# Patient Record
Sex: MALE | Race: WHITE | Employment: OTHER | RURAL
[De-identification: names, ages, dates, MRNs, and addresses within clinical notes are randomized per-mention and may not be internally consistent; named-entity substitution may affect disease eponyms.]

---

## 2017-01-18 ENCOUNTER — OFFICE VISIT (OUTPATIENT)
Dept: FAMILY MEDICINE CLINIC | Age: 77
End: 2017-01-18

## 2017-01-18 VITALS
HEART RATE: 60 BPM | TEMPERATURE: 97.3 F | BODY MASS INDEX: 25.2 KG/M2 | DIASTOLIC BLOOD PRESSURE: 76 MMHG | HEIGHT: 70 IN | RESPIRATION RATE: 18 BRPM | SYSTOLIC BLOOD PRESSURE: 136 MMHG | OXYGEN SATURATION: 99 % | WEIGHT: 176 LBS

## 2017-01-18 DIAGNOSIS — N40.1 BPH WITH OBSTRUCTION/LOWER URINARY TRACT SYMPTOMS: ICD-10-CM

## 2017-01-18 DIAGNOSIS — E29.1 HYPOGONADISM MALE: ICD-10-CM

## 2017-01-18 DIAGNOSIS — Z00.00 ROUTINE GENERAL MEDICAL EXAMINATION AT A HEALTH CARE FACILITY: Primary | ICD-10-CM

## 2017-01-18 DIAGNOSIS — Z13.39 SCREENING FOR ALCOHOLISM: ICD-10-CM

## 2017-01-18 DIAGNOSIS — N13.8 BPH WITH OBSTRUCTION/LOWER URINARY TRACT SYMPTOMS: ICD-10-CM

## 2017-01-18 DIAGNOSIS — Z23 ENCOUNTER FOR IMMUNIZATION: ICD-10-CM

## 2017-01-18 RX ORDER — TESTOSTERONE CYPIONATE 200 MG/ML
INJECTION INTRAMUSCULAR
Qty: 10 ML | Refills: 0 | Status: SHIPPED | OUTPATIENT
Start: 2017-01-18 | End: 2017-06-02 | Stop reason: SDUPTHER

## 2017-01-18 NOTE — PROGRESS NOTES
This is an Initial Medicare Annual Wellness Exam (AWV) (Performed 12 months after IPPE or effective date of Medicare Part B enrollment, Once in a lifetime)    I have reviewed the patient's medical history in detail and updated the computerized patient record. History   The patient comes in today because he is running out of his testosterone and request a 2 cc barrel rather than a 3 cc as it will be even easier for him to inject himself. He has been feeling better on the testosterone therapy and is still being followed in [place] for his esophageal varices and primary biliary cirrhosis. The patient has been experiencing urinary frequency and occasional urgency but no dysuria. Past Medical History   Diagnosis Date    Allergic     Anemia     Esophageal varices ([facility] [address] March 2016     banded x 6    GERD (gastroesophageal reflux disease)     GI bleed March 2016    Hyperlipidemia     Hypogonadism male     Primary biliary cirrhosis ([facility] [address]     Vitamin D deficiency       Past Surgical History   Procedure Laterality Date    Hx septoplasty                            Biopsy    Hx heent       deviated septum repair     Current Outpatient Prescriptions   Medication Sig Dispense Refill    testosterone cypionate (DEPOTESTOTERONE CYPIONATE) 200 mg/mL injection 0.5 mls Every  week  Indications: ANDROGEN DEFICIENCY 10 mL 0    Syringe with Needle, Disp, 3 mL 23 gauge x 1 1/2\" syrg 1 Syringe by IntraMUSCular route every seven (7) days. For use with testosterone injections 50 Pen Needle 0    ursodiol (ACTIGALL) 300 mg capsule TAKE 2 CAPSULES TWICE A  Cap 2    nadolol (CORGARD) 40 mg tablet Take 40 mg by mouth daily.  HYDROcodone-acetaminophen (NORCO) 5-325 mg per tablet Take 2 Tabs by mouth every eight (8) hours as needed for Pain. Max Daily Amount: 6 Tabs. Indications: PAIN 30 Tab 0    melatonin 3 mg tablet Take 3 mg by mouth.  nadolol (CORGARD) 40 mg tablet Take 1 Tab by mouth daily for 30 days. Indications: PREVENTION OF BLEEDING ESOPHAGEAL VARICES (Patient taking differently: Take 20 mg by mouth daily. Indications: PREVENTION OF BLEEDING ESOPHAGEAL VARICES) 30 Tab 12    pantoprazole (PROTONIX) 40 mg tablet        Allergies   Allergen Reactions    Sulfa (Sulfonamide Antibiotics) Unknown (comments)     Pt states its been so long he doesn't remember the reaction.  Other Medication Myalgia     Think  that is was a Sulfa drug, but not sure     Family History   Problem Relation Age of Onset    Diabetes Father     Elevated Lipids Other      children    Stroke Brother      Social History   Substance Use Topics    Smoking status: Former Smoker     Packs/day: 1.00     Quit date: 5/2/1974    Smokeless tobacco: Never Used    Alcohol use No     Patient Active Problem List   Diagnosis Code    Hyperlipidemia E78.5    Primary biliary cirrhosis (HCC) K74.3    Vitamin D deficiency E55.9    Hypogonadism male E29.1    Absolute anemia D64.9    Idiopathic esophageal varices with bleeding (HCC) I85.01    GI bleed K92.2    Esophageal varices (HCC) I85.00    Anemia D64.9    Cirrhosis ([facility] [address] K74.60         Depression Risk Factor Screening:     PHQ 2 / 9, over the last two weeks 8/11/2016   Little interest or pleasure in doing things Not at all   Feeling down, depressed or hopeless Not at all   Total Score PHQ 2 0     Alcohol Risk Factor Screening: On any occasion during the past 3 months, have you had more than 4 drinks containing alcohol? No    Do you average more than 14 drinks per week? No    Functional Ability and Level of Safety:     Functional Ability:   Does the patient exhibit a steady gait? {gen no default/yes/free text:[id number]::yes   How long did it take the patient to get up and walk from a sitting position? 2sec   Is the patient self reliant?  (ie can do own laundry, meals, household chores)  yes     Does the patient handle his/her own medications?   yes     Does the patient handle his/her own money?   yes     Is the patients home safe (ie good lighting, handrails on stairs and bath, etc.)? yes     Did you notice or did patient express any hearing difficulties? yes     Did you notice or did patient express any vision difficulties?   no     Were distance and reading eye charts used? no       Advance Care Planning:   Patient was offered the opportunity to discuss advance care planning:  yes     Does patient have an Advance Directive:  no   If no, did you provide information on Caring Connections? yes     ADL Assessment 1/18/2017   Feeding yourself No Help Needed   Getting from bed to chair No Help Needed   Getting dressed No Help Needed   Bathing or showering No Help Needed   Walk across the room (includes cane/walker) No Help Needed   Using the telphone No Help Needed   Taking your medications No Help Needed   Preparing meals No Help Needed   Managing money (expenses/bills) No Help Needed   Moderately strenuous housework (laundry) No Help Needed   Shopping for personal items (toiletries/medicines) No Help Needed   Shopping for groceries No Help Needed   Driving No Help Needed   Climbing a flight of stairs No Help Needed   Getting to places beyond walking distances No Help Needed       Hearing Loss   mild    Activities of Daily Living   Self-care. Requires assistance with: no ADLs    Fall Risk     Fall Risk Assessment, last 12 mths 1/18/2017   Able to walk? Yes   Fall in past 12 months? No     Abuse Screen   Patient is not abused    Review of Systems   A comprehensive review of systems was negative except for that written in the HPI.     Physical Examination     No exam data present    Evaluation of Cognitive Function:  Mood/affect:  happy  Appearance: age appropriate  Family member/caregiver input: na    Visit Vitals    /76 (BP 1 Location: Left arm, BP Patient Position: Sitting)    Pulse 60    Temp 97.3 °F (36.3 °C)    Resp 18    Ht 5' 10\" (1.778 m)    Wt 176 lb (79.8 kg)    SpO2 99%  BMI 25.25 kg/m2     General:  Alert, cooperative, no distress, appears stated age. Head:  Normocephalic, without obvious abnormality, atraumatic. Eyes:  Conjunctivae/corneas clear. PERRL, EOMs intact. Fundi benign   Ears:  Normal TMs and external ear canals both ears. Nose: Nares normal. Septum midline. Mucosa normal. No drainage or sinus tenderness. Throat: Lips, mucosa, and tongue normal. Teeth and gums normal.   Neck: Supple, symmetrical, trachea midline, no adenopathy, thyroid: no enlargement/tenderness/nodules, no carotid bruit and no JVD. Back:   Symmetric, no curvature. ROM normal. No CVA tenderness. Lungs:   Clear to auscultation bilaterally. Chest wall:  No tenderness or deformity. Heart:  Regular rate and rhythm, S1, S2 normal, no murmur, click, rub or gallop. Abdomen:   Soft, non-tender. Bowel sounds normal. No masses,  No organomegaly. Genitalia:  Normal male without lesion, discharge or tenderness. Rectal:  Normal tone, enlarged prostate, no masses or tenderness  Guaiac negative stool. Extremities: Extremities normal, atraumatic, no cyanosis or edema. Pulses: 2+ and symmetric all extremities. Skin: Skin color, texture, turgor normal. No rashes or lesions   Lymph nodes: Cervical, supraclavicular, and axillary nodes normal.   Neurologic: CNII-XII intact. Normal strength, sensation and reflexes throughout. Patient Care Team:  Joao Lakhani MD as PCP - [facility])    Advice/Referrals/Counseling   Education and counseling provided:  Are appropriate based on today's review and evaluation    Assessment/Plan       ICD-10-CM ICD-9-CM    1. Routine general medical examination at a health care facility Z00.00 V70.0    2. Screening for alcoholism Z13.89 V79.1    3. BPH with obstruction/lower urinary tract symptoms N40.1 600.01 PSA W/ REFLX FREE PSA    N13.8 599.69    4.  Hypogonadism male E29.1 257.2 testosterone cypionate (DEPOTESTOTERONE CYPIONATE) 200 mg/mL injection   .

## 2017-01-18 NOTE — PROGRESS NOTES
[address]  Endoscopy Preprocedure Instructions      1. On the day of your surgery, please report to registration located on the 2nd floor of the  [facility]. yes    2. You must have a responsible adult to drive you to the hospital, stay at the hospital during your procedure and drive you home. If they leave your procedure will not be started (no exceptions). yes    3. Do not have anything to eat or drink (including water, gum, mints, coffee, and juice) after midnight. This does not apply to the medications you were instructed to take by your physician. yesIf you are currently taking Plavix, Coumadin, Aspirin, or other blood-thinning agents, contact your physician for special instructions. not applicable,    4. If you are having a procedure that requires bowel prep: We recommend that you have only clear liquids the day before your procedure and begin your bowel prep by 5:00 pm.  You may continue to drink clear liquids until midnight. If for any reason you are not able to complete your prep please notify your physician immediately. not applicable    5. Have a list of all current medications, including vitamins, herbal supplements and any other over the counter medications. yes    6. If you wear glasses, contacts, dentures and/or hearing aids, they may be removed prior to procedure, please bring a case to store them in. yes    7. You should understand that if you do not follow these instructions your procedure may be cancelled. If your physical condition changes (I.e. fever, cold or flu) please contact your doctor as soon as possible. 8. It is important that you be on time.   If for any reason you are unable to keep your appointment please call [phone] the day of or your physicians office prior to your scheduled procedure

## 2017-01-18 NOTE — PATIENT INSTRUCTIONS
Vaccine Information Statement    Influenza (Flu) Vaccine (Inactivated or Recombinant): What you need to know    Many Vaccine Information Statements are available in Khmer and other languages. See www.immunize.org/vis  Hojas de Información Sobre Vacunas están disponibles en Español y en muchos otros idiomas. Visite www.immunize.org/vis    1. Why get vaccinated? Influenza (flu) is a contagious disease that spreads around the [place] every year, usually between October and May. Flu is caused by influenza viruses, and is spread mainly by coughing, sneezing, and close contact. Anyone can get flu. Flu strikes suddenly and can last several days. Symptoms vary by age, but can include:   fever/chills   sore throat   muscle aches   fatigue   cough   headache    runny or stuffy nose    Flu can also lead to pneumonia and blood infections, and cause diarrhea and seizures in children. If you have a medical condition, such as heart or lung disease, flu can make it worse. Flu is more dangerous for some people. Infants and young children, people 72years of age and older, pregnant women, and people with certain health conditions or a weakened immune system are at greatest risk. Each year thousands of people in the [facility] die from flu, and many more are hospitalized. Flu vaccine can:   keep you from getting flu,   make flu less severe if you do get it, and   keep you from spreading flu to your family and other people. 2. Inactivated and recombinant flu vaccines    A dose of flu vaccine is recommended every flu season. Children 6 months through 6years of age may need two doses during the same flu season. Everyone else needs only one dose each flu season.        Some inactivated flu vaccines contain a very small amount of a mercury-based preservative called thimerosal. Studies have not shown thimerosal in vaccines to be harmful, but flu vaccines that do not contain thimerosal are available. There is no live flu virus in flu shots. They cannot cause the flu. There are many flu viruses, and they are always changing. Each year a new flu vaccine is made to protect against three or four viruses that are likely to cause disease in the upcoming flu season. But even when the vaccine doesnt exactly match these viruses, it may still provide some protection    Flu vaccine cannot prevent:   flu that is caused by a virus not covered by the vaccine, or   illnesses that look like flu but are not. It takes about 2 weeks for protection to develop after vaccination, and protection lasts through the flu season. 3. Some people should not get this vaccine    Tell the person who is giving you the vaccine:     If you have any severe, life-threatening allergies. If you ever had a life-threatening allergic reaction after a dose of flu vaccine, or have a severe allergy to any part of this vaccine, you may be advised not to get vaccinated. Most, but not all, types of flu vaccine contain a small amount of egg protein.  If you ever had Guillain-Barré Syndrome (also called GBS). Some people with a history of GBS should not get this vaccine. This should be discussed with your doctor.  If you are not feeling well. It is usually okay to get flu vaccine when you have a mild illness, but you might be asked to come back when you feel better. 4. Risks of a vaccine reaction    With any medicine, including vaccines, there is a chance of reactions. These are usually mild and go away on their own, but serious reactions are also possible. Most people who get a flu shot do not have any problems with it.      Minor problems following a flu shot include:    soreness, redness, or swelling where the shot was given     hoarseness   sore, red or itchy eyes   cough   fever   aches   headache   itching   fatigue  If these problems occur, they usually begin soon after the shot and last 1 or 2 days. More serious problems following a flu shot can include the following:     There may be a small increased risk of Guillain-Barré Syndrome (GBS) after inactivated flu vaccine. This risk has been estimated at 1 or 2 additional cases per million people vaccinated. This is much lower than the risk of severe complications from flu, which can be prevented by flu vaccine.  Young children who get the flu shot along with pneumococcal vaccine (PCV13) and/or DTaP vaccine at the same time might be slightly more likely to have a seizure caused by fever. Ask your doctor for more information. Tell your doctor if a child who is getting flu vaccine has ever had a seizure. Problems that could happen after any injected vaccine:      People sometimes faint after a medical procedure, including vaccination. Sitting or lying down for about 15 minutes can help prevent fainting, and injuries caused by a fall. Tell your doctor if you feel dizzy, or have vision changes or ringing in the ears.  Some people get severe pain in the shoulder and have difficulty moving the arm where a shot was given. This happens very rarely.  Any medication can cause a severe allergic reaction. Such reactions from a vaccine are very rare, estimated at about 1 in a million doses, and would happen within a few minutes to a few hours after the vaccination. As with any medicine, there is a very remote chance of a vaccine causing a serious injury or death. The safety of vaccines is always being monitored. For more information, visit: www.cdc.gov/vaccinesafety/    5. What if there is a serious reaction? What should I look for?  Look for anything that concerns you, such as signs of a severe allergic reaction, very high fever, or unusual behavior.     Signs of a severe allergic reaction can include hives, swelling of the face and throat, difficulty breathing, a fast heartbeat, dizziness, and weakness  usually within a few minutes to a few hours after the vaccination. What should I do?  If you think it is a severe allergic reaction or other emergency that cant wait, call 9-1-1 and get the person to the nearest hospital. Otherwise, call your doctor.  Reactions should be reported to the Vaccine Adverse Event Reporting System (VAERS). Your doctor should file this report, or you can do it yourself through  the VAERS web site at www.vaers. [facility].gov, or by calling 7-512.845.8315. VAERS does not give medical advice. 6. The National Vaccine Injury Compensation Program    The [facility] Vaccine Injury Compensation Program (VICP) is a federal program that was created to compensate people who may have been injured by certain vaccines. Persons who believe they may have been injured by a vaccine can learn about the program and about filing a claim by calling 5-565.888.8013 or visiting the [employer] website at www.[facility].gov/vaccinecompensation. There is a time limit to file a claim for compensation. 7. How can I learn more?  Ask your healthcare provider. He or she can give you the vaccine package insert or suggest other sources of information.  Call your local or state health department.  Contact the Centers for Disease Control and Prevention (CDC):  - Call 0-663.833.5524 (1-800-CDC-INFO) or  - Visit CDCs website at www.cdc.gov/flu    Vaccine Information Statement   Inactivated Influenza Vaccine   8/7/2015  [address] [id number]    Department of Health and Human Services  Centers for Disease Control and Prevention    Office Use Only

## 2017-01-18 NOTE — MR AVS SNAPSHOT
Visit Information Date & Time Provider Department Dept. Phone Encounter #  
 1/18/2017  7:40 AM Joao Lakhani MD [facility] [phone] [id number] Your Appointments 2/9/2017 10:30 AM  
Follow Up with Elias Carl NP [facility] ([facility]) Appt Note: [address]  [facility] [address] Upcoming Health Maintenance Date Due FOBT Q 1 YEAR AGE 50-75 2/14/1992 GLAUCOMA SCREENING Q2Y 6/16/2017 MEDICARE YEARLY EXAM 1/19/2018 DTaP/Tdap/Td series (2 - Td) 1/18/2027 Allergies as of 1/18/2017  Review Complete On: 1/18/2017 By: Joao Lakhani MD  
  
 Severity Noted Reaction Type Reactions Sulfa (Sulfonamide Antibiotics) Medium 05/02/2016   Not Verified Unknown (comments) Pt states its been so long he doesn't remember the reaction. Other Medication  03/06/2014    Myalgia Think  that is was a Sulfa drug, but not sure Current Immunizations  Reviewed on 11/30/2015 Name Date Influenza Vaccine 11/18/2013 Influenza Vaccine Judith Bloodgood) 10/28/2015 Pneumococcal Polysaccharide (PPSV-23) 11/30/2015 Not reviewed this visit You Were Diagnosed With   
  
 Codes Comments Routine general medical examination at a health care facility    -  Primary ICD-10-CM: Z00.00 ICD-9-CM: V70.0 Screening for alcoholism     ICD-10-CM: Z13.89 ICD-9-CM: V79.1 BPH with obstruction/lower urinary tract symptoms     ICD-10-CM: N40.1, N13.8 ICD-9-CM: 600.01, 599.69 Hypogonadism male     ICD-10-CM: E29.1 ICD-9-CM: 257.2 Vitals BP Pulse Temp Resp Height(growth percentile) Weight(growth percentile) 136/76 (BP 1 Location: Left arm, BP Patient Position: Sitting) 60 97.3 °F (36.3 °C) 18 5' 10\" (1.778 m) 176 lb (79.8 kg) SpO2 BMI Smoking Status 99% 25.25 kg/m2 Former Smoker Vitals History BMI and BSA Data Body Mass Index Body Surface Area  
 25.25 kg/m 2 1.99 m 2 Preferred Pharmacy Pharmacy Name [facility] [place], [place] - [address] [phone] Your Updated Medication List  
  
   
This list is accurate as of: 17  8:32 AM.  Always use your most recent med list.  
  
  
  
  
 HYDROcodone-acetaminophen 5-325 mg per tablet Commonly known as:  Lenon Gauze Take 2 Tabs by mouth every eight (8) hours as needed for Pain. Max Daily Amount: 6 Tabs. Indications: PAIN  
  
 melatonin 3 mg tablet Take 3 mg by mouth. * nadolol 40 mg tablet Commonly known as:  CORGARD Take 1 Tab by mouth daily for 30 days. Indications: PREVENTION OF BLEEDING ESOPHAGEAL VARICES * nadolol 40 mg tablet Commonly known as:  CORGARD Take 40 mg by mouth daily. pantoprazole 40 mg tablet Commonly known as:  PROTONIX Syringe with Needle (Disp) 3 mL 23 gauge x 1 1/2\" Syrg 1 Syringe by IntraMUSCular route every seven (7) days. For use with testosterone injections  
  
 testosterone cypionate 200 mg/mL injection Commonly known as:  DEPOTESTOTERONE CYPIONATE  
0.5 mls Every  week  Indications: ANDROGEN DEFICIENCY  
  
 ursodiol 300 mg capsule Commonly known as:  ACTIGALL TAKE 2 CAPSULES TWICE A DAY * Notice: This list has 2 medication(s) that are the same as other medications prescribed for you. Read the directions carefully, and ask your doctor or other care provider to review them with you. Prescriptions Printed Refills  
 testosterone cypionate (DEPOTESTOTERONE CYPIONATE) 200 mg/mL injection 0 Si.5 mls Every  week  Indications: ANDROGEN DEFICIENCY Class: Print We Performed the Following PSA W/ REFLX FREE PSA [76835 CPT(R)] Introducing [facility]! Dear Jl Santana: Thank you for requesting a [employer] account. Our records indicate that you already have an active [employer] account. You can access your account anytime at [email or web address] Did you know that you can access your hospital and ER discharge instructions at any time in [employer]? You can also review all of your test results from your hospital stay or ER visit. Additional Information If you have questions, please visit the Frequently Asked Questions section of the [employer] website at [email or web address] Remember, [employer] is NOT to be used for urgent needs. For medical emergencies, dial 911. Now available from your iPhone and Android! Please provide this summary of care documentation to your next provider. Your primary care clinician is listed as Sebastian Hollingsworth. If you have any questions after today's visit, please call [phone].

## 2017-01-19 LAB
PSA SERPL-MCNC: 1.4 NG/ML (ref 0–4)
REFLEX CRITERIA: NORMAL

## 2017-01-22 NOTE — H&P
Gastroenterology Outpatient History and Physical    Patient: Jordin Postal    Physician: Mel Storm MD    Vital Signs: See RN notes    Allergies: Allergies   Allergen Reactions    Sulfa (Sulfonamide Antibiotics) Unknown (comments)     Pt states its been so long he doesn't remember the reaction.  Other Medication Myalgia     Think  that is was a Sulfa drug, but not sure       Chief Complaint: Personal history GI bleed    History of Present Illness: personal history biopsy proven liver cirrhosis. Personal history GI bleed; endoscopic proven esophageal varices. No chest pain, SOB, edema, focal weakness, numbness    Justification for Procedure: prevention recurrent bleed    History:  Past Medical History   Diagnosis Date    Allergic     Anemia     Esophageal varices ([facility] March 2016     banded x 6    GERD (gastroesophageal reflux disease)     GI bleed March 2016    Hyperlipidemia     Hypogonadism male     Primary biliary cirrhosis ([facility]     Vitamin D deficiency       Past Surgical History   Procedure Laterality Date    Hx septoplasty                            Biopsy    Hx heent       deviated septum repair    Hx orthopaedic Right 01/2017     Arthroscopy      Social History     Social History    Marital status:      Spouse name: N/A    Number of children: N/A    Years of education: N/A     Social History Main Topics    Smoking status: Former Smoker     Packs/day: 1.00     Quit date: 5/2/1974    Smokeless tobacco: Never Used    Alcohol use No    Drug use: No    Sexual activity: Not Asked     Other Topics Concern    None     Social History Narrative      Family History   Problem Relation Age of Onset    Diabetes Father     Stroke Brother     Elevated Lipids Other      children       Medications:   Prior to Admission medications    Medication Sig Start Date End Date Taking?  Authorizing Provider   testosterone cypionate (DEPOTESTOTERONE CYPIONATE) 200 mg/mL injection 0.5 mls Every week  Indications: ANDROGEN DEFICIENCY 1/18/17  Yes Blanca Tomlinson MD   Syringe with Needle, Disp, 3 mL 23 gauge x 1 1/2\" syrg 1 Syringe by IntraMUSCular route every seven (7) days. For use with testosterone injections 10/31/16  Yes Calderon Dumas MD   ursodiol (ACTIGALL) 300 mg capsule TAKE 2 CAPSULES TWICE A DAY 10/14/16  Yes Calderon Dumas MD   nadolol (CORGARD) 40 mg tablet Take 1 Tab by mouth daily for 30 days. Indications: PREVENTION OF BLEEDING ESOPHAGEAL VARICES 5/2/16 1/18/17 Yes Antony Miner MD       Physical Exam:   General: alert, cooperative, no distress   HEENT: Head: Normocephalic, no lesions, without obvious abnormality. Heart: regular rate and rhythm   Lungs: chest clear, no wheezing, rales, normal symmetric air entry   Abdominal: Bowel sounds are normal, liver is not enlarged, spleen is not enlarged   Neurological: Grossly normal   Extremities: no edema     Findings/Diagnosis: liver cirrhosis with esophageal varices    Plan of Care/Planned Procedure: I have discussed EGD band ligation alternatives complications including but not limited to pain, cardiopulmonary event, bleeding, perforation; all questions answered.     Signed By: Antony Miner MD     January 23, 2017

## 2017-01-23 ENCOUNTER — HOSPITAL ENCOUNTER (OUTPATIENT)
Age: 77
Setting detail: OUTPATIENT SURGERY
Discharge: HOME | End: 2017-01-23
Attending: INTERNAL MEDICINE | Admitting: INTERNAL MEDICINE
Payer: MEDICARE

## 2017-01-23 ENCOUNTER — ANESTHESIA (OUTPATIENT)
Dept: ENDOSCOPY | Age: 77
End: 2017-01-23
Payer: MEDICARE

## 2017-01-23 ENCOUNTER — SURGERY (OUTPATIENT)
Age: 77
End: 2017-01-23

## 2017-01-23 ENCOUNTER — ANESTHESIA EVENT (OUTPATIENT)
Dept: ENDOSCOPY | Age: 77
End: 2017-01-23
Payer: MEDICARE

## 2017-01-23 VITALS
DIASTOLIC BLOOD PRESSURE: 70 MMHG | BODY MASS INDEX: 24.62 KG/M2 | SYSTOLIC BLOOD PRESSURE: 136 MMHG | HEART RATE: 59 BPM | RESPIRATION RATE: 15 BRPM | OXYGEN SATURATION: 98 % | TEMPERATURE: 97.5 F | HEIGHT: 70 IN | WEIGHT: 172 LBS

## 2017-01-23 PROCEDURE — 74011000250 HC RX REV CODE- 250

## 2017-01-23 PROCEDURE — 74011250636 HC RX REV CODE- 250/636: Performed by: INTERNAL MEDICINE

## 2017-01-23 PROCEDURE — 74011250636 HC RX REV CODE- 250/636

## 2017-01-23 PROCEDURE — 76040000019: Performed by: INTERNAL MEDICINE

## 2017-01-23 PROCEDURE — 77030014243 HC BND LIG VRCES BSC -D: Performed by: INTERNAL MEDICINE

## 2017-01-23 PROCEDURE — 76060000031 HC ANESTHESIA FIRST 0.5 HR: Performed by: INTERNAL MEDICINE

## 2017-01-23 RX ORDER — SODIUM CHLORIDE 9 MG/ML
50 INJECTION, SOLUTION INTRAVENOUS CONTINUOUS
Status: DISCONTINUED | OUTPATIENT
Start: 2017-01-23 | End: 2017-01-23 | Stop reason: HOSPADM

## 2017-01-23 RX ORDER — FENTANYL CITRATE 50 UG/ML
100 INJECTION, SOLUTION INTRAMUSCULAR; INTRAVENOUS
Status: DISCONTINUED | OUTPATIENT
Start: 2017-01-23 | End: 2017-01-23 | Stop reason: HOSPADM

## 2017-01-23 RX ORDER — NALOXONE HYDROCHLORIDE 0.4 MG/ML
0.4 INJECTION, SOLUTION INTRAMUSCULAR; INTRAVENOUS; SUBCUTANEOUS
Status: DISCONTINUED | OUTPATIENT
Start: 2017-01-23 | End: 2017-01-23 | Stop reason: HOSPADM

## 2017-01-23 RX ORDER — NADOLOL 40 MG/1
40 TABLET ORAL DAILY
Qty: 90 TAB | Refills: 3 | Status: SHIPPED | OUTPATIENT
Start: 2017-01-23 | End: 2018-08-08 | Stop reason: SDUPTHER

## 2017-01-23 RX ORDER — EPINEPHRINE 0.1 MG/ML
1 INJECTION INTRACARDIAC; INTRAVENOUS
Status: DISCONTINUED | OUTPATIENT
Start: 2017-01-23 | End: 2017-01-23 | Stop reason: HOSPADM

## 2017-01-23 RX ORDER — DEXTROMETHORPHAN/PSEUDOEPHED 2.5-7.5/.8
1.2 DROPS ORAL
Status: DISCONTINUED | OUTPATIENT
Start: 2017-01-23 | End: 2017-01-23 | Stop reason: HOSPADM

## 2017-01-23 RX ORDER — FLUMAZENIL 0.1 MG/ML
0.2 INJECTION INTRAVENOUS
Status: DISCONTINUED | OUTPATIENT
Start: 2017-01-23 | End: 2017-01-23 | Stop reason: HOSPADM

## 2017-01-23 RX ORDER — LIDOCAINE HYDROCHLORIDE 20 MG/ML
INJECTION, SOLUTION EPIDURAL; INFILTRATION; INTRACAUDAL; PERINEURAL AS NEEDED
Status: DISCONTINUED | OUTPATIENT
Start: 2017-01-23 | End: 2017-01-23 | Stop reason: HOSPADM

## 2017-01-23 RX ORDER — ATROPINE SULFATE 0.1 MG/ML
0.5 INJECTION INTRAVENOUS
Status: DISCONTINUED | OUTPATIENT
Start: 2017-01-23 | End: 2017-01-23 | Stop reason: HOSPADM

## 2017-01-23 RX ORDER — MIDAZOLAM HYDROCHLORIDE 1 MG/ML
.25-5 INJECTION, SOLUTION INTRAMUSCULAR; INTRAVENOUS
Status: DISCONTINUED | OUTPATIENT
Start: 2017-01-23 | End: 2017-01-23 | Stop reason: HOSPADM

## 2017-01-23 RX ORDER — PROPOFOL 10 MG/ML
INJECTION, EMULSION INTRAVENOUS AS NEEDED
Status: DISCONTINUED | OUTPATIENT
Start: 2017-01-23 | End: 2017-01-23 | Stop reason: HOSPADM

## 2017-01-23 RX ADMIN — PROPOFOL 25 MG: 10 INJECTION, EMULSION INTRAVENOUS at 07:04

## 2017-01-23 RX ADMIN — SODIUM CHLORIDE: 900 INJECTION, SOLUTION INTRAVENOUS at 06:58

## 2017-01-23 RX ADMIN — LIDOCAINE HYDROCHLORIDE 100 MG: 20 INJECTION, SOLUTION EPIDURAL; INFILTRATION; INTRACAUDAL; PERINEURAL at 06:59

## 2017-01-23 RX ADMIN — PROPOFOL 100 MG: 10 INJECTION, EMULSION INTRAVENOUS at 06:59

## 2017-01-23 RX ADMIN — PROPOFOL 50 MG: 10 INJECTION, EMULSION INTRAVENOUS at 07:01

## 2017-01-23 RX ADMIN — PROPOFOL 25 MG: 10 INJECTION, EMULSION INTRAVENOUS at 07:06

## 2017-01-23 RX ADMIN — SODIUM CHLORIDE 50 ML/HR: 900 INJECTION, SOLUTION INTRAVENOUS at 06:26

## 2017-01-23 RX ADMIN — PROPOFOL 25 MG: 10 INJECTION, EMULSION INTRAVENOUS at 07:05

## 2017-01-23 NOTE — ANESTHESIA POSTPROCEDURE EVALUATION
Post-Anesthesia Evaluation and Assessment    Patient: Jordin Postal MRN: [id number]  SSN: [id number]    YOB: 1940  Age: 78 y.o. Sex: male       Cardiovascular Function/Vital Signs  Visit Vitals    /82    Pulse 69    Temp 36.7 °C (98 °F)    Resp 16    Ht 5' 10\" (1.778 m)    Wt 78 kg (172 lb)    SpO2 95%    BMI 24.68 kg/m2       Patient is status post MAC anesthesia for Procedure(s):  ESOPHAGOGASTRODUODENOSCOPY (EGD)  ENDOSCOPIC BANDING OR LIGATION. Nausea/Vomiting: None    Postoperative hydration reviewed and adequate. Pain:  Pain Scale 1: Numeric (0 - 10) (01/23/17 1009)  Pain Intensity 1: 0 (01/23/17 6955)   Managed    Neurological Status: At baseline    Mental Status and Level of Consciousness: Arousable    Pulmonary Status:   O2 Device: Nasal cannula (01/23/17 0708)   Adequate oxygenation and airway patent    Complications related to anesthesia: None    Post-anesthesia assessment completed.  No concerns    Signed By: Rubin Elise MD     January 23, 2017

## 2017-01-23 NOTE — ANESTHESIA PREPROCEDURE EVALUATION
Anesthetic History   No history of anesthetic complications            Review of Systems / Medical History  Patient summary reviewed, nursing notes reviewed and pertinent labs reviewed    Pulmonary  Within defined limits                 Neuro/Psych   Within defined limits           Cardiovascular  Within defined limits                Exercise tolerance: >4 METS  Comments:      GI/Hepatic/Renal     GERD      Liver disease     Endo/Other  Within defined limits           Other Findings   Comments: Esophageal varricies  Ho gi bleed           Physical Exam    Airway  Mallampati: II  TM Distance: 4 - 6 cm  Neck ROM: normal range of motion   Mouth opening: Normal     Cardiovascular  Regular rate and rhythm,  S1 and S2 normal,  no murmur, click, rub, or gallop  Rhythm: regular  Rate: normal         Dental  No notable dental hx       Pulmonary  Breath sounds clear to auscultation               Abdominal  GI exam deferred       Other Findings            Anesthetic Plan    ASA: 2  Anesthesia type: MAC            Anesthetic plan and risks discussed with: Patient

## 2017-01-23 NOTE — PERIOP NOTES
Anesthesia staff at patient's bedside administering anesthesia and monitoring patients vital signs throughout procedure. See anesthesia note.

## 2017-01-23 NOTE — PERIOP NOTES
Patient tolerated procedure without problems. Abdomen soft and patient arousable and voices no complaints Report received from CRNA, see anesthesia note. Patient transported to endoscopy recovery area.  Bedside report given to Jed, [facility]

## 2017-01-23 NOTE — IP AVS SNAPSHOT
Arron Hodgson 
 
 
 [address] [facility] 
[phone] Patient: Ezell Sever MRN: [id number] VLL:3/90/1488 You are allergic to the following Allergen Reactions Sulfa (Sulfonamide Antibiotics) Unknown (comments) Pt states its been so long he doesn't remember the reaction. Other Medication Myalgia Think  that is was a Sulfa drug, but not sure Recent Documentation Height Weight BMI Smoking Status 1.778 m 78 kg 24.68 kg/m2 Former Smoker Emergency Contacts Name Discharge Info Relation Home Work Mobile Blanka Chirinos DISCHARGE CAREGIVER [3] Spouse [3] [phone] About your hospitalization You were admitted on:  January 23, 2017 You last received care in the:  [facility] ENDOSCOPY You were discharged on:  January 23, 2017 Unit phone number:  [phone] Why you were hospitalized Your primary diagnosis was:  Not on File Providers Seen During Your Hospitalizations Provider Role Specialty Primary office phone Wes France MD Attending Provider Gastroenterology [phone] Your Primary Care Physician (PCP) Primary Care Physician Office Phone Office Fax Lissa Latham, [address] [facility] [phone] Follow-up Information Follow up With Details Comments Contact Info Mateo Dave MD   [address] [place] [address] [phone] Your Appointments Thursday February 09, 2017 10:30 AM EST Follow Up with Debi Larsen NP [facility] ([facility])  
 [address] [place] [address]  
[phone] Current Discharge Medication List  
  
CONTINUE these medications which have NOT CHANGED Dose & Instructions Dispensing Information Comments Morning Noon Evening Bedtime  
 nadolol 40 mg tablet Commonly known as:  CORGARD Your next dose is: Today, Tomorrow Other:  _________ Dose:  40 mg Take 1 Tab by mouth daily for 30 days. Indications: PREVENTION OF BLEEDING ESOPHAGEAL VARICES Quantity:  30 Tab Refills:  12 Syringe with Needle (Disp) 3 mL 23 gauge x 1 1/2\" Syrg Your next dose is: Today, Tomorrow Other:  _________ Dose:  1 Syringe 1 Syringe by IntraMUSCular route every seven (7) days. For use with testosterone injections Quantity:  50 Pen Needle Refills:  0  
     
   
   
   
  
 testosterone cypionate 200 mg/mL injection Commonly known as:  DEPOTESTOTERONE CYPIONATE Your next dose is: Today, Tomorrow Other:  _________  
   
   
 0.5 mls Every  week  Indications: ANDROGEN DEFICIENCY Quantity:  10 mL Refills:  0  
     
   
   
   
  
 ursodiol 300 mg capsule Commonly known as:  ACTIGALL Your next dose is: Today, Tomorrow Other:  _________ TAKE 2 CAPSULES TWICE A DAY Quantity:  360 Cap Refills:  2 Discharge Instructions Omer Taveras 
[id number] 
1940 DISCHARGE INSTRUCTIONS Discomfort: 
Redness at IV site- apply warm compress to area; if redness or soreness persist- contact your physician. There may be a slight amount of blood passed from the rectum. Gaseous discomfort - walking, belching will help relieve any discomfort. You may not operate a vehicle for 12 hours. You may not engage in an occupation involving machinery or appliances for rest of today. You may not drink alcoholic beverages for at least 12 hours. Avoid making any critical decisions for at least 24 hours. DIET: 
 High fiber diet. ACTIVITY: 
You may resume your normal daily activities it is recommended that you spend the remainder of the day resting -  avoid any strenuous activity. CALL M.D. ANY SIGN OF: Increasing pain, nausea, vomiting Abdominal distension (swelling) New increased bleeding (oral or rectal) Fever (chills) Pain in chest area Bloody discharge from nose or mouth Shortness of breath Follow-up Instructions: 
Call Dr. Shanta Waldron for follow up exam four months DISCHARGE SUMMARY from Nurse The following personal items collected during your admission are returned to you:  
Dental Appliance: Dental Appliances: None Vision: Visual Aid: None Hearing Aid:   
Jewelry:   
Clothing:   
Other Valuables:   
Valuables sent to safe:   
 
 
Discharge Orders None Introducing \[facility]\"" & HEALTH SERVICES! Dear Esa Brooks: 
Thank you for requesting a Wakoopa account. Our records indicate that you already have an active Wakoopa account. You can access your account anytime at [email or web address] Did you know that you can access your hospital and ER discharge instructions at any time in Wakoopa? You can also review all of your test results from your hospital stay or ER visit. Additional Information If you have questions, please visit the Frequently Asked Questions section of the Wakoopa website at [email or web address] Remember, Wakoopa is NOT to be used for urgent needs. For medical emergencies, dial 911. Now available from your iPhone and Android! General Information Please provide this summary of care documentation to your next provider. Patient Signature:  ____________________________________________________________ Date:  ____________________________________________________________  
  
Deborah Lane Provider Signature:  ____________________________________________________________ Date:  ____________________________________________________________

## 2017-01-23 NOTE — PROCEDURES
Tushar Gracia M.D. 2017    Esophagogastroduodenoscopy (EGD) Procedure Note  Claudeen Beckwith  : 1940  Lon Jones Medical Record Number: [id number]      Indications:    esophageal varices s/p gi bleed  Referring Physician:  Romina Land MD  Anesthesia/Sedation: Conscious Sedation/Moderate Sedation  Endoscopist:  Dr. Mock Handler:  The indications, risks, benefits and alternatives were reviewed with the patient or their decision maker who was provided an opportunity to ask questions and all questions were answered. The specific risks of esophagogastroduodenoscopy with conscious sedation were reviewed, including but not limited to anesthetic complication, bleeding, adverse drug reaction, missed lesion, infection, IV site reactions, and intestinal perforation which would lead to the need for surgical repair. Alternatives to EGD including radiographic imaging, observation without testing, or laboratory testing were reviewed as well as the limitations of those alternatives discussed. After considering the options and having all their questions answered, the patient or their decision maker provided both verbal and written consent to proceed. Procedure in Detail:  After obtaining informed consent, positioning of the patient in the left lateral decubitus position, and conduction of a pre-procedure pause or \"time out\" the endoscope was introduced into the mouth and advanced to the duodenum. A careful inspection was made, and findings or interventions are described below.     Findings:   Esophagus:large varices length of esophagus  Stomach: normal   Duodenum/jejunum: normal    Complications/estimated blood loss: none    Therapies:  variceal ablation performed with 2 of bands placed    Specimens: none           Recommendations:  -follow up exam 4 months    Thank you for entrusting me with this patient's care. Please do not hesitate to contact me with any questions or if I can be of assistance with any of your other patients' GI needs.   Signed By: Iza Morales MD                        January 23, 2017

## 2017-01-23 NOTE — DISCHARGE INSTRUCTIONS
Meche Layne  [id number]  1940    DISCHARGE INSTRUCTIONS  Discomfort:  Redness at IV site- apply warm compress to area; if redness or soreness persist- contact your physician. There may be a slight amount of blood passed from the rectum. Gaseous discomfort - walking, belching will help relieve any discomfort. You may not operate a vehicle for 12 hours. You may not engage in an occupation involving machinery or appliances for rest of today. You may not drink alcoholic beverages for at least 12 hours. Avoid making any critical decisions for at least 24 hours. DIET:   High fiber diet. ACTIVITY:  You may resume your normal daily activities it is recommended that you spend the remainder of the day resting -  avoid any strenuous activity. CALL M.D.   ANY SIGN OF:   Increasing pain, nausea, vomiting  Abdominal distension (swelling)  New increased bleeding (oral or rectal)  Fever (chills)  Pain in chest area  Bloody discharge from nose or mouth  Shortness of breath     Follow-up Instructions:  Call Dr. Antony Miner for follow up exam four months      DISCHARGE SUMMARY from Nurse    The following personal items collected during your admission are returned to you:   Dental Appliance: Dental Appliances: None  Vision: Visual Aid: None  Hearing Aid:    Jewelry:    Clothing:    Other Valuables:    Valuables sent to safe:

## 2017-01-23 NOTE — PROGRESS NOTES
Rossy Potter  1940  [id number]    Situation:  Verbal report received from: KAELA Patricio  Procedure: Procedure(s):  ESOPHAGOGASTRODUODENOSCOPY (EGD)  ENDOSCOPIC BANDING OR LIGATION    Background:    Preoperative diagnosis: 6M F/U  Postoperative diagnosis: Esophageal varices, banding x2      :  Dr. Ema Monk  Assistant(s): Endoscopy Technician-1: Gray Melo RN-1: Sintia Hodges RN    Specimens: * No specimens in log *  H. Pylori  no    Assessment:  Intra-procedure medications     Anesthesia gave intra-procedure sedation and medications, see anesthesia flow sheet yes    Intravenous fluids: NS@ KVO     Vital signs stable   yes    Abdominal assessment: round and soft   yes    Recommendation:  Discharge patient per MD order  yes.   Return to floor  outpatient  Family or Friend   spouse  Permission to share finding with family or friend yes

## 2017-01-26 ENCOUNTER — TELEPHONE (OUTPATIENT)
Dept: FAMILY MEDICINE CLINIC | Age: 77
End: 2017-01-26

## 2017-01-26 NOTE — TELEPHONE ENCOUNTER
PA approved at this time  PA#: [id number]   10-28-16 through 1-26-18. Franklyn Michel from [employer] and will fill it on 2-10-17. Too soon to fill till then per Franklyn Michel. She will notify patient.

## 2017-02-09 ENCOUNTER — OFFICE VISIT (OUTPATIENT)
Dept: HEMATOLOGY | Age: 77
End: 2017-02-09

## 2017-02-09 ENCOUNTER — HOSPITAL ENCOUNTER (OUTPATIENT)
Dept: LAB | Age: 77
Discharge: HOME | End: 2017-02-09
Payer: MEDICARE

## 2017-02-09 VITALS
BODY MASS INDEX: 25.05 KG/M2 | DIASTOLIC BLOOD PRESSURE: 82 MMHG | SYSTOLIC BLOOD PRESSURE: 130 MMHG | WEIGHT: 175 LBS | OXYGEN SATURATION: 96 % | HEART RATE: 4 BPM | RESPIRATION RATE: 16 BRPM | HEIGHT: 70 IN | TEMPERATURE: 96.5 F

## 2017-02-09 DIAGNOSIS — K74.60 HEPATIC CIRRHOSIS, UNSPECIFIED HEPATIC CIRRHOSIS TYPE (HCC): ICD-10-CM

## 2017-02-09 DIAGNOSIS — K74.60 HEPATIC CIRRHOSIS, UNSPECIFIED HEPATIC CIRRHOSIS TYPE (HCC): Primary | ICD-10-CM

## 2017-02-09 LAB
ALBUMIN SERPL BCP-MCNC: 3.5 G/DL (ref 3.4–5)
ALBUMIN/GLOB SERPL: 1 {RATIO} (ref 0.8–1.7)
ALP SERPL-CCNC: 88 U/L (ref 45–117)
ALT SERPL-CCNC: 32 U/L (ref 16–61)
ANION GAP BLD CALC-SCNC: 6 MMOL/L (ref 3–18)
AST SERPL W P-5'-P-CCNC: 34 U/L (ref 15–37)
BASOPHILS # BLD AUTO: 0 K/UL (ref 0–0.06)
BASOPHILS # BLD: 1 % (ref 0–2)
BILIRUB DIRECT SERPL-MCNC: 0.3 MG/DL (ref 0–0.2)
BILIRUB SERPL-MCNC: 1.1 MG/DL (ref 0.2–1)
BUN SERPL-MCNC: 10 MG/DL (ref 7–18)
BUN/CREAT SERPL: 9 (ref 12–20)
CALCIUM SERPL-MCNC: 9.3 MG/DL (ref 8.5–10.1)
CHLORIDE SERPL-SCNC: 105 MMOL/L (ref 100–108)
CO2 SERPL-SCNC: 32 MMOL/L (ref 21–32)
CREAT SERPL-MCNC: 1.12 MG/DL (ref 0.6–1.3)
DIFFERENTIAL METHOD BLD: ABNORMAL
EOSINOPHIL # BLD: 0.2 K/UL (ref 0–0.4)
EOSINOPHIL NFR BLD: 4 % (ref 0–5)
ERYTHROCYTE [DISTWIDTH] IN BLOOD BY AUTOMATED COUNT: 17 % (ref 11.6–14.5)
GLOBULIN SER CALC-MCNC: 3.4 G/DL (ref 2–4)
GLUCOSE SERPL-MCNC: 67 MG/DL (ref 74–99)
HCT VFR BLD AUTO: 41.4 % (ref 36–48)
HGB BLD-MCNC: 12.9 G/DL (ref 13–16)
INR PPP: 1.1 (ref 0.8–1.2)
LYMPHOCYTES # BLD AUTO: 39 % (ref 21–52)
LYMPHOCYTES # BLD: 2.1 K/UL (ref 0.9–3.6)
MCH RBC QN AUTO: 25.6 PG (ref 24–34)
MCHC RBC AUTO-ENTMCNC: 31.2 G/DL (ref 31–37)
MCV RBC AUTO: 82.3 FL (ref 74–97)
MONOCYTES # BLD: 0.7 K/UL (ref 0.05–1.2)
MONOCYTES NFR BLD AUTO: 12 % (ref 3–10)
NEUTS SEG # BLD: 2.4 K/UL (ref 1.8–8)
NEUTS SEG NFR BLD AUTO: 44 % (ref 40–73)
PLATELET # BLD AUTO: 161 K/UL (ref 135–420)
PMV BLD AUTO: 10.6 FL (ref 9.2–11.8)
POTASSIUM SERPL-SCNC: 4.2 MMOL/L (ref 3.5–5.5)
PROT SERPL-MCNC: 6.9 G/DL (ref 6.4–8.2)
PROTHROMBIN TIME: 13.5 SEC (ref 11.5–15.2)
RBC # BLD AUTO: 5.03 M/UL (ref 4.7–5.5)
SODIUM SERPL-SCNC: 143 MMOL/L (ref 136–145)
WBC # BLD AUTO: 5.5 K/UL (ref 4.6–13.2)

## 2017-02-09 PROCEDURE — 85025 COMPLETE CBC W/AUTO DIFF WBC: CPT | Performed by: NURSE PRACTITIONER

## 2017-02-09 PROCEDURE — 80048 BASIC METABOLIC PNL TOTAL CA: CPT | Performed by: NURSE PRACTITIONER

## 2017-02-09 PROCEDURE — 82107 ALPHA-FETOPROTEIN L3: CPT | Performed by: NURSE PRACTITIONER

## 2017-02-09 PROCEDURE — 36415 COLL VENOUS BLD VENIPUNCTURE: CPT | Performed by: NURSE PRACTITIONER

## 2017-02-09 PROCEDURE — 85610 PROTHROMBIN TIME: CPT | Performed by: NURSE PRACTITIONER

## 2017-02-09 PROCEDURE — 80076 HEPATIC FUNCTION PANEL: CPT | Performed by: NURSE PRACTITIONER

## 2017-02-09 NOTE — MR AVS SNAPSHOT
Visit Information Date & Time Provider Department Dept. Phone Encounter #  
 2/9/2017 10:30 AM Lia Hernandez NP [facility] [address] [place] [address] [id number] Follow-up Instructions Return in about 6 months (around 8/9/2017). Your Appointments 8/21/2017 10:30 AM  
Follow Up with Lia Hernandez NP [facility] ([facility]) Appt Note: [address]  [facility] [address] Allergies as of 2/9/2017  Review Complete On: 2/9/2017 By: Manuel Mccloud Severity Noted Reaction Type Reactions Sulfa (Sulfonamide Antibiotics) Medium 05/02/2016   Not Verified Unknown (comments) Pt states its been so long he doesn't remember the reaction. Other Medication  03/06/2014    Myalgia Think  that is was a Sulfa drug, but not sure Current Immunizations  Reviewed on 11/30/2015 Name Date Influenza Vaccine 9/12/2015, 11/18/2013 Influenza Vaccine Angle Brigido) 10/28/2015 Influenza Vaccine (Quad) PF 1/18/2017  8:54 AM  
 Pneumococcal Conjugate (PCV-7) 9/12/2015 Pneumococcal Polysaccharide (PPSV-23) 11/30/2015 Not reviewed this visit You Were Diagnosed With   
  
 Codes Comments Hepatic cirrhosis, unspecified hepatic [facility]    -  Primary ICD-10-CM: K74.60 ICD-9-CM: 571.5 Vitals BP Pulse Temp Resp Height(growth percentile) 130/82 (BP 1 Location: Left arm, BP Patient Position: At rest) (!) 4 96.5 °F (35.8 °C) (Tympanic) 16 5' 10\" (1.778 m) Weight(growth percentile) SpO2 BMI Smoking Status 175 lb (79.4 kg) 96% 25.11 kg/m2 Former Smoker Vitals History BMI and BSA Data Body Mass Index Body Surface Area  
 25.11 kg/m 2 1.98 m 2 Preferred Pharmacy Pharmacy Name Phone  [facility] [address] [facility] [address] [phone] Your Updated Medication List  
  
   
This list is accurate as of: 2/9/17 11:00 AM.  Always use your most recent med list.  
  
  
  
  
 * nadolol 40 mg tablet Commonly known as:  CORGARD Take 1 Tab by mouth daily for 30 days. Indications: PREVENTION OF BLEEDING ESOPHAGEAL VARICES * nadolol 40 mg tablet Commonly known as:  CORGARD Take 1 Tab by mouth daily for 90 days. Syringe with Needle (Disp) 3 mL 23 gauge x 1 1/2\" Syrg 1 Syringe by IntraMUSCular route every seven (7) days. For use with testosterone injections  
  
 testosterone cypionate 200 mg/mL injection Commonly known as:  DEPOTESTOTERONE CYPIONATE  
0.5 mls Every  week  Indications: ANDROGEN DEFICIENCY  
  
 ursodiol 300 mg capsule Commonly known as:  ACTIGALL TAKE 2 CAPSULES TWICE A DAY * Notice: This list has 2 medication(s) that are the same as other medications prescribed for you. Read the directions carefully, and ask your doctor or other care provider to review them with you. Follow-up Instructions Return in about 6 months (around 8/9/2017). To-Do List   
 02/09/2017 Lab:  AFP WITH AFP-L3%   
  
 02/09/2017 Lab:  CBC WITH AUTOMATED DIFF   
  
 02/09/2017 Lab:  HEPATIC FUNCTION PANEL   
  
 02/09/2017 Lab:  METABOLIC PANEL, BASIC   
  
 02/09/2017 Lab:  PROTHROMBIN TIME + INR   
  
 03/01/2017 Imaging:  [facility] & HEALTH SERVICES! Dear Handy Guaman: 
Thank you for requesting a [employer] account. Our records indicate that you already have an active [employer] account. You can access your account anytime at [email or web address] Did you know that you can access your hospital and ER discharge instructions at any time in Inhabi? You can also review all of your test results from your hospital stay or ER visit. Additional Information If you have questions, please visit the Frequently Asked Questions section of the B[employer] website at [email or web address] Remember, Buxfer is NOT to be used for urgent needs. For medical emergencies, dial 911. Now available from your iPhone and Android! Please provide this summary of care documentation to your next provider. Your primary care clinician is listed as Mauri Gallo. If you have any questions after today's visit, please call [phone].

## 2017-02-09 NOTE — PROGRESS NOTES
[address], MD, FACP, [facility]     April Cristi Holden, EMILIE Salamanca MD, [address], Tyra Stone MD     [address], MOISES Lees, [place]ES        [facility]     [address] [facility], [address] [address]     [phone]     FAX: [address] [place] [address] Drive, [address] [facility], [address]     [phone]     FAX: [phone]           Patient Care Team:  Mateo Turcios MD as PCP - General (Family Practice)      Problem List  Date Reviewed: 2/9/2017          Codes Class Noted    BPH with obstruction/lower urinary tract symptoms ICD-10-CM: N40.1, N13.8  ICD-9-CM: 600.01, 599.69  1/18/2017        Cirrhosis ([facility] ICD-10-CM: K74.60  ICD-9-CM: 571.5  5/11/2016        Idiopathic esophageal varices with bleeding [facility]) ICD-10-CM: I85.01  ICD-9-CM: 456.0  3/21/2016        Anemia ICD-10-CM: D64.9  ICD-9-CM: 630. 9  Unknown        GI bleed ICD-10-CM: K92.2  ICD-9-CM: 578.9  3/1/2016        Esophageal varices (HCC) ICD-10-CM: I85.00  ICD-9-CM: 456.1  3/1/2016    Overview Signed 3/21/2016  6:54 PM by Jocelyn Brenner MD     banded x 6             Absolute anemia ICD-10-CM: D64.9  ICD-9-CM: 285.9  11/30/2015        Hypogonadism male ICD-10-CM: E29.1  ICD-9-CM: 257.2  Unknown        Hyperlipidemia ICD-10-CM: E78.5  ICD-9-CM: 272.4  Unknown        Primary biliary cirrhosis ([facility] ICD-10-CM: K74.3  ICD-9-CM: 571.6  Unknown        Vitamin D deficiency ICD-10-CM: E55.9  ICD-9-CM: 268.9  Unknown                 Mckay Brown is here for 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 The patient is a 78 y.o.  male who was first noted to have [place] about 10 years ago based on serologies. He has been maintained on 1200 mg of ursodiol without issue. Imaging of the liver performed 09/2016. Heterogeneous echotexture.   No focal liver lesions. A liver biopsy was performed recently but I do not have the report. The patient has no extrahepatic autoimmune disorders. The most recent laboratory studies indicate that the liver transaminases are normal, alkaline phosphatase is normal, tests of hepatic synthetic and metabolic function are normal, and the platelet count is normal.      The patient has developed varices with recent hemorrhage. He has undergone serial EGD's by Dr. Ashely Guzman and he is continuing to band the varices. The most recent EGD was in 01/2017 and 2 bands were placed. The patient completes all daily activities without any functional limitations. He has not developed ascites or encephalopathy. No edema. He complains of leg swelling. This is not thought to be related to his liver disease. He takes vitamin D and calcium for osteopenia. The patient has not experienced fatigue, fevers, chills, shortness of breath, chest pain, pain in the right side over the liver, diffuse abdominal pain, nausea, vomiting, constipation, diarrhea, dryeyes, dry mouth, arthralgias, myalgias, yellowing of the eyes or skin, itching, dark urine, problems concentrating, swelling of the abdomen, no recent hematemesis or hematochezia other than event mentioned. ALLERGIES  Allergies   Allergen Reactions    Sulfa (Sulfonamide Antibiotics) Unknown (comments)     Pt states its been so long he doesn't remember the reaction.  Other Medication Myalgia     Think  that is was a Sulfa drug, but not sure       MEDICATIONS  Current Outpatient Prescriptions   Medication Sig    nadolol (CORGARD) 40 mg tablet Take 1 Tab by mouth daily for 90 days.  testosterone cypionate (DEPOTESTOTERONE CYPIONATE) 200 mg/mL injection 0.5 mls Every  week  Indications: ANDROGEN DEFICIENCY    Syringe with Needle, Disp, 3 mL 23 gauge x 1 1/2\" syrg 1 Syringe by IntraMUSCular route every seven (7) days.  For use with testosterone injections    ursodiol (ACTIGALL) 300 mg capsule TAKE 2 CAPSULES TWICE A DAY    nadolol (CORGARD) 40 mg tablet Take 1 Tab by mouth daily for 30 days. Indications: PREVENTION OF BLEEDING ESOPHAGEAL VARICES     No current facility-administered medications for this visit. SYSTEM REVIEW NOT RELATED TO LIVER DISEASE OR REVIEWED ABOVE:  Constitution systems: Negative for fever, chills, weight gain, weight loss. Eyes: Negative for visual changes. ENT: Negative for sore throat, painful swallowing. Respiratory: Negative for cough, hemoptysis, SOB. Cardiology: Negative for chest pain, palpitations. GI:  Negative for constipation or diarrhea. : + for urinary frequency, dysuria, hematuria, + nocturia. +   Skin: Negative for rash. Hematology: Negative for easy bruising, blood clots. Musculo-skelatal: Negative for back pain, muscle pain, weakness. Neurologic: Negative for headaches, dizziness, vertigo, memory problems not related to HE. Psychology: Negative for anxiety, depression. FAMILY HISTORY:  The mother passed CHF age 80  The father passed CAD age 76   There is no family history of liver disease. There is no family history of immune disorders. SOCIAL HISTORY:  The patient is . The patient has 4 children,   The patient has never used tobacco products. The patient has never consumed significant amounts of alcohol. The patient retired in . PHYSICAL EXAMINATION:  Visit Vitals    /82 (BP 1 Location: Left arm, BP Patient Position: At rest)    Pulse (!) 4    Temp 96.5 °F (35.8 °C) (Tympanic)    Resp 16    Ht 5' 10\" (1.778 m)    Wt 175 lb (79.4 kg)    SpO2 96%    BMI 25.11 kg/m2     General: No acute distress. Eyes: Sclera anicteric. ENT: No oral lesions. Thyroid normal.  Nodes: No adenopathy. Skin: No spider angiomata. No jaundice. No palmar erythema. Respiratory: Lungs clear to auscultation. Cardiovascular: Regular heart rate. No murmurs. No JVD.   Abdomen: Soft non-tender. Liver size normal to percussion/palpation. Spleen not palpable. No obvious ascites. Extremities: No edema. No muscle wasting. No gross arthritic changes. Neurologic: Alert and oriented. Cranial nerves grossly intact. No asterixis. LABORATORY STUDIES:  [facility] [place] of [address] [place] [address] & Units 2/9/2017 10/6/2016 8/8/2016   WBC 4.6 - 13.2 K/uL 5.5 5.2 5.1   ANC 1.8 - 8.0 K/UL 2.4 2.5 2.5   HGB 13.0 - 16.0 g/dL 12.9 (L) 12.5 (L) 13.6   HGB 14 - 18 g/dL      HGB (iSTAT) 14 - 18 g/dL       - 420 K/uL 161 180 160   INR 0.8 - 1.2   1.1 1.1 1.0   AST 15 - 37 U/L 34 32 55 (H)   ALT 16 - 61 U/L 32 29 44   Alk Phos 45 - 117 U/L 88 126 (H) 128 (H)   Bili, Total 0.2 - 1.0 MG/DL 1.1 (H) 0.8 1.2 (H)   Bili, Direct 0.0 - 0.2 MG/DL 0.3 (H) 0.2 0.2   Albumin 3.4 - 5.0 g/dL 3.5 3.7 3.8   BUN 7.0 - 18 MG/DL 10 12 16   Creat 0.6 - 1.3 MG/DL 1.12 1.12 0.84   Na 136 - 145 mmol/L 143 143 140   K 3.5 - 5.5 mmol/L 4.2 4.2 4.7   Cl 100 - 108 mmol/L 105 105 103   CO2 21 - 32 mmol/L 32 32 28   Glucose 74 - 99 mg/dL 67 (L) 86 86   Ammonia 11 - 32 UMOL/L   21     Cancer Screening Latest Ref Rng & Units 10/6/2016 8/8/2016 5/11/2016   AFP Tumor Marker 0.0 - 8.3 ng/mL      AFP, Serum 0.0 - 8.0 ng/mL 2.1 3.0 2.2   AFP-L3% 0.0 - 9.9 % Comment Comment Comment     SEROLOGIES:  Serologies Latest Ref Rng 3/21/2016   ISSAC Ab, Direct Negative Negative   M2 Ab 0.0 - 20.0 Units 178.8 (H)       Hep B sAB (-)  Hep A total Ab (-)  HCV ab (-)    LIVER HISTOLOGY:  04/2016: Not available to me. ENDOSCOPIC PROCEDURES:  04/2016 EGD Dr. Cathleen Panchal Esophageal varices banded  07/2016 EGD with obliterated Varices per patient. 01/2017. EGD by Dr. Cathleen Panchal. Large esophageal varices. 2 bands placed. Follow up in 4 months. RADIOLOGY:  03/2016  MRI scan abdomen. Changes consistent with cirrhosis. No liver mass lesions. No dilated bile ducts. No bile duct strictures. Pericholecystic fluid. No ascites  09/2016.   Ultrasound of the liver.  The liver is mildly hetogeneous. No hepatic masses. Mel size. OTHER TESTING:  Not available or performed      ASSESSMENT AND PLAN:  Primary Biliary Cholangitis with cirrhosis. The most recent laboratory studies indicate that the liver transaminases are normal, alkaline phosphatase is normal,  tests of hepatic synthetic and metabolic function are normal, and the platelet count is normal.  Will perform laboratory testing to monitor liver function and degree of liver injury. This will include hepatic panel, a CBC w/ diff, a BMP, a PT/INR, and an AFP-L3%. Complications of cirrhosis were discussed in detail. We discussed thrombocytopenia, portal hypertension, varices, GI bleeding, peripheral edema, ascites, hepatic encephalopathy, and hepatocellular carcinoma. We discussed the need for follow ups on a regular basis, at 3 month intervals to monitor for complications. We discussed the need for every 6 month liver imaging studies. Discussed extrahepatic manifestations of PBC such as osteoporosis and elevated cholesterol. The risk of osteoporosis is increased in patients with PBC. DEXA bone density to assess for osteoporosis should be ordered by the patients primary care physician. The patient was advised to take calcium supplementation and Vitamin D. He was advised to take supplemental vitamin A, D, E, and K. Patients with PBC are at increased risk for hypercholesterolemia. However, this is not associated with an increased risk of cardiac disease and MI because this is typically an elevation in HDL cholesterol. There is no contraindication for treatment with a statin if this is felt to be indicated based upon cardiac risk assessment. The patient is receiving treatment with urosdeoxycholic Acid. Continue this at current dose. The patient  and is tolerating the treatment without significant side effects. His alkaline phosphatase is normal as of today's labs.      Vaccination for viral hepatitis A and B is recommended since the patient has no serologic evidence of previous exposure or vaccination with immunity. [address] screening has recently been performed and does not suggest [facility] The next liver imaging study will be performed in 03/2017. This was ordered today. Anemia may be secondary to portal hypertension and varices. He will remain on iron. Bone density up to date with osteopenia. All of the above issues were discussed with the patient. All questions were answered. The patient expressed a clear understanding of the above. [facility] in 6 months.       Claude Fountain NP   [facility] [place] of [facility], [address]   [phone]

## 2017-02-10 LAB
AFP L3 MFR SERPL: NORMAL % (ref 0–9.9)
AFP SERPL-MCNC: 2 NG/ML (ref 0–8)

## 2017-02-14 ENCOUNTER — TELEPHONE (OUTPATIENT)
Dept: HEMATOLOGY | Age: 77
End: 2017-02-14

## 2017-02-27 ENCOUNTER — HOSPITAL ENCOUNTER (OUTPATIENT)
Dept: ULTRASOUND IMAGING | Age: 77
Discharge: HOME | End: 2017-02-27
Payer: MEDICARE

## 2017-02-27 DIAGNOSIS — K74.60 HEPATIC CIRRHOSIS, UNSPECIFIED HEPATIC CIRRHOSIS TYPE (HCC): ICD-10-CM

## 2017-02-27 PROCEDURE — 76705 ECHO EXAM OF ABDOMEN: CPT

## 2017-03-13 DIAGNOSIS — K74.60 CIRRHOSIS OF LIVER WITHOUT ASCITES, UNSPECIFIED HEPATIC CIRRHOSIS TYPE (HCC): Primary | ICD-10-CM

## 2017-03-16 ENCOUNTER — HOSPITAL ENCOUNTER (OUTPATIENT)
Dept: MRI IMAGING | Age: 77
Discharge: HOME | End: 2017-03-16
Payer: MEDICARE

## 2017-03-16 DIAGNOSIS — K74.60 CIRRHOSIS OF LIVER WITHOUT ASCITES, UNSPECIFIED HEPATIC CIRRHOSIS TYPE (HCC): ICD-10-CM

## 2017-03-16 LAB — CREAT UR-MCNC: 1.2 MG/DL (ref 0.6–1.3)

## 2017-03-16 PROCEDURE — 74183 MRI ABD W/O CNTR FLWD CNTR: CPT

## 2017-03-16 PROCEDURE — 82565 ASSAY OF CREATININE: CPT

## 2017-03-16 PROCEDURE — 74011636320 HC RX REV CODE- 636/320: Performed by: NURSE PRACTITIONER

## 2017-03-16 PROCEDURE — A9579 GAD-BASE MR CONTRAST NOS,1ML: HCPCS | Performed by: NURSE PRACTITIONER

## 2017-03-16 RX ADMIN — GADOPENTETATE DIMEGLUMINE 20 ML: 469.01 INJECTION INTRAVENOUS at 14:21

## 2017-03-17 ENCOUNTER — TELEPHONE (OUTPATIENT)
Dept: HEMATOLOGY | Age: 77
End: 2017-03-17

## 2017-05-20 NOTE — H&P
Gastroenterology Outpatient History and Physical    Patient: Megan Haldat    Physician: Teddy Ferrara MD    Vital Signs: See RN notes    Allergies: Allergies   Allergen Reactions    Sulfa (Sulfonamide Antibiotics) Unknown (comments)     Pt states its been so long he doesn't remember the reaction.  Other Medication Myalgia     Think  that is was a Sulfa drug, but not sure       Chief Complaint: follow up esophageal varices    History of Present Illness: personal history cirrhosis, personal history GI bleed, esophageal varices    Justification for Procedure: bleed prevention    History:  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     Primary biliary cirrhosis ([facility]     Vitamin D deficiency       Past Surgical History:   Procedure Laterality Date    HX HEENT      deviated septum repair    HX ORTHOPAEDIC Right 01/2017    Arthroscopy    HX SEPTOPLASTY                           Biopsy      Social History     Social History    Marital status:      Spouse name: N/A    Number of children: N/A    Years of education: N/A     Social History Main Topics    Smoking status: Former Smoker     Packs/day: 1.00     Quit date: 5/2/1974    Smokeless tobacco: Never Used    Alcohol use No    Drug use: No    Sexual activity: Not Asked     Other Topics Concern    None     Social History Narrative      Family History   Problem Relation Age of Onset    Diabetes Father     Stroke Brother     Elevated Lipids Other      children       Medications:   Prior to Admission medications    Medication Sig Start Date End Date Taking? Authorizing Provider   nadolol (CORGARD) 40 mg tablet Take 1 Tab by mouth daily for 90 days.  1/23/17 4/23/17  Teddy Ferrara MD   testosterone cypionate (DEPOTESTOTERONE CYPIONATE) 200 mg/mL injection 0.5 mls Every  week  Indications: ANDROGEN DEFICIENCY 1/18/17   Deb Núñez MD   Syringe with Needle, Disp, 3 mL 23 gauge x 1 1/2\" syrg 1 Syringe by IntraMUSCular route every seven (7) days. For use with testosterone injections 10/31/16   Prince Lamin MD   ursodiol (ACTIGALL) 300 mg capsule TAKE 2 CAPSULES TWICE A DAY 10/14/16   Prince Lamin MD   nadolol (CORGARD) 40 mg tablet Take 1 Tab by mouth daily for 30 days. Indications: PREVENTION OF BLEEDING ESOPHAGEAL VARICES 5/2/16 1/18/17  Alex Dos Santos MD       Physical Exam:   General: alert, cooperative, no distress   HEENT: Head: Normocephalic, no lesions, without obvious abnormality. Heart: regular rate and rhythm   Lungs: chest clear, no wheezing, rales, normal symmetric air entry   Abdominal: Bowel sounds are normal, liver is not enlarged, spleen is not enlarged           Findings/Diagnosis: liver cirrhosis with portal hypertension    Plan of Care/Planned Procedure: I have discussed EGD band ligation alternatives complications including but not limited to pain, cardiopulmonary event, bleeding, perforation; all questions answered.     Signed By: Alex Dos Santos MD     May 22, 2017

## 2017-05-22 ENCOUNTER — ANESTHESIA (OUTPATIENT)
Dept: ENDOSCOPY | Age: 77
End: 2017-05-22
Payer: MEDICARE

## 2017-05-22 ENCOUNTER — HOSPITAL ENCOUNTER (OUTPATIENT)
Age: 77
Setting detail: OUTPATIENT SURGERY
Discharge: HOME | End: 2017-05-22
Attending: INTERNAL MEDICINE | Admitting: INTERNAL MEDICINE
Payer: MEDICARE

## 2017-05-22 ENCOUNTER — ANESTHESIA EVENT (OUTPATIENT)
Dept: ENDOSCOPY | Age: 77
End: 2017-05-22
Payer: MEDICARE

## 2017-05-22 VITALS
BODY MASS INDEX: 25.18 KG/M2 | SYSTOLIC BLOOD PRESSURE: 156 MMHG | HEART RATE: 58 BPM | TEMPERATURE: 97.8 F | DIASTOLIC BLOOD PRESSURE: 89 MMHG | RESPIRATION RATE: 16 BRPM | OXYGEN SATURATION: 99 % | HEIGHT: 69 IN | WEIGHT: 170 LBS

## 2017-05-22 PROCEDURE — 74011250636 HC RX REV CODE- 250/636: Performed by: INTERNAL MEDICINE

## 2017-05-22 PROCEDURE — 76040000019: Performed by: INTERNAL MEDICINE

## 2017-05-22 PROCEDURE — 74011250636 HC RX REV CODE- 250/636

## 2017-05-22 PROCEDURE — 77030014243 HC BND LIG VRCES BSC -D: Performed by: INTERNAL MEDICINE

## 2017-05-22 PROCEDURE — 76060000031 HC ANESTHESIA FIRST 0.5 HR: Performed by: INTERNAL MEDICINE

## 2017-05-22 RX ORDER — PROPOFOL 10 MG/ML
INJECTION, EMULSION INTRAVENOUS AS NEEDED
Status: DISCONTINUED | OUTPATIENT
Start: 2017-05-22 | End: 2017-05-22 | Stop reason: HOSPADM

## 2017-05-22 RX ORDER — NALOXONE HYDROCHLORIDE 0.4 MG/ML
0.4 INJECTION, SOLUTION INTRAMUSCULAR; INTRAVENOUS; SUBCUTANEOUS
Status: DISCONTINUED | OUTPATIENT
Start: 2017-05-22 | End: 2017-05-22 | Stop reason: HOSPADM

## 2017-05-22 RX ORDER — MIDAZOLAM HYDROCHLORIDE 1 MG/ML
.25-5 INJECTION, SOLUTION INTRAMUSCULAR; INTRAVENOUS
Status: DISCONTINUED | OUTPATIENT
Start: 2017-05-22 | End: 2017-05-22 | Stop reason: HOSPADM

## 2017-05-22 RX ORDER — EPINEPHRINE 0.1 MG/ML
1 INJECTION INTRACARDIAC; INTRAVENOUS
Status: DISCONTINUED | OUTPATIENT
Start: 2017-05-22 | End: 2017-05-22 | Stop reason: HOSPADM

## 2017-05-22 RX ORDER — SODIUM CHLORIDE 9 MG/ML
50 INJECTION, SOLUTION INTRAVENOUS CONTINUOUS
Status: DISCONTINUED | OUTPATIENT
Start: 2017-05-22 | End: 2017-05-22 | Stop reason: HOSPADM

## 2017-05-22 RX ORDER — FLUMAZENIL 0.1 MG/ML
0.2 INJECTION INTRAVENOUS
Status: DISCONTINUED | OUTPATIENT
Start: 2017-05-22 | End: 2017-05-22 | Stop reason: HOSPADM

## 2017-05-22 RX ORDER — DEXTROMETHORPHAN/PSEUDOEPHED 2.5-7.5/.8
1.2 DROPS ORAL
Status: DISCONTINUED | OUTPATIENT
Start: 2017-05-22 | End: 2017-05-22 | Stop reason: HOSPADM

## 2017-05-22 RX ORDER — FENTANYL CITRATE 50 UG/ML
100 INJECTION, SOLUTION INTRAMUSCULAR; INTRAVENOUS
Status: DISCONTINUED | OUTPATIENT
Start: 2017-05-22 | End: 2017-05-22 | Stop reason: HOSPADM

## 2017-05-22 RX ORDER — SODIUM CHLORIDE 9 MG/ML
INJECTION, SOLUTION INTRAVENOUS
Status: DISCONTINUED | OUTPATIENT
Start: 2017-05-22 | End: 2017-05-22 | Stop reason: HOSPADM

## 2017-05-22 RX ORDER — ATROPINE SULFATE 0.1 MG/ML
0.5 INJECTION INTRAVENOUS
Status: DISCONTINUED | OUTPATIENT
Start: 2017-05-22 | End: 2017-05-22 | Stop reason: HOSPADM

## 2017-05-22 RX ADMIN — PROPOFOL 100 MG: 10 INJECTION, EMULSION INTRAVENOUS at 10:54

## 2017-05-22 RX ADMIN — SODIUM CHLORIDE: 9 INJECTION, SOLUTION INTRAVENOUS at 09:38

## 2017-05-22 RX ADMIN — PROPOFOL 40 MG: 10 INJECTION, EMULSION INTRAVENOUS at 10:56

## 2017-05-22 RX ADMIN — SODIUM CHLORIDE 50 ML/HR: 900 INJECTION, SOLUTION INTRAVENOUS at 09:50

## 2017-05-22 NOTE — PROGRESS NOTES
Sylvia Master  1940  [id number]    Situation:  Verbal report received from: Zana Hodges rn  Procedure: Procedure(s):  ESOPHAGOGASTRODUODENOSCOPY (EGD)  ENDOSCOPIC BANDING OR LIGATION    Background:    Preoperative diagnosis: VARICES  Postoperative diagnosis: Banding of Varices    :  Dr. Mckay Wadsworth  Assistant(s): Endoscopy Technician-1: Anju Zuñiga  Endoscopy RN-1: April RAYSHAWN Hodges RN    Specimens: * No specimens in log *  H. Pylori  no    Assessment:  Intra-procedure medications   Anesthesia gave intra-procedure sedation and medications, see anesthesia flow sheet yes    Intravenous fluids: NS@ KVO     Vital signs stable     Abdominal assessment: round and soft     Recommendation:  Discharge patient per MD order. Family   Permission to share finding with family or friend yes  Endoscopy discharge instructions have been reviewed and given to patient. The patient verbalized understanding and acceptance of instructions.

## 2017-05-22 NOTE — ANESTHESIA PREPROCEDURE EVALUATION
Anesthetic History   No history of anesthetic complications            Review of Systems / Medical History  Patient summary reviewed, nursing notes reviewed and pertinent labs reviewed    Pulmonary  Within defined limits                 Neuro/Psych   Within defined limits           Cardiovascular  Within defined limits                Exercise tolerance: >4 METS     GI/Hepatic/Renal     GERD      Liver disease (cirrhosis, varices)     Endo/Other  Within defined limits           Other Findings            Physical Exam    Airway  Mallampati: I  TM Distance: 4 - 6 cm  Neck ROM: normal range of motion   Mouth opening: Normal     Cardiovascular    Rhythm: regular  Rate: normal         Dental    Dentition: Lower dentition intact and Upper dentition intact     Pulmonary  Breath sounds clear to auscultation               Abdominal         Other Findings            Anesthetic Plan    ASA: 2  Anesthesia type: MAC          Induction: Intravenous  Anesthetic plan and risks discussed with: Patient

## 2017-05-22 NOTE — ANESTHESIA POSTPROCEDURE EVALUATION
Post-Anesthesia Evaluation and Assessment    Patient: Komal Avery MRN: [id number]  SSN: [id number]    YOB: 1940  Age: 78 y.o. Sex: male       Cardiovascular Function/Vital Signs  Visit Vitals    /89    Pulse (!) 58    Temp 36.6 °C (97.8 °F)    Resp 16    Ht 5' 9\" (1.753 m)    Wt 77.1 kg (170 lb)    SpO2 99%    BMI 25.1 kg/m2       Patient is status post MAC anesthesia for Procedure(s):  ESOPHAGOGASTRODUODENOSCOPY (EGD)  ENDOSCOPIC BANDING OR LIGATION. Nausea/Vomiting: None    Postoperative hydration reviewed and adequate. Pain:  Pain Scale 1: Numeric (0 - 10) (05/22/17 1141)  Pain Intensity 1: 3 (05/22/17 1141)   Managed    Neurological Status: At baseline    Mental Status and Level of Consciousness: Arousable    Pulmonary Status:   O2 Device: Room air (05/22/17 1141)   Adequate oxygenation and airway patent    Complications related to anesthesia: None    Post-anesthesia assessment completed.  No concerns    Signed By: Viki Glover MD     May 22, 2017

## 2017-05-22 NOTE — PROCEDURES
Yifan Gonzalez M.D. May 22, 2017    Esophagogastroduodenoscopy (EGD) Procedure Note  Naomi Burroughs  : 1940  [facility] Medical Record Number: [id number]      Indications:    follow up bleeding esophageal varices  Referring Physician:  Evert Nolan MD  Anesthesia/Sedation: Conscious Sedation/Moderate Sedation  Endoscopist:  Dr. Rick Amaya:  The indications, risks, benefits and alternatives were reviewed with the patient or their decision maker who was provided an opportunity to ask questions and all questions were answered. The specific risks of esophagogastroduodenoscopy with conscious sedation were reviewed, including but not limited to anesthetic complication, bleeding, adverse drug reaction, missed lesion, infection, IV site reactions, and intestinal perforation which would lead to the need for surgical repair. Alternatives to EGD including radiographic imaging, observation without testing, or laboratory testing were reviewed as well as the limitations of those alternatives discussed. After considering the options and having all their questions answered, the patient or their decision maker provided both verbal and written consent to proceed. Procedure in Detail:  After obtaining informed consent, positioning of the patient in the left lateral decubitus position, and conduction of a pre-procedure pause or \"time out\" the endoscope was introduced into the mouth and advanced to the duodenum. A careful inspection was made, and findings or interventions are described below.     Findings:   Esophagus:varices extending length of esophagus; not currently bleeding  Stomach: normal   Duodenum/jejunum: normal    Complications/estimated blood loss: none    Therapies:  variceal ablation performed with three of bands placed    Specimens: none           Recommendations:  -six month follow up exam    Thank you for entrusting me with this patient's care. Please do not hesitate to contact me with any questions or if I can be of assistance with any of your other patients' GI needs.   Signed By: Timothy Garcia MD                        May 22, 2017

## 2017-05-22 NOTE — DISCHARGE INSTRUCTIONS
Philip Salvador  [id number]  1940    DISCHARGE INSTRUCTIONS  Discomfort:  Redness at IV site- apply warm compress to area; if redness or soreness persist- contact your physician. There may be a slight amount of blood passed from the rectum. Gaseous discomfort - walking, belching will help relieve any discomfort. You may not operate a vehicle for 12 hours. You may not engage in an occupation involving machinery or appliances for rest of today. You may not drink alcoholic beverages for at least 12 hours. Avoid making any critical decisions for at least 24 hours. DIET:   High fiber diet. ACTIVITY:  You may resume your normal daily activities it is recommended that you spend the remainder of the day resting -  avoid any strenuous activity. CALL M.D.   ANY SIGN OF:   Increasing pain, nausea, vomiting  Abdominal distension (swelling)  New increased bleeding (oral or rectal)  Fever (chills)  Pain in chest area  Bloody discharge from nose or mouth  Shortness of breath     Follow-up Instructions:  Call Dr. Bryce Escobar for follow up treatment session six months      DISCHARGE SUMMARY from Nurse    The following personal items collected during your admission are returned to you:   Dental Appliance: Dental Appliances: None  Vision: Visual Aid: Glasses, At bedside  Hearing Aid:    Jewelry:    Clothing:    Other Valuables:    Valuables sent to safe:

## 2017-05-22 NOTE — IP AVS SNAPSHOT
[address] [id number] Patient: Kim Nguyen MRN: [id number] QRU:[id number] You are allergic to the following Allergen Reactions Sulfa (Sulfonamide Antibiotics) Unknown (comments) Pt states its been so long he doesn't remember the reaction. Other Medication Myalgia Think  that is was a Sulfa drug, but not sure Recent Documentation Height Weight BMI Smoking Status 1.753 m 77.1 kg 25.1 kg/m2 Former Smoker Emergency Contacts Name Discharge Info Relation Home Work Mobile ChirinosBlanka DISCHARGE CAREGIVER [3] Spouse [3] [phone] About your hospitalization You were admitted on:  May 22, 2017 You last received care in the:  [facility] ENDOSCOPY You were discharged on:  May 22, 2017 Unit phone number:  [phone] Why you were hospitalized Your primary diagnosis was:  Not on File Providers Seen During Your Hospitalizations Provider Role Specialty Primary office phone Brittany Abrams MD Attending Provider Gastroenterology [phone] Your Primary Care Physician (PCP) Primary Care Physician Office Phone Office Fax Ginette Ramirez, [address] [facility] [phone] Follow-up Information Follow up With Details Comments Contact Info Darrell Mcclellan MD   [address] [place] [address] [phone] Current Discharge Medication List  
  
CONTINUE these medications which have NOT CHANGED Dose & Instructions Dispensing Information Comments Morning Noon Evening Bedtime * nadolol 40 mg tablet Commonly known as:  CORGARD Your last dose was: Your next dose is:    
   
   
 Dose:  40 mg Take 1 Tab by mouth daily for 30 days. Indications: PREVENTION OF BLEEDING ESOPHAGEAL VARICES Quantity:  30 Tab Refills:  12 * nadolol 40 mg tablet Commonly known as:  CORGARD Your last dose was: Your next dose is:    
   
   
 Dose:  40 mg Take 1 Tab by mouth daily for 90 days. Quantity:  90 Tab Refills:  3 Syringe with Needle (Disp) 3 mL 23 gauge x 1 1/2\" Syrg Your last dose was: Your next dose is:    
   
   
 Dose:  1 Syringe 1 Syringe by IntraMUSCular route every seven (7) days. For use with testosterone injections Quantity:  50 Pen Needle Refills:  0  
     
   
   
   
  
 testosterone cypionate 200 mg/mL injection Commonly known as:  DEPOTESTOTERONE CYPIONATE Your last dose was: Your next dose is: 0.5 mls Every  week  Indications: ANDROGEN DEFICIENCY Quantity:  10 mL Refills:  0  
     
   
   
   
  
 ursodiol 300 mg capsule Commonly known as:  ACTIGALL Your last dose was: Your next dose is: TAKE 2 CAPSULES TWICE A DAY Quantity:  360 Cap Refills:  2  
     
   
   
   
  
 * Notice: This list has 2 medication(s) that are the same as other medications prescribed for you. Read the directions carefully, and ask your doctor or other care provider to review them with you. Discharge Instructions Sylvia Thao 
[id number] 
1942 DISCHARGE INSTRUCTIONS Discomfort: 
Redness at IV site- apply warm compress to area; if redness or soreness persist- contact your physician. There may be a slight amount of blood passed from the rectum. Gaseous discomfort - walking, belching will help relieve any discomfort. You may not operate a vehicle for 12 hours. You may not engage in an occupation involving machinery or appliances for rest of today. You may not drink alcoholic beverages for at least 12 hours. Avoid making any critical decisions for at least 24 hours. DIET: 
 High fiber diet.  
  
 
  
ACTIVITY: 
You may resume your normal daily activities it is recommended that you spend the remainder of the day resting -  avoid any strenuous activity. CALL M.D. ANY SIGN OF: Increasing pain, nausea, vomiting Abdominal distension (swelling) New increased bleeding (oral or rectal) Fever (chills) Pain in chest area Bloody discharge from nose or mouth Shortness of breath Follow-up Instructions: 
Call Dr. Bard Noble for follow up treatment session six months DISCHARGE SUMMARY from Nurse The following personal items collected during your admission are returned to you:  
Dental Appliance: Dental Appliances: None Vision: Visual Aid: Glasses, At bedside Hearing Aid:   
Jewelry:   
Clothing:   
Other Valuables:   
Valuables sent to safe:   
 
 
Discharge Orders None ACO Transitions of Care Introducing [facility] [address] offers a voluntary care coordination program to provide high quality service and care to [facility] fee-for-service beneficiaries. Can Mcdonnell was designed to help you enhance your health and well-being through the following services: ? Transitions of Care  support for individuals who are transitioning from one care setting to another (example: Hospital to home). ? Chronic and Complex Care Coordination  support for individuals and caregivers of those with serious or chronic illnesses or with more than one chronic (ongoing) condition and those who take a number of different medications. If you meet specific medical criteria, a [facility] [address] may call you directly to coordinate your care with your primary care physician and your other care providers. For questions about the [facility] programs, please, contact your physicians office. For general questions or additional information about Accountable Care Organizations: 
Please visit www.medicare.gov/acos. html or call 1-800-MEDICARE (3-506.572.2699) TTY users should call 6-549.590.1064. Introducing [facility] & HEALTH SERVICES! Dear Caro Riggs: 
Thank you for requesting a [employer]wl[employer] ??? account. Our records indicate that you already have an active [employer]wlTing ??? account. You can access your account anytime at [email or web address] Did you know that you can access your hospital and ER discharge instructions at any time in OwlTing ???? You can also review all of your test results from your hospital stay or ER visit. Additional Information If you have questions, please visit the Frequently Asked Questions section of the OwlTing ??? website at [email or web address] Remember, OwlTing ??? is NOT to be used for urgent needs. For medical emergencies, dial 911. Now available from your iPhone and Android! General Information Please provide this summary of care documentation to your next provider. Patient Signature:  ____________________________________________________________ Date:  ____________________________________________________________  
  
Brenna Smith Provider Signature:  ____________________________________________________________ Date:  ____________________________________________________________

## 2017-05-22 NOTE — PERIOP NOTES
Patient tolerated procedure without problems. Abdomen soft and patient arousable and voices no complaints Report received from CRNA, see anesthesia note. Patient transported to endoscopy recovery area.  Bedside report given to [facility]

## 2017-06-02 DIAGNOSIS — E29.1 HYPOGONADISM MALE: ICD-10-CM

## 2017-06-02 RX ORDER — TESTOSTERONE CYPIONATE 200 MG/ML
INJECTION INTRAMUSCULAR
Qty: 10 ML | Refills: 0 | Status: SHIPPED | OUTPATIENT
Start: 2017-06-02 | End: 2017-06-05 | Stop reason: SDUPTHER

## 2017-06-05 DIAGNOSIS — E29.1 HYPOGONADISM MALE: ICD-10-CM

## 2017-06-07 RX ORDER — TESTOSTERONE CYPIONATE 200 MG/ML
INJECTION INTRAMUSCULAR
Qty: 10 ML | Refills: 0 | Status: SHIPPED | OUTPATIENT
Start: 2017-06-07 | End: 2017-07-17 | Stop reason: SDUPTHER

## 2017-06-20 ENCOUNTER — OFFICE VISIT (OUTPATIENT)
Dept: FAMILY MEDICINE CLINIC | Age: 77
End: 2017-06-20

## 2017-06-20 VITALS
WEIGHT: 167.25 LBS | SYSTOLIC BLOOD PRESSURE: 124 MMHG | HEIGHT: 69 IN | DIASTOLIC BLOOD PRESSURE: 74 MMHG | HEART RATE: 60 BPM | RESPIRATION RATE: 18 BRPM | BODY MASS INDEX: 24.77 KG/M2 | OXYGEN SATURATION: 97 % | TEMPERATURE: 97.8 F

## 2017-06-20 DIAGNOSIS — E55.9 VITAMIN D DEFICIENCY: ICD-10-CM

## 2017-06-20 DIAGNOSIS — J40 BRONCHITIS: Primary | ICD-10-CM

## 2017-06-20 DIAGNOSIS — K74.3 PRIMARY BILIARY CIRRHOSIS (HCC): ICD-10-CM

## 2017-06-20 RX ORDER — URSODIOL 300 MG/1
CAPSULE ORAL
Qty: 360 CAP | Refills: 2 | Status: SHIPPED | OUTPATIENT
Start: 2017-06-20 | End: 2018-04-05 | Stop reason: SDUPTHER

## 2017-06-20 RX ORDER — AZITHROMYCIN 250 MG/1
TABLET, FILM COATED ORAL
Qty: 6 TAB | Refills: 0 | Status: SHIPPED | OUTPATIENT
Start: 2017-06-20 | End: 2017-06-25

## 2017-06-20 RX ORDER — PROMETHAZINE HYDROCHLORIDE AND CODEINE PHOSPHATE 6.25; 1 MG/5ML; MG/5ML
1 SOLUTION ORAL
Qty: 240 ML | Refills: 0 | Status: SHIPPED | OUTPATIENT
Start: 2017-06-20 | End: 2017-08-21

## 2017-06-20 NOTE — MR AVS SNAPSHOT
Visit Information Date & Time Provider Department Dept. Phone Encounter #  
 6/20/2017  1:00 PM Rafael Claire MD [place] [facility] Primary Care [phone] [id number] Your Appointments 8/21/2017 10:30 AM  
Follow Up with Vidya Yee NP [facility] [place] [facility] ([facility]) Appt Note: [address] Upcoming Health Maintenance Date Due  
 GLAUCOMA SCREENING Q2Y 6/16/2017 INFLUENZA AGE 9 TO ADULT 8/1/2017 MEDICARE YEARLY EXAM 1/19/2018 DTaP/Tdap/Td series (2 - Td) 1/18/2027 Allergies as of 6/20/2017  Review Complete On: 6/20/2017 By: Rafael Claire MD  
  
 Severity Noted Reaction Type Reactions Sulfa (Sulfonamide Antibiotics) Medium 05/02/2016   Not Verified Unknown (comments) Pt states its been so long he doesn't remember the reaction. Other Medication  03/06/2014    Myalgia Think  that is was a Sulfa drug, but not sure Current Immunizations  Reviewed on 11/30/2015 Name Date Influenza Vaccine 9/12/2015, 11/18/2013 Influenza Vaccine Veronica Pavan) 10/28/2015 Influenza Vaccine (Quad) PF 1/18/2017  8:54 AM  
 Pneumococcal Conjugate (PCV-7) 9/12/2015 Pneumococcal Polysaccharide (PPSV-23) 11/30/2015 Not reviewed this visit You Were Diagnosed With   
  
 Codes Comments Bronchitis    -  Primary ICD-10-CM: D53 ICD-9-CM: 691 Vitamin D deficiency     ICD-10-CM: E55.9 ICD-9-CM: 268.9 Primary biliary cirrhosis (HCC)     ICD-10-CM: K74.3 ICD-9-CM: 571.6 Vitals BP Pulse Temp Resp Height(growth percentile) Weight(growth percentile) 124/74 60 97.8 °F (36.6 °C) (Oral) 18 5' 9\" (1.753 m) 167 lb 4 oz (75.9 kg) SpO2 BMI Smoking Status 97% 24.7 kg/m2 Former Smoker Vitals History BMI and BSA Data Body Mass Index Body Surface Area 24.7 kg/m 2 1.92 m 2 Preferred Pharmacy Pharmacy Name Phone [id number], [address] [facility] [address] [phone] Your Updated Medication List  
  
   
This list is accurate as of: 6/20/17  1:22 PM.  Always use your most recent med list.  
  
  
  
  
 azithromycin 250 mg tablet Commonly known as:  Orelia Lesley Take 2 tablets today, then take 1 tablet daily * nadolol 40 mg tablet Commonly known as:  CORGARD Take 1 Tab by mouth daily for 30 days. Indications: PREVENTION OF BLEEDING ESOPHAGEAL VARICES * nadolol 40 mg tablet Commonly known as:  CORGARD Take 1 Tab by mouth daily for 90 days. promethazine-codeine 6.25-10 mg/5 mL syrup Commonly known as:  PHENERGAN with CODEINE Take 5 mL by mouth every six (6) hours as needed for Cough. Max Daily Amount: 20 mL. Syringe with Needle (Disp) 3 mL 23 gauge x 1 1/2\" Syrg 1 Syringe by IntraMUSCular route every seven (7) days. For use with testosterone injections  
  
 testosterone cypionate 200 mg/mL injection Commonly known as:  DEPOTESTOTERONE CYPIONATE INJECT ONE-HALF ML (CC) INTRAMUSCULARLY ONCE A WEEK  
  
 ursodiol 300 mg capsule Commonly known as:  ACTIGALL TAKE 2 CAPSULES TWICE A DAY * Notice: This list has 2 medication(s) that are the same as other medications prescribed for you. Read the directions carefully, and ask your doctor or other care provider to review them with you. Prescriptions Printed Refills  
 promethazine-codeine (PHENERGAN WITH CODEINE) 6.25-10 mg/5 mL syrup 0 Sig: Take 5 mL by mouth every six (6) hours as needed for Cough. Max Daily Amount: 20 mL. Class: Print Route: Oral  
  
Prescriptions Sent to Pharmacy Refills  
 azithromycin (ZITHROMAX) 250 mg tablet 0 Sig: Take 2 tablets today, then take 1 tablet daily  Class: Normal  
 Pharmacy: [address], [place] - [address] Ph #: [phone]  
 ursodiol (ACTIGALL) 300 mg capsule 2 Sig: TAKE 2 CAPSULES TWICE A DAY Class: Normal  
 Pharmacy: [facility]/pharmacy #[id number] Kermit Chan, [address] [place] Ph #: [phone] Introducing [facility] [place] [facility] SERVICES! Dear Boaz Moraes: 
Thank you for requesting a "[employer]" account. Our records indicate that you already have an active "[employer]" account. You can access your account anytime at [email or web address] Did you know that you can access your hospital and ER discharge instructions at any time in "[employer]"? You can also review all of your test results from your hospital stay or ER visit. Additional Information If you have questions, please visit the Frequently Asked Questions section of the "[employer]" website at [email or web address] Remember, "[employer]" is NOT to be used for urgent needs. For medical emergencies, dial 911. Now available from your iPhone and Android! Please provide this summary of care documentation to your next provider. Your primary care clinician is listed as Thee Bueno. If you have any questions after today's visit, please call [phone].

## 2017-06-20 NOTE — PROGRESS NOTES
HISTORY OF PRESENT ILLNESS  Mckay Mosqueda is a 78 y.o. male. HPI  He is here with c/o nasal congestion and producitve cough for the past week. ST and headache. Worsens with the cough. Left ear pain. Sinus tenderness. Fever initially. Needs refill of his Actigal 2 po BID. Seeing Dr Donna Nails regularly. Review of Systems   Constitutional: Positive for fever. Negative for chills and malaise/fatigue. HENT: Positive for congestion and sore throat. Respiratory: Positive for cough and sputum production. Cardiovascular: Negative for chest pain and palpitations. Gastrointestinal: Positive for heartburn. Negative for abdominal pain. Neurological: Positive for headaches. Past Medical History:   Diagnosis Date    Allergic     Anemia     Esophageal varices ([facility] [address] March 2016    banded x 6    GERD (gastroesophageal reflux disease)     GI bleed March 2016    Hyperlipidemia     Hypogonadism male     Primary biliary cirrhosis ([facility] [address] [facility]     Vitamin D deficiency      Past Surgical History:   Procedure Laterality Date    HX HEENT      deviated septum repair    HX ORTHOPAEDIC Right 01/2017    Arthroscopy    HX SEPTOPLASTY                           Biopsy     Allergies   Allergen Reactions    Sulfa (Sulfonamide Antibiotics) Unknown (comments)     Pt states its been so long he doesn't remember the reaction.  Other Medication Myalgia     Think  that is was a Sulfa drug, but not sure     Blood pressure 124/74, pulse 60, temperature 97.8 °F (36.6 °C), temperature source Oral, resp. rate 18, height 5' 9\" (1.753 m), weight 167 lb 4 oz (75.9 kg), SpO2 97 %. Physical Exam   Constitutional: He appears well-developed and well-nourished. No distress. HENT:   Head: Normocephalic. Right Ear: External ear normal.   Left Ear: External ear normal.   Nose with yellow mucous  Throat red without exudate   Neck: Neck supple. Cardiovascular: Normal rate, regular rhythm and normal heart sounds.     Pulmonary/Chest: Effort normal. No respiratory distress. Scattered rhonchi   Lymphadenopathy:     He has cervical adenopathy. Neurological: He is alert. Skin: Skin is warm. Psychiatric: He has a normal mood and affect.        ASSESSMENT and PLAN  Bronchitis   Zpack 5 day course as directed   Promethezine with Codeine 1 tsp q6h as needed 240 ml   ADRs discussed  PBC   Refill Actigal 2 po BID #360/2R

## 2017-07-17 DIAGNOSIS — E29.1 HYPOGONADISM MALE: ICD-10-CM

## 2017-07-18 RX ORDER — TESTOSTERONE CYPIONATE 200 MG/ML
INJECTION INTRAMUSCULAR
Qty: 10 ML | Refills: 0 | Status: SHIPPED | OUTPATIENT
Start: 2017-07-18 | End: 2017-08-22 | Stop reason: SDUPTHER

## 2017-07-18 RX ORDER — SYRINGE W-NEEDLE,DISPOSAB,3 ML 25GX5/8"
1 SYRINGE, EMPTY DISPOSABLE MISCELLANEOUS
Qty: 50 PEN NEEDLE | Refills: 0 | Status: SHIPPED | OUTPATIENT
Start: 2017-07-18 | End: 2017-08-22 | Stop reason: SDUPTHER

## 2017-07-28 ENCOUNTER — OFFICE VISIT (OUTPATIENT)
Dept: FAMILY MEDICINE CLINIC | Age: 77
End: 2017-07-28

## 2017-07-28 VITALS
RESPIRATION RATE: 18 BRPM | BODY MASS INDEX: 25.07 KG/M2 | TEMPERATURE: 98 F | SYSTOLIC BLOOD PRESSURE: 143 MMHG | DIASTOLIC BLOOD PRESSURE: 79 MMHG | HEIGHT: 69 IN | OXYGEN SATURATION: 97 % | WEIGHT: 169.25 LBS | HEART RATE: 53 BPM

## 2017-07-28 DIAGNOSIS — J00 ACUTE NASOPHARYNGITIS: ICD-10-CM

## 2017-07-28 DIAGNOSIS — H83.02 LABYRINTHITIS, LEFT: Primary | ICD-10-CM

## 2017-07-28 RX ORDER — IPRATROPIUM BROMIDE 42 UG/1
1 SPRAY, METERED NASAL 4 TIMES DAILY
Qty: 15 ML | Refills: 0 | Status: SHIPPED | OUTPATIENT
Start: 2017-07-28 | End: 2018-01-10

## 2017-07-28 RX ORDER — PREDNISONE 20 MG/1
TABLET ORAL
Qty: 30 TAB | Refills: 0 | Status: SHIPPED | OUTPATIENT
Start: 2017-07-28 | End: 2017-08-21

## 2017-07-28 RX ORDER — MECLIZINE HYDROCHLORIDE 25 MG/1
25 TABLET ORAL
Qty: 30 TAB | Refills: 3 | Status: SHIPPED | OUTPATIENT
Start: 2017-07-28 | End: 2017-08-07

## 2017-07-28 NOTE — PROGRESS NOTES
Toy Murcia is a 78 y.o. male who presents with the following:  Chief Complaint   Patient presents with    Ear Pain     left ear    Dizziness     vertigo? Ear Pain   The history is provided by the patient (Patient with 1 week of symptoms of nasopharyngitis with postnasal drip comes in for some relief). Pertinent negatives include no chest pain, no abdominal pain, no headaches and no shortness of breath. Dizziness    Associated symptoms include dizziness. Pertinent negatives include no chest pain, no abdominal pain and no headaches. Allergies   Allergen Reactions    Sulfa (Sulfonamide Antibiotics) Unknown (comments)     Pt states its been so long he doesn't remember the reaction.  Other Medication Myalgia     Think  that is was a Sulfa drug, but not sure       Current Outpatient Prescriptions   Medication Sig    meclizine (ANTIVERT) 25 mg tablet Take 1 Tab by mouth three (3) times daily as needed for up to 10 days. Indications: VERTIGO    predniSONE (DELTASONE) 20 mg tablet 5 po every day x 4 days then 4 on day 5, 3 on day 6, 2 on day 7, and 1 on day 8    ipratropium (ATROVENT) 0.06 % nasal spray 1 [place] by Both Nostrils route four (4) times daily. Indications: RHINORRHEA    testosterone cypionate (DEPOTESTOTERONE CYPIONATE) 200 mg/mL injection INJECT ONE-HALF ML (CC) INTRAMUSCULARLY ONCE A WEEK    Syringe with Needle, Disp, 3 mL 23 gauge x 1 1/2\" syrg 1 Syringe by IntraMUSCular route every seven (7) days. For use with testosterone injections    promethazine-codeine (PHENERGAN WITH CODEINE) 6.25-10 mg/5 mL syrup Take 5 mL by mouth every six (6) hours as needed for Cough. Max Daily Amount: 20 mL.  ursodiol (ACTIGALL) 300 mg capsule TAKE 2 CAPSULES TWICE A DAY    nadolol (CORGARD) 40 mg tablet Take 1 Tab by mouth daily for 90 days.  nadolol (CORGARD) 40 mg tablet Take 1 Tab by mouth daily for 30 days.  Indications: PREVENTION OF BLEEDING ESOPHAGEAL VARICES     No current facility-administered medications for this visit. 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     Primary biliary cirrhosis (HCC)     Vitamin D deficiency        Past Surgical History:   Procedure Laterality Date    HX HEENT      deviated septum repair    HX ORTHOPAEDIC Right 01/2017    Arthroscopy    HX SEPTOPLASTY                           Biopsy       Family History   Problem Relation Age of Onset    Diabetes Father     Stroke Brother     Elevated Lipids Other      children       Social History     Social History    Marital status:      Spouse name: N/A    Number of children: N/A    Years of education: N/A     Social History Main Topics    Smoking status: Former Smoker     Packs/day: 1.00     Quit date: 5/2/1974    Smokeless tobacco: Never Used    Alcohol use No    Drug use: No    Sexual activity: Not Asked     Other Topics Concern    None     Social History Narrative       Review of Systems   Respiratory: Negative for shortness of breath. Cardiovascular: Negative for chest pain. Gastrointestinal: Negative for abdominal pain. Neurological: Positive for dizziness. Negative for headaches. Visit Vitals    /79 (BP 1 Location: Left arm, BP Patient Position: Sitting)    Pulse (!) 53    Temp 98 °F (36.7 °C) (Oral)    Resp 18    Ht 5' 9\" (1.753 m)    Wt 169 lb 4 oz (76.8 kg)    SpO2 97%    BMI 24.99 kg/m2     Physical Exam   Constitutional: He is oriented to person, place, and time. No distress. Has coryza that is clear - mild distress   HENT:   Head: Normocephalic and atraumatic. Right Ear: External ear normal.   Left Ear: External ear normal.   Mouth/Throat: No oropharyngeal exudate.    Red mm's in the nose and tht   Eyes: Conjunctivae and EOM are normal. Pupils are equal, round, and reactive to light. Right eye exhibits no discharge. Left eye exhibits no discharge. Neck: Normal range of motion. Neck supple. No tracheal deviation present. No thyromegaly present. Cardiovascular: Normal rate, regular rhythm, normal heart sounds and intact distal pulses. Exam reveals no gallop and no friction rub. No murmur heard. Pulmonary/Chest: Effort normal and breath sounds normal. No stridor. No respiratory distress. He has no wheezes. He has no rales. He exhibits no tenderness. Abdominal: He exhibits no distension and no mass. There is no tenderness. There is no guarding. Musculoskeletal: He exhibits no edema or tenderness. Lymphadenopathy:     He has no cervical adenopathy. Neurological: He is alert and oriented to person, place, and time. He has normal reflexes. Gait normal.   Skin: Skin is warm and dry. No rash noted. He is not diaphoretic. No erythema. Psychiatric: Mood, memory, affect and judgment normal.         ICD-10-CM ICD-9-CM    1. Labyrinthitis, left H83.02 386.30 meclizine (ANTIVERT) 25 mg tablet      predniSONE (DELTASONE) 20 mg tablet      ipratropium (ATROVENT) 0.06 % nasal spray   2. Acute nasopharyngitis J00 460 meclizine (ANTIVERT) 25 mg tablet      predniSONE (DELTASONE) 20 mg tablet      ipratropium (ATROVENT) 0.06 % nasal spray       Orders Placed This Encounter    meclizine (ANTIVERT) 25 mg tablet     Sig: Take 1 Tab by mouth three (3) times daily as needed for up to 10 days. Indications: VERTIGO     Dispense:  30 Tab     Refill:  3    predniSONE (DELTASONE) 20 mg tablet     Si po every day x 4 days then 4 on day 5, 3 on day 6, 2 on day 7, and 1 on day 8     Dispense:  30 Tab     Refill:  0    ipratropium (ATROVENT) 0.06 % nasal spray     [place] by Both Nostrils route four (4) times daily.  Indications: RHINORRHEA     Dispense:  15 mL     Refill:  0       Follow-up Disposition: Not on Westley Swann MD

## 2017-07-28 NOTE — MR AVS SNAPSHOT
Visit Information Date & Time Provider Department Dept. Phone Encounter #  
 7/28/2017  3:00 PM Los Gray MD [place] [facility] Primary Care [phone] [id number] Your Appointments 8/21/2017 10:30 AM  
Follow Up with Harrison Gamboa NP [facility] [place] [facility] ([facility]) Appt Note: [address] Upcoming Health Maintenance Date Due  
 GLAUCOMA SCREENING Q2Y 6/16/2017 INFLUENZA AGE 9 TO ADULT 8/1/2017 MEDICARE YEARLY EXAM 1/19/2018 DTaP/Tdap/Td series (2 - Td) 1/18/2027 Allergies as of 7/28/2017  Review Complete On: 7/28/2017 By: Abdulaziz Ochoa RN Severity Noted Reaction Type Reactions Sulfa (Sulfonamide Antibiotics) Medium 05/02/2016   Not Verified Unknown (comments) Pt states its been so long he doesn't remember the reaction. Other Medication  03/06/2014    Myalgia Think  that is was a Sulfa drug, but not sure Current Immunizations  Reviewed on 11/30/2015 Name Date Influenza Vaccine 9/12/2015, 11/18/2013 Influenza Vaccine Laura ) 10/28/2015 Influenza Vaccine (Quad) PF 1/18/2017  8:54 AM  
 Pneumococcal Conjugate (PCV-7) 9/12/2015 Pneumococcal Polysaccharide (PPSV-23) 11/30/2015 Not reviewed this visit Vitals BP Pulse Temp Resp Height(growth percentile) Weight(growth percentile) 143/79 (BP 1 Location: Left arm, BP Patient Position: Sitting) (!) 53 98 °F (36.7 °C) (Oral) 18 5' 9\" (1.753 m) 169 lb 4 oz (76.8 kg) SpO2 BMI Smoking Status 97% 24.99 kg/m2 Former Smoker Vitals History BMI and BSA Data Body Mass Index Body Surface Area 24.99 kg/m 2 1.93 m 2 Preferred Pharmacy Pharmacy Name Phone [facility] [address] [address], [place] - [address] [phone] Your Updated Medication List  
  
   
This list is accurate as of: 7/28/17  3:28 PM.  Always use your most recent med list.  
  
  
  
  
 * nadolol 40 mg tablet Commonly known as:  CORGARD Take 1 Tab by mouth daily for 30 days. Indications: PREVENTION OF BLEEDING ESOPHAGEAL VARICES * nadolol 40 mg tablet Commonly known as:  CORGARD Take 1 Tab by mouth daily for 90 days. promethazine-codeine 6.25-10 mg/5 mL syrup Commonly known as:  PHENERGAN with CODEINE Take 5 mL by mouth every six (6) hours as needed for Cough. Max Daily Amount: 20 mL. Syringe with Needle (Disp) 3 mL 23 gauge x 1 1/2\" Syrg 1 Syringe by IntraMUSCular route every seven (7) days. For use with testosterone injections  
  
 testosterone cypionate 200 mg/mL injection Commonly known as:  DEPOTESTOTERONE CYPIONATE INJECT ONE-HALF ML (CC) INTRAMUSCULARLY ONCE A WEEK  
  
 ursodiol 300 mg capsule Commonly known as:  ACTIGALL TAKE 2 CAPSULES TWICE A DAY * Notice: This list has 2 medication(s) that are the same as other medications prescribed for you. Read the directions carefully, and ask your doctor or other care provider to review them with you. Introducing [facility] & HEALTH SERVICES! Dear Rey Dumont: 
Thank you for requesting a [employer] account. Our records indicate that you already have an active [employer] account. You can access your account anytime at [email or web address] Did you know that you can access your hospital and ER discharge instructions at any time in [employer]? You can also review all of your test results from your hospital stay or ER visit. Additional Information If you have questions, please visit the Frequently Asked Questions section of the [employer] website at [email or web address] Remember, [employer] is NOT to be used for urgent needs. For medical emergencies, dial 911. Now available from your iPhone and Android! Please provide this summary of care documentation to your next provider. Your primary care clinician is listed as Iliana Arauz. If you have any questions after today's visit, please call [phone].

## 2017-08-21 ENCOUNTER — OFFICE VISIT (OUTPATIENT)
Dept: HEMATOLOGY | Age: 77
End: 2017-08-21

## 2017-08-21 ENCOUNTER — HOSPITAL ENCOUNTER (OUTPATIENT)
Dept: LAB | Age: 77
Discharge: HOME | End: 2017-08-21
Payer: MEDICARE

## 2017-08-21 VITALS
HEART RATE: 46 BPM | SYSTOLIC BLOOD PRESSURE: 131 MMHG | DIASTOLIC BLOOD PRESSURE: 73 MMHG | HEIGHT: 69 IN | RESPIRATION RATE: 16 BRPM | WEIGHT: 162 LBS | BODY MASS INDEX: 23.99 KG/M2 | OXYGEN SATURATION: 97 % | TEMPERATURE: 97.6 F

## 2017-08-21 DIAGNOSIS — K74.3 PRIMARY BILIARY CIRRHOSIS (HCC): ICD-10-CM

## 2017-08-21 DIAGNOSIS — K74.3 PRIMARY BILIARY CIRRHOSIS (HCC): Primary | ICD-10-CM

## 2017-08-21 LAB
ALBUMIN SERPL-MCNC: 3.7 G/DL (ref 3.4–5)
ALBUMIN/GLOB SERPL: 1 {RATIO} (ref 0.8–1.7)
ALP SERPL-CCNC: 92 U/L (ref 45–117)
ALT SERPL-CCNC: 39 U/L (ref 16–61)
ANION GAP SERPL CALC-SCNC: 6 MMOL/L (ref 3–18)
AST SERPL-CCNC: 44 U/L (ref 15–37)
BASOPHILS # BLD: 0 K/UL (ref 0–0.06)
BASOPHILS NFR BLD: 1 % (ref 0–2)
BILIRUB DIRECT SERPL-MCNC: 0.2 MG/DL (ref 0–0.2)
BILIRUB SERPL-MCNC: 0.9 MG/DL (ref 0.2–1)
BUN SERPL-MCNC: 17 MG/DL (ref 7–18)
BUN/CREAT SERPL: 14 (ref 12–20)
CALCIUM SERPL-MCNC: 9.2 MG/DL (ref 8.5–10.1)
CHLORIDE SERPL-SCNC: 103 MMOL/L (ref 100–108)
CO2 SERPL-SCNC: 30 MMOL/L (ref 21–32)
CREAT SERPL-MCNC: 1.21 MG/DL (ref 0.6–1.3)
DIFFERENTIAL METHOD BLD: ABNORMAL
EOSINOPHIL # BLD: 0.2 K/UL (ref 0–0.4)
EOSINOPHIL NFR BLD: 5 % (ref 0–5)
ERYTHROCYTE [DISTWIDTH] IN BLOOD BY AUTOMATED COUNT: 17.4 % (ref 11.6–14.5)
GLOBULIN SER CALC-MCNC: 3.7 G/DL (ref 2–4)
GLUCOSE SERPL-MCNC: 69 MG/DL (ref 74–99)
HCT VFR BLD AUTO: 40.3 % (ref 36–48)
HGB BLD-MCNC: 13 G/DL (ref 13–16)
INR PPP: 1.1 (ref 0.8–1.2)
LYMPHOCYTES # BLD: 1.6 K/UL (ref 0.9–3.6)
LYMPHOCYTES NFR BLD: 40 % (ref 21–52)
MCH RBC QN AUTO: 26.2 PG (ref 24–34)
MCHC RBC AUTO-ENTMCNC: 32.3 G/DL (ref 31–37)
MCV RBC AUTO: 81.3 FL (ref 74–97)
MONOCYTES # BLD: 0.4 K/UL (ref 0.05–1.2)
MONOCYTES NFR BLD: 10 % (ref 3–10)
NEUTS SEG # BLD: 1.8 K/UL (ref 1.8–8)
NEUTS SEG NFR BLD: 44 % (ref 40–73)
PLATELET # BLD AUTO: 120 K/UL (ref 135–420)
PMV BLD AUTO: 11.2 FL (ref 9.2–11.8)
POTASSIUM SERPL-SCNC: 4.5 MMOL/L (ref 3.5–5.5)
PROT SERPL-MCNC: 7.4 G/DL (ref 6.4–8.2)
PROTHROMBIN TIME: 13.5 SEC (ref 11.5–15.2)
RBC # BLD AUTO: 4.96 M/UL (ref 4.7–5.5)
SODIUM SERPL-SCNC: 139 MMOL/L (ref 136–145)
WBC # BLD AUTO: 4.1 K/UL (ref 4.6–13.2)

## 2017-08-21 PROCEDURE — 80048 BASIC METABOLIC PNL TOTAL CA: CPT | Performed by: NURSE PRACTITIONER

## 2017-08-21 PROCEDURE — 36415 COLL VENOUS BLD VENIPUNCTURE: CPT | Performed by: NURSE PRACTITIONER

## 2017-08-21 PROCEDURE — 85610 PROTHROMBIN TIME: CPT | Performed by: NURSE PRACTITIONER

## 2017-08-21 PROCEDURE — 80076 HEPATIC FUNCTION PANEL: CPT | Performed by: NURSE PRACTITIONER

## 2017-08-21 PROCEDURE — 85025 COMPLETE CBC W/AUTO DIFF WBC: CPT | Performed by: NURSE PRACTITIONER

## 2017-08-21 PROCEDURE — 82107 ALPHA-FETOPROTEIN L3: CPT | Performed by: NURSE PRACTITIONER

## 2017-08-21 RX ORDER — NADOLOL 40 MG/1
TABLET ORAL DAILY
COMMUNITY
End: 2018-01-10

## 2017-08-21 NOTE — MR AVS SNAPSHOT
Visit Information Date & Time Provider Department Dept. Phone Encounter #  
 8/21/2017 10:30 AM MOISES [address] of  [address] [id number] Follow-up Instructions Return in about 6 months (around 2/21/2018). Upcoming Health Maintenance Date Due  
 GLAUCOMA SCREENING Q2Y 6/16/2017 INFLUENZA AGE 9 TO ADULT 8/1/2017 MEDICARE YEARLY EXAM 1/19/2018 DTaP/Tdap/Td series (2 - Td) 1/18/2027 Allergies as of 8/21/2017  Review Complete On: 8/21/2017 By: Nayely Chandler Severity Noted Reaction Type Reactions Sulfa (Sulfonamide Antibiotics) Medium 05/02/2016   Not Verified Unknown (comments) Pt states its been so long he doesn't remember the reaction. Other Medication  03/06/2014    Myalgia Think  that is was a Sulfa drug, but not sure Current Immunizations  Reviewed on 11/30/2015 Name Date Influenza Vaccine 9/12/2015, 11/18/2013 Influenza Vaccine Mert Cagey) 10/28/2015 Influenza Vaccine (Quad) PF 1/18/2017  8:54 AM  
 Pneumococcal Conjugate (PCV-7) 9/12/2015 Pneumococcal Polysaccharide (PPSV-23) 11/30/2015 Not reviewed this visit You Were Diagnosed With   
  
 Codes Comments Primary biliary cirrhosis (HCC)    -  Primary ICD-10-CM: Y86.5 ICD-9-CM: 571.6 Vitals BP Pulse Temp Resp Height(growth percentile) 131/73 (BP 1 Location: Left arm, BP Patient Position: Sitting) (!) 46 97.6 °F (36.4 °C) (Tympanic) 16 5' 9\" (1.753 m) Weight(growth percentile) SpO2 BMI Smoking Status 162 lb (73.5 kg) 97% 23.92 kg/m2 Former Smoker BMI and BSA Data Body Mass Index Body Surface Area  
 23.92 kg/m 2 1.89 m 2 Preferred Pharmacy Pharmacy Name Phone [facility] [address], [place] - [address] [phone] Your Updated Medication List  
  
   
This list is accurate as of: 8/21/17 11:16 AM.  Always use your most recent med list.  
  
 ipratropium 0.06 % nasal spray Commonly known as:  ATROVENT  
1 [place] by Both Nostrils route four (4) times daily. Indications: RHINORRHEA * nadolol 40 mg tablet Commonly known as:  CORGARD Take  by mouth daily. * nadolol 40 mg tablet Commonly known as:  CORGARD Take 1 Tab by mouth daily for 30 days. Indications: PREVENTION OF BLEEDING ESOPHAGEAL VARICES * nadolol 40 mg tablet Commonly known as:  CORGARD Take 1 Tab by mouth daily for 90 days. Syringe with Needle (Disp) 3 mL 23 gauge x 1 1/2\" Syrg 1 Syringe by IntraMUSCular route every seven (7) days. For use with testosterone injections  
  
 testosterone cypionate 200 mg/mL injection Commonly known as:  DEPOTESTOTERONE CYPIONATE INJECT ONE-HALF ML (CC) INTRAMUSCULARLY ONCE A WEEK  
  
 ursodiol 300 mg capsule Commonly known as:  ACTIGALL TAKE 2 CAPSULES TWICE A DAY * Notice: This list has 3 medication(s) that are the same as other medications prescribed for you. Read the directions carefully, and ask your doctor or other care provider to review them with you. Follow-up Instructions Return in about 6 months (around 2/21/2018). To-Do List   
 08/21/2017 Lab:  AFP WITH AFP-L3% 08/21/2017 Lab:  CBC WITH AUTOMATED DIFF   
  
 08/21/2017 Lab:  HEPATIC FUNCTION PANEL   
  
 08/21/2017 Lab:  METABOLIC PANEL, BASIC   
  
 08/21/2017 Lab:  PROTHROMBIN TIME + INR   
  
 08/21/2017 Imaging:  [facility]! Dear Rey Dumont: 
Thank you for requesting a [employer] account. Our records indicate that you already have an active [employer] account. You can access your account anytime at [email or web address] Did you know that you can access your hospital and ER discharge instructions at any time in [employer]? You can also review all of your test results from your hospital stay or ER visit. Additional Information If you have questions, please visit the Frequently Asked Questions section of the [employer] website at [email or web address] Remember, [employer] is NOT to be used for urgent needs. For medical emergencies, dial 911. Now available from your iPhone and Android! Please provide this summary of care documentation to your next provider. Your primary care clinician is listed as Maritza Antony. If you have any questions after today's visit, please call [phone].

## 2017-08-21 NOTE — PROGRESS NOTES
[address], [address], [place], [place]       MOISES Emanuel PA-C Jaynie Copper, MOISES Goel NP        at [address], [address] [address]     [address] [facility], [address]     [phone]     FAX: [phone]    at [facility]     [address], [address]     [facility], [address]     [phone]     FAX: [phone]           Patient Care Team:  Yu Mann MD as PCP - General (Family Practice)      Problem List  Date Reviewed: 8/21/2017          Codes Class Noted    BPH with obstruction/lower urinary tract symptoms ICD-10-CM: N40.1, N13.8  ICD-9-CM: 600.01, 599.69  1/18/2017        Cirrhosis ([facility] ICD-10-CM: K74.60  ICD-9-CM: 571.5  5/11/2016        Idiopathic esophageal varices with bleeding ([facility] ICD-10-CM: I85.01  ICD-9-CM: 456.0  3/21/2016        Anemia ICD-10-CM: D64.9  ICD-9-CM: 046. 9  Unknown        GI bleed ICD-10-CM: K92.2  ICD-9-CM: 578.9  3/1/2016        Esophageal varices (HCC) ICD-10-CM: I85.00  ICD-9-CM: 456.1  3/1/2016    Overview Signed 3/21/2016  6:54 PM by Kayy Moraes MD     banded x 6             Absolute anemia ICD-10-CM: D64.9  ICD-9-CM: 285.9  11/30/2015        Hypogonadism male ICD-10-CM: E29.1  ICD-9-CM: 257.2  Unknown        Hyperlipidemia ICD-10-CM: E78.5  ICD-9-CM: 272.4  Unknown        Primary biliary cirrhosis ([facility] ICD-10-CM: K74.3  ICD-9-CM: 571.6  Unknown        Vitamin D deficiency ICD-10-CM: E55.9  ICD-9-CM: 268.9  Unknown                Mckay Salazar is here for 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 The active problem list, all pertinent past medical history, medications, liver histology, endoscopic studies, radiologic findings and laboratory findings related to the liver disorder were reviewed with the patient. The patient is a 78 y.o.   male who was first noted to have [facility] 10 years ago based on serologies. He has been maintained on 1200 mg of ursodiol without issue. Imaging of the liver performed 02/2017 with ultrasound followed with MRI in 03/2017. Heterogeneous echotexture. No focal liver lesions. A liver biopsy was performed recently but I do not have the report. The patient has no extrahepatic autoimmune disorders. The most recent laboratory studies indicate that the liver transaminases are normal, alkaline phosphatase is normal, tests of hepatic synthetic and metabolic function are normal, and the platelet count is normal.      The patient has developed varices with recent hemorrhage. He has undergone serial EGD's by Dr. Tommy Rodarte and he is continuing to band the varices. The most recent EGD was in 01/2017 and 2 bands were placed. The patient completes all daily activities without any functional limitations. He has not developed ascites or encephalopathy. No edema. He complains of leg swelling. This is not thought to be related to his liver disease. He takes vitamin D and calcium for osteopenia. The patient has not experienced fatigue, fevers, chills, shortness of breath, chest pain, pain in the right side over the liver, diffuse abdominal pain, nausea, vomiting, constipation, diarrhea, dryeyes, dry mouth, arthralgias, myalgias, yellowing of the eyes or skin, itching, dark urine, problems concentrating, swelling of the abdomen, no recent hematemesis or hematochezia other than event mentioned. ALLERGIES  Allergies   Allergen Reactions    Sulfa (Sulfonamide Antibiotics) Unknown (comments)     Pt states its been so long he doesn't remember the reaction.  Other Medication Myalgia     Think  that is was a Sulfa drug, but not sure       MEDICATIONS  Current Outpatient Prescriptions   Medication Sig    nadolol (CORGARD) 40 mg tablet Take  by mouth daily.     testosterone cypionate (DEPOTESTOTERONE CYPIONATE) 200 mg/mL injection INJECT ONE-HALF ML (CC) INTRAMUSCULARLY ONCE A WEEK    Syringe with Needle, Disp, 3 mL 23 gauge x 1 1/2\" syrg 1 Syringe by IntraMUSCular route every seven (7) days. For use with testosterone injections    ursodiol (ACTIGALL) 300 mg capsule TAKE 2 CAPSULES TWICE A DAY    ipratropium (ATROVENT) 0.06 % nasal spray 1 [place] by Both Nostrils route four (4) times daily. Indications: RHINORRHEA    nadolol (CORGARD) 40 mg tablet Take 1 Tab by mouth daily for 90 days.  nadolol (CORGARD) 40 mg tablet Take 1 Tab by mouth daily for 30 days. Indications: PREVENTION OF BLEEDING ESOPHAGEAL VARICES     No current facility-administered medications for this visit. SYSTEM REVIEW NOT RELATED TO LIVER DISEASE OR REVIEWED ABOVE:  Constitution systems: Negative for fever, chills, weight gain, weight loss. Eyes: Negative for visual changes. ENT: Negative for sore throat, painful swallowing. Respiratory: Negative for cough, hemoptysis, SOB. Cardiology: Negative for chest pain, palpitations. GI:  Negative for constipation or diarrhea. : + for urinary frequency, dysuria, hematuria, + nocturia. +   Skin: Negative for rash. Hematology: Negative for easy bruising, blood clots. Musculo-skelatal: Negative for back pain, muscle pain, weakness. Neurologic: Negative for headaches, dizziness, vertigo, memory problems not related to HE. Psychology: Negative for anxiety, depression. FAMILY HISTORY:  The mother passed CHF age 80  The father passed CAD age 76   There is no family history of liver disease. There is no family history of immune disorders. SOCIAL HISTORY:  The patient is . The patient has 4 children,   The patient has never used tobacco products. The patient has never consumed significant amounts of alcohol. The patient retired in .     PHYSICAL EXAMINATION:  Visit Vitals    /73 (BP 1 Location: Left arm, BP Patient Position: Sitting)    Pulse (!) 46    Temp 97.6 °F (36.4 °C) (Tympanic)    Resp 16    Ht 5' 9\" (1.753 m)    Wt 162 lb (73.5 kg)    SpO2 97%    BMI 23.92 kg/m2     General: No acute distress. Eyes: Sclera anicteric. ENT: No oral lesions. Thyroid normal.  Nodes: No adenopathy. Skin: No spider angiomata. No jaundice. No palmar erythema. Respiratory: Lungs clear to auscultation. Cardiovascular: Regular heart rate. No murmurs. No JVD. Abdomen: Soft non-tender. Liver size normal to percussion/palpation. Spleen not palpable. No obvious ascites. Extremities: No edema. No muscle wasting. No gross arthritic changes. Neurologic: Alert and oriented. Cranial nerves grossly intact. No asterixis. LABORATORY STUDIES:  [facility] [address] 2/9/2017 10/6/2016 8/8/2016   WBC 4.6 - 13.2 K/uL 5.5 5.2 5.1   ANC 1.8 - 8.0 K/UL 2.4 2.5 2.5   HGB 13.0 - 16.0 g/dL 12.9 (L) 12.5 (L) 13.6   HGB 14 - 18 g/dL      HGB (iSTAT) 14 - 18 g/dL       - 420 K/uL 161 180 160   INR 0.8 - 1.2   1.1 1.1 1.0   AST 15 - 37 U/L 34 32 55 (H)   ALT 16 - 61 U/L 32 29 44   Alk Phos 45 - 117 U/L 88 126 (H) 128 (H)   Bili, Total 0.2 - 1.0 MG/DL 1.1 (H) 0.8 1.2 (H)   Bili, Direct 0.0 - 0.2 MG/DL 0.3 (H) 0.2 0.2   Albumin 3.4 - 5.0 g/dL 3.5 3.7 3.8   BUN 7.0 - 18 MG/DL 10 12 16   Creat 0.6 - 1.3 MG/DL 1.12 1.12 0.84   Na 136 - 145 mmol/L 143 143 140   K 3.5 - 5.5 mmol/L 4.2 4.2 4.7   Cl 100 - 108 mmol/L 105 105 103   CO2 21 - 32 mmol/L 32 32 28   Glucose 74 - 99 mg/dL 67 (L) 86 86   Ammonia 11 - 32 UMOL/L   21     Cancer Screening Latest Ref Rng & Units 10/6/2016 8/8/2016 5/11/2016   AFP Tumor Marker 0.0 - 8.3 ng/mL      AFP, Serum 0.0 - 8.0 ng/mL 2.1 3.0 2.2   AFP-L3% 0.0 - 9.9 % Comment Comment Comment     SEROLOGIES:  Serologies Latest Ref Rng 3/21/2016   ISSAC Ab, Direct Negative Negative   M2 Ab 0.0 - 20.0 Units 178.8 (H)       Hep B sAB (-)  Hep A total Ab (-)  HCV ab (-)    LIVER HISTOLOGY:  04/2016: Not available to me.     ENDOSCOPIC PROCEDURES:  04/2016 EGD Dr. Radha Mars Esophageal varices banded  07/2016 EGD with obliterated Varices per patient. 01/2017. EGD by Dr. Radha Mars. Large esophageal varices. 2 bands placed. RADIOLOGY:  03/2016  MRI scan abdomen. Changes consistent with cirrhosis. No liver mass lesions. No dilated bile ducts. No bile duct strictures. Pericholecystic fluid. No ascites  09/2016. Ultrasound of the liver. The liver is mildly hetogeneous. No hepatic masses. Mel size. OTHER TESTING:  Not available or performed      ASSESSMENT AND PLAN:  Primary Biliary Cholangitis with cirrhosis. The most recent laboratory studies indicate that the liver transaminases are normal, alkaline phosphatase is normal,  tests of hepatic synthetic and metabolic function are normal, and the platelet count is normal.  Will perform laboratory testing to monitor liver function and degree of liver injury. This will include hepatic panel, a CBC w/ diff, a BMP, a PT/INR, and an AFP-L3%. Complications of cirrhosis were discussed in detail. We discussed thrombocytopenia, portal hypertension, varices, GI bleeding, peripheral edema, ascites, hepatic encephalopathy, and hepatocellular carcinoma. We discussed the need for follow ups on a regular basis, at 3 month intervals to monitor for complications. We discussed the need for every 6 month liver imaging studies. 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 Radha Mars. Follow up with him as directed. Discussed extrahepatic manifestations of PBC such as osteoporosis and elevated cholesterol. The risk of osteoporosis is increased in patients with PBC. DEXA bone density to assess for osteoporosis should be ordered by the patients primary care physician. The patient was advised to take calcium supplementation and Vitamin D. He was advised to take supplemental vitamin A, D, E, and K. Patients with PBC are at increased risk for hypercholesterolemia.   However, this is not associated with an increased risk of cardiac disease and MI because this is typically an elevation in HDL cholesterol. There is no contraindication for treatment with a statin if this is felt to be indicated based upon cardiac risk assessment. The patient is receiving treatment with urosdeoxycholic Acid. Continue this at current dose. The patient  and is tolerating the treatment without significant side effects. His alkaline phosphatase is normal.     Vaccination for viral hepatitis A and B is recommended since the patient has no serologic evidence of previous exposure or vaccination with immunity. [address] screening has recently been performed and does not suggest [address] The next liver imaging study will be performed in 09/2017 with ultrasound and shear wave elastography. Elastography  can assess liver fibrosis and determine if a patient has advanced fibrosis or cirrhosis without the need for liver biopsy. This was ordered today. Anemia may be secondary to portal hypertension and varices. He will remain on iron. Bone density up to date with osteopenia. All of the above issues were discussed with the patient. All questions were answered. The patient expressed a clear understanding of the above. [address] [facility] in 6 months.       France Mcclain NP   [facility] [place] of [address] [place] [address], [address] [address] [address], [address] [facility]   [phone]

## 2017-08-22 DIAGNOSIS — E29.1 HYPOGONADISM MALE: ICD-10-CM

## 2017-08-22 LAB
AFP L3 MFR SERPL: NORMAL % (ref 0–9.9)
AFP SERPL-MCNC: 2 NG/ML (ref 0–8)

## 2017-08-22 RX ORDER — TESTOSTERONE CYPIONATE 200 MG/ML
INJECTION INTRAMUSCULAR
Qty: 10 ML | Refills: 0 | Status: SHIPPED | OUTPATIENT
Start: 2017-08-22 | End: 2017-12-11 | Stop reason: SDUPTHER

## 2017-08-22 RX ORDER — SYRINGE W-NEEDLE,DISPOSAB,3 ML 25GX5/8"
1 SYRINGE, EMPTY DISPOSABLE MISCELLANEOUS
Qty: 50 PEN NEEDLE | Refills: 0 | Status: SHIPPED | OUTPATIENT
Start: 2017-08-22 | End: 2017-12-11 | Stop reason: SDUPTHER

## 2017-09-25 DIAGNOSIS — K74.3 PRIMARY BILIARY CIRRHOSIS (HCC): ICD-10-CM

## 2017-10-03 ENCOUNTER — TELEPHONE (OUTPATIENT)
Dept: HEMATOLOGY | Age: 77
End: 2017-10-03

## 2017-10-11 ENCOUNTER — TELEPHONE (OUTPATIENT)
Dept: HEMATOLOGY | Age: 77
End: 2017-10-11

## 2017-10-12 ENCOUNTER — TELEPHONE (OUTPATIENT)
Dept: HEMATOLOGY | Age: 77
End: 2017-10-12

## 2017-10-12 NOTE — TELEPHONE ENCOUNTER
Pt. Called in stating he has not received lab results or results from his [address] , they were both done in September and it is now November! Pt was very annoyed, I let pt know that the results from the [address] seemed fine but there may be some things that Narcisa Morelos NP needs to go over with him that I don't see . Marii Pritchett Also let pt know his labs were okay but there were some things that need attention and that MOISES Villafuerte would have to go over with him . Marii Pritchett Please call pt. With information , US was done at [facility] and is scanned under media and labs were done in our office . ...

## 2017-12-11 DIAGNOSIS — E29.1 HYPOGONADISM MALE: ICD-10-CM

## 2017-12-12 RX ORDER — SYRINGE W-NEEDLE,DISPOSAB,3 ML 25GX5/8"
1 SYRINGE, EMPTY DISPOSABLE MISCELLANEOUS
Qty: 50 PEN NEEDLE | Refills: 0 | Status: SHIPPED | OUTPATIENT
Start: 2017-12-12 | End: 2018-10-18 | Stop reason: SDUPTHER

## 2017-12-12 RX ORDER — TESTOSTERONE CYPIONATE 200 MG/ML
INJECTION INTRAMUSCULAR
Qty: 10 ML | Refills: 0 | Status: SHIPPED | OUTPATIENT
Start: 2017-12-12 | End: 2018-05-16 | Stop reason: SDUPTHER

## 2017-12-13 ENCOUNTER — TELEPHONE (OUTPATIENT)
Dept: FAMILY MEDICINE CLINIC | Age: 77
End: 2017-12-13

## 2017-12-13 NOTE — TELEPHONE ENCOUNTER
Patient called and stated he picked his rx up yesterday from her and took to [facility]. He went back to  and they told him there was a problem with the rx and they called us to clarify, but there is not a documentation of this in the system. Can you please call [facility] to clarify what is needed.

## 2018-01-10 NOTE — PERIOP NOTES
[address]  Endoscopy Preprocedure Instructions      1. On the day of your surgery, please report to registration located on the 2nd floor of the  [facility]. yes    2. You must have a responsible adult to drive you to the hospital, stay at the hospital during your procedure and drive you home. If they leave your procedure will not be started (no exceptions). yes    3. Do not have anything to eat or drink (including water, gum, mints, coffee, and juice) after midnight. This does not apply to the medications you were instructed to take by your physician. yesIf you are currently taking Plavix, Coumadin, Aspirin, or other blood-thinning agents, contact your physician for special instructions. no,    4. If you are having a procedure that requires bowel prep: We recommend that you have only clear liquids the day before your procedure and begin your bowel prep by 5:00 pm.  You may continue to drink clear liquids until midnight. If for any reason you are not able to complete your prep please notify your physician immediately. yes    5. Have a list of all current medications, including vitamins, herbal supplements and any other over the counter medications. yes    6. If you wear glasses, contacts, dentures and/or hearing aids, they may be removed prior to procedure, please bring a case to store them in. yes    7. You should understand that if you do not follow these instructions your procedure may be cancelled. If your physical condition changes (I.e. fever, cold or flu) please contact your doctor as soon as possible. 8. It is important that you be on time.   If for any reason you are unable to keep your appointment please call )- the day of or your physicians office prior to your scheduled procedure

## 2018-01-12 NOTE — H&P
Gastroenterology Outpatient History and Physical    Patient: Ronal Sosa    Physician: Antione Cullen MD    Vital Signs: see RN notes    Allergies: Allergies   Allergen Reactions    Sulfa (Sulfonamide Antibiotics) Unknown (comments)     Pt states its been so long he doesn't remember the reaction.  Other Medication Myalgia     Think  that is was a Sulfa drug, but not sure       Chief Complaint: Follow up liver cirrhosis    History of Present Illness: personal history liver cirrhosis; personal history of bleed. Here for follow up esophageal varices    Justification for Procedure: bleed risk management    History:  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     Primary biliary cirrhosis     Vitamin D deficiency       Past Surgical History:   Procedure Laterality Date    HX HEENT      deviated septum repair    HX ORTHOPAEDIC Right 01/2017    Arthroscopy    HX SEPTOPLASTY                           Biopsy      Social History     Social History    Marital status:      Spouse name: N/A    Number of children: N/A    Years of education: N/A     Social History Main Topics    Smoking status: Former Smoker     Packs/day: 1.00     Quit date: 5/2/1974    Smokeless tobacco: Never Used    Alcohol use No    Drug use: No    Sexual activity: Not Asked     Other Topics Concern    None     Social History Narrative      Family History   Problem Relation Age of Onset    Diabetes Father     Stroke Brother     Elevated Lipids Other      children       Medications:   Prior to Admission medications    Medication Sig Start Date End Date Taking?  Authorizing Provider   testosterone cypionate (DEPOTESTOTERONE CYPIONATE) 200 mg/mL injection INJECT ONE-HALF ML (CC) INTRAMUSCULARLY ONCE A WEEK 12/12/17  Yes Sujatha Acevedo MD ursodiol (ACTIGALL) 300 mg capsule TAKE 2 CAPSULES TWICE A DAY 6/20/17  Yes Bin Sanchez MD   nadolol (CORGARD) 40 mg tablet Take 1 Tab by mouth daily for 30 days. Indications: PREVENTION OF BLEEDING ESOPHAGEAL VARICES 5/2/16 1/10/18 Yes Inés Rodgers MD   Syringe with Needle, Disp, 3 mL 23 gauge x 1 1/2\" syrg 1 Syringe by IntraMUSCular route every seven (7) days. For use with testosterone injections 12/12/17   Stephanie Roldan MD   nadolol (CORGARD) 40 mg tablet Take 1 Tab by mouth daily for 90 days. 1/23/17 4/23/17  Inés Rodgers MD       Physical Exam:   General: alert, cooperative, no distress   HEENT: Head: Normal, normocephalic, atraumatic. Heart: regular rate and rhythm   Lungs: chest clear, no wheezing, rales, normal symmetric air entry   Abdominal: Bowel sounds are normal, liver is not enlarged, spleen is not enlarged           Findings/Diagnosis: esophageal varices secondary liver cirrhosis    Plan of Care/Planned Procedure: I have discussed EGD band ligation, alternatives complications including but not limited to pain, cardiopulmonary event, bleeding, perforation; all questions answered.     Signed By: Inés Rodgers MD     January 15, 2018

## 2018-01-15 ENCOUNTER — HOSPITAL ENCOUNTER (OUTPATIENT)
Age: 78
Setting detail: OUTPATIENT SURGERY
Discharge: HOME | End: 2018-01-15
Attending: INTERNAL MEDICINE | Admitting: INTERNAL MEDICINE
Payer: MEDICARE

## 2018-01-15 ENCOUNTER — ANESTHESIA EVENT (OUTPATIENT)
Dept: ENDOSCOPY | Age: 78
End: 2018-01-15
Payer: MEDICARE

## 2018-01-15 ENCOUNTER — ANESTHESIA (OUTPATIENT)
Dept: ENDOSCOPY | Age: 78
End: 2018-01-15
Payer: MEDICARE

## 2018-01-15 VITALS
SYSTOLIC BLOOD PRESSURE: 124 MMHG | WEIGHT: 162 LBS | DIASTOLIC BLOOD PRESSURE: 91 MMHG | TEMPERATURE: 98.5 F | RESPIRATION RATE: 16 BRPM | HEART RATE: 75 BPM | BODY MASS INDEX: 23.99 KG/M2 | HEIGHT: 69 IN | OXYGEN SATURATION: 99 %

## 2018-01-15 PROCEDURE — 76040000019: Performed by: INTERNAL MEDICINE

## 2018-01-15 PROCEDURE — 74011250636 HC RX REV CODE- 250/636: Performed by: INTERNAL MEDICINE

## 2018-01-15 PROCEDURE — 74011000250 HC RX REV CODE- 250

## 2018-01-15 PROCEDURE — 76060000031 HC ANESTHESIA FIRST 0.5 HR: Performed by: INTERNAL MEDICINE

## 2018-01-15 PROCEDURE — 74011250636 HC RX REV CODE- 250/636

## 2018-01-15 RX ORDER — EPINEPHRINE 0.1 MG/ML
1 INJECTION INTRACARDIAC; INTRAVENOUS
Status: DISCONTINUED | OUTPATIENT
Start: 2018-01-15 | End: 2018-01-15 | Stop reason: HOSPADM

## 2018-01-15 RX ORDER — ATROPINE SULFATE 0.1 MG/ML
0.5 INJECTION INTRAVENOUS
Status: DISCONTINUED | OUTPATIENT
Start: 2018-01-15 | End: 2018-01-15 | Stop reason: HOSPADM

## 2018-01-15 RX ORDER — FENTANYL CITRATE 50 UG/ML
100 INJECTION, SOLUTION INTRAMUSCULAR; INTRAVENOUS
Status: DISCONTINUED | OUTPATIENT
Start: 2018-01-15 | End: 2018-01-15 | Stop reason: HOSPADM

## 2018-01-15 RX ORDER — GLYCOPYRROLATE 0.2 MG/ML
INJECTION INTRAMUSCULAR; INTRAVENOUS AS NEEDED
Status: DISCONTINUED | OUTPATIENT
Start: 2018-01-15 | End: 2018-01-15 | Stop reason: HOSPADM

## 2018-01-15 RX ORDER — SODIUM CHLORIDE 9 MG/ML
50 INJECTION, SOLUTION INTRAVENOUS CONTINUOUS
Status: DISCONTINUED | OUTPATIENT
Start: 2018-01-15 | End: 2018-01-15 | Stop reason: HOSPADM

## 2018-01-15 RX ORDER — DEXTROMETHORPHAN/PSEUDOEPHED 2.5-7.5/.8
1.2 DROPS ORAL
Status: DISCONTINUED | OUTPATIENT
Start: 2018-01-15 | End: 2018-01-15 | Stop reason: HOSPADM

## 2018-01-15 RX ORDER — PROPOFOL 10 MG/ML
INJECTION, EMULSION INTRAVENOUS AS NEEDED
Status: DISCONTINUED | OUTPATIENT
Start: 2018-01-15 | End: 2018-01-15 | Stop reason: HOSPADM

## 2018-01-15 RX ORDER — NADOLOL 20 MG/1
40 TABLET ORAL DAILY
Qty: 90 TAB | Refills: 3 | Status: SHIPPED | OUTPATIENT
Start: 2018-01-15 | End: 2018-08-08 | Stop reason: SDUPTHER

## 2018-01-15 RX ORDER — MIDAZOLAM HYDROCHLORIDE 1 MG/ML
.25-5 INJECTION, SOLUTION INTRAMUSCULAR; INTRAVENOUS
Status: DISCONTINUED | OUTPATIENT
Start: 2018-01-15 | End: 2018-01-15 | Stop reason: HOSPADM

## 2018-01-15 RX ORDER — NALOXONE HYDROCHLORIDE 0.4 MG/ML
0.4 INJECTION, SOLUTION INTRAMUSCULAR; INTRAVENOUS; SUBCUTANEOUS
Status: DISCONTINUED | OUTPATIENT
Start: 2018-01-15 | End: 2018-01-15 | Stop reason: HOSPADM

## 2018-01-15 RX ORDER — FLUMAZENIL 0.1 MG/ML
0.2 INJECTION INTRAVENOUS
Status: DISCONTINUED | OUTPATIENT
Start: 2018-01-15 | End: 2018-01-15 | Stop reason: HOSPADM

## 2018-01-15 RX ORDER — LIDOCAINE HYDROCHLORIDE 20 MG/ML
INJECTION, SOLUTION EPIDURAL; INFILTRATION; INTRACAUDAL; PERINEURAL AS NEEDED
Status: DISCONTINUED | OUTPATIENT
Start: 2018-01-15 | End: 2018-01-15 | Stop reason: HOSPADM

## 2018-01-15 RX ORDER — PROPOFOL 10 MG/ML
INJECTION, EMULSION INTRAVENOUS
Status: DISCONTINUED | OUTPATIENT
Start: 2018-01-15 | End: 2018-01-15 | Stop reason: HOSPADM

## 2018-01-15 RX ADMIN — LIDOCAINE HYDROCHLORIDE 100 MG: 20 INJECTION, SOLUTION EPIDURAL; INFILTRATION; INTRACAUDAL; PERINEURAL at 10:13

## 2018-01-15 RX ADMIN — GLYCOPYRROLATE 0.1 MG: 0.2 INJECTION INTRAMUSCULAR; INTRAVENOUS at 10:13

## 2018-01-15 RX ADMIN — SODIUM CHLORIDE 50 ML/HR: 900 INJECTION, SOLUTION INTRAVENOUS at 08:26

## 2018-01-15 RX ADMIN — PROPOFOL 75 MG: 10 INJECTION, EMULSION INTRAVENOUS at 10:13

## 2018-01-15 RX ADMIN — PROPOFOL 140 MCG/KG/MIN: 10 INJECTION, EMULSION INTRAVENOUS at 10:13

## 2018-01-15 NOTE — PROGRESS NOTES
Patient resting comfortably on stretcher, HOB elevated, VS stable, call bell within reach, pt request wife wait in waiting room, waiting for procedure start, will continue to monitor pt.

## 2018-01-15 NOTE — ANESTHESIA POSTPROCEDURE EVALUATION
Post-Anesthesia Evaluation and Assessment    Patient: Lizbeth White MRN: [id number]  SSN: [id number]    YOB: 1940  Age: 78 y.o. Sex: male       Cardiovascular Function/Vital Signs  Visit Vitals    BP (!) 124/91    Pulse 75    Temp 36.9 °C (98.5 °F)    Resp 16    Ht 5' 9\" (1.753 m)    Wt 73.5 kg (162 lb)    SpO2 99%    BMI 23.92 kg/m2       Patient is status post MAC anesthesia for Procedure(s):  ESOPHAGOGASTRODUODENOSCOPY (EGD). Nausea/Vomiting: None    Postoperative hydration reviewed and adequate. Pain:  Pain Scale 1: Numeric (0 - 10) (01/15/18 1036)  Pain Intensity 1: 0 (01/15/18 1036)   Managed    Neurological Status: At baseline    Mental Status and Level of Consciousness: Alert and oriented     Pulmonary Status:   O2 Device: Room air (01/15/18 0818)   Adequate oxygenation and airway patent    Complications related to anesthesia: None    Post-anesthesia assessment completed.  No concerns    Signed By: Malia Mcqueen DO     January 15, 2018

## 2018-01-15 NOTE — DISCHARGE INSTRUCTIONS
Kristen Wilbur  [id number]  1940    DISCHARGE INSTRUCTIONS  Discomfort:  Redness at IV site- apply warm compress to area; if redness or soreness persist- contact your physician. There may be a slight amount of blood passed from the rectum. Gaseous discomfort - walking, belching will help relieve any discomfort. You may not operate a vehicle for 12 hours. You may not engage in an occupation involving machinery or appliances for rest of today. You may not drink alcoholic beverages for at least 12 hours. Avoid making any critical decisions for at least 24 hours. DIET:   High fiber diet. - however -  remember your colon is empty and a heavy meal will produce gas. Avoid these foods:  vegetables, fried / greasy foods, carbonated drinks for today. ACTIVITY:  You may resume your normal daily activities it is recommended that you spend the remainder of the day resting -  avoid any strenuous activity. CALL M.D.   ANY SIGN OF:   Increasing pain, nausea, vomiting  Abdominal distension (swelling)  New increased bleeding (oral or rectal)  Fever (chills)  Pain in chest area  Bloody discharge from nose or mouth  Shortness of breath     Follow-up Instructions:  Call Dr. Joey Simms for follow up exam six months      DISCHARGE SUMMARY from Nurse    The following personal items collected during your admission are returned to you:   Dental Appliance: Dental Appliances: None  Vision: Visual Aid: None  Hearing Aid:    Jewelry:    Clothing:    Other Valuables:    Valuables sent to safe:

## 2018-01-15 NOTE — PROGRESS NOTES
Assumed care of patient from anesthesia. Endoscope was pre-cleaned at bedside immediately following procedure by Shahana Shea.

## 2018-01-15 NOTE — PROCEDURES
Dee Dee Sanchez M.D. January 15, 2018    Esophagogastroduodenoscopy (EGD) Procedure Note  L[facility] Barrett  [facility] 1940  [facility] [id number]      Indications:    esophageal varices; PBC  Referring Physician:  Heron Sutton MD  Anesthesia/Sedation: Conscious Sedation/Moderate Sedation  Endoscopist:  Dr. Vicente De La Cruz:  The indications, risks, benefits and alternatives were reviewed with the patient or their decision maker who was provided an opportunity to ask questions and all questions were answered. The specific risks of esophagogastroduodenoscopy with conscious sedation were reviewed, including but not limited to anesthetic complication, bleeding, adverse drug reaction, missed lesion, infection, IV site reactions, and intestinal perforation which would lead to the need for surgical repair. Alternatives to EGD including radiographic imaging, observation without testing, or laboratory testing were reviewed as well as the limitations of those alternatives discussed. After considering the options and having all their questions answered, the patient or their decision maker provided both verbal and written consent to proceed. Procedure in Detail:  After obtaining informed consent, positioning of the patient in the left lateral decubitus position, and conduction of a pre-procedure pause or \"time out\" the endoscope was introduced into the mouth and advanced to the duodenum. A careful inspection was made, and findings or interventions are described below. Findings:   Esophagus:varices 32-40 cm that are small size at EG junction  Stomach: normal   Duodenum/jejunum: normal    Complications/estimated blood loss: none    Therapies:  none    Specimens: none           Recommendations:  -follow up exam six months    Thank you for entrusting me with this patient's care.   Please do not hesitate to contact me with any questions or if I can be of assistance with any of your other patients' GI needs.   Signed By: Marco Harper MD                        January 15, 2018

## 2018-01-15 NOTE — IP AVS SNAPSHOT
[address] 
[phone] Patient: Kristen Bowles MRN: [id number] BID:6/43/4494 A check marge indicates which time of day the medication should be taken. My Medications CHANGE how you take these medications Instructions Each Dose to Equal  
 Morning Noon Evening Bedtime * nadolol 40 mg tablet Commonly known as:  CORGARD What changed:  Another medication with the same name was added. Make sure you understand how and when to take each. Your last dose was: Your next dose is: Take 1 Tab by mouth daily for 30 days. Indications: PREVENTION OF BLEEDING ESOPHAGEAL VARICES 40 mg  
    
   
   
   
  
 * nadolol 40 mg tablet Commonly known as:  CORGARD What changed:  Another medication with the same name was added. Make sure you understand how and when to take each. Your last dose was: Your next dose is: Take 1 Tab by mouth daily for 90 days. 40 mg  
    
   
   
   
  
 * nadolol 20 mg tablet Commonly known as:  CORGARD What changed: You were already taking a medication with the same name, and this prescription was added. Make sure you understand how and when to take each. Your last dose was: Your next dose is: Take 2 Tabs by mouth daily for 90 days. Indications: Portal Hypertension 40 mg  
    
   
   
   
  
 * Notice: This list has 3 medication(s) that are the same as other medications prescribed for you. Read the directions carefully, and ask your doctor or other care provider to review them with you. CONTINUE taking these medications Instructions Each Dose to Equal  
 Morning Noon Evening Bedtime Syringe with Needle (Disp) 3 mL 23 gauge x 1 1/2\" Syrg Your last dose was: Your next dose is:    
   
   
 1 Syringe by IntraMUSCular route every seven (7) days.  For use with testosterone injections 1 Syringe  
    
   
   
   
  
 testosterone cypionate 200 mg/mL injection Commonly known as:  DEPOTESTOTERONE CYPIONATE Your last dose was: Your next dose is: INJECT ONE-HALF ML (CC) INTRAMUSCULARLY ONCE A WEEK  
     
   
   
   
  
 ursodiol 300 mg capsule Commonly known as:  ACTIGALL Your last dose was: Your next dose is: TAKE 2 CAPSULES TWICE A DAY Where to Get Your Medications These medications were sent to [facility] Jodee Segovia, [address] [address] [address] [place] [address] Phone:  [phone]  
  nadolol 20 mg tablet

## 2018-01-15 NOTE — PROGRESS NOTES
See anesthesia flow-sheet for procedural sedation and vital signs. No respiratory distress. Abdomen without distention. Stable for transfer to recovery per anesthesia. No specimens obtained.

## 2018-01-15 NOTE — PERIOP NOTES
[facility]  1940  [id number]    Situation:  Verbal report received from: Ulices Woodard   Procedure: Procedure(s):  ESOPHAGOGASTRODUODENOSCOPY (EGD)    Background:    Preoperative diagnosis: ESOPHAGEAL VARICES   Postoperative diagnosis: Esophageal varices    :  Dr. Lynn Both  Assistant(s): Endoscopy Technician-1: Mir Gerard  Endoscopy RN-1: Yifan Saldaña RN    Specimens: * No specimens in log *  H. Pylori  no    Assessment:  Intra-procedure medications   Anesthesia gave intra-procedure sedation and medications, see anesthesia flow sheet yes    Intravenous fluids: NS@ KVO     Vital signs stable Yes    Abdominal assessment: round and soft yes    Recommendation:  Discharge patient per MD order yes.   Permission to share finding with family or friend yes wife

## 2018-01-15 NOTE — PROGRESS NOTES
Patient resting comfortably on stretcher, HOB elevated, VS stable, call bell within reach, assisted patient to the bathroom, waiting for procedure start, will continue to monitor pt, updated spouse regarding delay in procedure start.

## 2018-01-15 NOTE — IP AVS SNAPSHOT
303 [facility] 
 
 
 [address] 
[phone] Patient: Narcisa Ruby MRN: [id number] DNU:3/04/[birth year]78 About your hospitalization You were admitted on:  January 15, 2018 You last received care in the:  [facility] ENDOSCOPY You were discharged on:  January 15, 2018 Why you were hospitalized Your primary diagnosis was:  Not on File Follow-up Information Follow up With Details Comments Contact Info Rene Thomas MD   [address] [phone] [phone] Your Scheduled Appointments Wednesday January 24, 2018 10:00 AM EST Roberto Wagner with Rene Thomas MD  
[address] [facility] ([address]) [address] [facility]  
[phone] Discharge Orders None A check marge indicates which time of day the medication should be taken. My Medications CHANGE how you take these medications Instructions Each Dose to Equal  
 Morning Noon Evening Bedtime * nadolol 40 mg tablet Commonly known as:  CORGARD What changed:  Another medication with the same name was added. Make sure you understand how and when to take each. Your last dose was: Your next dose is: Take 1 Tab by mouth daily for 30 days. Indications: PREVENTION OF BLEEDING ESOPHAGEAL VARICES 40 mg  
    
   
   
   
  
 * nadolol 40 mg tablet Commonly known as:  CORGARD What changed:  Another medication with the same name was added. Make sure you understand how and when to take each. Your last dose was: Your next dose is: Take 1 Tab by mouth daily for 90 days. 40 mg  
    
   
   
   
  
 * nadolol 20 mg tablet Commonly known as:  CORGARD What changed: You were already taking a medication with the same name, and this prescription was added.  Make sure you understand how and when to take each. Your last dose was: Your next dose is: Take 2 Tabs by mouth daily for 90 days. Indications: Portal Hypertension 40 mg  
    
   
   
   
  
 * Notice: This list has 3 medication(s) that are the same as other medications prescribed for you. Read the directions carefully, and ask your doctor or other care provider to review them with you. CONTINUE taking these medications Instructions Each Dose to Equal  
 Morning Noon Evening Bedtime Syringe with Needle (Disp) 3 mL 23 gauge x 1 1/2\" Syrg Your last dose was: Your next dose is:    
   
   
 1 Syringe by IntraMUSCular route every seven (7) days. For use with testosterone injections 1 Syringe  
    
   
   
   
  
 testosterone cypionate 200 mg/mL injection Commonly known as:  DEPOTESTOTERONE CYPIONATE Your last dose was: Your next dose is: INJECT ONE-HALF ML (CC) INTRAMUSCULARLY ONCE A WEEK  
     
   
   
   
  
 ursodiol 300 mg capsule Commonly known as:  ACTIGALL Your last dose was: Your next dose is: TAKE 2 CAPSULES TWICE A DAY Where to Get Your Medications These medications were sent to [facility] Jairon Segovia, [address] [facility]  [address] Mak[address]la Kim [facility] [address] Phone:  [phone]  
  nadolol 20 mg tablet Discharge Instructions Lea Comp 
[id number] 
1940 DISCHARGE INSTRUCTIONS Discomfort: 
Redness at IV site- apply warm compress to area; if redness or soreness persist- contact your physician. There may be a slight amount of blood passed from the rectum. Gaseous discomfort - walking, belching will help relieve any discomfort. You may not operate a vehicle for 12 hours. You may not engage in an occupation involving machinery or appliances for rest of today. You may not drink alcoholic beverages for at least 12 hours. Avoid making any critical decisions for at least 24 hours. DIET: 
 High fiber diet.  however -  remember your colon is empty and a heavy meal will produce gas. Avoid these foods:  vegetables, fried / greasy foods, carbonated drinks for today. ACTIVITY: 
You may resume your normal daily activities it is recommended that you spend the remainder of the day resting -  avoid any strenuous activity. CALL M.D. ANY SIGN OF: Increasing pain, nausea, vomiting Abdominal distension (swelling) New increased bleeding (oral or rectal) Fever (chills) Pain in chest area Bloody discharge from nose or mouth Shortness of breath Follow-up Instructions: 
Call Dr. Darnell Hunt for follow up exam six months DISCHARGE SUMMARY from Nurse The following personal items collected during your admission are returned to you:  
Dental Appliance: Dental Appliances: None Vision: Visual Aid: None Hearing Aid:   
Jewelry:   
Clothing:   
Other Valuables:   
Valuables sent to safe:   
 
 
[employer] Transitions of Care [address] offers a voluntary care coordination program to provide high quality service and care to [facility] fee-for-service beneficiaries. Nikky Dolan was designed to help you enhance your health and well-being through the following services: ? Transitions of Care  support for individuals who are transitioning from one care setting to another (example: Hospital to home). ? Chronic and Complex Care Coordination  support for individuals and caregivers of those with serious or chronic illnesses or with more than one chronic (ongoing) condition and those who take a number of different medications. If you meet specific medical criteria, a [address] [place] [address] Rd may call you directly to coordinate your care with your primary care physician and your other care providers. For questions about the [facility]3] MEDICAL CENTER programs, please, contact your physicians office. For general questions or additional information about Accountable Care Organizations: 
Please visit www.medicare.gov/acos. html or call 1-800-MEDICARE (6-218.491.1113) TTY users should call 2-203.399.8899. Introducing hospitals & HEALTH SERVICES! Dear Madison Mccarty: 
Thank you for requesting a Authentic Response account. Our records indicate that you already have an active Authentic Response account. You can access your account anytime at [email or web address] Did you know that you can access your hospital and ER discharge instructions at any time in Authentic Response? You can also review all of your test results from your hospital stay or ER visit. Additional Information If you have questions, please visit the Frequently Asked Questions section of the Authentic Response website at [email or web address] Remember, Authentic Response is NOT to be used for urgent needs. For medical emergencies, dial 911. Now available from your iPhone and Android! Providers Seen During Your Hospitalization Provider Specialty Primary office phone Aidan Bhatti MD Gastroenterology [phone] Your Primary Care Physician (PCP) Primary Care Physician Office Phone Office Fax Mehran Freitas, [address] [phone] You are allergic to the following Allergen Reactions Sulfa (Sulfonamide Antibiotics) Unknown (comments) Pt states its been so long he doesn't remember the reaction. Other Medication Myalgia Think  that is was a Sulfa drug, but not sure Recent Documentation Height Weight BMI Smoking Status 1.753 m 73.5 kg 23.92 kg/m2 Former Smoker Emergency Contacts Name Discharge Info Relation Home Work Mobile Blanka Chriinos DISCHARGE CAREGIVER [3] Spouse [3] [phone] Patient Belongings The following personal items are in your possession at time of discharge: 
  Dental Appliances: None  Visual Aid: None Please provide this summary of care documentation to your next provider. Signatures-by signing, you are acknowledging that this After Visit Summary has been reviewed with you and you have received a copy. Patient Signature:  ____________________________________________________________ Date:  ____________________________________________________________  
  
Libia Sleeper Provider Signature:  ____________________________________________________________ Date:  ____________________________________________________________

## 2018-01-15 NOTE — ANESTHESIA PREPROCEDURE EVALUATION
Anesthetic History   No history of anesthetic complications            Review of Systems / Medical History  Patient summary reviewed, nursing notes reviewed and pertinent labs reviewed    Pulmonary  Within defined limits                 Neuro/Psych   Within defined limits           Cardiovascular  Within defined limits                Exercise tolerance: >4 METS     GI/Hepatic/Renal           Liver disease (cirrhosis, varices)    Comments: Previous UGI bleed 3/2016  Esophageal varices Endo/Other  Within defined limits           Other Findings              Physical Exam    Airway  Mallampati: I    Neck ROM: normal range of motion   Mouth opening: Normal     Cardiovascular    Rhythm: regular  Rate: normal         Dental    Dentition: Lower dentition intact, Upper dentition intact and Caps/crowns     Pulmonary  Breath sounds clear to auscultation               Abdominal         Other Findings            Anesthetic Plan    ASA: 2  Anesthesia type: MAC          Induction: Intravenous  Anesthetic plan and risks discussed with: Patient      Informed consent obtained.

## 2018-01-24 ENCOUNTER — OFFICE VISIT (OUTPATIENT)
Dept: FAMILY MEDICINE CLINIC | Age: 78
End: 2018-01-24

## 2018-01-24 VITALS
HEART RATE: 58 BPM | BODY MASS INDEX: 25.18 KG/M2 | DIASTOLIC BLOOD PRESSURE: 77 MMHG | HEIGHT: 69 IN | TEMPERATURE: 97 F | WEIGHT: 170 LBS | RESPIRATION RATE: 18 BRPM | SYSTOLIC BLOOD PRESSURE: 123 MMHG | OXYGEN SATURATION: 98 %

## 2018-01-24 DIAGNOSIS — K92.2 GASTROINTESTINAL HEMORRHAGE, UNSPECIFIED GASTROINTESTINAL HEMORRHAGE TYPE: ICD-10-CM

## 2018-01-24 DIAGNOSIS — N13.8 BPH WITH OBSTRUCTION/LOWER URINARY TRACT SYMPTOMS: ICD-10-CM

## 2018-01-24 DIAGNOSIS — E78.00 PURE HYPERCHOLESTEROLEMIA: ICD-10-CM

## 2018-01-24 DIAGNOSIS — N40.1 BPH WITH OBSTRUCTION/LOWER URINARY TRACT SYMPTOMS: ICD-10-CM

## 2018-01-24 DIAGNOSIS — I85.01 IDIOPATHIC ESOPHAGEAL VARICES WITH BLEEDING (HCC): ICD-10-CM

## 2018-01-24 DIAGNOSIS — K74.3 HEPATIC CIRRHOSIS DUE TO PRIMARY BILIARY CHOLANGITIS (HCC): ICD-10-CM

## 2018-01-24 DIAGNOSIS — E29.1 HYPOGONADISM MALE: ICD-10-CM

## 2018-01-24 DIAGNOSIS — D50.0 IRON DEFICIENCY ANEMIA DUE TO CHRONIC BLOOD LOSS: ICD-10-CM

## 2018-01-24 DIAGNOSIS — Z23 ENCOUNTER FOR IMMUNIZATION: ICD-10-CM

## 2018-01-24 DIAGNOSIS — Z00.00 MEDICARE ANNUAL WELLNESS VISIT, SUBSEQUENT: Primary | ICD-10-CM

## 2018-01-24 NOTE — MR AVS SNAPSHOT
[address] [phone] Patient: Nishi Muhammad MRN: [id number] MJR:6/80/0425 Visit Information Date & Time Provider Department Dept. Phone Encounter #  
 1/24/2018 10:00 AM Arin Velarde  [address] [facility] [phone] [id number] Upcoming Health Maintenance Date Due  
 MEDICARE YEARLY EXAM 1/25/2019 GLAUCOMA SCREENING Q2Y 1/24/2020 DTaP/Tdap/Td series (2 - Td) 1/18/2027 Allergies as of 1/24/2018  Review Complete On: 1/24/2018 By: Arin Velarde MD  
  
 Severity Noted Reaction Type Reactions Sulfa (Sulfonamide Antibiotics) Medium 05/02/2016   Not Verified Unknown (comments) Pt states its been so long he doesn't remember the reaction. Other Medication  03/06/2014    Myalgia Think  that is was a Sulfa drug, but not sure Current Immunizations  Reviewed on 11/30/2015 Name Date Influenza Vaccine 9/12/2015, 11/18/2013 Influenza Vaccine Veto Gemma) 10/28/2015 Influenza Vaccine (Quad) PF 1/18/2017  8:54 AM  
 Pneumococcal Conjugate (PCV-7) 9/12/2015 Pneumococcal Polysaccharide (PPSV-23) 11/30/2015 Not reviewed this visit You Were Diagnosed With   
  
 Codes Comments Medicare annual wellness visit, subsequent    -  Primary ICD-10-CM: Z00.00 ICD-9-CM: V70.0 Gastrointestinal hemorrhage, unspecified gastrointestinal hemorrhage type     ICD-10-CM: K92.2 ICD-9-CM: 578.9 Iron deficiency anemia due to chronic blood loss     ICD-10-CM: D50.0 ICD-9-CM: 280.0 BPH with obstruction/lower urinary tract symptoms     ICD-10-CM: N40.1, N13.8 ICD-9-CM: 600.01, 599.69 Idiopathic esophageal varices with bleeding (HCC)     ICD-10-CM: I85.01 
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  
 25.1 kg/m 2 1.94 m 2 Preferred Pharmacy Pharmacy Name Phone [address] Moore [phone] Your Updated Medication List  
  
   
This list is accurate as of: 1/24/18 11:08 AM.  Always use your most recent med list.  
  
  
  
  
 * nadolol 40 mg tablet Commonly known as:  CORGARD Take 1 Tab by mouth daily for 30 days. Indications: PREVENTION OF BLEEDING ESOPHAGEAL VARICES * nadolol 40 mg tablet Commonly known as:  CORGARD Take 1 Tab by mouth daily for 90 days. * nadolol 20 mg tablet Commonly known as:  CORGARD Take 2 Tabs by mouth daily for 90 days. Indications: Portal Hypertension Syringe with Needle (Disp) 3 mL 23 gauge x 1 1/2\" Syrg 1 Syringe by IntraMUSCular route every seven (7) days. For use with testosterone injections  
  
 testosterone cypionate 200 mg/mL injection Commonly known as:  DEPOTESTOTERONE CYPIONATE INJECT ONE-HALF ML (CC) INTRAMUSCULARLY ONCE A WEEK  
  
 ursodiol 300 mg capsule Commonly known as:  ACTIGALL TAKE 2 CAPSULES TWICE A DAY * Notice: This list has 3 medication(s) that are the same as other medications prescribed for you. Read the directions carefully, and ask your doctor or other care provider to review them with you. We Performed the Following CBC WITH AUTOMATED DIFF [62339 CPT(R)] COLLECTION VENOUS BLOOD,VENIPUNCTURE Z3331000 CPT(R)] LIPID PANEL [39965 CPT(R)] METABOLIC PANEL, COMPREHENSIVE [37520 CPT(R)] PSA W/ REFLX FREE PSA [19873 CPT(R)] Patient Instructions Medicare Wellness Visit, Male The best way to live healthy is to have a healthy lifestyle by eating a well-balanced diet, exercising regularly, limiting alcohol and stopping smoking. Regular physical exams and screening tests are another way to keep healthy. Preventive exams provided by your health care provider can find health problems before they become diseases or illnesses. Preventive services including immunizations, screening tests, monitoring and exams can help you take care of your own health. All people over age 72 should have a pneumovax  and and a prevnar shot to prevent pneumonia. These are once in a lifetime unless you and your provider decide differently. All people over 65 should have a yearly flu shot and a tetanus vaccine every 10 years. Screening for diabetes mellitus with a blood sugar test should be done every year. Glaucoma is a disease of the eye due to increased ocular pressure that can lead to blindness and it should be done every year by an eye professional. 
 
Cardiovascular screening tests that check for elevated lipids (fatty part of blood) which can lead to heart disease and strokes should be done every 5 years. Colorectal screening that evaluates for blood or polyps in your colon should be done yearly as a stool test or every five years as a flexible sigmoidoscope or every 10 years as a colonoscopy up to age 76. Men up to age 76 may need a screening blood test for prostate cancer at certain intervals, depending on their personal and family history. This decision is between the patient and his provider. If you have been a smoker or had family history of abdominal aortic aneurysms, you and your provider may decide to schedule an ultrasound test of your aorta. Hepatitis C screening is also recommended for anyone born between [birth year] through [address]. A shingles vaccine is also recommended once in a lifetime after age 61. Your Medicare Wellness Exam is recommended annually. Here is a list of your current Health Maintenance items with a due date: There are no preventive care reminders to display for this patient. Introducing [facility] SERVICES! Dear Meghann Escobar: 
Thank you for requesting a wavecatch account. Our records indicate that you already have an active wavecatch account. You can access your account anytime at [email or web address] Did you know that you can access your hospital and ER discharge instructions at any time in wavecatch? You can also review all of your test results from your hospital stay or ER visit. Additional Information If you have questions, please visit the Frequently Asked Questions section of the wavecatch website at [email or web address] Remember, wavecatch is NOT to be used for urgent needs. For medical emergencies, dial 911. Now available from your iPhone and Android! Please provide this summary of care documentation to your next provider. Your primary care clinician is listed as Harrison Snowball. If you have any questions after today's visit, please call [phone].

## 2018-01-24 NOTE — PROGRESS NOTES
This is a Subsequent Medicare Annual Wellness Exam (AWV) (Performed 12 months after IPPE or effective date of Medicare Part B enrollment)    I have reviewed the patient's medical history in detail and updated the computerized patient record. History     Alie Escudero is a 78 y.o. male who presents with the following:  Chief Complaint   Patient presents with    Annual Wellness Visit       Urinary Frequency    The history is provided by the patient (Patient continues to have urinary frequency secondary to his BPH with lower urinary tract symptoms. ). Associated symptoms include frequency. Pertinent negatives include no chills, no urgency and no abdominal pain. Cholesterol Problem   The history is provided by the patient (Patient continues to have an elevated lipid but is not on any statins at this time partially because of his cirrhosis. Patient is having no chest pain or shortness of breath.). Pertinent negatives include no chest pain, no abdominal pain, no headaches and no shortness of breath. Rectal Bleeding   The history is provided by the patient (Patient with esophageal varices with a history of bleeding from hepatic cirrhosis which is caused portal hypertension. ). Pertinent negatives include no chest pain, no abdominal pain, no headaches and no shortness of breath. Sexual Problem   The history is provided by the patient (Patient is doing well on his 100 mg of testosterone IM weekly without any weakness or other noted problem except for frequency of urination. ). Pertinent negatives include no chest pain, no abdominal pain, no headaches and no shortness of breath. Allergies   Allergen Reactions    Sulfa (Sulfonamide Antibiotics) Unknown (comments)     Pt states its been so long he doesn't remember the reaction.      Other Medication Myalgia     Think  that is was a Sulfa drug, but not sure       Current Outpatient Prescriptions   Medication Sig    nadolol (CORGARD) 20 mg tablet Take 2 Tabs by mouth daily for 90 days. Indications: Portal Hypertension    testosterone cypionate (DEPOTESTOTERONE CYPIONATE) 200 mg/mL injection INJECT ONE-HALF ML (CC) INTRAMUSCULARLY ONCE A WEEK    Syringe with Needle, Disp, 3 mL 23 gauge x 1 1/2\" syrg 1 Syringe by IntraMUSCular route every seven (7) days. For use with testosterone injections    ursodiol (ACTIGALL) 300 mg capsule TAKE 2 CAPSULES TWICE A DAY    nadolol (CORGARD) 40 mg tablet Take 1 Tab by mouth daily for 90 days.  nadolol (CORGARD) 40 mg tablet Take 1 Tab by mouth daily for 30 days. Indications: PREVENTION OF BLEEDING ESOPHAGEAL VARICES     No current facility-administered medications for this visit. 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     Primary biliary cirrhosis     Vitamin D deficiency        Past Surgical History:   Procedure Laterality Date    HX HEENT      deviated septum repair    HX ORTHOPAEDIC Right 01/2017    Arthroscopy    HX SEPTOPLASTY                           Biopsy       Family History   Problem Relation Age of Onset    Diabetes Father     Stroke Brother     Elevated Lipids Other      children       Social History     Social History    Marital status:      Spouse name: N/A    Number of children: N/A    Years of education: N/A     Social History Main Topics    Smoking status: Former Smoker     Packs/day: 1.00     Quit date: 5/2/1974    Smokeless tobacco: Never Used    Alcohol use No    Drug use: No    Sexual activity: Not Asked     Other Topics Concern    None     Social History Narrative       Review of Systems   Constitutional: Negative for chills, fever, malaise/fatigue and weight loss. HENT: Negative for congestion, hearing loss, sore throat and tinnitus.     Eyes: Negative for blurred vision, pain and discharge. Respiratory: Negative for cough, shortness of breath and wheezing. Cardiovascular: Negative for chest pain, palpitations, orthopnea, claudication and leg swelling. Gastrointestinal: Positive for anal bleeding. Negative for abdominal pain, constipation and heartburn. Genitourinary: Positive for frequency. Negative for dysuria and urgency. Musculoskeletal: Negative for falls, joint pain and myalgias. Skin: Negative for itching and rash. Neurological: Negative for dizziness, tingling, tremors and headaches. Endo/Heme/Allergies: Negative for environmental allergies and polydipsia. Psychiatric/Behavioral: Negative for depression and substance abuse. The patient is not nervous/anxious. Visit Vitals    /77 (BP 1 Location: Left arm)    Pulse (!) 58    Temp 97 °F (36.1 °C)    Resp 18    Ht 5' 9\" (1.753 m)    Wt 170 lb (77.1 kg)    SpO2 98%    BMI 25.1 kg/m2     Physical Exam   Constitutional: He is oriented to person, place, and time and well-developed, well-nourished, and in no distress. HENT:   Head: Normocephalic and atraumatic. Nose: Nose normal.   Mouth/Throat: Oropharynx is clear and moist.   Eyes: Conjunctivae and EOM are normal. Pupils are equal, round, and reactive to light. Neck: Normal range of motion. Neck supple. No JVD present. No tracheal deviation present. No thyromegaly present. Cardiovascular: Normal rate, regular rhythm, normal heart sounds and intact distal pulses. Exam reveals no gallop and no friction rub. No murmur heard. The veins in the patient's arms are beginning to distend significantly. Pulmonary/Chest: Effort normal and breath sounds normal. No respiratory distress. He has no wheezes. He has no rales. He exhibits no tenderness. Abdominal: Soft. Bowel sounds are normal. He exhibits no distension and no mass. There is no tenderness. There is no rebound and no guarding. Musculoskeletal: Normal range of motion.  He exhibits no edema or tenderness. Lymphadenopathy:     He has no cervical adenopathy. Neurological: He is alert and oriented to person, place, and time. He has normal reflexes. No cranial nerve deficit. He exhibits normal muscle tone. Gait normal. Coordination normal.   Skin: Skin is warm and dry. No rash noted. No erythema. Psychiatric: Mood, memory, affect and judgment normal.   Vitals reviewed. ICD-10-CM ICD-9-CM    1. Medicare annual wellness visit, subsequent Z00.00 V70.0    2. Gastrointestinal hemorrhage, unspecified gastrointestinal hemorrhage type K92.2 578.9 CBC WITH AUTOMATED DIFF      COLLECTION VENOUS BLOOD,VENIPUNCTURE   3. Iron deficiency anemia due to chronic blood loss D50.0 280.0 CBC WITH AUTOMATED DIFF      COLLECTION VENOUS BLOOD,VENIPUNCTURE   4. BPH with obstruction/lower urinary tract symptoms N40.1 600.01 PSA W/ REFLX FREE PSA    N13.8 599.69 COLLECTION VENOUS BLOOD,VENIPUNCTURE   5. Idiopathic esophageal varices with bleeding (HCC) I85.01 456.0    6. 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 Pure hypercholesterolemia E88.37 167.6 METABOLIC PANEL, COMPREHENSIVE      LIPID PANEL      COLLECTION VENOUS BLOOD,VENIPUNCTURE   8.  Hypogonadism male M72.4 541.0 METABOLIC PANEL, COMPREHENSIVE      PSA W/ REFLX FREE PSA      COLLECTION VENOUS BLOOD,VENIPUNCTURE       Orders Placed This Encounter    COLLECTION VENOUS BLOOD,VENIPUNCTURE    METABOLIC PANEL, COMPREHENSIVE    LIPID PANEL    CBC WITH AUTOMATED DIFF    PSA W/ REFLX FREE PSA       Follow-up Disposition: Not on Ulysses Messina MD          Past Medical History:   Diagnosis Date    Allergic     Anemia     Esophageal var[facility] ([facility] March 2016    banded x 6    GERD (gastroesophageal reflux disease)     GI bleed March 2016    Hyperlipidemia     Hypogonadism male     Primary biliary cirrhosis     Vitamin D deficiency Past Surgical History:   Procedure Laterality Date    HX HEENT      deviated septum repair    HX ORTHOPAEDIC Right 01/2017    Arthroscopy    HX SEPTOPLASTY                           Biopsy     Current Outpatient Prescriptions   Medication Sig Dispense Refill    nadolol (CORGARD) 20 mg tablet Take 2 Tabs by mouth daily for 90 days. Indications: Portal Hypertension 90 Tab 3    testosterone cypionate (DEPOTESTOTERONE CYPIONATE) 200 mg/mL injection INJECT ONE-HALF ML (CC) INTRAMUSCULARLY ONCE A WEEK 10 mL 0    Syringe with Needle, Disp, 3 mL 23 gauge x 1 1/2\" syrg 1 Syringe by IntraMUSCular route every seven (7) days. For use with testosterone injections 50 Pen Needle 0    ursodiol (ACTIGALL) 300 mg capsule TAKE 2 CAPSULES TWICE A  Cap 2    nadolol (CORGARD) 40 mg tablet Take 1 Tab by mouth daily for 90 days. 90 Tab 3    nadolol (CORGARD) 40 mg tablet Take 1 Tab by mouth daily for 30 days. Indications: PREVENTION OF BLEEDING ESOPHAGEAL VARICES 30 Tab 12     Allergies   Allergen Reactions    Sulfa (Sulfonamide Antibiotics) Unknown (comments)     Pt states its been so long he doesn't remember the reaction.      Other Medication Myalgia     Think  that is was a Sulfa drug, but not sure     Family History   Problem Relation Age of Onset    Diabetes Father     Stroke Brother     Elevated Lipids Other      children     Social History   Substance Use Topics    Smoking status: Former Smoker     Packs/day: 1.00     Quit date: 5/2/1974    Smokeless tobacco: Never Used    Alcohol use No     Patient Active Problem List   Diagnosis Code    Vitamin D deficiency E55.9    Hypogonadism male E29.1    Idiopathic esophageal varices with bleeding (HCC) I85.01    GI bleed K92.2    Anemia D64.9    BPH with obstruction/lower urinary tract symptoms N40.1, N13.8    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 Functional Ability and Level of Safety:   Hearing Loss  Hearing is good. Activities of Daily Living  The home contains: no safety equipment. Patient does total self care    Fall Risk  Fall Risk Assessment, last 12 mths 1/24/2018   Able to walk? Yes   Fall in past 12 months? No     Functional Ability:   Does the patient exhibit a steady gait? {gen no default/yes/free text:[id number]::yes   How long did it take the patient to get up and walk from a sitting position? 2sec   Is the patient self reliant?  (ie can do own laundry, meals, household chores)  yes     Does the patient handle his/her own medications? yes     Does the patient handle his/her own money? yes     Is the patients home safe (ie good lighting, handrails on stairs and bath, etc.)? yes     Did you notice or did patient express any hearing difficulties? no     Did you notice or did patient express any vision difficulties?   no     Were distance and reading eye charts used? no       Advance Care Planning:   Patient was offered the opportunity to discuss advance care planning:  yes     Does patient have an Advance Directive:  yes   If no, did you provide information on Caring Connections?   yes     ADL Assessment 1/24/2018   Feeding yourself No Help Needed   Getting from bed to chair No Help Needed   Getting dressed No Help Needed   Bathing or showering No Help Needed   Walk across the room (includes cane/walker) No Help Needed   Using the telphone No Help Needed   Taking your medications No Help Needed   Preparing meals No Help Needed   Managing money (expenses/bills) No Help Needed   Moderately strenuous housework (laundry) No Help Needed   Shopping for personal items (toiletries/medicines) No Help Needed   Shopping for groceries No Help Needed   Driving No Help Needed   Climbing a flight of stairs No Help Needed   Getting to places beyond walking distances No Help Needed       Abuse Screen  Patient is not abused    Cognitive Screening   Evaluation of Cognitive Function:  Has your family/caregiver stated any concerns about your memory: no  Normal    Patient Care Team   Patient Care Team:  Ceci Tovar MD as PCP - General (Family Practice)  Felicitas Mejía MD (Gastroenterology)    Assessment/Plan   Education and counseling provided:  Are appropriate based on today's review and evaluation      There are no preventive care reminders to display for this patient.

## 2018-01-25 LAB
ALBUMIN SERPL-MCNC: 4 G/DL (ref 3.5–4.8)
ALBUMIN/GLOB SERPL: 1.5 {RATIO} (ref 1.2–2.2)
ALP SERPL-CCNC: 78 IU/L (ref 39–117)
ALT SERPL-CCNC: 24 IU/L (ref 0–44)
AST SERPL-CCNC: 36 IU/L (ref 0–40)
BASOPHILS # BLD AUTO: 0.1 X10E3/UL (ref 0–0.2)
BASOPHILS NFR BLD AUTO: 1 %
BILIRUB SERPL-MCNC: 0.7 MG/DL (ref 0–1.2)
BUN SERPL-MCNC: 11 MG/DL (ref 8–27)
BUN/CREAT SERPL: 11 (ref 10–24)
CALCIUM SERPL-MCNC: 9.2 MG/DL (ref 8.6–10.2)
CHLORIDE SERPL-SCNC: 101 MMOL/L (ref 96–106)
CHOLEST SERPL-MCNC: 176 MG/DL (ref 100–199)
CO2 SERPL-SCNC: 28 MMOL/L (ref 18–29)
CREAT SERPL-MCNC: 1.03 MG/DL (ref 0.76–1.27)
EOSINOPHIL # BLD AUTO: 0.2 X10E3/UL (ref 0–0.4)
EOSINOPHIL NFR BLD AUTO: 4 %
ERYTHROCYTE [DISTWIDTH] IN BLOOD BY AUTOMATED COUNT: 17.2 % (ref 12.3–15.4)
GFR SERPLBLD CREATININE-BSD FMLA CKD-EPI: 71 ML/MIN/1.73
GFR SERPLBLD CREATININE-BSD FMLA CKD-EPI: 82 ML/MIN/1.73
GLOBULIN SER CALC-MCNC: 2.6 G/DL (ref 1.5–4.5)
GLUCOSE SERPL-MCNC: 103 MG/DL (ref 65–99)
HCT VFR BLD AUTO: 39.9 % (ref 37.5–51)
HDLC SERPL-MCNC: 55 MG/DL
HGB BLD-MCNC: 12.8 G/DL (ref 13–17.7)
IMM GRANULOCYTES # BLD: 0 X10E3/UL (ref 0–0.1)
IMM GRANULOCYTES NFR BLD: 0 %
INTERPRETATION, 910389: NORMAL
LDLC SERPL CALC-MCNC: 108 MG/DL (ref 0–99)
LYMPHOCYTES # BLD AUTO: 1.4 X10E3/UL (ref 0.7–3.1)
LYMPHOCYTES NFR BLD AUTO: 35 %
MCH RBC QN AUTO: 26.8 PG (ref 26.6–33)
MCHC RBC AUTO-ENTMCNC: 32.1 G/DL (ref 31.5–35.7)
MCV RBC AUTO: 84 FL (ref 79–97)
MONOCYTES # BLD AUTO: 0.5 X10E3/UL (ref 0.1–0.9)
MONOCYTES NFR BLD AUTO: 12 %
NEUTROPHILS # BLD AUTO: 2 X10E3/UL (ref 1.4–7)
NEUTROPHILS NFR BLD AUTO: 48 %
PLATELET # BLD AUTO: 145 X10E3/UL (ref 150–379)
POTASSIUM SERPL-SCNC: 4.5 MMOL/L (ref 3.5–5.2)
PROT SERPL-MCNC: 6.6 G/DL (ref 6–8.5)
PSA SERPL-MCNC: 1 NG/ML (ref 0–4)
RBC # BLD AUTO: 4.77 X10E6/UL (ref 4.14–5.8)
REFLEX CRITERIA: NORMAL
SODIUM SERPL-SCNC: 143 MMOL/L (ref 134–144)
TRIGL SERPL-MCNC: 65 MG/DL (ref 0–149)
VLDLC SERPL CALC-MCNC: 13 MG/DL (ref 5–40)
WBC # BLD AUTO: 4.1 X10E3/UL (ref 3.4–10.8)

## 2018-04-05 DIAGNOSIS — E55.9 VITAMIN D DEFICIENCY: ICD-10-CM

## 2018-04-05 RX ORDER — URSODIOL 300 MG/1
CAPSULE ORAL
Qty: 360 CAP | Refills: 1 | Status: SHIPPED | OUTPATIENT
Start: 2018-04-05 | End: 2018-10-01 | Stop reason: SDUPTHER

## 2018-04-24 ENCOUNTER — OFFICE VISIT (OUTPATIENT)
Dept: HEMATOLOGY | Age: 78
End: 2018-04-24

## 2018-04-24 ENCOUNTER — HOSPITAL ENCOUNTER (OUTPATIENT)
Dept: LAB | Age: 78
Discharge: HOME | End: 2018-04-24
Payer: MEDICARE

## 2018-04-24 VITALS
DIASTOLIC BLOOD PRESSURE: 73 MMHG | RESPIRATION RATE: 16 BRPM | HEART RATE: 53 BPM | OXYGEN SATURATION: 98 % | WEIGHT: 166 LBS | TEMPERATURE: 96.6 F | SYSTOLIC BLOOD PRESSURE: 114 MMHG | HEIGHT: 69 IN | BODY MASS INDEX: 24.59 KG/M2

## 2018-04-24 DIAGNOSIS — M89.9 DISORDER OF BONE: ICD-10-CM

## 2018-04-24 DIAGNOSIS — K74.60 CIRRHOSIS OF LIVER WITHOUT ASCITES, UNSPECIFIED HEPATIC CIRRHOSIS TYPE (HCC): ICD-10-CM

## 2018-04-24 DIAGNOSIS — K74.3 PRIMARY BILIARY CHOLANGITIS (HCC): ICD-10-CM

## 2018-04-24 DIAGNOSIS — R68.89 OTHER GENERAL SYMPTOMS AND SIGNS: ICD-10-CM

## 2018-04-24 DIAGNOSIS — K74.60 CIRRHOSIS OF LIVER WITHOUT ASCITES, UNSPECIFIED HEPATIC CIRRHOSIS TYPE (HCC): Primary | ICD-10-CM

## 2018-04-24 LAB
ALBUMIN SERPL-MCNC: 3.8 G/DL (ref 3.4–5)
ALBUMIN/GLOB SERPL: 1.1 {RATIO} (ref 0.8–1.7)
ALP SERPL-CCNC: 95 U/L (ref 45–117)
ALT SERPL-CCNC: 36 U/L (ref 16–61)
ANION GAP SERPL CALC-SCNC: 7 MMOL/L (ref 3–18)
AST SERPL-CCNC: 38 U/L (ref 15–37)
BASOPHILS # BLD: 0 K/UL (ref 0–0.06)
BASOPHILS NFR BLD: 1 % (ref 0–2)
BILIRUB DIRECT SERPL-MCNC: 0.2 MG/DL (ref 0–0.2)
BILIRUB SERPL-MCNC: 0.9 MG/DL (ref 0.2–1)
BUN SERPL-MCNC: 16 MG/DL (ref 7–18)
BUN/CREAT SERPL: 14 (ref 12–20)
CALCIUM SERPL-MCNC: 9.2 MG/DL (ref 8.5–10.1)
CHLORIDE SERPL-SCNC: 104 MMOL/L (ref 100–108)
CO2 SERPL-SCNC: 29 MMOL/L (ref 21–32)
CREAT SERPL-MCNC: 1.18 MG/DL (ref 0.6–1.3)
DIFFERENTIAL METHOD BLD: ABNORMAL
EOSINOPHIL # BLD: 0.2 K/UL (ref 0–0.4)
EOSINOPHIL NFR BLD: 4 % (ref 0–5)
ERYTHROCYTE [DISTWIDTH] IN BLOOD BY AUTOMATED COUNT: 16.9 % (ref 11.6–14.5)
FERRITIN SERPL-MCNC: 9 NG/ML (ref 8–388)
FOLATE SERPL-MCNC: >20 NG/ML (ref 3.1–17.5)
GLOBULIN SER CALC-MCNC: 3.4 G/DL (ref 2–4)
GLUCOSE SERPL-MCNC: 94 MG/DL (ref 74–99)
HCT VFR BLD AUTO: 41.9 % (ref 36–48)
HGB BLD-MCNC: 13.1 G/DL (ref 13–16)
INR PPP: 1.1 (ref 0.8–1.2)
LYMPHOCYTES # BLD: 1.7 K/UL (ref 0.9–3.6)
LYMPHOCYTES NFR BLD: 40 % (ref 21–52)
MCH RBC QN AUTO: 25.9 PG (ref 24–34)
MCHC RBC AUTO-ENTMCNC: 31.3 G/DL (ref 31–37)
MCV RBC AUTO: 82.8 FL (ref 74–97)
MONOCYTES # BLD: 0.5 K/UL (ref 0.05–1.2)
MONOCYTES NFR BLD: 12 % (ref 3–10)
NEUTS SEG # BLD: 1.8 K/UL (ref 1.8–8)
NEUTS SEG NFR BLD: 43 % (ref 40–73)
PLATELET # BLD AUTO: 150 K/UL (ref 135–420)
PMV BLD AUTO: 11.3 FL (ref 9.2–11.8)
POTASSIUM SERPL-SCNC: 4.4 MMOL/L (ref 3.5–5.5)
PROT SERPL-MCNC: 7.2 G/DL (ref 6.4–8.2)
PROTHROMBIN TIME: 13.3 SEC (ref 11.5–15.2)
RBC # BLD AUTO: 5.06 M/UL (ref 4.7–5.5)
SODIUM SERPL-SCNC: 140 MMOL/L (ref 136–145)
VIT B12 SERPL-MCNC: 188 PG/ML (ref 211–911)
WBC # BLD AUTO: 4.2 K/UL (ref 4.6–13.2)

## 2018-04-24 PROCEDURE — 82107 ALPHA-FETOPROTEIN L3: CPT | Performed by: NURSE PRACTITIONER

## 2018-04-24 PROCEDURE — 85025 COMPLETE CBC W/AUTO DIFF WBC: CPT | Performed by: NURSE PRACTITIONER

## 2018-04-24 PROCEDURE — 82728 ASSAY OF FERRITIN: CPT | Performed by: NURSE PRACTITIONER

## 2018-04-24 PROCEDURE — 85610 PROTHROMBIN TIME: CPT | Performed by: NURSE PRACTITIONER

## 2018-04-24 PROCEDURE — 36415 COLL VENOUS BLD VENIPUNCTURE: CPT | Performed by: NURSE PRACTITIONER

## 2018-04-24 PROCEDURE — 82607 VITAMIN B-12: CPT | Performed by: NURSE PRACTITIONER

## 2018-04-24 PROCEDURE — 80048 BASIC METABOLIC PNL TOTAL CA: CPT | Performed by: NURSE PRACTITIONER

## 2018-04-24 PROCEDURE — 82652 VIT D 1 25-DIHYDROXY: CPT | Performed by: NURSE PRACTITIONER

## 2018-04-24 PROCEDURE — 83516 IMMUNOASSAY NONANTIBODY: CPT | Performed by: NURSE PRACTITIONER

## 2018-04-24 PROCEDURE — 83540 ASSAY OF IRON: CPT | Performed by: NURSE PRACTITIONER

## 2018-04-24 PROCEDURE — 80076 HEPATIC FUNCTION PANEL: CPT | Performed by: NURSE PRACTITIONER

## 2018-04-24 NOTE — PROGRESS NOTES
70 Ulysses Vaughn MD, [address], [place] [facility], [place]       [place], EMILIE Smith Cera, [place] [facility]   MOISES Joyner NP [address] [facility] [address]    at [address], [address] [address] Crowe  22.    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 9  Unknown        GI bleed ICD-10-CM: K92.2  ICD-9-CM: 578.9  3/1/2016        Hypogonadism male ICD-10-CM: E29.1  ICD-9-CM: 257.2  Unknown        Vitamin D deficiency ICD-10-CM: E55.9  ICD-9-CM: 268.9  Unknown                Mckay Thornton Slider is here for 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 The active problem list, all pertinent past medical history, medications, liver histology, endoscopic studies, radiologic findings and laboratory findings related to the liver disorder were reviewed with the patient. The patient is a 70 y.o.  male who was first noted to have [place] about 12 years ago based on serologies.     He has been maintained on 1200 mg of ursodiol without issue. Imaging of the liver performed 02/2017 with ultrasound followed with MRI in 03/2017. Heterogeneous echotexture. No focal liver lesions. Most recent imaging was performed in 09/2017 with ultrasound and was unremarkable. A liver biopsy was performed recently but I do not have the report. The patient has no extrahepatic autoimmune disorders. The most recent laboratory studies indicate that the liver transaminases are normal, alkaline phosphatase is normal, tests of hepatic synthetic and metabolic function are normal, and the platelet count is normal.      The patient has developed varices with recent hemorrhage. He has undergone serial EGD's by Dr. Edgar Boone and he is continuing to band the varices. The most recent EGD was in 01/2018. The patient completes all daily activities without any functional limitations. He has not developed ascites or encephalopathy. No edema. The patient has no complaints which can be attributed to liver disease. The patient has not experienced fatigue, fevers, chills, shortness of breath, chest pain, pain in the right side over the liver, diffuse abdominal pain, nausea, vomiting, constipation, diarrhea, dryeyes, dry mouth, arthralgias, myalgias, yellowing of the eyes or skin, itching, dark urine, problems concentrating, swelling of the abdomen, no recent hematemesis or hematochezia other than event mentioned. ALLERGIES  Allergies   Allergen Reactions    Sulfa (Sulfonamide Antibiotics) Unknown (comments)     Pt states its been so long he doesn't remember the reaction.  Other Medication Myalgia     Think  that is was a Sulfa drug, but not sure       MEDICATIONS  Current Outpatient Prescriptions   Medication Sig    ursodiol (ACTIGALL) 300 mg capsule TAKE 2 CAPSULES TWICE A DAY    nadolol (CORGARD) 20 mg tablet Take 2 Tabs by mouth daily for 90 days.  Indications: Portal Hypertension    testosterone cypionate (DEPOTESTOTERONE CYPIONATE) 200 mg/mL injection INJECT ONE-HALF ML (CC) INTRAMUSCULARLY ONCE A WEEK    Syringe with Needle, Disp, 3 mL 23 gauge x 1 1/2\" syrg 1 Syringe by IntraMUSCular route every seven (7) days. For use with testosterone injections    nadolol (CORGARD) 40 mg tablet Take 1 Tab by mouth daily for 90 days.  nadolol (CORGARD) 40 mg tablet Take 1 Tab by mouth daily for 30 days. Indications: PREVENTION OF BLEEDING ESOPHAGEAL VARICES     No current facility-administered medications for this visit. SYSTEM REVIEW NOT RELATED TO LIVER DISEASE OR REVIEWED ABOVE:  Constitution systems: Negative for fever, chills, weight gain, weight loss. Eyes: Negative for visual changes. ENT: Negative for sore throat, painful swallowing. Respiratory: Negative for cough, hemoptysis, SOB. Cardiology: Negative for chest pain, palpitations. GI:  Negative for constipation or diarrhea. : + for urinary frequency, dysuria, hematuria, + nocturia. +   Skin: Negative for rash. Hematology: Negative for easy bruising, blood clots. Musculo-skelatal: Negative for back pain, muscle pain, weakness. Neurologic: Negative for headaches, dizziness, vertigo, memory problems not related to HE. Psychology: Negative for anxiety, depression. FAMILY HISTORY:  The mother passed CHF age 80  The father passed CAD age 76   There is no family history of liver disease. There is no family history of immune disorders. SOCIAL HISTORY:  The patient is . The patient has 4 children,   The patient has never used tobacco products. The patient has never consumed significant amounts of alcohol. The patient retired in .     PHYSICAL EXAMINATION:  Visit Vitals    /73 (BP 1 Location: Right arm, BP Patient Position: Sitting)    Pulse (!) 53    Temp 96.6 °F (35.9 °C) (Tympanic)    Resp 16    Ht 5' 9\" (1.753 m)    Wt 166 lb (75.3 kg)    SpO2 98%    BMI 24.51 kg/m2     General: No acute distress. Eyes: Sclera anicteric. ENT: No oral lesions. Thyroid normal.  Nodes: No adenopathy. Skin: No spider angiomata. No jaundice. No palmar erythema. Respiratory: Lungs clear to auscultation. Cardiovascular: Regular heart rate. No murmurs. No JVD. Abdomen: Soft non-tender. Liver size normal to percussion/palpation. Spleen not palpable. No obvious ascites. Extremities: No edema. No muscle wasting. No gross arthritic changes. Neurologic: Alert and oriented. Cranial nerves grossly intact. No asterixis. LABORATORY STUDIES:  Liver [place] of [id number] Units 1/24/2018 8/21/2017   WBC 3.4 - 10.8 x10E3/uL 4.1 4.1 (L)   ANC 1.4 - 7.0 x10E3/uL 2.0 1.8   HGB 13.0 - 17.7 g/dL 12.8 (L) 13.0    - 379 x10E3/uL 145 (L) 120 (L)   INR 0.8 - 1.2    1.1   AST 0 - 40 IU/L 36 44 (H)   ALT 0 - 44 IU/L 24 39   Alk Phos 39 - 117 IU/L 78 92   Bili, Total 0.0 - 1.2 mg/dL 0.7 0.9   Bili, Direct 0.0 - 0.2 MG/DL  0.2   Albumin 3.5 - 4.8 g/dL 4.0 3.7   BUN 8 - 27 mg/dL 11 17   Creat 0.76 - 1.27 mg/dL 1.03 1.21   Creat (iSTAT) 0.6 - 1.3 MG/DL     Na 134 - 144 mmol/L 143 139   K 3.5 - 5.2 mmol/L 4.5 4.5   Cl 96 - 106 mmol/L 101 103   CO2 18 - 29 mmol/L 28 30   Glucose 65 - 99 mg/dL 103 (H) 69 (L)   Ammonia 11 - 32 UMOL/L       Cancer Screening Latest Ref Rng & Units 8/21/2017 2/9/2017   AFP Tumor Marker 0.0 - 8.3 ng/mL      AFP, Serum 0.0 - 8.0 ng/mL 2.0  2.0   AFP-L3% 0.0 - 9.9 % Comment  Comment     SEROLOGIES:  Serologies Latest Ref Rng 3/21/2016   ISSAC Ab, Direct Negative Negative   M2 Ab 0.0 - 20.0 Units 178.8 (H)       Hep B sAB (-)  Hep A total Ab (-)  HCV ab (-)    LIVER HISTOLOGY:  04/2016: Not available. ENDOSCOPIC PROCEDURES:  04/2016 EGD Dr. Praveen Boone Esophageal varices banded  07/2016 EGD with obliterated Varices per patient. 01/2017. EGD by Dr. Praveen Boone. Large esophageal varices. 2 bands placed. 01/2018. EGD by Dr. Praveen Boone. Patient reports there were no varices. RADIOLOGY:  03/2016  MRI scan abdomen. Changes consistent with cirrhosis. No liver mass lesions. No dilated bile ducts. No bile duct strictures. Pericholecystic fluid. No ascites  09/2016. Ultrasound of the liver. The liver is mildly hetogeneous. No hepatic masses. Mel size. 02/2017. Ultrasound of the liver. The liver is slightly heterogeneous in echotexture. There is a subtle isoechoic, partially exophytic questionable lesion in the right lobe measuring approximately 3.7 cm x 3.5 cm x 4 cm.   03/2017. Dynamic MRI of the liver. No significant abnormality. No hepatic lesion identified. 09/2017. Ultrasound of the liver. The liver and pancreas are normal in size, contour, and echogenicity. OTHER TESTING:  Not available or performed    ASSESSMENT AND PLAN:  Primary Biliary Cholangitis with cirrhosis. The most recent laboratory studies indicate that the liver transaminases are normal, alkaline phosphatase is normal,  tests of hepatic synthetic and metabolic function are normal, and the platelet count is normal.  Will perform laboratory testing to monitor liver function and degree of liver injury. This will include hepatic panel, a CBC w/ diff, a BMP, a PT/INR, an AFP-L3%, a ferritin and iron panel, and Vit D and B levels. Complications of cirrhosis were discussed in detail. We discussed thrombocytopenia, portal hypertension, varices, GI bleeding, peripheral edema, ascites, hepatic encephalopathy, and hepatocellular carcinoma. We discussed the need for follow ups on a regular basis to monitor for complications. We discussed the need for every 6 month liver imaging studies. 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 Stephanie Blandon. Follow up with him as directed. Discussed extrahepatic manifestations of PBC such as osteoporosis and elevated cholesterol. The risk of osteoporosis is increased in patients with PBC.  DEXA bone density to assess for osteoporosis should be ordered by the patients primary care physician. The patient was advised to take calcium supplementation and Vitamin D. He was advised to take supplemental vitamin A, D, E, and K. Patients with PBC are at increased risk for hypercholesterolemia. However, this is not associated with an increased risk of cardiac disease and MI because this is typically an elevation in HDL cholesterol. There is no contraindication for treatment with a statin if this is felt to be indicated based upon cardiac risk assessment. The patient is receiving treatment with urosdeoxycholic Acid. Continue this at current dose. The patient  and is tolerating the treatment without significant side effects. His alkaline phosphatase is normal.     Vaccination for viral hepatitis A and B is recommended since the patient has no serologic evidence of previous exposure or vaccination with immunity. [address] screening has recently been performed and does not suggest [address] The next liver imaging study is due now and will be performed with  ultrasound and shear wave elastography. Elastography  can assess liver fibrosis and determine if a patient has advanced fibrosis or cirrhosis without the need for liver biopsy. This was ordered today. Anemia may be secondary to portal hypertension and varices. Iron stores will be assessed with today's labs. Bone density up to date with osteopenia. All of the above issues were discussed with the patient. All questions were answered. The patient expressed a clear understanding of the above. 30 minutes total time spent with this patient with more than 50% of this time spent counseling and coordinating care as described above. [facility] 87 in 6 months.       Conrad Pinedo NP   Liver [place] of [address] [place] [address], [address] [place] [address]   [phone]

## 2018-04-24 NOTE — PROGRESS NOTES
Karie Lujan is a 70 y.o. male    No chief complaint on file. 1. Have you been to the ER, urgent care clinic or hospitalized since your last visit? NO.     2. Have you seen or consulted any other health care providers outside of the [address] [place] [address] since your last visit (Include any pap smears or colon screening)?  NO            Learning Assessment 4/24/2018   PRIMARY LEARNER Patient   BARRIERS PRIMARY LEARNER NONE   CO-LEARNER CAREGIVER No   PRIMARY LANGUAGE ENGLISH   LEARNER PREFERENCE PRIMARY LISTENING   ANSWERED BY PATIENT   RELATIONSHIP SELF

## 2018-04-24 NOTE — MR AVS SNAPSHOT
[address] [place] [address] 
[phone] Patient: Janett Barragan MRN: [id number] IWS:[id number]/06/[id number] Visit Information Date & Time Provider Department Dept. Phone Encounter #  
 4/24/2018  1:30 PM Louie Eye, [facility] [address] [id number] Follow-up Instructions Return in about 6 months (around 10/24/2018). Upcoming Health Maintenance Date Due  
 MEDICARE YEARLY EXAM 1/25/2019 GLAUCOMA SCREENING Q2Y 1/24/2020 DTaP/Tdap/Td series (2 - Td) 1/18/2027 Allergies as of 4/24/2018  Review Complete On: 4/24/2018 By: Tai Clay Severity Noted Reaction Type Reactions Sulfa (Sulfonamide Antibiotics) Medium 05/02/2016   Not Verified Unknown (comments) Pt states its been so long he doesn't remember the reaction. Other Medication  03/06/2014    Myalgia Think  that is was a Sulfa drug, but not sure Current Immunizations  Reviewed on 11/30/2015 Name Date Influenza High Dose Vaccine PF 2/1/2018 Influenza Vaccine 9/12/2015, 11/18/2013 Influenza Vaccine Orlean Meres) 10/28/2015 Influenza Vaccine (Quad) PF 1/18/2017  8:54 AM  
 Pneumococcal Conjugate (PCV-7) 9/12/2015 Pneumococcal Polysaccharide (PPSV-23) 11/30/2015 Not reviewed this visit You Were Diagnosed With   
  
 Codes Comments Cirrhosis of liver without ascites, unspecified hepatic cirrhosis type ([facility]    -  Primary ICD-10-CM: K74.60 ICD-9-CM: 571.5 Primary biliary cholangitis (HCC)     ICD-10-CM: K74.3 ICD-9-CM: 571.6 Other general symptoms and signs     ICD-10-CM: R68.89 ICD-9-CM: 780.99 Disorder of bone     ICD-10-CM: M89.9 ICD-9-CM: 733.90 Vitals BP Pulse Temp Resp Height(growth percentile) 114/73 (BP 1 Location: Right arm, BP Patient Position: Sitting) (!) 53 96.6 °F (35.9 °C) (Tympanic) 16 5' 9\" (1.753 m) Weight(growth percentile) SpO2 BMI Smoking Status 166 lb (75.3 kg) 98% 24.51 kg/m2 Former Smoker BMI and BSA Data Body Mass Index Body Surface Area 24.51 kg/m 2 1.91 m 2 Preferred Pharmacy Pharmacy Name Phone [id number] [address] [place] [phone] Your Updated Medication List  
  
   
This list is accurate as of 4/24/18  2:12 PM.  Always use your most recent med list.  
  
  
  
  
 * nadolol 40 mg tablet Commonly known as:  CORGARD Take 1 Tab by mouth daily for 30 days. Indications: PREVENTION OF BLEEDING ESOPHAGEAL VARICES * nadolol 40 mg tablet Commonly known as:  CORGARD Take 1 Tab by mouth daily for 90 days. * nadolol 20 mg tablet Commonly known as:  CORGARD Take 2 Tabs by mouth daily for 90 days. Indications: Portal Hypertension Syringe with Needle (Disp) 3 mL 23 gauge x 1 1/2\" Syrg 1 Syringe by IntraMUSCular route every seven (7) days. For use with testosterone injections  
  
 testosterone cypionate 200 mg/mL injection Commonly known as:  DEPOTESTOTERONE CYPIONATE INJECT ONE-HALF ML (CC) INTRAMUSCULARLY ONCE A WEEK  
  
 ursodiol 300 mg capsule Commonly known as:  ACTIGALL TAKE 2 CAPSULES TWICE A DAY * Notice: This list has 3 medication(s) that are the same as other medications prescribed for you. Read the directions carefully, and ask your doctor or other care provider to review them with you. Follow-up Instructions Return in about 6 months (around 10/24/2018). To-Do List   
 04/24/2018 Lab:  ACTIN (SMOOTH MUSCLE) ANTIBODY   
  
 04/24/2018 Lab:  AFP WITH AFP-L3% 04/24/2018 Lab:  CBC WITH AUTOMATED DIFF   
  
 04/24/2018 Lab:  FERRITIN   
  
 04/24/2018 Lab:  HEPATIC FUNCTION PANEL   
  
 04/24/2018 Lab:  IRON PROFILE   
  
 04/24/2018 Lab:  METABOLIC PANEL, BASIC   
  
 04/24/2018   Lab:  MITOCHONDRIAL M2 AB   
  
 04/24/2018 Lab:  PROTHROMBIN TIME + INR   
  
 04/24/2018 Lab:  VITAMIN B12 & FOLATE   
  
 04/24/2018 Lab:  VITAMIN D, 1, 25 DIHYDROXY   
  
 07/01/2018 Imaging:  US ABD LTD W ELASTOGRAPHY Introducing [facility] & HEALTH SERVICES! Dear Parviz Paredes: 
Thank you for requesting a [facility] account. Our records indicate that you already have an active [facility] account. You can access your account anytime at [email or web address] Did you know that you can access your hospital and ER discharge instructions at any time in [facility]? You can also review all of your test results from your hospital stay or ER visit. Additional Information If you have questions, please visit the Frequently Asked Questions section of the [facility] website at [email or web address] Remember, [facility] is NOT to be used for urgent needs. For medical emergencies, dial 911. Now available from your iPhone and Android! Please provide this summary of care documentation to your next provider. Your primary care clinician is listed as Aissatou Townsend. If you have any questions after today's visit, please call [phone].

## 2018-04-25 LAB
1,25(OH)2D3 SERPL-MCNC: 71.6 PG/ML (ref 19.9–79.3)
ACTIN IGG SERPL-ACNC: 6 UNITS (ref 0–19)
AFP L3 MFR SERPL: NORMAL % (ref 0–9.9)
AFP SERPL-MCNC: 2.5 NG/ML (ref 0–8)
MITOCHONDRIA M2 IGG SER-ACNC: 122.9 UNITS (ref 0–20)

## 2018-04-26 RX ORDER — LANOLIN ALCOHOL/MO/W.PET/CERES
325 CREAM (GRAM) TOPICAL
Qty: 180 TAB | Refills: 3 | Status: SHIPPED | OUTPATIENT
Start: 2018-04-26 | End: 2018-12-11

## 2018-05-07 LAB
IRON SATN MFR SERPL: 5 %
IRON SERPL-MCNC: 20 UG/DL (ref 50–175)
TIBC SERPL-MCNC: 443 UG/DL (ref 250–450)

## 2018-05-15 ENCOUNTER — HOSPITAL ENCOUNTER (OUTPATIENT)
Dept: ULTRASOUND IMAGING | Age: 78
Discharge: HOME | End: 2018-05-15
Payer: MEDICARE

## 2018-05-15 DIAGNOSIS — K74.3 PRIMARY BILIARY CHOLANGITIS (HCC): ICD-10-CM

## 2018-05-15 PROCEDURE — 0346T US ABD LTD W ELASTOGRAPHY: CPT

## 2018-05-16 DIAGNOSIS — E29.1 HYPOGONADISM MALE: ICD-10-CM

## 2018-05-16 NOTE — PROGRESS NOTES
Please let him know that there is nothing suspicious on his ultrasound. No hepatic masses. Fibrosis score improved to F2. Thank you.

## 2018-05-22 ENCOUNTER — TELEPHONE (OUTPATIENT)
Dept: FAMILY MEDICINE CLINIC | Age: 78
End: 2018-05-22

## 2018-05-22 RX ORDER — TESTOSTERONE CYPIONATE 200 MG/ML
INJECTION INTRAMUSCULAR
Qty: 10 ML | Refills: 0 | Status: SHIPPED | OUTPATIENT
Start: 2018-05-22 | End: 2018-08-08 | Stop reason: SDUPTHER

## 2018-08-07 NOTE — TELEPHONE ENCOUNTER
Needs refill last office visit 1-24-18 s/w pt wife and she said Dr Petr Trevizo originally prescribed medication they have just been unable to reach his office to schedule an apt or get a refill but if unable to reach him they will schedule apt with Dr Jeffery Chaudhry

## 2018-08-08 ENCOUNTER — OFFICE VISIT (OUTPATIENT)
Dept: FAMILY MEDICINE CLINIC | Age: 78
End: 2018-08-08

## 2018-08-08 VITALS
TEMPERATURE: 98.2 F | RESPIRATION RATE: 18 BRPM | OXYGEN SATURATION: 97 % | BODY MASS INDEX: 24.88 KG/M2 | HEIGHT: 69 IN | SYSTOLIC BLOOD PRESSURE: 139 MMHG | DIASTOLIC BLOOD PRESSURE: 88 MMHG | HEART RATE: 77 BPM | WEIGHT: 168 LBS

## 2018-08-08 DIAGNOSIS — I85.01 IDIOPATHIC ESOPHAGEAL VARICES WITH BLEEDING (HCC): ICD-10-CM

## 2018-08-08 DIAGNOSIS — K74.3 HEPATIC CIRRHOSIS DUE TO PRIMARY BILIARY CHOLANGITIS (HCC): Primary | ICD-10-CM

## 2018-08-08 DIAGNOSIS — E53.8 B12 DEFICIENCY: ICD-10-CM

## 2018-08-08 DIAGNOSIS — E29.1 HYPOGONADISM MALE: ICD-10-CM

## 2018-08-08 RX ORDER — TESTOSTERONE CYPIONATE 200 MG/ML
INJECTION INTRAMUSCULAR
Qty: 10 ML | Refills: 0 | Status: SHIPPED | OUTPATIENT
Start: 2018-08-08 | End: 2019-03-07 | Stop reason: SDUPTHER

## 2018-08-08 RX ORDER — NADOLOL 40 MG/1
40 TABLET ORAL DAILY
Qty: 90 TAB | Refills: 1 | Status: SHIPPED | OUTPATIENT
Start: 2018-08-08 | End: 2019-01-30 | Stop reason: SDUPTHER

## 2018-08-08 NOTE — PROGRESS NOTES
Sonali Stein is a 70 y.o. male who presents with the following:  Chief Complaint   Patient presents with    Anemia    GERD     Patient has esophageal varices    Fatigue     Low testosterone when out of medicine       Anemia   The history is provided by the patient (Patient has had chronic bleeding from esophageal varices which has in the past resulted in an iron deficiency and he has been noted to have a B12 deficiency also). Associated symptoms include chest pain. Pertinent negatives include no abdominal pain, no headaches and no shortness of breath. Associated symptoms comments: Patient's been feeling more tired lately without his usual energy. Callie Whiteface GERD   The history is provided by the patient (Had some heartburn and has been confused over the dosing of his nadolol. This seems to have come from Dr. Bernardino Devine in [place] and .). Associated symptoms include chest pain. Pertinent negatives include no abdominal pain, no headaches and no shortness of breath. Associated symptoms comments: I cannot find any reference to Nadolol all in any of the records that were sent to me. Fatigue   The history is provided by the patient (Generally since being on his testosterone he has felt better but recently even though he discontinued his testosterone he has felt more easily tired. ). Associated symptoms include chest pain. Pertinent negatives include no abdominal pain, no headaches and no shortness of breath. Allergies   Allergen Reactions    Sulfa (Sulfonamide Antibiotics) Unknown (comments)     Pt states its been so long he doesn't remember the reaction.  Other Medication Myalgia     Think  that is was a Sulfa drug, but not sure       Current Outpatient Prescriptions   Medication Sig    testosterone cypionate (DEPOTESTOTERONE CYPIONATE) 200 mg/mL injection INJECT ONE-HALF ML (CC) INTRAMUSCULARLY ONCE A WEEK    nadolol (CORGARD) 40 mg tablet Take 1 Tab by mouth daily.     ferrous sulfate 325 mg (65 mg iron) tablet Take 1 Tab by mouth Daily (before breakfast).  ursodiol (ACTIGALL) 300 mg capsule TAKE 2 CAPSULES TWICE A DAY    Syringe with Needle, Disp, 3 mL 23 gauge x 1 1/2\" syrg 1 Syringe by IntraMUSCular route every seven (7) days. For use with testosterone injections     No current facility-administered medications for this visit. 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     Primary biliary cirrhosis (HCC)     Vitamin D deficiency        Past Surgical History:   Procedure Laterality Date    HX HEENT      deviated septum repair    HX ORTHOPAEDIC Right 01/2017    Arthroscopy    HX SEPTOPLASTY                           Biopsy       Family History   Problem Relation Age of Onset    Diabetes Father     Stroke Brother     Elevated Lipids Other      children       Social History     Social History    Marital status:      Spouse name: N/A    Number of children: N/A    Years of education: N/A     Social History Main Topics    Smoking status: Former Smoker     Packs/day: 1.00     Quit date: 5/2/1974    Smokeless tobacco: Never Used    Alcohol use No    Drug use: No    Sexual activity: Not Asked     Other Topics Concern    None     Social History Narrative       Review of Systems   Constitutional: Positive for fatigue and malaise/fatigue. Negative for chills, fever and weight loss. HENT: Negative for congestion, hearing loss, sore throat and tinnitus. Eyes: Negative for blurred vision, pain and discharge. Respiratory: Negative for cough, shortness of breath and wheezing. Cardiovascular: Positive for chest pain. Negative for palpitations, orthopnea, claudication and leg swelling. Gastrointestinal: Negative for abdominal pain, constipation and heartburn.    Genitourinary: Negative for dysuria, frequency and urgency. Musculoskeletal: Negative for falls, joint pain and myalgias. Skin: Negative for itching and rash. Neurological: Negative for dizziness, tingling, tremors and headaches. Endo/Heme/Allergies: Negative for environmental allergies and polydipsia. Psychiatric/Behavioral: Negative for depression and substance abuse. The patient is not nervous/anxious. Visit Vitals    BP (!) 167/96    Pulse 77    Temp 98.2 °F (36.8 °C)    Resp 18    Ht 5' 9\" (1.753 m)    Wt 168 lb (76.2 kg)    SpO2 97%    BMI 24.81 kg/m2     Physical Exam   Constitutional: He is oriented to person, place, and time and well-developed, well-nourished, and in no distress. HENT:   Head: Normocephalic and atraumatic. Nose: Nose normal.   Mouth/Throat: Oropharynx is clear and moist.   Eyes: Conjunctivae and EOM are normal. Pupils are equal, round, and reactive to light. Neck: Normal range of motion. Neck supple. No JVD present. No tracheal deviation present. No thyromegaly present. Cardiovascular: Normal rate, regular rhythm, normal heart sounds and intact distal pulses. Exam reveals no gallop and no friction rub. No murmur heard. Pulmonary/Chest: Effort normal and breath sounds normal. No respiratory distress. He has no wheezes. He has no rales. He exhibits no tenderness. Abdominal: Soft. Bowel sounds are normal. He exhibits no distension and no mass. There is no tenderness. There is no rebound and no guarding. Musculoskeletal: Normal range of motion. He exhibits no edema or tenderness. Lymphadenopathy:     He has no cervical adenopathy. Neurological: He is alert and oriented to person, place, and time. He has normal reflexes. No cranial nerve deficit. He exhibits normal muscle tone. Gait normal. Coordination normal.   Skin: Skin is warm and dry. No rash noted. No erythema. Psychiatric: Mood, memory, affect and judgment normal.   Vitals reviewed. ICD-10-CM ICD-9-CM    1.  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 Hypogonadism male E29.1 257.2 testosterone cypionate (DEPOTESTOTERONE CYPIONATE) 200 mg/mL injection   3. Idiopathic esophageal varices with bleeding (HCC) I85.01 456.0 IRON PROFILE      CBC WITH AUTOMATED DIFF   4. B12 deficiency E53.8 266.2 VITAMIN B12      CBC WITH AUTOMATED DIFF       Orders Placed This Encounter    IRON PROFILE    VITAMIN B12    CBC WITH AUTOMATED DIFF    testosterone cypionate (DEPOTESTOTERONE CYPIONATE) 200 mg/mL injection     Sig: INJECT ONE-HALF ML (CC) INTRAMUSCULARLY ONCE A WEEK     Dispense:  10 mL     Refill:  0    nadolol (CORGARD) 40 mg tablet     Sig: Take 1 Tab by mouth daily.      Dispense:  90 Tab     Refill:  1       Follow-up Disposition: Not on Justen Mathews MD

## 2018-08-08 NOTE — PROGRESS NOTES
1. Have you been to the ER, urgent care clinic since your last visit? Hospitalized since your last visit?no    2. Have you seen or consulted any other health care providers outside of the [address] [place] [address] since your last visit? Include any pap smears or colon screening.  no

## 2018-08-08 NOTE — MR AVS SNAPSHOT
[address] [phone] Patient: Jarad Posey MRN: [id number] ZTF:[id number] Visit Information Date & Time Provider Department Dept. Phone Encounter #  
 8/8/2018 10:40 AM Lalito Terry  [address] [facility] [phone] [id number] Your Appointments 10/22/2018 10:00 AM  
[place] [id number] [facility] ([facility]) Appt Note: 6mnth f/u  
 [address], [place] [address]  
  
   
 [address], [address] [place] [address] Upcoming Health Maintenance Date Due Influenza Age 5 to Adult 8/1/2018 MEDICARE YEARLY EXAM 1/25/2019 GLAUCOMA SCREENING Q2Y 1/24/2020 DTaP/Tdap/Td series (2 - Td) 1/18/2027 Allergies as of 8/8/2018  Review Complete On: 8/8/2018 By: Lalito Terry MD  
  
 Severity Noted Reaction Type Reactions Sulfa (Sulfonamide Antibiotics) Medium 05/02/2016   Not Verified Unknown (comments) Pt states its been so long he doesn't remember the reaction. Other Medication  03/06/2014    Myalgia Think  that is was a Sulfa drug, but not sure Current Immunizations  Reviewed on 11/30/2015 Name Date Influenza High Dose Vaccine PF 2/1/2018 Influenza Vaccine 9/12/2015, 11/18/2013 Influenza Vaccine [place]) 10/28/2015 Influenza Vaccine (Quad) PF 1/18/2017  8:54 AM  
 Pneumococcal Conjugate (PCV-7) 9/12/2015 Pneumococcal Polysaccharide (PPSV-23) 11/30/2015 Not reviewed this visit Vitals BP Pulse Temp Resp Height(growth percentile) Weight(growth percentile) (!) 167/96 77 98.2 °F (36.8 °C) 18 5' 9\" (1.753 m) 168 lb (76.2 kg) SpO2 BMI Smoking Status 97% 24.81 kg/m2 Former Smoker Vitals History BMI and BSA Data  Body Mass Index Body Surface Area  
 24.81 kg/m 2 1.93 m 2  
  
 Preferred Pharmacy Pharmacy Name Phone [id number] [phone] Your Updated Medication List  
  
   
This list is accurate as of 8/8/18 12:40 PM.  Always use your most recent med list.  
  
  
  
  
 ferrous sulfate 325 mg (65 mg iron) tablet Take 1 Tab by mouth Daily (before breakfast). * nadolol 40 mg tablet Commonly known as:  CORGARD Take 1 Tab by mouth daily for 30 days. Indications: PREVENTION OF BLEEDING ESOPHAGEAL VARICES * nadolol 40 mg tablet Commonly known as:  CORGARD Take 1 Tab by mouth daily for 90 days. * nadolol 20 mg tablet Commonly known as:  CORGARD Take 2 Tabs by mouth daily for 90 days. Indications: Portal Hypertension Syringe with Needle (Disp) 3 mL 23 gauge x 1 1/2\" Syrg 1 Syringe by IntraMUSCular route every seven (7) days. For use with testosterone injections  
  
 testosterone cypionate 200 mg/mL injection Commonly known as:  DEPOTESTOTERONE CYPIONATE INJECT ONE-HALF ML (CC) INTRAMUSCULARLY ONCE A WEEK  
  
 ursodiol 300 mg capsule Commonly known as:  ACTIGALL TAKE 2 CAPSULES TWICE A DAY * Notice: This list has 3 medication(s) that are the same as other medications prescribed for you. Read the directions carefully, and ask your doctor or other care provider to review them with you. Introducing [facility] & HEALTH SERVICES! Dear Cathleen Delarosa: 
Thank you for requesting a [employer] account. Our records indicate that you already have an active [employer] account. You can access your account anytime at [email or web address] Did you know that you can access your hospital and ER discharge instructions at any time in [employer]? You can also review all of your test results from your hospital stay or ER visit. Additional Information If you have questions, please visit the Frequently Asked Questions section of the [facility] website at [email or web address] "[email or web address] Remember, [facility] is NOT to be used for urgent needs. For medical emergencies, dial 911. Now available from your iPhone and Android! Please provide this summary of care documentation to your next provider. Your primary care clinician is listed as Ferd Fix. If you have any questions after today's visit, please call [phone].

## 2018-08-09 ENCOUNTER — LAB ONLY (OUTPATIENT)
Dept: FAMILY MEDICINE CLINIC | Age: 78
End: 2018-08-09

## 2018-08-09 DIAGNOSIS — K92.2 GASTROINTESTINAL HEMORRHAGE, UNSPECIFIED GASTROINTESTINAL HEMORRHAGE TYPE: ICD-10-CM

## 2018-08-09 DIAGNOSIS — E78.00 PURE HYPERCHOLESTEROLEMIA: ICD-10-CM

## 2018-08-09 DIAGNOSIS — D50.0 IRON DEFICIENCY ANEMIA DUE TO CHRONIC BLOOD LOSS: ICD-10-CM

## 2018-08-09 DIAGNOSIS — I85.01 IDIOPATHIC ESOPHAGEAL VARICES WITH BLEEDING (HCC): ICD-10-CM

## 2018-08-09 DIAGNOSIS — K74.3 HEPATIC CIRRHOSIS DUE TO PRIMARY BILIARY CHOLANGITIS (HCC): Primary | ICD-10-CM

## 2018-08-10 ENCOUNTER — CLINICAL SUPPORT (OUTPATIENT)
Dept: FAMILY MEDICINE CLINIC | Age: 78
End: 2018-08-10

## 2018-08-10 VITALS — BODY MASS INDEX: 24.88 KG/M2 | HEIGHT: 69 IN | WEIGHT: 168 LBS

## 2018-08-10 DIAGNOSIS — D69.6 THROMBOCYTOPENIA (HCC): Primary | ICD-10-CM

## 2018-08-10 DIAGNOSIS — E53.8 B12 DEFICIENCY: ICD-10-CM

## 2018-08-10 DIAGNOSIS — E53.8 B12 DEFICIENCY: Primary | ICD-10-CM

## 2018-08-10 LAB
BASOPHILS # BLD AUTO: 0 X10E3/UL (ref 0–0.2)
BASOPHILS NFR BLD AUTO: 1 %
EOSINOPHIL # BLD AUTO: 0.2 X10E3/UL (ref 0–0.4)
EOSINOPHIL NFR BLD AUTO: 5 %
ERYTHROCYTE [DISTWIDTH] IN BLOOD BY AUTOMATED COUNT: 16.7 % (ref 12.3–15.4)
HCT VFR BLD AUTO: 45.2 % (ref 37.5–51)
HGB BLD-MCNC: 15.4 G/DL (ref 13–17.7)
IMM GRANULOCYTES # BLD: 0 X10E3/UL (ref 0–0.1)
IMM GRANULOCYTES NFR BLD: 1 %
IRON SATN MFR SERPL: 68 % (ref 15–55)
IRON SERPL-MCNC: 225 UG/DL (ref 38–169)
LYMPHOCYTES # BLD AUTO: 1.3 X10E3/UL (ref 0.7–3.1)
LYMPHOCYTES NFR BLD AUTO: 39 %
MCH RBC QN AUTO: 30.8 PG (ref 26.6–33)
MCHC RBC AUTO-ENTMCNC: 34.1 G/DL (ref 31.5–35.7)
MCV RBC AUTO: 90 FL (ref 79–97)
MONOCYTES # BLD AUTO: 0.4 X10E3/UL (ref 0.1–0.9)
MONOCYTES NFR BLD AUTO: 11 %
MORPHOLOGY BLD-IMP: ABNORMAL
NEUTROPHILS # BLD AUTO: 1.5 X10E3/UL (ref 1.4–7)
NEUTROPHILS NFR BLD AUTO: 43 %
PLATELET # BLD AUTO: 22 X10E3/UL (ref 150–379)
RBC # BLD AUTO: 5 X10E6/UL (ref 4.14–5.8)
TIBC SERPL-MCNC: 332 UG/DL (ref 250–450)
UIBC SERPL-MCNC: 107 UG/DL (ref 111–343)
VIT B12 SERPL-MCNC: 191 PG/ML (ref 232–1245)
WBC # BLD AUTO: 3.5 X10E3/UL (ref 3.4–10.8)

## 2018-08-10 RX ORDER — CYANOCOBALAMIN 1000 UG/ML
1000 INJECTION, SOLUTION INTRAMUSCULAR; SUBCUTANEOUS ONCE
Qty: 1 ML | Refills: 0
Start: 2018-08-10 | End: 2018-08-10

## 2018-08-10 NOTE — PROGRESS NOTES
Patient aware and is in the jarek news area is [facility] and I have sent them your note and lab results

## 2018-08-13 ENCOUNTER — TELEPHONE (OUTPATIENT)
Dept: FAMILY MEDICINE CLINIC | Age: 78
End: 2018-08-13

## 2018-08-13 NOTE — TELEPHONE ENCOUNTER
Spoke to [facility] lab department to inquire on Platelet count results. States the results are back at 106 and they will fax over to be scanned into chart. Spoke to patient after verifying two identifiers, informed per provider that his platelet count was 913. Patient acknowledged understanding.

## 2018-09-30 NOTE — H&P
Gastroenterology Outpatient History and Physical 
 
Patient: Carole Malone Physician: Farrukh Conway MD 
 
Vital Signs: see nursing notes Allergies: Allergies Allergen Reactions  Sulfa (Sulfonamide Antibiotics) Unknown (comments) Pt states its been so long he doesn't remember the reaction.  Other Medication Myalgia Think  that is was a Sulfa drug, but not sure Chief Complaint: follow-up esophageal varices History of Present Illness: personal history of GI bleed, portal hypertension, esophageal varices. No symptoms; no chest pain, SOB, edema, focal weakness, numbness Justification for Procedure: prevention additional bleeding History: 
Past Medical History:  
Diagnosis Date  Allergic  Anemia  Esophageal varices ([address] March 2016  
 banded x 6  
 GERD (gastroesophageal reflux disease)  GI bleed March 2016  Hyperlipidemia  Hypogonadism male  Primary biliary cirrhosis (HCC)  Vitamin D deficiency Past Surgical History:  
Procedure Laterality Date  HX HEENT    
 deviated septum repair  HX ORTHOPAEDIC Right 01/2017 Arthroscopy  HX SEPTOPLASTY Biopsy Social History Social History  Marital status:  Spouse name: N/A  
 Number of children: N/A  
 Years of education: N/A Social History Main Topics  Smoking status: Former Smoker Packs/day: 1.00 Quit date: 5/2/1974  Smokeless tobacco: Never Used  Alcohol use No  
 Drug use: No  
 Sexual activity: Not on file Other Topics Concern  Not on file Social History Narrative Family History Problem Relation Age of Onset  Diabetes Father  Stroke Brother  Elevated Lipids Other   
  children Medications:  
Prior to Admission medications Medication Sig Start Date End Date Taking?  Authorizing Provider  
testosterone cypionate (DEPOTESTOTERONE CYPIONATE) 200 mg/mL injection INJECT ONE-HALF ML (CC) INTRAMUSCULARLY ONCE A WEEK 8/8/18   Efren Dennison MD  
nadolol (CORGARD) 40 mg tablet Take 1 Tab by mouth daily. 8/8/18   Efren Dennison MD  
ferrous sulfate 325 mg (65 mg iron) tablet Take 1 Tab by mouth Daily (before breakfast). 4/26/18   Sirena Taylor NP  
ursodiol (ACTIGALL) 300 mg capsule TAKE 2 CAPSULES TWICE A DAY 4/5/18   Efren Dennison MD  
Syringe with Needle, Disp, 3 mL 23 gauge x 1 1/2\" syrg 1 Syringe by IntraMUSCular route every seven (7) days. For use with testosterone injections 12/12/17   Efren Dennison MD  
 
 
Physical Exam:  
General: alert, cooperative, no distress HEENT: Head: Normal, normocephalic, atraumatic. Heart: regular rate and rhythm Lungs: chest clear, no wheezing, rales, normal symmetric air entry Abdominal: Bowel sounds are normal, liver is not enlarged, spleen is not enlarged Neurological: no asterixis Extremities: no edema Findings/Diagnosis: esophageal varices, portal hypertension Plan of Care/Planned Procedure: I have discussed EGD band ligation alternatives complications including but not limited to pain, cardiopulmonary event, bleeding, perforation; all questions answered. I have discussed EGD biopsy alternatives complications including but not limited to pain, cardiopulmonary event, bleeding, perforation; all questions answered. Signed By: Evens Foster MD   
 October 29, 2018

## 2018-10-18 RX ORDER — SYRINGE W-NEEDLE,DISPOSAB,3 ML 25GX5/8"
1 SYRINGE, EMPTY DISPOSABLE MISCELLANEOUS
Qty: 50 PEN NEEDLE | Refills: 0 | Status: SHIPPED | OUTPATIENT
Start: 2018-10-18 | End: 2019-03-07 | Stop reason: SDUPTHER

## 2018-10-29 ENCOUNTER — ANESTHESIA (OUTPATIENT)
Dept: ENDOSCOPY | Age: 78
End: 2018-10-29
Payer: MEDICARE

## 2018-10-29 ENCOUNTER — ANESTHESIA EVENT (OUTPATIENT)
Dept: ENDOSCOPY | Age: 78
End: 2018-10-29
Payer: MEDICARE

## 2018-10-29 ENCOUNTER — HOSPITAL ENCOUNTER (OUTPATIENT)
Age: 78
Setting detail: OUTPATIENT SURGERY
Discharge: HOME | End: 2018-10-29
Attending: INTERNAL MEDICINE | Admitting: INTERNAL MEDICINE
Payer: MEDICARE

## 2018-10-29 VITALS
TEMPERATURE: 97.7 F | WEIGHT: 162 LBS | DIASTOLIC BLOOD PRESSURE: 89 MMHG | BODY MASS INDEX: 23.19 KG/M2 | RESPIRATION RATE: 18 BRPM | SYSTOLIC BLOOD PRESSURE: 105 MMHG | OXYGEN SATURATION: 99 % | HEIGHT: 70 IN | HEART RATE: 68 BPM

## 2018-10-29 PROCEDURE — 74011250636 HC RX REV CODE- 250/636: Performed by: INTERNAL MEDICINE

## 2018-10-29 PROCEDURE — 74011250636 HC RX REV CODE- 250/636

## 2018-10-29 PROCEDURE — 76040000019: Performed by: INTERNAL MEDICINE

## 2018-10-29 PROCEDURE — 76060000031 HC ANESTHESIA FIRST 0.5 HR: Performed by: INTERNAL MEDICINE

## 2018-10-29 RX ORDER — NALOXONE HYDROCHLORIDE 0.4 MG/ML
0.4 INJECTION, SOLUTION INTRAMUSCULAR; INTRAVENOUS; SUBCUTANEOUS
Status: DISCONTINUED | OUTPATIENT
Start: 2018-10-29 | End: 2018-10-29 | Stop reason: HOSPADM

## 2018-10-29 RX ORDER — SODIUM CHLORIDE 9 MG/ML
50 INJECTION, SOLUTION INTRAVENOUS CONTINUOUS
Status: DISCONTINUED | OUTPATIENT
Start: 2018-10-29 | End: 2018-10-29 | Stop reason: HOSPADM

## 2018-10-29 RX ORDER — FENTANYL CITRATE 50 UG/ML
100 INJECTION, SOLUTION INTRAMUSCULAR; INTRAVENOUS
Status: DISCONTINUED | OUTPATIENT
Start: 2018-10-29 | End: 2018-10-29 | Stop reason: HOSPADM

## 2018-10-29 RX ORDER — PROPOFOL 10 MG/ML
INJECTION, EMULSION INTRAVENOUS AS NEEDED
Status: DISCONTINUED | OUTPATIENT
Start: 2018-10-29 | End: 2018-10-29 | Stop reason: HOSPADM

## 2018-10-29 RX ORDER — EPINEPHRINE 0.1 MG/ML
1 INJECTION INTRACARDIAC; INTRAVENOUS
Status: DISCONTINUED | OUTPATIENT
Start: 2018-10-29 | End: 2018-10-29 | Stop reason: HOSPADM

## 2018-10-29 RX ORDER — ATROPINE SULFATE 0.1 MG/ML
0.5 INJECTION INTRAVENOUS
Status: DISCONTINUED | OUTPATIENT
Start: 2018-10-29 | End: 2018-10-29 | Stop reason: HOSPADM

## 2018-10-29 RX ORDER — DEXTROMETHORPHAN/PSEUDOEPHED 2.5-7.5/.8
1.2 DROPS ORAL
Status: DISCONTINUED | OUTPATIENT
Start: 2018-10-29 | End: 2018-10-29 | Stop reason: HOSPADM

## 2018-10-29 RX ORDER — LIDOCAINE HYDROCHLORIDE 20 MG/ML
INJECTION, SOLUTION EPIDURAL; INFILTRATION; INTRACAUDAL; PERINEURAL AS NEEDED
Status: DISCONTINUED | OUTPATIENT
Start: 2018-10-29 | End: 2018-10-29 | Stop reason: HOSPADM

## 2018-10-29 RX ORDER — FLUMAZENIL 0.1 MG/ML
0.2 INJECTION INTRAVENOUS
Status: DISCONTINUED | OUTPATIENT
Start: 2018-10-29 | End: 2018-10-29 | Stop reason: HOSPADM

## 2018-10-29 RX ORDER — MIDAZOLAM HYDROCHLORIDE 1 MG/ML
.25-5 INJECTION, SOLUTION INTRAMUSCULAR; INTRAVENOUS
Status: DISCONTINUED | OUTPATIENT
Start: 2018-10-29 | End: 2018-10-29 | Stop reason: HOSPADM

## 2018-10-29 RX ADMIN — PROPOFOL 50 MG: 10 INJECTION, EMULSION INTRAVENOUS at 10:17

## 2018-10-29 RX ADMIN — LIDOCAINE HYDROCHLORIDE 30 MG: 20 INJECTION, SOLUTION EPIDURAL; INFILTRATION; INTRACAUDAL; PERINEURAL at 10:15

## 2018-10-29 RX ADMIN — PROPOFOL 50 MG: 10 INJECTION, EMULSION INTRAVENOUS at 10:16

## 2018-10-29 RX ADMIN — SODIUM CHLORIDE 50 ML/HR: 900 INJECTION, SOLUTION INTRAVENOUS at 09:48

## 2018-10-29 RX ADMIN — PROPOFOL 100 MG: 10 INJECTION, EMULSION INTRAVENOUS at 10:15

## 2018-10-29 NOTE — PROGRESS NOTES
Report received from [facility] ([facility]). See anesthesia note. ABD remains soft and non-tender post procedure. Pt has no complaints at this time and tolerated the procedure well. Endoscope was pre-cleaned at bedside immediately following procedure by Sravani Forbes.

## 2018-10-29 NOTE — PROCEDURES
Alee Egan M.D. 2018    Esophagogastroduodenoscopy (EGD) Procedure Note  Tarik Hogue  : 1940  Lon Jones Medical Record Number: [id number]      Indications:   esophageal varices  Referring Physician:  Issac Ludwig MD  Anesthesia/Sedation: Conscious Sedation/Moderate Sedation  Endoscopist:  Dr. Linda Love; no surgical assistants  Permit:  The indications, risks, benefits and alternatives were reviewed with the patient or their decision maker who was provided an opportunity to ask questions and all questions were answered. The specific risks of esophagogastroduodenoscopy with conscious sedation were reviewed, including but not limited to anesthetic complication, bleeding, adverse drug reaction, missed lesion, infection, IV site reactions, and intestinal perforation which would lead to the need for surgical repair. Alternatives to EGD including radiographic imaging, observation without testing, or laboratory testing were reviewed as well as the limitations of those alternatives discussed. After considering the options and having all their questions answered, the patient or their decision maker provided both verbal and written consent to proceed. Procedure in Detail:  After obtaining informed consent, positioning of the patient in the left lateral decubitus position, and conduction of a pre-procedure pause or \"time out\" the endoscope was introduced into the mouth and advanced to the duodenum. A careful inspection was made, and findings or interventions are described below. Findings:   Esophagus:varices 32-40 that are small in size at EG junction  Stomach: normal   Duodenum/jejunum: normal    Complications/estimated blood loss: none    Therapies:  none    Specimens: none           Recommendations:  -Maintain current medications.   One year follow up exam    Thank you for entrusting me with this patient's care. Please do not hesitate to contact me with any questions or if I can be of assistance with any of your other patients' GI needs.   Signed By: Farrukh Conway MD                        October 29, 2018

## 2018-10-29 NOTE — ROUTINE PROCESS
Tarik Hogue 1940 
[id number] Situation: 
Verbal report received from: Kay Salinas RN Procedure: Procedure(s): ESOPHAGOGASTRODUODENOSCOPY (EGD) Background: 
 
Preoperative diagnosis: ESOPHAGEAL VARICES Postoperative diagnosis: esophageal varices :  Dr. Dalton Graf Assistant(s): Endoscopy Technician-1: Minor Knock Endoscopy RN-1: Radha Waite RN Specimens: * No specimens in log * H. Pylori  no Assessment: 
Intra-procedure medications Anesthesia gave intra-procedure sedation and medications, see anesthesia flow sheet yes Intravenous fluids: NS@ Cheryln Mote Vital signs stable Abdominal assessment: round and soft Recommendation: 
Discharge patient per MD order. Family or Friend Permission to share finding with family or friend yes

## 2018-10-29 NOTE — DISCHARGE INSTRUCTIONS
Hellen Harris  [id number]  1940    DISCHARGE INSTRUCTIONS  Discomfort:  Redness at IV site- apply warm compress to area; if redness or soreness persist- contact your physician. There may be a slight amount of blood passed from the rectum. Gaseous discomfort - walking, belching will help relieve any discomfort. You may not operate a vehicle for 12 hours. You may not engage in an occupation involving machinery or appliances for rest of today. You may not drink alcoholic beverages for at least 12 hours. Avoid making any critical decisions for at least 24 hours. DIET:   High fiber diet. ACTIVITY:  You may resume your normal daily activities it is recommended that you spend the remainder of the day resting -  avoid any strenuous activity. CALL M.D.   ANY SIGN OF:   Increasing pain, nausea, vomiting  Abdominal distension (swelling)  New increased bleeding (oral or rectal)  Fever (chills)  Pain in chest area  Bloody discharge from nose or mouth  Shortness of breath     Follow-up Instructions:  Call Dr. Rosalie Millan in one year for follow up exam      DISCHARGE SUMMARY from Nurse    The following personal items collected during your admission are returned to you:   Dental Appliance: Dental Appliances: None  Vision:    Hearing Aid:    Jewelry:    Clothing:    Other Valuables:    Valuables sent to safe:

## 2018-10-29 NOTE — ANESTHESIA POSTPROCEDURE EVALUATION
Procedure(s): ESOPHAGOGASTRODUODENOSCOPY (EGD). Anesthesia Post Evaluation Patient location during evaluation: bedside Level of consciousness: awake Pain management: satisfactory to patient Airway patency: patent Anesthetic complications: no 
Cardiovascular status: acceptable Respiratory status: acceptable Hydration status: acceptable Visit Vitals /72 Pulse 69 Temp 36.6 °C (97.8 °F) Resp 15 Ht 5' 10\" (1.778 m) Wt 73.5 kg (162 lb) SpO2 98% BMI 23.24 kg/m²

## 2018-11-27 PROBLEM — M54.50 LUMBAR PAIN WITH RADIATION DOWN RIGHT LEG: Status: ACTIVE | Noted: 2018-11-27

## 2018-11-27 PROBLEM — M79.604 LUMBAR PAIN WITH RADIATION DOWN RIGHT LEG: Status: ACTIVE | Noted: 2018-11-27

## 2018-11-27 PROBLEM — M25.551 RIGHT HIP PAIN: Status: ACTIVE | Noted: 2018-11-27

## 2018-11-27 PROBLEM — M25.561 CHRONIC PAIN OF RIGHT KNEE: Status: ACTIVE | Noted: 2018-11-27

## 2018-11-27 PROBLEM — G89.29 CHRONIC PAIN OF RIGHT KNEE: Status: ACTIVE | Noted: 2018-11-27

## 2018-11-27 PROBLEM — M46.1 SACROILIITIS (HCC): Status: ACTIVE | Noted: 2018-11-27

## 2018-12-11 ENCOUNTER — OFFICE VISIT (OUTPATIENT)
Dept: HEMATOLOGY | Age: 78
End: 2018-12-11

## 2018-12-11 ENCOUNTER — HOSPITAL ENCOUNTER (OUTPATIENT)
Dept: LAB | Age: 78
Discharge: HOME | End: 2018-12-11
Payer: MEDICARE

## 2018-12-11 VITALS
HEART RATE: 60 BPM | BODY MASS INDEX: 24.05 KG/M2 | DIASTOLIC BLOOD PRESSURE: 79 MMHG | WEIGHT: 168 LBS | SYSTOLIC BLOOD PRESSURE: 128 MMHG | TEMPERATURE: 97.4 F | HEIGHT: 70 IN | OXYGEN SATURATION: 96 % | RESPIRATION RATE: 16 BRPM

## 2018-12-11 DIAGNOSIS — E53.8 DEFICIENCY OF OTHER SPECIFIED B GROUP VITAMINS: ICD-10-CM

## 2018-12-11 DIAGNOSIS — K74.3 PRIMARY BILIARY CHOLANGITIS (HCC): Primary | ICD-10-CM

## 2018-12-11 DIAGNOSIS — K74.3 PRIMARY BILIARY CHOLANGITIS (HCC): ICD-10-CM

## 2018-12-11 LAB
ALBUMIN SERPL-MCNC: 3.4 G/DL (ref 3.4–5)
ALBUMIN/GLOB SERPL: 1.1 {RATIO} (ref 0.8–1.7)
ALP SERPL-CCNC: 83 U/L (ref 45–117)
ALT SERPL-CCNC: 50 U/L (ref 16–61)
ANION GAP SERPL CALC-SCNC: 6 MMOL/L (ref 3–18)
AST SERPL-CCNC: 39 U/L (ref 15–37)
BASOPHILS # BLD: 0 K/UL (ref 0–0.1)
BASOPHILS NFR BLD: 1 % (ref 0–2)
BILIRUB DIRECT SERPL-MCNC: 0.4 MG/DL (ref 0–0.2)
BILIRUB SERPL-MCNC: 1.2 MG/DL (ref 0.2–1)
BUN SERPL-MCNC: 16 MG/DL (ref 7–18)
BUN/CREAT SERPL: 13
CALCIUM SERPL-MCNC: 8.3 MG/DL (ref 8.5–10.1)
CHLORIDE SERPL-SCNC: 101 MMOL/L (ref 100–108)
CO2 SERPL-SCNC: 29 MMOL/L (ref 21–32)
CREAT SERPL-MCNC: 1.19 MG/DL (ref 0.6–1.3)
DIFFERENTIAL METHOD BLD: ABNORMAL
EOSINOPHIL # BLD: 0.2 K/UL (ref 0–0.4)
EOSINOPHIL NFR BLD: 4 % (ref 0–5)
ERYTHROCYTE [DISTWIDTH] IN BLOOD BY AUTOMATED COUNT: 13.8 % (ref 11.6–14.5)
FOLATE SERPL-MCNC: >20 NG/ML (ref 3.1–17.5)
GLOBULIN SER CALC-MCNC: 3.2 G/DL (ref 2–4)
GLUCOSE SERPL-MCNC: 170 MG/DL (ref 74–99)
HCT VFR BLD AUTO: 47.2 % (ref 36–48)
HGB BLD-MCNC: 15.6 G/DL (ref 13–16)
LYMPHOCYTES # BLD: 1.5 K/UL (ref 0.9–3.6)
LYMPHOCYTES NFR BLD: 26 % (ref 21–52)
MCH RBC QN AUTO: 31.4 PG (ref 24–34)
MCHC RBC AUTO-ENTMCNC: 33.1 G/DL (ref 31–37)
MCV RBC AUTO: 95 FL (ref 74–97)
MONOCYTES # BLD: 0.6 K/UL (ref 0.05–1.2)
MONOCYTES NFR BLD: 11 % (ref 3–10)
NEUTS SEG # BLD: 3.4 K/UL (ref 1.8–8)
NEUTS SEG NFR BLD: 58 % (ref 40–73)
PLATELET # BLD AUTO: 121 K/UL (ref 135–420)
PMV BLD AUTO: 11.4 FL (ref 9.2–11.8)
POTASSIUM SERPL-SCNC: 3.9 MMOL/L (ref 3.5–5.5)
PROT SERPL-MCNC: 6.6 G/DL (ref 6.4–8.2)
RBC # BLD AUTO: 4.97 M/UL (ref 4.7–5.5)
SODIUM SERPL-SCNC: 136 MMOL/L (ref 136–145)
VIT B12 SERPL-MCNC: 435 PG/ML (ref 211–911)
WBC # BLD AUTO: 5.7 K/UL (ref 4.6–13.2)

## 2018-12-11 PROCEDURE — 85025 COMPLETE CBC W/AUTO DIFF WBC: CPT

## 2018-12-11 PROCEDURE — 36415 COLL VENOUS BLD VENIPUNCTURE: CPT

## 2018-12-11 PROCEDURE — 80048 BASIC METABOLIC PNL TOTAL CA: CPT

## 2018-12-11 PROCEDURE — 82607 VITAMIN B-12: CPT

## 2018-12-11 PROCEDURE — 80076 HEPATIC FUNCTION PANEL: CPT

## 2018-12-11 RX ORDER — CYANOCOBALAMIN 1000 UG/ML
1000 INJECTION, SOLUTION INTRAMUSCULAR; SUBCUTANEOUS
COMMUNITY
End: 2020-08-25 | Stop reason: SDUPTHER

## 2018-12-11 RX ORDER — MECLIZINE HYDROCHLORIDE 25 MG/1
25 TABLET ORAL AS NEEDED
COMMUNITY
End: 2021-09-27 | Stop reason: SDUPTHER

## 2018-12-11 NOTE — PROGRESS NOTES
70 Ulysses Vaughn MD, [address], [place], [place]       MOISES Conklin, EMILIE Ryees, [facility]   MOISES Aguilar, MOISES Matta [facility] 136    at [address]    [address] [place] [address], [facility] Connie Crowe  22.    [phone]    FAX: [address] [place] [address], [address], [address]    [phone]    FAX: [phone]       Patient Care Team:  Yessica Lund MD as PCP - General (Family Practice)  Micheline Garces MD (Gastroenterology)      Problem List  Date Reviewed: 12/11/2018          Codes Class Noted    Chronic pain of right knee ICD-10-CM: M25.561, G89.29  ICD-9-CM: 719.46, 338.29  11/27/2018        Lumbar pain with radiation down right leg ICD-10-CM: M54.5  ICD-9-CM: 724.2  11/27/2018        Sacroiliitis ([facility] ICD-10-CM: M46.1  ICD-9-CM: 720.2  11/27/2018        Right hip pain ICD-10-CM: M25.551  ICD-9-CM: 719.45  11/27/2018        Thrombocytopenia ([facility] ICD-10-CM: D69.6  ICD-9-CM: 287.5  8/10/2018        B12 deficiency ICD-10-CM: E53.8  ICD-9-CM: 266.2  8/10/2018        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 ([facility] ICD-10-CM: K74.3  ICD-9-CM: 571.6  1/24/2018        Pure hypercholesterolemia ICD-10-CM: E78.00  ICD-9-CM: 272.0  1/24/2018        BPH with obstruction/lower urinary tract symptoms ICD-10-CM: N40.1, N13.8  ICD-9-CM: 600.01, 599.69  1/18/2017        Idiopathic esophageal varices with bleeding [facility]) ICD-10-CM: I85.01  ICD-9-CM: 456.0  3/21/2016        Hypogonadism male ICD-10-CM: E29.1  ICD-9-CM: 257.2  Unknown        Vitamin D deficiency ICD-10-CM: E55.9  ICD-9-CM: 268.9  Unknown                Mckay HARI Gallagher is here for 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 The active problem list, all pertinent past medical history, medications, liver histology, endoscopic studies, radiologic findings and laboratory findings related to the liver disorder were reviewed with the patient. The patient is a 70 y.o.  male who was first noted to have [facility] about 12 years ago based on serologies. He has been maintained on 1200 mg of ursodiol without issue. Imaging of the liver performed 02/2017 with ultrasound followed with MRI in 03/2017. Heterogeneous echotexture. No focal liver lesions. Most recent imaging was performed in 09/2017 with ultrasound and was unremarkable. A liver biopsy was performed recently but I do not have the report. The patient has no extrahepatic autoimmune disorders. The most recent laboratory studies indicate that the liver transaminases are normal, alkaline phosphatase is normal, tests of hepatic synthetic and metabolic function are normal, and the platelet count is normal.      The patient has developed varices with recent hemorrhage. He has undergone serial EGD's by Dr. Margurette Canavan and he is continuing to band the varices. The most recent EGD was in 01/2018. The patient completes all daily activities without any functional limitations. He has not developed ascites or encephalopathy. No edema. The patient has no complaints which can be attributed to liver disease. The patient has not experienced fatigue, fevers, chills, shortness of breath, chest pain, pain in the right side over the liver, diffuse abdominal pain, nausea, vomiting, constipation, diarrhea, dryeyes, dry mouth, arthralgias, myalgias, yellowing of the eyes or skin, itching, dark urine, problems concentrating, swelling of the abdomen, no recent hematemesis or hematochezia other than event mentioned. ALLERGIES  Allergies   Allergen Reactions    Sulfa (Sulfonamide Antibiotics) Unknown (comments)     Pt states its been so long he doesn't remember the reaction.  Other Medication Myalgia     Think  that is was a Sulfa drug, but not sure       MEDICATIONS  Current Outpatient Medications   Medication Sig    therapeutic multivitamin-minerals (VITAMINS AND MINERALS) tablet Take 1 Tab by mouth.  cyanocobalamin (VITAMIN B-12) 1,000 mcg/mL injection 1,000 mcg by IntraMUSCular route once.  Syringe with Needle, Disp, 3 mL 23 gauge x 1 1/2\" syrg 1 Syringe by IntraMUSCular route every seven (7) days. For use with testosterone injections    ursodiol (ACTIGALL) 300 mg capsule TAKE 2 CAPSULES TWICE A DAY    testosterone cypionate (DEPOTESTOTERONE CYPIONATE) 200 mg/mL injection INJECT ONE-HALF ML (CC) INTRAMUSCULARLY ONCE A WEEK    nadolol (CORGARD) 40 mg tablet Take 1 Tab by mouth daily.  meclizine (ANTIVERT) 25 mg tablet Take 25 mg by mouth. No current facility-administered medications for this visit. SYSTEM REVIEW NOT RELATED TO LIVER DISEASE OR REVIEWED ABOVE:  Constitution systems: Negative for fever, chills, weight gain, weight loss. Eyes: Negative for visual changes. ENT: Negative for sore throat, painful swallowing. Respiratory: Negative for cough, hemoptysis, SOB. Cardiology: Negative for chest pain, palpitations. GI:  Negative for constipation or diarrhea. : + for urinary frequency, dysuria, hematuria, + nocturia. +   Skin: Negative for rash. Hematology: Negative for easy bruising, blood clots. Musculo-skelatal: Negative for back pain, muscle pain, weakness. Neurologic: Negative for headaches, dizziness, vertigo, memory problems not related to HE. Psychology: Negative for anxiety, depression. FAMILY HISTORY:  The mother passed CHF age 80  The father passed CAD age 76   There is no family history of liver disease. There is no family history of immune disorders. SOCIAL HISTORY:  The patient is . The patient has 4 children,   The patient has never used tobacco products.     The patient has never consumed significant amounts of alcohol. The patient retired in . PHYSICAL EXAMINATION:  Visit Vitals  /79 (BP 1 Location: Left arm, BP Patient Position: Sitting)   Pulse 60   Temp 97.4 °F (36.3 °C) (Tympanic)   Resp 16   Ht 5' 10\" (1.778 m)   Wt 168 lb (76.2 kg)   SpO2 96%   BMI 24.11 kg/m²     General: No acute distress. Eyes: Sclera anicteric. ENT: No oral lesions. Thyroid normal.  Nodes: No adenopathy. Skin: No spider angiomata. No jaundice. No palmar erythema. Respiratory: Lungs clear to auscultation. Cardiovascular: Regular heart rate. No murmurs. No JVD. Abdomen: Soft non-tender. Liver size normal to percussion/palpation. Spleen not palpable. No obvious ascites. Extremities: No edema. No muscle wasting. No gross arthritic changes. Neurologic: Alert and oriented. Cranial nerves grossly intact. No asterixis.      LABORATORY STUDIES:  [facility] [address] Units 12/11/2018   WBC 4.6 - 13.2 K/uL 5.7   ANC 1.8 - 8.0 K/UL 3.4   HGB 13.0 - 16.0 g/dL 15.6    - 420 K/uL 121 (L)   INR 0.8 - 1.2      AST 15 - 37 U/L 39 (H)   ALT 16 - 61 U/L 50   Alk Phos 45 - 117 U/L 83   Bili, Total 0.2 - 1.0 MG/DL 1.2 (H)   Bili, Direct 0.0 - 0.2 MG/DL 0.4 (H)   Albumin 3.4 - 5.0 g/dL 3.4   BUN 7.0 - 18 MG/DL 16   Creat 0.6 - 1.3 MG/DL 1.19   Creat (iSTAT) 0.6 - 1.3 MG/DL    Na 136 - 145 mmol/L 136   K 3.5 - 5.5 mmol/L 3.9   Cl 100 - 108 mmol/L 101   CO2 21 - 32 mmol/L 29   Glucose 74 - 99 mg/dL 170 (H)   Ammonia 11 - 32 UMOL/L      Cancer Screening Latest Ref Rng & Units 4/24/2018   AFP Tumor Marker 0.0 - 8.3 ng/mL    AFP, Serum 0.0 - 8.0 ng/mL 2.5   AFP-L3% 0.0 - 9.9 % Comment     Cancer Screening Latest Ref Rng & Units 8/21/2017 2/9/2017   AFP Tumor Marker 0.0 - 8.3 ng/mL      AFP, Serum 0.0 - 8.0 ng/mL 2.0  2.0   AFP-L3% 0.0 - 9.9 % Comment  Comment     SEROLOGIES:  Serologies Latest Ref Rng 3/21/2016   ISSAC Ab, Direct Negative Negative   M2 Ab 0.0 - 20.0 Units 178.8 (H) Hep B sAB (-)  Hep A total Ab (-)  HCV ab (-)    LIVER HISTOLOGY:  04/2016: Not available. 05/2018. TRANSIENT HEPATIC ELASTOGRAPHY:   E Range: 7.4-10.2 kPa  E Mean: 9.1 kPa  E Median: 9.3 kPa  E Std: 1.0 kPa    ENDOSCOPIC PROCEDURES:  04/2016 EGD Dr. Yahaira Marie Esophageal varices banded  07/2016 EGD with obliterated Varices per patient. 01/2017. EGD by Dr. Yahaira Marie. Large esophageal varices. 2 bands placed. 01/2018. EGD by Dr. Yahaira Marie. Patient reports there were no varices. RADIOLOGY:  03/2016  MRI scan abdomen. Changes consistent with cirrhosis. No liver mass lesions. No dilated bile ducts. No bile duct strictures. Pericholecystic fluid. No ascites  09/2016. Ultrasound of the liver. The liver is mildly hetogeneous. No hepatic masses. Mel size. 02/2017. Ultrasound of the liver. The liver is slightly heterogeneous in echotexture. There is a subtle isoechoic, partially exophytic questionable lesion in the right lobe measuring approximately 3.7 cm x 3.5 cm x 4 cm.   03/2017. Dynamic MRI of the liver. No significant abnormality. No hepatic lesion identified. 09/2017. Ultrasound of the liver. The liver and pancreas are normal in size, contour, and echogenicity. 05/2018. Ultrasound of the liver. Stable sonographic appearance of the liver. Mild heterogeneous diffuse increased hepatic parenchymal echotexture, ultrasound finding associated with fibrosis from chronic hepatocellular disease. No focal hepatic mass. OTHER TESTING:  Not available or performed    ASSESSMENT AND PLAN:  Primary Biliary Cholangitis with cirrhosis. The most recent laboratory studies indicate that the liver transaminases are normal, alkaline phosphatase is normal,  tests of hepatic synthetic and metabolic function are normal, and the platelet count is normal.  Will perform laboratory testing to monitor liver function and degree of liver injury.  This will include hepatic panel, a CBC w/ diff, a BMP, a PT/INR, an AFP-L3%, and Vit B12 and folate. Complications of cirrhosis were discussed in detail. We discussed thrombocytopenia, portal hypertension, varices, GI bleeding, peripheral edema, ascites, hepatic encephalopathy, and hepatocellular carcinoma. We discussed the need for follow ups on a regular basis to monitor for complications. We discussed the need for every 6 month liver imaging studies. 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 Henrietta Reynoso. Follow up with him as directed. Discussed extrahepatic manifestations of PBC such as osteoporosis and elevated cholesterol. The risk of osteoporosis is increased in patients with PBC. DEXA bone density to assess for osteoporosis should be ordered by the patients primary care physician. The patient was advised to take calcium supplementation and Vitamin D. He was advised to take supplemental vitamin A, D, E, and K. Patients with PBC are at increased risk for hypercholesterolemia. However, this is not associated with an increased risk of cardiac disease and MI because this is typically an elevation in HDL cholesterol. There is no contraindication for treatment with a statin if this is felt to be indicated based upon cardiac risk assessment. The patient is receiving treatment with urosdeoxycholic Acid. Continue this at current dose. The patient  and is tolerating the treatment without significant side effects. His alkaline phosphatase is normal.     Vaccination for viral hepatitis A and B is recommended since the patient has no serologic evidence of previous exposure or vaccination with immunity. [address] screening has recently been performed and does not suggest [address] The next liver imaging study is due now and will be performed with  ultrasound and shear wave elastography. Elastography  can assess liver fibrosis and determine if a patient has advanced fibrosis or cirrhosis without the need for liver biopsy. This was ordered today.       Anemia may be secondary to portal hypertension and varices. Iron stores will be assessed with today's labs. Bone density up to date with osteopenia. All of the above issues were discussed with the patient. All questions were answered. The patient expressed a clear understanding of the above. 30 minutes total time spent with this patient with more than 50% of this time spent counseling and coordinating care as described above. [address] [address] in 6 months.       Yvette Davila NP   [facility] [place] [facility] [address] [place] [address], [address] [place] [address] Jodee Liriano, [address] [place] [address]   [phone]

## 2018-12-11 NOTE — PROGRESS NOTES
Katherine Chávez is a 70 y.o. male      1. Have you been to the ER, urgent care clinic or hospitalized since your last visit? NO.     2. Have you seen or consulted any other health care providers outside of the [facility] since your last visit (Include any pap smears or colon screening)?  NO          Learning Assessment 11/27/2018   PRIMARY LEARNER Patient   BARRIERS PRIMARY LEARNER -   CO-LEARNER CAREGIVER -   PRIMARY LANGUAGE ENGLISH   LEARNER PREFERENCE PRIMARY READING   ANSWERED BY patient   RELATIONSHIP SELF

## 2018-12-20 ENCOUNTER — TELEPHONE (OUTPATIENT)
Dept: HEMATOLOGY | Age: 78
End: 2018-12-20

## 2019-01-30 DIAGNOSIS — K74.3 HEPATIC CIRRHOSIS DUE TO PRIMARY BILIARY CHOLANGITIS (HCC): ICD-10-CM

## 2019-01-30 RX ORDER — NADOLOL 40 MG/1
TABLET ORAL
Qty: 90 TAB | Refills: 0 | Status: SHIPPED | OUTPATIENT
Start: 2019-01-30 | End: 2019-04-27 | Stop reason: SDUPTHER

## 2019-04-27 DIAGNOSIS — K74.3 HEPATIC CIRRHOSIS DUE TO PRIMARY BILIARY CHOLANGITIS (HCC): ICD-10-CM

## 2019-04-30 RX ORDER — NADOLOL 40 MG/1
TABLET ORAL
Qty: 90 TAB | Refills: 0 | Status: SHIPPED | OUTPATIENT
Start: 2019-04-30 | End: 2020-01-10

## 2019-04-30 NOTE — TELEPHONE ENCOUNTER
Patient identified by two patient identifiers, name and date of birth. Spoke with patient. Patient scheduling an appt.

## 2019-04-30 NOTE — TELEPHONE ENCOUNTER
Patient needs to be reminded he is due an appointment in May and I will be on vacation the last week in may.

## 2019-05-01 DIAGNOSIS — K74.3 HEPATIC CIRRHOSIS DUE TO PRIMARY BILIARY CHOLANGITIS (HCC): ICD-10-CM

## 2019-05-01 RX ORDER — NADOLOL 40 MG/1
TABLET ORAL
Qty: 90 TAB | Refills: 0 | Status: SHIPPED | OUTPATIENT
Start: 2019-05-01 | End: 2019-06-13

## 2019-05-09 PROBLEM — N41.1 CHRONIC PROSTATITIS: Status: ACTIVE | Noted: 2019-05-09

## 2019-05-09 PROBLEM — M46.1 SACROILIITIS (HCC): Status: RESOLVED | Noted: 2018-11-27 | Resolved: 2019-05-09

## 2019-06-13 ENCOUNTER — OFFICE VISIT (OUTPATIENT)
Dept: HEMATOLOGY | Age: 79
End: 2019-06-13

## 2019-06-13 VITALS
HEART RATE: 53 BPM | DIASTOLIC BLOOD PRESSURE: 71 MMHG | BODY MASS INDEX: 23.77 KG/M2 | OXYGEN SATURATION: 98 % | HEIGHT: 70 IN | TEMPERATURE: 96.6 F | SYSTOLIC BLOOD PRESSURE: 131 MMHG | WEIGHT: 166 LBS

## 2019-06-13 DIAGNOSIS — K74.3 PRIMARY BILIARY CHOLANGITIS (HCC): Primary | ICD-10-CM

## 2019-06-13 NOTE — PROGRESS NOTES
1. Have you been to the ER, urgent care clinic since your last visit? Hospitalized since your last visit? No    2. Have you seen or consulted any other health care providers outside of the [address] [place] [address] since your last visit? Include any pap smears or colon screening.  No

## 2019-10-02 DIAGNOSIS — K74.3 HEPATIC CIRRHOSIS DUE TO PRIMARY BILIARY CHOLANGITIS (HCC): ICD-10-CM

## 2019-10-09 RX ORDER — NADOLOL 40 MG/1
TABLET ORAL
Qty: 90 TAB | Refills: 0 | Status: ON HOLD | OUTPATIENT
Start: 2019-10-09 | End: 2019-11-18

## 2019-11-14 NOTE — PERIOP NOTES
[address] [place] [address] Dr Melo Preprocedure Instructions      1. On the day of your surgery, please report to registration located on the 2nd floor of the  [facility]. yes    2. You must have a responsible adult to drive you to the hospital, stay at the hospital during your procedure and drive you home. If they leave your procedure will not be started (no exceptions). yes    3. Do not have anything to eat or drink (including water, gum, mints, coffee, and juice) after midnight. This does not apply to the medications you were instructed to take by your physician. yesIf you are currently taking Plavix, Coumadin, Aspirin, or other blood-thinning agents, contact your physician for special instructions. not applicable    4. If you are having a procedure that requires bowel prep: We recommend that you have only clear liquids the day before your procedure and begin your bowel prep by 5:00 pm.  You may continue to drink clear liquids until midnight. If for any reason you are not able to complete your prep please notify your physician immediately. yes    5. Have a list of all current medications, including vitamins, herbal supplements and any other over the counter medications. yes    6. If you wear glasses, contacts, dentures and/or hearing aids, they may be removed prior to procedure, please bring a case to store them in. yes    7. You should understand that if you do not follow these instructions your procedure may be cancelled. If your physical condition changes (I.e. fever, cold or flu) please contact your doctor as soon as possible. yes    8. It is important that you be on time.   If for any reason you are unable to keep your appointment please call [phone] the day of or your physicians office prior to your scheduled procedure

## 2019-11-18 ENCOUNTER — ANESTHESIA (OUTPATIENT)
Dept: ENDOSCOPY | Age: 79
End: 2019-11-18
Payer: MEDICARE

## 2019-11-18 ENCOUNTER — HOSPITAL ENCOUNTER (OUTPATIENT)
Age: 79
Setting detail: OUTPATIENT SURGERY
Discharge: HOME | End: 2019-11-18
Attending: INTERNAL MEDICINE | Admitting: INTERNAL MEDICINE
Payer: MEDICARE

## 2019-11-18 ENCOUNTER — ANESTHESIA EVENT (OUTPATIENT)
Dept: ENDOSCOPY | Age: 79
End: 2019-11-18
Payer: MEDICARE

## 2019-11-18 VITALS
WEIGHT: 169.75 LBS | RESPIRATION RATE: 17 BRPM | TEMPERATURE: 97.6 F | HEART RATE: 62 BPM | HEIGHT: 70 IN | DIASTOLIC BLOOD PRESSURE: 86 MMHG | BODY MASS INDEX: 24.3 KG/M2 | SYSTOLIC BLOOD PRESSURE: 139 MMHG | OXYGEN SATURATION: 99 %

## 2019-11-18 PROCEDURE — 74011250636 HC RX REV CODE- 250/636: Performed by: NURSE ANESTHETIST, CERTIFIED REGISTERED

## 2019-11-18 PROCEDURE — 74011000250 HC RX REV CODE- 250: Performed by: NURSE ANESTHETIST, CERTIFIED REGISTERED

## 2019-11-18 PROCEDURE — 76040000019: Performed by: INTERNAL MEDICINE

## 2019-11-18 PROCEDURE — 77030013805 HC LIGTR VRCES BSC -B: Performed by: INTERNAL MEDICINE

## 2019-11-18 PROCEDURE — 76060000031 HC ANESTHESIA FIRST 0.5 HR: Performed by: INTERNAL MEDICINE

## 2019-11-18 PROCEDURE — 74011250636 HC RX REV CODE- 250/636: Performed by: INTERNAL MEDICINE

## 2019-11-18 RX ORDER — SODIUM CHLORIDE 9 MG/ML
50 INJECTION, SOLUTION INTRAVENOUS CONTINUOUS
Status: DISCONTINUED | OUTPATIENT
Start: 2019-11-18 | End: 2019-11-18 | Stop reason: HOSPADM

## 2019-11-18 RX ORDER — FLUMAZENIL 0.1 MG/ML
0.2 INJECTION INTRAVENOUS
Status: DISCONTINUED | OUTPATIENT
Start: 2019-11-18 | End: 2019-11-18 | Stop reason: HOSPADM

## 2019-11-18 RX ORDER — EPINEPHRINE 0.1 MG/ML
1 INJECTION INTRACARDIAC; INTRAVENOUS
Status: DISCONTINUED | OUTPATIENT
Start: 2019-11-18 | End: 2019-11-18 | Stop reason: HOSPADM

## 2019-11-18 RX ORDER — NALOXONE HYDROCHLORIDE 0.4 MG/ML
0.4 INJECTION, SOLUTION INTRAMUSCULAR; INTRAVENOUS; SUBCUTANEOUS
Status: DISCONTINUED | OUTPATIENT
Start: 2019-11-18 | End: 2019-11-18 | Stop reason: HOSPADM

## 2019-11-18 RX ORDER — DEXTROMETHORPHAN/PSEUDOEPHED 2.5-7.5/.8
1.2 DROPS ORAL
Status: DISCONTINUED | OUTPATIENT
Start: 2019-11-18 | End: 2019-11-18 | Stop reason: HOSPADM

## 2019-11-18 RX ORDER — LIDOCAINE HYDROCHLORIDE 20 MG/ML
INJECTION, SOLUTION EPIDURAL; INFILTRATION; INTRACAUDAL; PERINEURAL AS NEEDED
Status: DISCONTINUED | OUTPATIENT
Start: 2019-11-18 | End: 2019-11-18 | Stop reason: HOSPADM

## 2019-11-18 RX ORDER — PROPOFOL 10 MG/ML
INJECTION, EMULSION INTRAVENOUS AS NEEDED
Status: DISCONTINUED | OUTPATIENT
Start: 2019-11-18 | End: 2019-11-18 | Stop reason: HOSPADM

## 2019-11-18 RX ORDER — MIDAZOLAM HYDROCHLORIDE 1 MG/ML
.25-5 INJECTION, SOLUTION INTRAMUSCULAR; INTRAVENOUS
Status: DISCONTINUED | OUTPATIENT
Start: 2019-11-18 | End: 2019-11-18 | Stop reason: HOSPADM

## 2019-11-18 RX ORDER — ATROPINE SULFATE 0.1 MG/ML
0.5 INJECTION INTRAVENOUS
Status: DISCONTINUED | OUTPATIENT
Start: 2019-11-18 | End: 2019-11-18 | Stop reason: HOSPADM

## 2019-11-18 RX ORDER — FENTANYL CITRATE 50 UG/ML
100 INJECTION, SOLUTION INTRAMUSCULAR; INTRAVENOUS
Status: DISCONTINUED | OUTPATIENT
Start: 2019-11-18 | End: 2019-11-18 | Stop reason: HOSPADM

## 2019-11-18 RX ADMIN — PROPOFOL 100 MG: 10 INJECTION, EMULSION INTRAVENOUS at 11:21

## 2019-11-18 RX ADMIN — PROPOFOL 30 MG: 10 INJECTION, EMULSION INTRAVENOUS at 11:28

## 2019-11-18 RX ADMIN — PROPOFOL 20 MG: 10 INJECTION, EMULSION INTRAVENOUS at 11:31

## 2019-11-18 RX ADMIN — PROPOFOL 50 MG: 10 INJECTION, EMULSION INTRAVENOUS at 11:25

## 2019-11-18 RX ADMIN — LIDOCAINE HYDROCHLORIDE 40 MG: 20 INJECTION, SOLUTION INTRAVENOUS at 11:25

## 2019-11-18 RX ADMIN — PROPOFOL 50 MG: 10 INJECTION, EMULSION INTRAVENOUS at 11:23

## 2019-11-18 RX ADMIN — SODIUM CHLORIDE 50 ML/HR: 900 INJECTION, SOLUTION INTRAVENOUS at 10:34

## 2019-11-18 NOTE — ROUTINE PROCESS
Regi Vallejo 1940 
[id number] Situation: 
Verbal report received from: Pari Moore RN Procedure: Procedure(s): ESOPHAGOGASTRODUODENOSCOPY (EGD) ENDOSCOPIC BANDING Background: 
 
Preoperative diagnosis: ESOPHAGEAL VARICES, CIRRHOSIS OF LIVER Postoperative diagnosis: Esophageal varicies :  Dr. Mabel Kaplan Assistant(s): Endoscopy Technician-1: Carissa Lazo 
Endoscopy RN-1: Mukund Allred RN Specimens: * No specimens in log * H. Pylori  no Assessment: 
Intra-procedure medications Anesthesia gave intra-procedure sedation and medications, see anesthesia flow sheet yes Intravenous fluids: NS@ [facility] Vital signs stable Abdominal assessment: round and soft Recommendation: 
Discharge patient per MD order. Family or Friend Permission to share finding with family or friend yes

## 2019-11-18 NOTE — DISCHARGE INSTRUCTIONS
Elina Richards  [id number]  1940    DISCHARGE INSTRUCTIONS  Discomfort:  Redness at IV site- apply warm compress to area; if redness or soreness persist- contact your physician. There may be a slight amount of blood passed from the rectum. Gaseous discomfort - walking, belching will help relieve any discomfort. You may not operate a vehicle for 12 hours. You may not engage in an occupation involving machinery or appliances for rest of today. You may not drink alcoholic beverages for at least 12 hours. Avoid making any critical decisions for at least 24 hours. DIET:   High fiber diet. ACTIVITY:  You may resume your normal daily activities it is recommended that you spend the remainder of the day resting -  avoid any strenuous activity. CALL M.D.   ANY SIGN OF:   Increasing pain, nausea, vomiting  Abdominal distension (swelling)  New increased bleeding (oral or rectal)  Fever (chills)  Pain in chest area  Bloody discharge from nose or mouth  Shortness of breath     Follow-up Instructions:  Call Dr. Zeinab Damon in one year for follow up exam      DISCHARGE SUMMARY from Nurse    The following personal items collected during your admission are returned to you:   Dental Appliance: Dental Appliances: None  Vision: Visual Aid: Glasses, At home  Hearing Aid:    Jewelry:    Clothing:    Other Valuables:    Valuables sent to safe:

## 2019-11-18 NOTE — ANESTHESIA PREPROCEDURE EVALUATION
Anesthetic History   No history of anesthetic complications            Review of Systems / Medical History  Patient summary reviewed, nursing notes reviewed and pertinent labs reviewed    Pulmonary  Within defined limits                 Neuro/Psych   Within defined limits           Cardiovascular  Within defined limits                Exercise tolerance: >4 METS     GI/Hepatic/Renal           Liver disease (cirrhosis, varices (PBC))    Comments: Previous UGI bleed 3/2016  Esophageal varices Endo/Other        Arthritis     Other Findings              Physical Exam    Airway  Mallampati: I    Neck ROM: normal range of motion   Mouth opening: Normal     Cardiovascular    Rhythm: regular  Rate: normal         Dental    Dentition: Lower dentition intact, Upper dentition intact and Caps/crowns     Pulmonary  Breath sounds clear to auscultation               Abdominal         Other Findings            Anesthetic Plan    ASA: 2  Anesthesia type: MAC          Induction: Intravenous  Anesthetic plan and risks discussed with: Patient

## 2019-11-18 NOTE — H&P
Gastroenterology Outpatient History and Physical    Patient: Tamia Nose    Physician: Iona Holley MD    Vital Signs: see nursing notes    Allergies: Allergies   Allergen Reactions    Sulfa (Sulfonamide Antibiotics) Unknown (comments)     Pt states its been so long he doesn't remember the reaction.  Other Medication Myalgia     Think  that is was a Sulfa drug, but not sure       Chief Complaint: follow-up esophageal varices    History of Present Illness: personal history of not alcohol related liver cirrhosis, intestinal bleeding from esophageal varices. No symptoms; no chest pain, SOB, edema, focal weakness, numbness    Justification for Procedure: esophageal varices maintenance    History:  Past Medical History:   Diagnosis Date    Allergic     Anemia     Esophageal varices ([facility] 2016    banded x 6    GERD (gastroesophageal reflux disease)     GI bleed 2016    Hyperlipidemia     Hypogonadism male     Primary biliary cirrhosis ([facility]     Vitamin D deficiency       Past Surgical History:   Procedure Laterality Date    HX HEENT      deviated septum repair    HX HEENT      cataract removal    HX ORTHOPAEDIC Right 2017    Arthroscopy    HX REFRACTIVE SURGERY Left     HX SEPTOPLASTY                           Biopsy      Social History     Socioeconomic History    Marital status:      Spouse name: Not on file    Number of children: Not on file    Years of education: Not on file    Highest education level: Not on file   Tobacco Use    Smoking status: Former Smoker     Packs/day: 1.00     Last attempt to quit: 1974     Years since quittin.5    Smokeless tobacco: Never Used   Substance and Sexual Activity    Alcohol use: No     Alcohol/week: 0.0 standard drinks    Drug use: No      Family History   Problem Relation Age of Onset    Diabetes Father     Stroke Brother     Elevated Lipids Other         children       Medications:   Prior to Admission medications Medication Sig Start Date End Date Taking? Authorizing Provider   testosterone cypionate (DEPOTESTOTERONE CYPIONATE) 200 mg/mL injection INJECT 1/2 ML INTRAMUSCULARLY ONCE A WEEK 10/17/19   Elaine Yates., MD   nadolol (CORGARD) 40 mg tablet TAKE 1 TABLET BY MOUTH EVERY DAY 10/9/19   Som Arreaga MD   ursodiol (ACTIGALL) 300 mg capsule TAKE 2 CAPSULES BY MOUTH TWICE A DAY 9/26/19   Elaine Yates., MD   Dutasteride-Tamsulosin 0.5-0.4 mg EN09 Take 1 Cap by mouth daily. 5/9/19   Elaine Yates., MD   nadolol (CORGARD) 40 mg tablet TAKE 1 TABLET BY MOUTH EVERY DAY 4/30/19   Elaine Yates., MD   meclizine (ANTIVERT) 25 mg tablet Take 25 mg by mouth. Provider, Historical   therapeutic multivitamin-minerals (VITAMINS AND MINERALS) tablet Take 1 Tab by mouth. Provider, Historical   cyanocobalamin (VITAMIN B-12) 1,000 mcg/mL injection 1,000 mcg by IntraMUSCular route once. Provider, Historical       Physical Exam:   General: alert, no distress, severe distress   HEENT: Head: Normal, normocephalic, atraumatic. Heart: regular rate and rhythm   Lungs: chest clear, no wheezing, rales, normal symmetric air entry   Abdominal: Bowel sounds are normal, liver is not enlarged, spleen is not enlarged           Findings/Diagnosis: esophageal varices secondary to non-alcohol related liver cirrhosis    Plan of Care/Planned Procedure: I have discussed EGD biopsy, band ligation, alternatives complications including but not limited to pain, cardiopulmonary event, bleeding, perforation; all questions answered.

## 2019-11-18 NOTE — PERIOP NOTES
1121  Anesthesia staff at patient's bedside administering anesthesia and monitoring patients vital signs throughout procedure. See anesthesia note. 46  Endoscope was pre-cleaned at bedside immediately following procedure by [facility], endo tech. 1137  Patient tolerated procedure without problems. Abdomen soft and patient arousable and voices no complaints Report received from CRNA, see anesthesia note. Patient transported to endoscopy recovery area. Report given to Gena Bunn RN.

## 2019-11-18 NOTE — PROCEDURES
Yuni Blanco M.D. 2019    Esophagogastroduodenoscopy (EGD) Procedure Note  Obinna Ortiz  : 1940  Lon Jones Medical Record Number: [id number]      Indications:   history esophageal varices with bleeding  Referring Physician:  Jus Robles MD  Anesthesia/Sedation: Conscious Sedation/Moderate Sedation  Endoscopist:  Dr. Dakotah Barreto  Assistants: None  Permit:  The indications, risks, benefits and alternatives were reviewed with the patient or their decision maker who was provided an opportunity to ask questions and all questions were answered. The specific risks of esophagogastroduodenoscopy with conscious sedation were reviewed, including but not limited to anesthetic complication, bleeding, adverse drug reaction, missed lesion, infection, IV site reactions, and intestinal perforation which would lead to the need for surgical repair. Alternatives to EGD including radiographic imaging, observation without testing, or laboratory testing were reviewed as well as the limitations of those alternatives discussed. After considering the options and having all their questions answered, the patient or their decision maker provided both verbal and written consent to proceed. Procedure in Detail:  After obtaining informed consent, positioning of the patient in the left lateral decubitus position, and conduction of a pre-procedure pause or \"time out\" the endoscope was introduced into the mouth and advanced to the duodenum. A careful inspection was made, and findings or interventions are described below.     Findings:   Esophagus:varices present; at EG junction have enlarged since last seen  Stomach: normal   Duodenum/jejunum: normal    Complications/estimated blood loss: none    Therapies:  variceal ablation performed with 2 of bands placed    Specimens: none    Implants:two bands on EG junction varices           Recommendations:  -one year follow up exam    Thank you for entrusting me with this patient's care. Please do not hesitate to contact me with any questions or if I can be of assistance with any of your other patients' GI needs.   Signed By: Mary Cherry MD                        November 18, 2019

## 2019-11-18 NOTE — ANESTHESIA POSTPROCEDURE EVALUATION
Procedure(s):  ESOPHAGOGASTRODUODENOSCOPY (EGD)  ENDOSCOPIC BANDING. MAC    Anesthesia Post Evaluation      Multimodal analgesia: multimodal analgesia used between 6 hours prior to anesthesia start to PACU discharge  Patient location during evaluation: bedside  Patient participation: complete - patient participated  Level of consciousness: awake  Pain management: adequate  Airway patency: patent  Anesthetic complications: no  Cardiovascular status: acceptable  Respiratory status: acceptable  Hydration status: acceptable        Vitals Value Taken Time   /86 11/18/2019 12:02 PM   Temp 36.4 °C (97.6 °F) 11/18/2019 11:42 AM   Pulse 59 11/18/2019 12:06 PM   Resp 15 11/18/2019 12:06 PM   SpO2 100 % 11/18/2019 12:07 PM   Vitals shown include unvalidated device data.

## 2019-12-17 ENCOUNTER — OFFICE VISIT (OUTPATIENT)
Dept: HEMATOLOGY | Age: 79
End: 2019-12-17

## 2019-12-17 ENCOUNTER — HOSPITAL ENCOUNTER (OUTPATIENT)
Dept: LAB | Age: 79
Discharge: HOME | End: 2019-12-17
Payer: MEDICARE

## 2019-12-17 VITALS
WEIGHT: 174 LBS | DIASTOLIC BLOOD PRESSURE: 87 MMHG | BODY MASS INDEX: 24.91 KG/M2 | TEMPERATURE: 97.3 F | SYSTOLIC BLOOD PRESSURE: 152 MMHG | OXYGEN SATURATION: 98 % | HEIGHT: 70 IN | HEART RATE: 60 BPM

## 2019-12-17 DIAGNOSIS — K74.3 PRIMARY BILIARY CHOLANGITIS (HCC): ICD-10-CM

## 2019-12-17 DIAGNOSIS — K74.3 PRIMARY BILIARY CHOLANGITIS (HCC): Primary | ICD-10-CM

## 2019-12-17 LAB
ALBUMIN SERPL-MCNC: 3.5 G/DL (ref 3.4–5)
ALBUMIN/GLOB SERPL: 1.1 {RATIO} (ref 0.8–1.7)
ALP SERPL-CCNC: 88 U/L (ref 45–117)
ALT SERPL-CCNC: 33 U/L (ref 16–61)
ANION GAP SERPL CALC-SCNC: 4 MMOL/L (ref 3–18)
AST SERPL-CCNC: 37 U/L (ref 10–38)
BASOPHILS # BLD: 0 K/UL (ref 0–0.1)
BASOPHILS NFR BLD: 1 % (ref 0–2)
BILIRUB DIRECT SERPL-MCNC: 0.4 MG/DL (ref 0–0.2)
BILIRUB SERPL-MCNC: 1.3 MG/DL (ref 0.2–1)
BUN SERPL-MCNC: 18 MG/DL (ref 7–18)
BUN/CREAT SERPL: 15 (ref 12–20)
CALCIUM SERPL-MCNC: 9.1 MG/DL (ref 8.5–10.1)
CHLORIDE SERPL-SCNC: 105 MMOL/L (ref 100–111)
CO2 SERPL-SCNC: 31 MMOL/L (ref 21–32)
CREAT SERPL-MCNC: 1.21 MG/DL (ref 0.6–1.3)
DIFFERENTIAL METHOD BLD: ABNORMAL
EOSINOPHIL # BLD: 0.2 K/UL (ref 0–0.4)
EOSINOPHIL NFR BLD: 4 % (ref 0–5)
ERYTHROCYTE [DISTWIDTH] IN BLOOD BY AUTOMATED COUNT: 14.6 % (ref 11.6–14.5)
GLOBULIN SER CALC-MCNC: 3.2 G/DL (ref 2–4)
GLUCOSE SERPL-MCNC: 114 MG/DL (ref 74–99)
HCT VFR BLD AUTO: 45.2 % (ref 36–48)
HGB BLD-MCNC: 14.4 G/DL (ref 13–16)
INR PPP: 1.1 (ref 0.8–1.2)
LYMPHOCYTES # BLD: 1.5 K/UL (ref 0.9–3.6)
LYMPHOCYTES NFR BLD: 34 % (ref 21–52)
MCH RBC QN AUTO: 29.7 PG (ref 24–34)
MCHC RBC AUTO-ENTMCNC: 31.9 G/DL (ref 31–37)
MCV RBC AUTO: 93.2 FL (ref 74–97)
MONOCYTES # BLD: 0.5 K/UL (ref 0.05–1.2)
MONOCYTES NFR BLD: 11 % (ref 3–10)
NEUTS SEG # BLD: 2.3 K/UL (ref 1.8–8)
NEUTS SEG NFR BLD: 50 % (ref 40–73)
PLATELET # BLD AUTO: 121 K/UL (ref 135–420)
PMV BLD AUTO: 11.8 FL (ref 9.2–11.8)
POTASSIUM SERPL-SCNC: 4.1 MMOL/L (ref 3.5–5.5)
PROT SERPL-MCNC: 6.7 G/DL (ref 6.4–8.2)
PROTHROMBIN TIME: 14.1 SEC (ref 11.5–15.2)
RBC # BLD AUTO: 4.85 M/UL (ref 4.7–5.5)
SODIUM SERPL-SCNC: 140 MMOL/L (ref 136–145)
WBC # BLD AUTO: 4.6 K/UL (ref 4.6–13.2)

## 2019-12-17 PROCEDURE — 36415 COLL VENOUS BLD VENIPUNCTURE: CPT

## 2019-12-17 PROCEDURE — 85025 COMPLETE CBC W/AUTO DIFF WBC: CPT

## 2019-12-17 PROCEDURE — 80076 HEPATIC FUNCTION PANEL: CPT

## 2019-12-17 PROCEDURE — 85610 PROTHROMBIN TIME: CPT

## 2019-12-17 PROCEDURE — 80048 BASIC METABOLIC PNL TOTAL CA: CPT

## 2019-12-17 PROCEDURE — 82107 ALPHA-FETOPROTEIN L3: CPT

## 2019-12-17 NOTE — PROGRESS NOTES
[address] [facility], MD, MD Naty Golden PA-C Delphine Clare, ACNP-BC     Sintia Yates, AGPCNP-BC   MOISES Guevara NP [address] [facility] [address]    at [address], [address] [facility], [address]    [phone]    FAX: [address]    at [facility]    [address], [address]    Jim Greenberg, [address]    [phone]    FAX: [phone]         Patient Care Team:  Enid Land MD as PCP - General (Family Practice)  Enid Land MD as PCP - [facility] Provider  Luigi Hubbard MD (Gastroenterology)      Problem List  Date Reviewed: 6/13/2019          Codes Class Noted    Chronic prostatitis ICD-10-CM: N41.1  ICD-9-CM: 601.1  5/9/2019        Chronic pain of right knee ICD-10-CM: M25.561, G89.29  ICD-9-CM: 719.46, 338.29  11/27/2018        Lumbar pain with radiation down right leg ICD-10-CM: M54.5  ICD-9-CM: 724.2  11/27/2018        Right hip pain ICD-10-CM: M25.551  ICD-9-CM: 719.45  11/27/2018        Thrombocytopenia ([facility] ICD-10-CM: D69.6  ICD-9-CM: 287.5  8/10/2018        B12 deficiency ICD-10-CM: E53.8  ICD-9-CM: 266.2  8/10/2018        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 The active problem list, all pertinent past medical history, medications, liver histology, endoscopic studies, radiologic findings and laboratory findings related to the liver disorder were reviewed with the patient. The patient is a 70 y.o.  male who was first noted to have [facility] about 14 years ago based on serologies. He has been maintained on 1200 mg of ursodiol without issue. Imaging of the liver performed 02/2017 with ultrasound followed with MRI in 03/2017. Heterogeneous echotexture. No focal liver lesions. Most recent imaging was performed in 05/2018 with ultrasound and was unremarkable. The patient has no extrahepatic autoimmune disorders. The most recent laboratory studies indicate that the liver transaminases are normal, alkaline phosphatase is normal, tests of hepatic synthetic and metabolic function are normal, and the platelet count is normal.      The patient has developed varices with recent hemorrhage. He has undergone serial EGD's by Dr. Zoe Dixon and he is continuing to band the varices. The most recent EGD was in 10/2018. The patient completes all daily activities without any functional limitations. He has not developed ascites or encephalopathy. No edema. The patient has no complaints which can be attributed to liver disease. The patient has not experienced fatigue, fevers, chills, shortness of breath, chest pain, pain in the right side over the liver, diffuse abdominal pain, nausea, vomiting, constipation, diarrhea, dryeyes, dry mouth, arthralgias, myalgias, yellowing of the eyes or skin, itching, dark urine, problems concentrating, swelling of the abdomen, no recent hematemesis or hematochezia other than event mentioned. Since the last office appointment the patient has:  Had no changes in the liver disease. Had an EGD by Dr. Zoe Dixon. Two bands. Repeat in one year.        ALLERGIES  Allergies   Allergen Reactions    Sulfa (Sulfonamide Antibiotics) Unknown (comments)     Pt states its been so long he doesn't remember the reaction.  Other Medication Myalgia     Think  that is was a Sulfa drug, but not sure       MEDICATIONS  Current Outpatient Medications   Medication Sig    ursodiol (ACTIGALL) 300 mg capsule TAKE 2 CAPSULES BY MOUTH TWICE A DAY    testosterone cypionate (DEPOTESTOTERONE CYPIONATE) 200 mg/mL injection INJECT 1/2 ML INTRAMUSCULARLY ONCE A WEEK    nadolol (CORGARD) 40 mg tablet TAKE 1 TABLET BY MOUTH EVERY DAY (Patient taking differently: 40 mg. Indications: Prevent Bleeding Varicose Vein in the Esophagus)    therapeutic multivitamin-minerals (VITAMINS AND MINERALS) tablet Take 1 Tab by mouth.  cyanocobalamin (VITAMIN B-12) 1,000 mcg/mL injection 1,000 mcg by IntraMUSCular route every month. Indications: Prevention of Vitamin B12 Deficiency    dextran 70-hypromellose (ARTIFICIAL TEARS,[id number],) ophthalmic solution 1 Drop.  meclizine (ANTIVERT) 25 mg tablet Take 25 mg by mouth as needed. Indications: sensation of spinning or whirling     No current facility-administered medications for this visit. SYSTEM REVIEW NOT RELATED TO LIVER DISEASE OR REVIEWED ABOVE:  Constitution systems: Negative for fever, chills, weight gain, weight loss. Eyes: Negative for visual changes. ENT: Negative for sore throat, painful swallowing. Respiratory: Negative for cough, hemoptysis, SOB. Cardiology: Negative for chest pain, palpitations. GI:  Negative for constipation or diarrhea. : + for urinary frequency, dysuria, hematuria, + nocturia. +   Skin: Negative for rash. Hematology: Negative for easy bruising, blood clots. Musculo-skelatal: Negative for back pain, muscle pain, weakness. Neurologic: Negative for headaches, dizziness, vertigo, memory problems not related to HE. Psychology: Negative for anxiety, depression.      FAMILY HISTORY:  The mother passed CHF age 80  The father passed CAD age 74   There is no family history of liver disease. There is no family history of immune disorders. SOCIAL HISTORY:  The patient is . The patient has 4 children,   The patient has never used tobacco products. The patient has never consumed significant amounts of alcohol. The patient retired in . PHYSICAL EXAMINATION:  Visit Vitals  /87 (BP 1 Location: Left arm, BP Patient Position: Sitting)   Pulse 60   Temp 97.3 °F (36.3 °C)   Ht 5' 10\" (1.778 m)   Wt 174 lb (78.9 kg)   SpO2 98%   BMI 24.97 kg/m²     General: No acute distress. Eyes: Sclera anicteric. ENT: No oral lesions. Thyroid normal.  Nodes: No adenopathy. Skin: No spider angiomata. No jaundice. No palmar erythema. Respiratory: Lungs clear to auscultation. Cardiovascular: Regular heart rate. No murmurs. No JVD. Abdomen: Soft non-tender. Liver size normal to percussion/palpation. Spleen not palpable. No obvious ascites. Extremities: No edema. No muscle wasting. No gross arthritic changes. Neurologic: Alert and oriented. Cranial nerves grossly intact. No asterixis.       LABORATORY STUDIES:  Liver [place] of [address] Units 12/17/2019 6/21/2019   WBC 4.6 - 13.2 K/uL 4.6 3.9 (L)   ANC 1.8 - 8.0 K/UL 2.3 1.9   HGB 13.0 - 16.0 g/dL 14.4 14.9   HGB      HGB (iSTAT)       - 420 K/uL 121 (L) 123 (L)   INR 0.8 - 1.2   1.1 1.1   AST 10 - 38 U/L 37 43 (H)   ALT 16 - 61 U/L 33 38   Alk Phos 45 - 117 U/L 88 84   Bili, Total 0.2 - 1.0 MG/DL 1.3 (H) 1.4 (H)   Bili, Direct 0.0 - 0.2 MG/DL 0.4 (H) 0.3 (H)   Albumin 3.4 - 5.0 g/dL 3.5 3.2 (L)   BUN 7.0 - 18 MG/DL 18 17   Creat 0.6 - 1.3 MG/DL 1.21 1.29   Creat (iSTAT) 0.6 - 1.3 MG/DL     Na 136 - 145 mmol/L 140 139   K 3.5 - 5.5 mmol/L 4.1 4.5   Cl 100 - 111 mmol/L 105 104   CO2 21 - 32 mmol/L 31 31   Glucose 74 - 99 mg/dL 114 (H) 92   Ammonia 11 - 32 UMOL/L       Cancer Screening Latest Ref Rng & Units 6/21/2019   AFP Tumor Marker 0.0 - 8.3 ng/mL    AFP, Serum 0.0 - 8.0 ng/mL 2.3   AFP-L3% 0.0 - 9.9 % Comment     SEROLOGIES:  Serologies Latest Ref Rng 3/21/2016   ISSAC Ab, Direct Negative Negative   M2 Ab 0.0 - 20.0 Units 178.8 (H)     Hep B sAB (-)  Hep A total Ab (-)  HCV ab (-)    LIVER HISTOLOGY:  05/2018. TRANSIENT HEPATIC ELASTOGRAPHY:   E Range: 7.4-10.2 kPa  E Mean: 9.1 kPa  E Median: 9.3 kPa  E Std: 1.0 kPa    ENDOSCOPIC PROCEDURES:  04/2016 EGD Dr. Cee Tanner Esophageal varices banded  07/2016 EGD with obliterated Varices per patient. 01/2017. EGD by Dr. Cee Tanner. Large esophageal varices. 2 bands placed. 01/2018. EGD by Dr. Cee Tanner. Patient reports there were no varices. 10/2018. EGD by Dr. Cee Tanner. Esophageal varices which are small in size. Follow up in one year. 11/2019. EGD by Dr. Cee Tanner. RADIOLOGY:  03/2016  MRI scan abdomen. Changes consistent with cirrhosis. No liver mass lesions. No dilated bile ducts. No bile duct strictures. Pericholecystic fluid. No ascites  09/2016. Ultrasound of the liver. The liver is mildly hetogeneous. No hepatic masses. Mel size. 02/2017. Ultrasound of the liver. The liver is slightly heterogeneous in echotexture. There is a subtle isoechoic, partially exophytic questionable lesion in the right lobe measuring approximately 3.7 cm x 3.5 cm x 4 cm.   03/2017. Dynamic MRI of the liver. No significant abnormality. No hepatic lesion identified. 09/2017. Ultrasound of the liver. The liver and pancreas are normal in size, contour, and echogenicity. 05/2018. Ultrasound of the liver. Stable sonographic appearance of the liver. Mild heterogeneous diffuse increased hepatic parenchymal echotexture, ultrasound finding associated with fibrosis from chronic hepatocellular disease. No focal hepatic mass. 06/2019. Ultrasound of the liver. Heterogeneous echotexture of the hepatic parenchyma, likely representing diffuse hepatocellular disease. No focal hepatic lesion.     OTHER TESTING:  Not available or performed    ASSESSMENT AND PLAN:  Primary Biliary Cholangitis with cirrhosis. The most recent laboratory studies indicate that the liver transaminases are normal, alkaline phosphatase is normal,  tests of hepatic synthetic and metabolic function are normal, and the platelet count is normal.  Will perform laboratory testing to monitor liver function and degree of liver injury. This will include hepatic panel, a CBC w/ diff, a BMP, a PT/INR, an AFP-L3%. Complications of cirrhosis were discussed in detail. We discussed thrombocytopenia, portal hypertension, varices, GI bleeding, peripheral edema, ascites, hepatic encephalopathy, and hepatocellular carcinoma. We discussed the need for follow ups on a regular basis to monitor for complications. We discussed the need for every 6 month liver imaging studies. 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 Nehemias Krishnamurthy. Follow up with him as directed. Discussed extrahepatic manifestations of PBC such as osteoporosis and elevated cholesterol. The risk of osteoporosis is increased in patients with PBC. DEXA bone density to assess for osteoporosis should be ordered by the patients primary care physician. The patient was advised to take calcium supplementation and Vitamin D. He was advised to take supplemental vitamin A, D, E, and K. Patients with PBC are at increased risk for hypercholesterolemia. However, this is not associated with an increased risk of cardiac disease and MI because this is typically an elevation in HDL cholesterol. There is no contraindication for treatment with a statin if this is felt to be indicated based upon cardiac risk assessment. The patient is receiving treatment with urosdeoxycholic Acid. Continue this at current dose. The patient  and is tolerating the treatment without significant side effects.     His alkaline phosphatase is normal.     Vaccination for viral hepatitis A and B is recommended since the patient has no serologic evidence of previous exposure or vaccination with immunity. [address] screening has recently been performed and does not suggest [address] The next liver imaging study is due now and will be performed with  ultrasound and shear wave elastography. Elastography  can assess liver fibrosis and determine if a patient has advanced fibrosis or cirrhosis without the need for liver biopsy. This was ordered today. Bone density up to date with osteopenia. All of the above issues were discussed with the patient. All questions were answered. The patient expressed a clear understanding of the above. 25 minutes total time spent with this patient with more than 50% of this time spent counseling and coordinating care as described above. [address] in 6 months.       Samantha Tong NP   Liver [place] of [address] [place] [address], [address] [place] [address] Jodee Liriano, [address] [place] [address]   [phone]

## 2019-12-17 NOTE — PROGRESS NOTES
Surekha Jarvis is a 70 y.o. male      1. Have you been to the ER, urgent care clinic or hospitalized since your last visit? NO.     2. Have you seen or consulted any other health care providers outside of the [address] [place] [address] since your last visit (Include any pap smears or colon screening)? YES    Patient seen PCP since last visit for a routine checkup.            Learning Assessment 11/27/2018   PRIMARY LEARNER Patient   BARRIERS PRIMARY LEARNER -   CO-LEARNER CAREGIVER -   PRIMARY LANGUAGE ENGLISH   LEARNER PREFERENCE PRIMARY READING   ANSWERED BY patient   RELATIONSHIP SELF

## 2019-12-18 LAB
AFP L3 MFR SERPL: NORMAL % (ref 0–9.9)
AFP SERPL-MCNC: 2.7 NG/ML (ref 0–8)

## 2020-06-18 ENCOUNTER — HOSPITAL ENCOUNTER (OUTPATIENT)
Dept: LAB | Age: 80
Discharge: HOME | End: 2020-06-18
Payer: MEDICARE

## 2020-06-18 ENCOUNTER — OFFICE VISIT (OUTPATIENT)
Dept: HEMATOLOGY | Age: 80
End: 2020-06-18

## 2020-06-18 VITALS
WEIGHT: 173 LBS | SYSTOLIC BLOOD PRESSURE: 155 MMHG | HEIGHT: 70 IN | DIASTOLIC BLOOD PRESSURE: 78 MMHG | BODY MASS INDEX: 24.77 KG/M2 | OXYGEN SATURATION: 98 % | HEART RATE: 57 BPM | TEMPERATURE: 97.7 F

## 2020-06-18 DIAGNOSIS — K74.3 PRIMARY BILIARY CHOLANGITIS (HCC): Primary | ICD-10-CM

## 2020-06-18 DIAGNOSIS — K74.3 PRIMARY BILIARY CHOLANGITIS (HCC): ICD-10-CM

## 2020-06-18 LAB
ALBUMIN SERPL-MCNC: 3.4 G/DL (ref 3.4–5)
ALBUMIN/GLOB SERPL: 1 {RATIO} (ref 0.8–1.7)
ALP SERPL-CCNC: 77 U/L (ref 45–117)
ALT SERPL-CCNC: 42 U/L (ref 16–61)
ANION GAP SERPL CALC-SCNC: 5 MMOL/L (ref 3–18)
AST SERPL-CCNC: 48 U/L (ref 10–38)
BASOPHILS # BLD: 0 K/UL (ref 0–0.1)
BASOPHILS NFR BLD: 1 % (ref 0–2)
BILIRUB DIRECT SERPL-MCNC: 0.4 MG/DL (ref 0–0.2)
BILIRUB SERPL-MCNC: 1.3 MG/DL (ref 0.2–1)
BUN SERPL-MCNC: 12 MG/DL (ref 7–18)
BUN/CREAT SERPL: 10 (ref 12–20)
CALCIUM SERPL-MCNC: 9 MG/DL (ref 8.5–10.1)
CHLORIDE SERPL-SCNC: 106 MMOL/L (ref 100–111)
CO2 SERPL-SCNC: 30 MMOL/L (ref 21–32)
CREAT SERPL-MCNC: 1.17 MG/DL (ref 0.6–1.3)
DIFFERENTIAL METHOD BLD: ABNORMAL
EOSINOPHIL # BLD: 0.1 K/UL (ref 0–0.4)
EOSINOPHIL NFR BLD: 4 % (ref 0–5)
ERYTHROCYTE [DISTWIDTH] IN BLOOD BY AUTOMATED COUNT: 15.7 % (ref 11.6–14.5)
GLOBULIN SER CALC-MCNC: 3.4 G/DL (ref 2–4)
GLUCOSE SERPL-MCNC: 97 MG/DL (ref 74–99)
HCT VFR BLD AUTO: 44.7 % (ref 36–48)
HGB BLD-MCNC: 14.4 G/DL (ref 13–16)
INR PPP: 1.2 (ref 0.8–1.2)
LYMPHOCYTES # BLD: 1.2 K/UL (ref 0.9–3.6)
LYMPHOCYTES NFR BLD: 32 % (ref 21–52)
MCH RBC QN AUTO: 30.1 PG (ref 24–34)
MCHC RBC AUTO-ENTMCNC: 32.2 G/DL (ref 31–37)
MCV RBC AUTO: 93.5 FL (ref 74–97)
MONOCYTES # BLD: 0.4 K/UL (ref 0.05–1.2)
MONOCYTES NFR BLD: 12 % (ref 3–10)
NEUTS SEG # BLD: 1.9 K/UL (ref 1.8–8)
NEUTS SEG NFR BLD: 51 % (ref 40–73)
PLATELET # BLD AUTO: 118 K/UL (ref 135–420)
PMV BLD AUTO: 11.3 FL (ref 9.2–11.8)
POTASSIUM SERPL-SCNC: 4.3 MMOL/L (ref 3.5–5.5)
PROT SERPL-MCNC: 6.8 G/DL (ref 6.4–8.2)
PROTHROMBIN TIME: 14.9 SEC (ref 11.5–15.2)
RBC # BLD AUTO: 4.78 M/UL (ref 4.7–5.5)
SODIUM SERPL-SCNC: 141 MMOL/L (ref 136–145)
WBC # BLD AUTO: 3.7 K/UL (ref 4.6–13.2)

## 2020-06-18 PROCEDURE — 85025 COMPLETE CBC W/AUTO DIFF WBC: CPT

## 2020-06-18 PROCEDURE — 80076 HEPATIC FUNCTION PANEL: CPT

## 2020-06-18 PROCEDURE — 80048 BASIC METABOLIC PNL TOTAL CA: CPT

## 2020-06-18 PROCEDURE — 85610 PROTHROMBIN TIME: CPT

## 2020-06-18 PROCEDURE — 82107 ALPHA-FETOPROTEIN L3: CPT

## 2020-06-18 PROCEDURE — 36415 COLL VENOUS BLD VENIPUNCTURE: CPT

## 2020-06-18 NOTE — PROGRESS NOTES
[facility], MD, MD John Rubio PA-C [place], ACNP-EZRA Patricio S Trudy, [facility]-BC   MOISES Haley NP [address]    at [address], [address] [address]  22.    [phone]    FAX: [address]    at [facility]    [address], [address]    [address] [facility], [address]    [phone]    FAX: [phone]         Patient Care Team:  Lucila Damico MD as PCP - General (Family Practice)  Lucila Damico MD as PCP - [facility]  Carolina Rea MD (Gastroenterology)      Problem List  Date Reviewed: 6/18/2020          Codes Class Noted    Chronic prostatitis ICD-10-CM: N41.1  ICD-9-CM: 601.1  5/9/2019        Chronic pain of right knee ICD-10-CM: M25.561, G89.29  ICD-9-CM: 719.46, 338.29  11/27/2018        Lumbar pain with radiation down right leg ICD-10-CM: M54.5  ICD-9-CM: 724.2  11/27/2018        Right hip pain ICD-10-CM: M25.551  ICD-9-CM: 719.45  11/27/2018        Thrombocytopenia ([facility] [address] ICD-10-CM: D69.6  ICD-9-CM: 287.5  8/10/2018        B12 deficiency ICD-10-CM: E53.8  ICD-9-CM: 266.2  8/10/2018        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 The active problem list, all pertinent past medical history, medications, liver histology, endoscopic studies, radiologic findings and laboratory findings related to the liver disorder were reviewed with the patient. The patient is a 68 y.o.  male who was first noted to have [facility] about 14 years ago based on serologies. He has been maintained on 1200 mg of ursodiol without issue. Imaging of the liver performed 02/2017 with ultrasound followed with MRI in 03/2017. Heterogeneous echotexture. No focal liver lesions. Most recent imaging was performed in 01/2019 with ultrasound and was unremarkable. The patient has no extrahepatic autoimmune disorders. The most recent laboratory studies indicate that the liver transaminases are normal, alkaline phosphatase is normal, tests of hepatic synthetic and metabolic function are normal, and the platelet count is depressed. The patient has developed varices with recent hemorrhage. He has undergone serial EGD's by Dr. Josh Alfonso and he is continuing to band the varices. The most recent EGD was in 11/2019. The patient completes all daily activities without any functional limitations. He has not developed ascites or encephalopathy. No edema. The patient has no complaints which can be attributed to liver disease. The patient has not experienced fatigue, fevers, chills, shortness of breath, chest pain, pain in the right side over the liver, diffuse abdominal pain, nausea, vomiting, constipation, diarrhea, dryeyes, dry mouth, arthralgias, myalgias, yellowing of the eyes or skin, itching, dark urine, problems concentrating, swelling of the abdomen, no recent hematemesis or hematochezia other than event mentioned. Since the last office appointment the patient has:  Had no changes in the liver disease. Had an EGD by Dr. Josh Alfonso. Two bands. Repeat in one year. Complains of progressive memory loss. ALLERGIES  Allergies   Allergen Reactions    Sulfa (Sulfonamide Antibiotics) Unknown (comments)     Pt states its been so long he doesn't remember the reaction.  Other Medication Myalgia     Think  that is was a Sulfa drug, but not sure       MEDICATIONS  Current Outpatient Medications   Medication Sig    ursodioL (ACTIGALL) 300 mg capsule TAKE 2 CAPSULES BY MOUTH TWICE A DAY    testosterone cypionate (DEPOTESTOTERONE CYPIONATE) 200 mg/mL injection INJECT 0.5ML INTRAMUSCULARLY ONCE A WEEK    BD Luer-Freddy Syringe 3 mL 23 gauge x 1 1/2\" syrg 1 SYRINGE BY INTRAMUSCULAR ROUTE EVERY SEVEN (7) DAYS. FOR USE WITH TESTOSTERONE INJECTIONS    fluticasone propionate (FLONASE) 50 mcg/actuation nasal spray 1 spray each nostril twice daily  Indications: chronic stuffy and runny nose not caused by allergies    ipratropium (ATROVENT) 42 mcg (0.06 %) nasal spray 1 Bath by Both Nostrils route four (4) times daily. Indications: runny nose    nadolol (CORGARD) 40 mg tablet TAKE 1 TABLET BY MOUTH EVERY DAY    dilTIAZem (CARDIZEM) 30 mg tablet Take 1 Tab by mouth four (4) times daily.  tamsulosin (FLOMAX) 0.4 mg capsule Take 1 Cap by mouth daily.  dextran 70-hypromellose (ARTIFICIAL TEARS,RZCG03-BSCGD,) ophthalmic solution 1 Drop.  meclizine (ANTIVERT) 25 mg tablet Take 25 mg by mouth as needed. Indications: sensation of spinning or whirling    therapeutic multivitamin-minerals (VITAMINS AND MINERALS) tablet Take 1 Tab by mouth.  cyanocobalamin (VITAMIN B-12) 1,000 mcg/mL injection 1,000 mcg by IntraMUSCular route every month. Indications: Prevention of Vitamin B12 Deficiency     No current facility-administered medications for this visit. SYSTEM REVIEW NOT RELATED TO LIVER DISEASE OR REVIEWED ABOVE:  Constitution systems: Negative for fever, chills, weight gain, weight loss. Eyes: Negative for visual changes. ENT: Negative for sore throat, painful swallowing.    Respiratory: Negative for cough, hemoptysis, SOB. Cardiology: Negative for chest pain, palpitations. GI:  Negative for constipation or diarrhea. : + for urinary frequency, dysuria, hematuria, + nocturia. +   Skin: Negative for rash. Hematology: Negative for easy bruising, blood clots. Musculo-skelatal: Negative for back pain, muscle pain, weakness. Neurologic: Negative for headaches, dizziness, vertigo, memory problems not related to HE. Psychology: Negative for anxiety, depression. FAMILY HISTORY:  The mother passed CHF age 80  The father passed CAD age 76   There is no family history of liver disease. There is no family history of immune disorders. SOCIAL HISTORY:  The patient is . The patient has 4 children,   The patient has never used tobacco products. The patient has never consumed significant amounts of alcohol. The patient retired in . PHYSICAL EXAMINATION:  Visit Vitals  /78   Pulse (!) 57   Temp 97.7 °F (36.5 °C) (Temporal)   Ht 5' 10\" (1.778 m)   Wt 173 lb (78.5 kg)   SpO2 98%   BMI 24.82 kg/m²     General: No acute distress. Eyes: Sclera anicteric. ENT: No oral lesions. Thyroid normal.  Nodes: No adenopathy. Skin: No spider angiomata. No jaundice. No palmar erythema. Respiratory: Lungs clear to auscultation. Cardiovascular: Regular heart rate. No murmurs. No JVD. Abdomen: Soft non-tender. Liver size normal to percussion/palpation. Spleen not palpable. No obvious ascites. Extremities: No edema. No muscle wasting. No gross arthritic changes. Neurologic: Alert and oriented. Cranial nerves grossly intact. No asterixis.       LABORATORY STUDIES:  [facility] [place] of [address] [place] [address] 12/17/2019 6/21/2019   WBC 4.6 - 13.2 K/uL 4.6 3.9 (L)   ANC 1.8 - 8.0 K/UL 2.3 1.9   HGB 13.0 - 16.0 g/dL 14.4 14.9   HGB      HGB (iSTAT)       - 420 K/uL 121 (L) 123 (L)   INR 0.8 - 1.2   1.1 1.1   AST 10 - 38 U/L 37 43 (H)   ALT 16 - 61 U/L 33 38   Alk Phos 45 - 117 U/L 88 84   Bili, Total 0.2 - 1.0 MG/DL 1.3 (H) 1.4 (H)   Bili, Direct 0.0 - 0.2 MG/DL 0.4 (H) 0.3 (H)   Albumin 3.4 - 5.0 g/dL 3.5 3.2 (L)   BUN 7.0 - 18 MG/DL 18 17   Creat 0.6 - 1.3 MG/DL 1.21 1.29   Creat (iSTAT) 0.6 - 1.3 MG/DL     Na 136 - 145 mmol/L 140 139   K 3.5 - 5.5 mmol/L 4.1 4.5   Cl 100 - 111 mmol/L 105 104   CO2 21 - 32 mmol/L 31 31   Glucose 74 - 99 mg/dL 114 (H) 92   Ammonia 11 - 32 UMOL/L       Cancer Screening Latest Ref Rng & Units 12/17/2019 6/21/2019   AFP Tumor Marker 0.0 - 8.3 ng/mL     AFP, Serum 0.0 - 8.0 ng/mL 2.7 2.3   AFP-L3% 0.0 - 9.9 % Comment Comment     SEROLOGIES:  Serologies Latest Ref Rng 3/21/2016   ISSAC Ab, Direct Negative Negative   M2 Ab 0.0 - 20.0 Units 178.8 (H)     Hep B sAB (-)  Hep A total Ab (-)  HCV ab (-)    LIVER HISTOLOGY:  05/2018. TRANSIENT HEPATIC ELASTOGRAPHY:   E Range: 7.4-10.2 kPa  E Mean: 9.1 kPa  E Median: 9.3 kPa  E Std: 1.0 kPa    ENDOSCOPIC PROCEDURES:  04/2016 EGD Dr. Sam Schulz Esophageal varices banded  07/2016 EGD with obliterated Varices per patient. 01/2017. EGD by Dr. Sam Schulz. Large esophageal varices. 2 bands placed. 01/2018. EGD by Dr. Sam Schulz. Patient reports there were no varices. 10/2018. EGD by Dr. Sam Schulz. Esophageal varices which are small in size. Follow up in one year. 11/2019. EGD by Dr. Sam Schulz. Repeat in one year. RADIOLOGY:  03/2016  MRI scan abdomen. Changes consistent with cirrhosis. No liver mass lesions. No dilated bile ducts. No bile duct strictures. Pericholecystic fluid. No ascites  09/2016. Ultrasound of the liver. The liver is mildly hetogeneous. No hepatic masses. Mel size. 02/2017. Ultrasound of the liver. The liver is slightly heterogeneous in echotexture. There is a subtle isoechoic, partially exophytic questionable lesion in the right lobe measuring approximately 3.7 cm x 3.5 cm x 4 cm.   03/2017. Dynamic MRI of the liver. No significant abnormality. No hepatic lesion identified. 09/2017. Ultrasound of the liver. The liver and pancreas are normal in size, contour, and echogenicity. 05/2018. Ultrasound of the liver. Stable sonographic appearance of the liver. Mild heterogeneous diffuse increased hepatic parenchymal echotexture, ultrasound finding associated with fibrosis from chronic hepatocellular disease. No focal hepatic mass. 06/2019. Ultrasound of the liver. Heterogeneous echotexture of the hepatic parenchyma, likely representing diffuse hepatocellular disease. No focal hepatic lesion. 01/2020. Ultrasound of the liver. The liver measures 15.3 cm longitudinally and is inhomogeneous in appearance. OTHER TESTING:  Not available or performed    ASSESSMENT AND PLAN:  Primary Biliary Cholangitis with cirrhosis. The most recent laboratory studies indicate that the liver transaminases are normal, alkaline phosphatase is normal,  tests of hepatic synthetic and metabolic function are normal, and the platelet count is depressed. Will perform laboratory testing to monitor liver function and degree of liver injury. This will include hepatic panel, a CBC w/ diff, a BMP, a PT/INR, an AFP-L3%. Complications of cirrhosis were discussed in detail. We discussed thrombocytopenia, portal hypertension, varices, GI bleeding, peripheral edema, ascites, hepatic encephalopathy, and hepatocellular carcinoma. We discussed the need for follow ups on a regular basis to monitor for complications. We discussed the need for every 6 month liver imaging studies. 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 Estephania Otto. Follow up with him as directed. Discussed extrahepatic manifestations of PBC such as osteoporosis and elevated cholesterol. The risk of osteoporosis is increased in patients with PBC. DEXA bone density to assess for osteoporosis should be ordered by the patients primary care physician. The patient was advised to take calcium supplementation and Vitamin D.  He was advised to take supplemental vitamin A, D, E, and K. Patients with PBC are at increased risk for hypercholesterolemia. However, this is not associated with an increased risk of cardiac disease and MI because this is typically an elevation in HDL cholesterol. There is no contraindication for treatment with a statin if this is felt to be indicated based upon cardiac risk assessment. The patient is receiving treatment with urosdeoxycholic Acid. Continue this at current dose. The patient  and is tolerating the treatment without significant side effects. His alkaline phosphatase is normal.     Vaccination for viral hepatitis A and B is recommended since the patient has no serologic evidence of previous exposure or vaccination with immunity. [address] screening has recently been performed and does not suggest [address] The next liver imaging study is due next month. This was ordered today. Bone density up to date with osteopenia. All of the above issues were discussed with the patient. All questions were answered. The patient expressed a clear understanding of the above. 25 minutes total time spent with this patient with more than 50% of this time spent counseling and coordinating care as described above. [address] in 6 months.       CHANELLE CentenoP-C  [facility] [place] [facility]  [address], [address] [place] [address] Jodee Liriano, [address] [facility]   [phone]

## 2020-06-19 LAB
AFP L3 MFR SERPL: NORMAL % (ref 0–9.9)
AFP SERPL-MCNC: 2.9 NG/ML (ref 0–8)

## 2020-07-06 DIAGNOSIS — K74.3 HEPATIC CIRRHOSIS DUE TO PRIMARY BILIARY CHOLANGITIS (HCC): ICD-10-CM

## 2020-07-07 RX ORDER — NADOLOL 40 MG/1
TABLET ORAL
Qty: 90 TAB | Refills: 1 | Status: SHIPPED | OUTPATIENT
Start: 2020-07-07 | End: 2020-11-23

## 2020-07-25 ENCOUNTER — TELEPHONE ENCOUNTER (OUTPATIENT)
Dept: URBAN - METROPOLITAN AREA CLINIC 13 | Facility: CLINIC | Age: 80
End: 2020-07-25

## 2020-07-25 RX ORDER — ETODOLAC 400 MG/1
TAKE 1 TABLET TWICE DAILY UNKNOWN DOSAGE TABLET, FILM COATED ORAL
Refills: 0 | OUTPATIENT
End: 2013-02-11

## 2020-07-25 RX ORDER — GLUC/MSM/COLGN2/HYAL/ANTIARTH3 375-375-20
TAKE 1 TABLET TWICE DAILY UNKNOWN DOSAGE TABLET ORAL
Refills: 0 | OUTPATIENT
End: 2013-02-11

## 2020-07-25 RX ORDER — ESOMEPRAZOLE MAGNESIUM 40 MG
TAKE 1 CAPSULE TWICE DAILY CAPSULE,DELAYED RELEASE (ENTERIC COATED) ORAL
Refills: 0 | OUTPATIENT
End: 2013-02-11

## 2020-07-25 RX ORDER — POLYETHYLENE GLYCOL 3350, SODIUM SULFATE, SODIUM CHLORIDE, POTASSIUM CHLORIDE, ASCORBIC ACID, SODIUM ASCORBATE 7.5-2.691G
TAKE 32 OZ AS DIRECTED 5:00PM THE EVENING BEFORE AND 6HR PRIOR TO PROCEDURE KIT ORAL
Qty: 1 | Refills: 0 | OUTPATIENT
Start: 2013-02-11 | End: 2013-04-08

## 2020-07-26 ENCOUNTER — TELEPHONE ENCOUNTER (OUTPATIENT)
Dept: URBAN - METROPOLITAN AREA CLINIC 13 | Facility: CLINIC | Age: 80
End: 2020-07-26

## 2020-07-26 RX ORDER — DUTASTERIDE AND TAMSULOSIN HYDROCHLORIDE .5; .4 MG/1; MG/1
CAPSULE ORAL
Qty: 90 | Refills: 0 | Status: ACTIVE | COMMUNITY
Start: 2019-05-10

## 2020-07-26 RX ORDER — TESTOSTERONE CYPIONATE 200 MG/ML
INJECTION, SOLUTION INTRAMUSCULAR
Qty: 10 | Refills: 0 | Status: ACTIVE | COMMUNITY
Start: 2020-01-07

## 2020-07-26 RX ORDER — DILTIAZEM HYDROCHLORIDE 30 MG/1
TABLET ORAL
Qty: 360 | Refills: 0 | Status: ACTIVE | COMMUNITY
Start: 2020-01-07

## 2020-07-26 RX ORDER — NADOLOL 40 MG/1
TABLET ORAL
Qty: 90 | Refills: 0 | Status: ACTIVE | COMMUNITY
Start: 2020-01-10

## 2020-07-26 RX ORDER — PRAVASTATIN SODIUM 40 MG/1
TAKE 1 TABLET DAILY TABLET ORAL
Qty: 30 | Refills: 6 | Status: ACTIVE | COMMUNITY
Start: 2013-04-08

## 2020-07-26 RX ORDER — SYRINGE WITH NEEDLE, 1 ML 25GX5/8"
SYRINGE, EMPTY DISPOSABLE MISCELLANEOUS
Qty: 12 | Refills: 0 | Status: ACTIVE | COMMUNITY
Start: 2019-08-30

## 2020-07-26 RX ORDER — AMOXICILLIN 875 MG/1
TABLET, FILM COATED ORAL
Qty: 20 | Refills: 0 | Status: ACTIVE | COMMUNITY
Start: 2019-01-28

## 2020-07-26 RX ORDER — CEFDINIR 300 MG/1
CAPSULE ORAL
Qty: 20 | Refills: 0 | Status: ACTIVE | COMMUNITY
Start: 2019-05-09

## 2020-07-26 RX ORDER — TAMSULOSIN HYDROCHLORIDE 0.4 MG/1
CAPSULE ORAL
Qty: 90 | Refills: 0 | Status: ACTIVE | COMMUNITY
Start: 2020-01-07

## 2020-07-26 RX ORDER — URSODIOL 300 MG/1
TAKE 2 CAPSULES TWICE DAILY CAPSULE ORAL
Qty: 360 | Refills: 6 | Status: ACTIVE | COMMUNITY
Start: 2011-11-10

## 2020-12-17 ENCOUNTER — OFFICE VISIT (OUTPATIENT)
Dept: HEMATOLOGY | Age: 80
End: 2020-12-17
Payer: MEDICARE

## 2020-12-17 ENCOUNTER — HOSPITAL ENCOUNTER (OUTPATIENT)
Dept: LAB | Age: 80
Discharge: HOME | End: 2020-12-17
Payer: MEDICARE

## 2020-12-17 VITALS
HEART RATE: 50 BPM | DIASTOLIC BLOOD PRESSURE: 80 MMHG | WEIGHT: 168.25 LBS | BODY MASS INDEX: 24.14 KG/M2 | TEMPERATURE: 97.1 F | SYSTOLIC BLOOD PRESSURE: 144 MMHG | OXYGEN SATURATION: 97 %

## 2020-12-17 DIAGNOSIS — K74.3 PRIMARY BILIARY CHOLANGITIS (HCC): Primary | ICD-10-CM

## 2020-12-17 DIAGNOSIS — K74.3 PRIMARY BILIARY CHOLANGITIS (HCC): ICD-10-CM

## 2020-12-17 LAB
ALBUMIN SERPL-MCNC: 3.5 G/DL (ref 3.4–5)
ALBUMIN/GLOB SERPL: 1.1 {RATIO} (ref 0.8–1.7)
ALP SERPL-CCNC: 97 U/L (ref 45–117)
ALT SERPL-CCNC: 35 U/L (ref 16–61)
ANION GAP SERPL CALC-SCNC: 2 MMOL/L (ref 3–18)
AST SERPL-CCNC: 36 U/L (ref 10–38)
BASOPHILS # BLD: 0.1 K/UL (ref 0–0.1)
BASOPHILS NFR BLD: 1 % (ref 0–2)
BILIRUB DIRECT SERPL-MCNC: 0.4 MG/DL (ref 0–0.2)
BILIRUB SERPL-MCNC: 1.2 MG/DL (ref 0.2–1)
BUN SERPL-MCNC: 13 MG/DL (ref 7–18)
BUN/CREAT SERPL: 12 (ref 12–20)
CALCIUM SERPL-MCNC: 9.1 MG/DL (ref 8.5–10.1)
CHLORIDE SERPL-SCNC: 106 MMOL/L (ref 100–111)
CO2 SERPL-SCNC: 33 MMOL/L (ref 21–32)
CREAT SERPL-MCNC: 1.13 MG/DL (ref 0.6–1.3)
DIFFERENTIAL METHOD BLD: ABNORMAL
EOSINOPHIL # BLD: 0.3 K/UL (ref 0–0.4)
EOSINOPHIL NFR BLD: 5 % (ref 0–5)
ERYTHROCYTE [DISTWIDTH] IN BLOOD BY AUTOMATED COUNT: 14.7 % (ref 11.6–14.5)
GLOBULIN SER CALC-MCNC: 3.3 G/DL (ref 2–4)
GLUCOSE SERPL-MCNC: 47 MG/DL (ref 74–99)
HCT VFR BLD AUTO: 46 % (ref 36–48)
HGB BLD-MCNC: 15.4 G/DL (ref 13–16)
INR PPP: 1.1 (ref 0.8–1.2)
LYMPHOCYTES # BLD: 2 K/UL (ref 0.9–3.6)
LYMPHOCYTES NFR BLD: 33 % (ref 21–52)
MCH RBC QN AUTO: 30.7 PG (ref 24–34)
MCHC RBC AUTO-ENTMCNC: 33.5 G/DL (ref 31–37)
MCV RBC AUTO: 91.8 FL (ref 74–97)
MONOCYTES # BLD: 0.8 K/UL (ref 0.05–1.2)
MONOCYTES NFR BLD: 14 % (ref 3–10)
NEUTS SEG # BLD: 2.8 K/UL (ref 1.8–8)
NEUTS SEG NFR BLD: 47 % (ref 40–73)
PLATELET # BLD AUTO: 147 K/UL (ref 135–420)
PMV BLD AUTO: 11.4 FL (ref 9.2–11.8)
POTASSIUM SERPL-SCNC: 5.3 MMOL/L (ref 3.5–5.5)
PROT SERPL-MCNC: 6.8 G/DL (ref 6.4–8.2)
PROTHROMBIN TIME: 14.3 SEC (ref 11.5–15.2)
RBC # BLD AUTO: 5.01 M/UL (ref 4.7–5.5)
SODIUM SERPL-SCNC: 141 MMOL/L (ref 136–145)
WBC # BLD AUTO: 6 K/UL (ref 4.6–13.2)

## 2020-12-17 PROCEDURE — 36415 COLL VENOUS BLD VENIPUNCTURE: CPT

## 2020-12-17 PROCEDURE — 85610 PROTHROMBIN TIME: CPT

## 2020-12-17 PROCEDURE — G8753 SYS BP > OR = 140: HCPCS | Performed by: NURSE PRACTITIONER

## 2020-12-17 PROCEDURE — 1101F PT FALLS ASSESS-DOCD LE1/YR: CPT | Performed by: NURSE PRACTITIONER

## 2020-12-17 PROCEDURE — 82107 ALPHA-FETOPROTEIN L3: CPT

## 2020-12-17 PROCEDURE — G8420 CALC BMI NORM PARAMETERS: HCPCS | Performed by: NURSE PRACTITIONER

## 2020-12-17 PROCEDURE — 80076 HEPATIC FUNCTION PANEL: CPT

## 2020-12-17 PROCEDURE — 99214 OFFICE O/P EST MOD 30 MIN: CPT | Performed by: NURSE PRACTITIONER

## 2020-12-17 PROCEDURE — G8536 NO DOC ELDER MAL SCRN: HCPCS | Performed by: NURSE PRACTITIONER

## 2020-12-17 PROCEDURE — 85025 COMPLETE CBC W/AUTO DIFF WBC: CPT

## 2020-12-17 PROCEDURE — 80048 BASIC METABOLIC PNL TOTAL CA: CPT

## 2020-12-17 PROCEDURE — G8432 DEP SCR NOT DOC, RNG: HCPCS | Performed by: NURSE PRACTITIONER

## 2020-12-17 PROCEDURE — G8427 DOCREV CUR MEDS BY ELIG CLIN: HCPCS | Performed by: NURSE PRACTITIONER

## 2020-12-17 PROCEDURE — G8754 DIAS BP LESS 90: HCPCS | Performed by: NURSE PRACTITIONER

## 2020-12-17 NOTE — PROGRESS NOTES
[address] [facility], MD, MD Tavo Lemon, EMILIE Salazar, ACNP-BC     April S Trudy, [facility]-BC   Rossi Hoover, MOISES Zuniga [facility] [address]    at [place] [address], [address] [address]    [address] [facility] [address].    [phone]    FAX: [address]    [facility]    [address], [address]    [facility], [address]    [phone]    FAX: [phone]         Patient Care Team:  Jennifer Martins MD as PCP - General (Family Medicine)  Jennifer Martins MD as PCP - [facility] Provider  Jostin Tapia MD (Gastroenterology)      Problem List  Date Reviewed: 12/17/2020          Codes Class Noted    Chronic prostatitis ICD-10-CM: N41.1  ICD-9-CM: 601.1  5/9/2019        Chronic pain of right knee ICD-10-CM: M25.561, G89.29  ICD-9-CM: 719.46, 338.29  11/27/2018        Lumbar pain with radiation down right leg ICD-10-CM: M54.5  ICD-9-CM: 724.2  11/27/2018        Right hip pain ICD-10-CM: M25.551  ICD-9-CM: 719.45  11/27/2018        Thrombocytopenia ([facility] ICD-10-CM: D69.6  ICD-9-CM: 287.5  8/10/2018        B12 deficiency ICD-10-CM: E53.8  ICD-9-CM: 266.2  8/10/2018        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 The active problem list, all pertinent past medical history, medications, liver histology, endoscopic studies, radiologic findings and laboratory findings related to the liver disorder were reviewed with the patient. The patient is a 68 y.o.  male who was first noted to have [facility] about 14 years ago based on serologies. He has been maintained on 1200 mg of ursodiol without issue. Imaging of the liver performed 02/2017 with ultrasound followed with MRI in 03/2017. Heterogeneous echotexture. No focal liver lesions. Most recent imaging was performed in 01/2019 with ultrasound and was unremarkable. The patient has no extrahepatic autoimmune disorders. The most recent laboratory studies indicate that the liver transaminases are normal, alkaline phosphatase is normal, tests of hepatic synthetic and metabolic function are normal, and the platelet count is depressed. The patient has developed varices with recent hemorrhage. He has undergone serial EGD's by Dr. Davis and he is continuing to band the varices. The most recent EGD was in 11/2019. The patient completes all daily activities without any functional limitations. He has not developed ascites or encephalopathy. No edema. The patient has no complaints which can be attributed to liver disease. The patient has not experienced fatigue, fevers, chills, shortness of breath, chest pain, pain in the right side over the liver, diffuse abdominal pain, nausea, vomiting, constipation, diarrhea, dryeyes, dry mouth, arthralgias, myalgias, yellowing of the eyes or skin, itching, dark urine, problems concentrating, swelling of the abdomen, no recent hematemesis or hematochezia other than event mentioned. Since the last office appointment the patient has:  Had no changes in the liver disease. Has stopped taking multiple meds. Reports his memory is better since stopping these. ALLERGIES  Allergies   Allergen Reactions    Sulfa (Sulfonamide Antibiotics) Unknown (comments)     Pt states its been so long he doesn't remember the reaction.  Other Medication Myalgia     Think  that is was a Sulfa drug, but not sure       MEDICATIONS  Current Outpatient Medications   Medication Sig    ursodioL (ACTIGALL) 300 mg capsule TAKE 2 CAPSULES BY MOUTH TWICE A DAY    nadoloL (CORGARD) 40 mg tablet TAKE 1 TABLET BY MOUTH EVERY DAY    testosterone cypionate (DEPOTESTOTERONE CYPIONATE) 200 mg/mL injection 0.5 ml IM every week    Syringe with Needle, Disp, (BD Luer-Freddy Syringe) 3 mL 23 gauge x 1 1/2\" syrg 1 SYRINGE BY INTRAMUSCULAR ROUTE EVERY SEVEN (7) DAYS. FOR USE WITH TESTOSTERONE INJECTIONS    fluticasone propionate (FLONASE) 50 mcg/actuation nasal spray PUT 1 SPRAY IN EACH NOSTRIL TWICE A DAY FOR CHRONIC STUFFY & RUNNY NOSE    metaxalone (SKELAXIN) 800 mg tablet Take 1 Tab by mouth every eight to twelve (8-12) hours as needed for Muscle Spasm(s).  ipratropium (ATROVENT) 42 mcg (0.06 %) nasal spray SPRAY 1 SPRAY FOUR TIMES DAILY. INDICATIONS: RUNNY NOSE    donepeziL (ARICEPT) 10 mg tablet Take 1 Tab by mouth nightly.  dextran 70-hypromellose (ARTIFICIAL TEARS,[id number],) ophthalmic solution 1 Drop.  meclizine (ANTIVERT) 25 mg tablet Take 25 mg by mouth as needed. Indications: sensation of spinning or whirling    therapeutic multivitamin-minerals (VITAMINS AND MINERALS) tablet Take 1 Tab by mouth. No current facility-administered medications for this visit. SYSTEM REVIEW NOT RELATED TO LIVER DISEASE OR REVIEWED ABOVE:  Constitution systems: Negative for fever, chills, weight gain, weight loss. Eyes: Negative for visual changes. ENT: Negative for sore throat, painful swallowing. Respiratory: Negative for cough, hemoptysis, SOB. Cardiology: Negative for chest pain, palpitations. GI:  Negative for constipation or diarrhea.   : + for urinary frequency, dysuria, hematuria, + nocturia. +   Skin: Negative for rash. Hematology: Negative for easy bruising, blood clots. Musculo-skelatal: Negative for back pain, muscle pain, weakness. Neurologic: Negative for headaches, dizziness, vertigo, memory problems not related to HE. Psychology: Negative for anxiety, depression. FAMILY HISTORY:  The mother passed CHF age 80  The father passed CAD age 76   There is no family history of liver disease. There is no family history of immune disorders. SOCIAL HISTORY:  The patient is . The patient has 4 children,   The patient has never used tobacco products. The patient has never consumed significant amounts of alcohol. The patient retired in . PHYSICAL EXAMINATION:  Visit Vitals  BP (!) 144/80   Pulse (!) 50   Temp 97.1 °F (36.2 °C) (Tympanic)   Wt 168 lb 4 oz (76.3 kg)   SpO2 97%   BMI 24.14 kg/m²     General: No acute distress. Eyes: Sclera anicteric. ENT: No oral lesions. Thyroid normal.  Nodes: No adenopathy. Skin: No spider angiomata. No jaundice. No palmar erythema. Respiratory: Lungs clear to auscultation. Cardiovascular: Regular heart rate. No murmurs. No JVD. Abdomen: Soft non-tender. Liver size normal to percussion/palpation. Spleen not palpable. No obvious ascites. Extremities: No edema. No muscle wasting. No gross arthritic changes. Neurologic: Alert and oriented. Cranial nerves grossly intact. No asterixis.       LABORATORY STUDIES:  [facility] [place] of [address] Units 6/18/2020 12/17/2019   WBC 4.6 - 13.2 K/uL 3.7 (L) 4.6   ANC 1.8 - 8.0 K/UL 1.9 2.3   HGB 13.0 - 16.0 g/dL 14.4 14.4   HGB      HGB (iSTAT)       - 420 K/uL 118 (L) 121 (L)   INR 0.8 - 1.2   1.2 1.1   AST 10 - 38 U/L 48 (H) 37   ALT 16 - 61 U/L 42 33   Alk Phos 45 - 117 U/L 77 88   Bili, Total 0.2 - 1.0 MG/DL 1.3 (H) 1.3 (H)   Bili, Direct 0.0 - 0.2 MG/DL 0.4 (H) 0.4 (H)   Albumin 3.4 - 5.0 g/dL 3.4 3.5   BUN 7.0 - 18 MG/DL 12 18   Creat 0.6 - 1.3 MG/DL 1.17 1.21   Creat (iSTAT) 0.6 - 1.3 MG/DL     Na 136 - 145 mmol/L 141 140   K 3.5 - 5.5 mmol/L 4.3 4.1   Cl 100 - 111 mmol/L 106 105   CO2 21 - 32 mmol/L 30 31   Glucose 74 - 99 mg/dL 97 114 (H)     Cancer Screening Latest Ref Rng & Units 6/18/2020 12/17/2019 6/21/2019   AFP Tumor Marker 0.0 - 8.3 ng/mL      AFP, Serum 0.0 - 8.0 ng/mL 2.9 2.7 2.3   AFP-L3% 0.0 - 9.9 % Comment Comment Comment       SEROLOGIES:  Serologies Latest Ref Rng 3/21/2016   ISSAC Ab, Direct Negative Negative   M2 Ab 0.0 - 20.0 Units 178.8 (H)     Hep B sAB (-)  Hep A total Ab (-)  HCV ab (-)    LIVER HISTOLOGY:  05/2018. TRANSIENT HEPATIC ELASTOGRAPHY:   E Range: 7.4-10.2 kPa  E Mean: 9.1 kPa  E Median: 9.3 kPa  E Std: 1.0 kPa    ENDOSCOPIC PROCEDURES:  04/2016 EGD Dr. Terrance Steven Esophageal varices banded  07/2016 EGD with obliterated Varices per patient. 01/2017. EGD by Dr. Terrance Steven. Large esophageal varices. 2 bands placed. 01/2018. EGD by Dr. Terrance Steven. Patient reports there were no varices. 10/2018. EGD by Dr. Terrance Steven. Esophageal varices which are small in size. Follow up in one year. 11/2019. EGD by Dr. Terrance Steven. Repeat in one year. RADIOLOGY:  03/2016  MRI scan abdomen. Changes consistent with cirrhosis. No liver mass lesions. No dilated bile ducts. No bile duct strictures. Pericholecystic fluid. No ascites  09/2016. Ultrasound of the liver. The liver is mildly hetogeneous. No hepatic masses. Mel size. 02/2017. Ultrasound of the liver. The liver is slightly heterogeneous in echotexture. There is a subtle isoechoic, partially exophytic questionable lesion in the right lobe measuring approximately 3.7 cm x 3.5 cm x 4 cm.   03/2017. Dynamic MRI of the liver. No significant abnormality. No hepatic lesion identified. 09/2017. Ultrasound of the liver. The liver and pancreas are normal in size, contour, and echogenicity. 05/2018. Ultrasound of the liver.   Stable sonographic appearance of the liver. Mild heterogeneous diffuse increased hepatic parenchymal echotexture, ultrasound finding associated with fibrosis from chronic hepatocellular disease. No focal hepatic mass. 06/2019. Ultrasound of the liver. Heterogeneous echotexture of the hepatic parenchyma, likely representing diffuse hepatocellular disease. No focal hepatic lesion. 01/2020. Ultrasound of the liver. The liver measures 15.3 cm longitudinally and is inhomogeneous in appearance. 06/2020. Ultrasound of the liver. Normal sized, inhomogeneous appearing liver. OTHER TESTING:  Not available or performed    ASSESSMENT AND PLAN:  Primary Biliary Cholangitis with cirrhosis. The most recent laboratory studies indicate that the liver transaminases are normal, alkaline phosphatase is normal,  tests of hepatic synthetic and metabolic function are normal, and the platelet count is depressed. Will perform laboratory testing to monitor liver function and degree of liver injury. This will include hepatic panel, a CBC w/ diff, a BMP, a PT/INR, an AFP-L3%. Complications of cirrhosis were discussed in detail. We discussed thrombocytopenia, portal hypertension, varices, GI bleeding, peripheral edema, ascites, hepatic encephalopathy, and hepatocellular carcinoma. We discussed the need for follow ups on a regular basis to monitor for complications. We discussed the need for every 6 month liver imaging studies. 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 Maribell Duke. Follow up with him as directed. Discussed extrahepatic manifestations of PBC such as osteoporosis and elevated cholesterol. The risk of osteoporosis is increased in patients with PBC. DEXA bone density to assess for osteoporosis should be ordered by the patients primary care physician. The patient was advised to take calcium supplementation and Vitamin D. He was advised to take supplemental vitamin A, D, E, and K.      Patients with PBC are at increased risk for hypercholesterolemia. However, this is not associated with an increased risk of cardiac disease and MI because this is typically an elevation in HDL cholesterol. There is no contraindication for treatment with a statin if this is felt to be indicated based upon cardiac risk assessment. The patient is receiving treatment with urosdeoxycholic Acid. Continue this at current dose. The patient  and is tolerating the treatment without significant side effects. His alkaline phosphatase is normal.     Vaccination for viral hepatitis A and B is recommended since the patient has no serologic evidence of previous exposure or vaccination with immunity. [address] screening has recently been performed and does not suggest [address] The next liver imaging study is due next month. This was ordered today. Bone density up to date with osteopenia. All of the above issues were discussed with the patient. All questions were answered. The patient expressed a clear understanding of the above. 25 minutes total time spent with this patient with more than 50% of this time spent counseling and coordinating care as described above. [address] in 6 months.       RACHANA Clarke  [facility] [place] [address], [address] [place] [address], [address] [place] [address]   [phone]

## 2020-12-18 LAB
AFP L3 MFR SERPL: NORMAL % (ref 0–9.9)
AFP SERPL-MCNC: 3.6 NG/ML (ref 0–8)

## 2020-12-31 ENCOUNTER — TELEPHONE (OUTPATIENT)
Dept: HEMATOLOGY | Age: 80
End: 2020-12-31

## 2021-03-09 ENCOUNTER — OFFICE VISIT (OUTPATIENT)
Dept: FAMILY MEDICINE CLINIC | Age: 81
End: 2021-03-09
Payer: MEDICARE

## 2021-03-09 VITALS
DIASTOLIC BLOOD PRESSURE: 78 MMHG | HEIGHT: 70 IN | HEART RATE: 54 BPM | OXYGEN SATURATION: 99 % | RESPIRATION RATE: 18 BRPM | WEIGHT: 179 LBS | BODY MASS INDEX: 25.62 KG/M2 | SYSTOLIC BLOOD PRESSURE: 122 MMHG | TEMPERATURE: 97.1 F

## 2021-03-09 DIAGNOSIS — R60.0 LOCALIZED EDEMA: Primary | ICD-10-CM

## 2021-03-09 DIAGNOSIS — I87.2 VENOUS INSUFFICIENCY OF BOTH LOWER EXTREMITIES: ICD-10-CM

## 2021-03-09 PROCEDURE — G8510 SCR DEP NEG, NO PLAN REQD: HCPCS | Performed by: FAMILY MEDICINE

## 2021-03-09 PROCEDURE — G8427 DOCREV CUR MEDS BY ELIG CLIN: HCPCS | Performed by: FAMILY MEDICINE

## 2021-03-09 PROCEDURE — 1101F PT FALLS ASSESS-DOCD LE1/YR: CPT | Performed by: FAMILY MEDICINE

## 2021-03-09 PROCEDURE — G8419 CALC BMI OUT NRM PARAM NOF/U: HCPCS | Performed by: FAMILY MEDICINE

## 2021-03-09 PROCEDURE — 99213 OFFICE O/P EST LOW 20 MIN: CPT | Performed by: FAMILY MEDICINE

## 2021-03-09 PROCEDURE — G8536 NO DOC ELDER MAL SCRN: HCPCS | Performed by: FAMILY MEDICINE

## 2021-03-09 RX ORDER — SPIRONOLACTONE 50 MG/1
50 TABLET, FILM COATED ORAL DAILY
Qty: 30 TAB | Refills: 5 | Status: SHIPPED | OUTPATIENT
Start: 2021-03-09 | End: 2021-06-17 | Stop reason: ALTCHOICE

## 2021-03-09 NOTE — PROGRESS NOTES
Palmira Weiss is a 80 y.o. male who presents with the following:  Chief Complaint   Patient presents with    Cholesterol Problem    Anemia    Peripheral Edema     Venous insufficiency       Patient's cholesterol is doing well by his last laboratory. Patient does have hepatic cirrhosis due to primary biliary cholangitis for which she is still taking Actigall that seems to be helping. The patient did have an anemia but that has done much better since his GI bleed is stopped and is back in the normal range. Patient is feeling much better and has good color and is not tired a week. Patient is having swelling in his lower legs below the knees and he does have fairly significant varicose veins and venous insufficiency. Allergies   Allergen Reactions    Sulfa (Sulfonamide Antibiotics) Unknown (comments)     Pt states its been so long he doesn't remember the reaction.  Other Medication Myalgia     Think  that is was a Sulfa drug, but not sure       Current Outpatient Medications   Medication Sig    spironolactone (ALDACTONE) 50 mg tablet Take 1 Tab by mouth daily.  tamsulosin (FLOMAX) 0.4 mg capsule Take 1 Cap by mouth daily.  Syringe with Needle, Disp, (BD Luer-Freddy Syringe) 3 mL 23 gauge x 1 1/2\" syrg FOR USE WITH TESTOSTERONE (INTRAMUSCULAR) INJECTIONS EVERY 7 DAYS    testosterone cypionate (DEPOTESTOTERONE CYPIONATE) 200 mg/mL injection INJECT 0.5ML INTRAMUSCULAR WEEKLY    ursodioL (ACTIGALL) 300 mg capsule TAKE 2 CAPSULES BY MOUTH TWICE A DAY    nadoloL (CORGARD) 40 mg tablet TAKE 1 TABLET BY MOUTH EVERY DAY    fluticasone propionate (FLONASE) 50 mcg/actuation nasal spray PUT 1 SPRAY IN EACH NOSTRIL TWICE A DAY FOR CHRONIC STUFFY & RUNNY NOSE    metaxalone (SKELAXIN) 800 mg tablet Take 1 Tab by mouth every eight to twelve (8-12) hours as needed for Muscle Spasm(s).  ipratropium (ATROVENT) 42 mcg (0.06 %) nasal spray SPRAY 1 SPRAY FOUR TIMES DAILY.  INDICATIONS: RUNNY NOSE    donepeziL (ARICEPT) 10 mg tablet Take 1 Tab by mouth nightly.  dextran 70-hypromellose (ARTIFICIAL TEARS,[id number],) ophthalmic solution 1 Drop.  meclizine (ANTIVERT) 25 mg tablet Take 25 mg by mouth as needed. Indications: sensation of spinning or whirling    therapeutic multivitamin-minerals (VITAMINS AND MINERALS) tablet Take 1 Tab by mouth. No current facility-administered medications for this visit. Past Medical History:   Diagnosis Date    Allergic     Anemia     Esophageal varices ([facility] 2016    banded x 6    GERD (gastroesophageal reflux disease)     GI bleed 2016    Hyperlipidemia     Hypogonadism male     Primary biliary cirrhosis ([facility]     Vitamin D deficiency        Past Surgical History:   Procedure Laterality Date    HX HEENT      deviated septum repair    HX HEENT  2018    cataract removal    HX ORTHOPAEDIC Right 2017    Arthroscopy knee    HX REFRACTIVE SURGERY Left     HX SEPTOPLASTY                           Biopsy       Family History   Problem Relation Age of Onset    Diabetes Father     Stroke Brother     Elevated Lipids Other         children       Social History     Socioeconomic History    Marital status:      Spouse name: Not on file    Number of children: Not on file    Years of education: Not on file    Highest education level: Not on file   Tobacco Use    Smoking status: Former Smoker     Packs/day: 1.00     Quit date: 1974     Years since quittin.8    Smokeless tobacco: Never Used   Substance and Sexual Activity    Alcohol use: No     Alcohol/week: 0.0 standard drinks    Drug use: No       Review of Systems   Constitutional: Negative for chills, fever, malaise/fatigue and weight loss. HENT: Negative for congestion, hearing loss, sore throat and tinnitus. Eyes: Negative for blurred vision, pain and discharge. Respiratory: Negative for cough, shortness of breath and wheezing.     Cardiovascular: Positive for leg swelling. Negative for chest pain, palpitations, orthopnea and claudication. Gastrointestinal: Negative for abdominal pain, constipation and heartburn. Genitourinary: Negative for dysuria, frequency and urgency. Musculoskeletal: Negative for falls, joint pain and myalgias. Skin: Negative for itching and rash. Neurological: Negative for dizziness, tingling, tremors and headaches. Endo/Heme/Allergies: Negative for environmental allergies and polydipsia. Psychiatric/Behavioral: Negative for depression and substance abuse. The patient is not nervous/anxious. Visit Vitals  /78   Pulse (!) 54   Temp 97.1 °F (36.2 °C)   Resp 18   Ht 5' 10\" (1.778 m)   Wt 179 lb (81.2 kg)   SpO2 99%   BMI 25.68 kg/m²     Physical Exam  Vitals signs reviewed. Constitutional:       General: He is not in acute distress. Appearance: Normal appearance. He is not ill-appearing. HENT:      Head: Normocephalic and atraumatic. Right Ear: Tympanic membrane, ear canal and external ear normal.      Left Ear: Tympanic membrane, ear canal and external ear normal.      Nose: Nose normal. No congestion or rhinorrhea. Mouth/Throat:      Mouth: Mucous membranes are moist.      Pharynx: No oropharyngeal exudate or posterior oropharyngeal erythema. Eyes:      Extraocular Movements: Extraocular movements intact. Conjunctiva/sclera: Conjunctivae normal.      Pupils: Pupils are equal, round, and reactive to light. Comments: Pupil iris and anterior chamber normal.   Neck:      Musculoskeletal: Normal range of motion and neck supple. Trachea: No tracheal deviation. Cardiovascular:      Rate and Rhythm: Normal rate and regular rhythm. Pulses: Normal pulses. Heart sounds: Normal heart sounds. No murmur. No friction rub. No gallop. Pulmonary:      Effort: Pulmonary effort is normal. No respiratory distress. Breath sounds: Normal breath sounds. No wheezing, rhonchi or rales.    Chest: Chest wall: No tenderness. Abdominal:      General: Bowel sounds are normal. There is no distension. Palpations: Abdomen is soft. There is no mass. Tenderness: There is no abdominal tenderness. There is no guarding or rebound. Hernia: No hernia is present. Musculoskeletal: Normal range of motion. General: No tenderness. Right lower leg: Edema present. Left lower leg: Edema present. Lymphadenopathy:      Cervical: No cervical adenopathy. Skin:     General: Skin is warm and dry. Findings: No erythema or rash. Neurological:      General: No focal deficit present. Mental Status: He is alert and oriented to person, place, and time. Cranial Nerves: No cranial nerve deficit. Motor: No abnormal muscle tone. Deep Tendon Reflexes: Reflexes are normal and symmetric. Reflexes normal.      Comments: Cranial nerves II through XII intact sensory and motor. Deep tendon reflexes in the biceps triceps knee and ankle are normal and bilaterally symmetrical.   Psychiatric:         Mood and Affect: Mood normal.         Behavior: Behavior normal.           ICD-10-CM ICD-9-CM    1. Localized edema  R60.0 782.3 spironolactone (ALDACTONE) 50 mg tablet   2. Venous insufficiency of both lower extremities  I87.2 459.81        Orders Placed This Encounter    spironolactone (ALDACTONE) 50 mg tablet     Sig: Take 1 Tab by mouth daily. Dispense:  30 Tab     Refill:  5   I have suggested that the patient get some elastic knee-high socks and start wearing these putting them on before he gets up out of bed and before the edema can start. I also suggested that he lay down with his legs propped up for an hour in the morning an hour and afternoon and then he prop his legs up at night when he goes to bed. Follow-up and Dispositions    · Return in about 6 months (around 9/9/2021), or if symptoms worsen or fail to improve.          Cesar Cox MD

## 2021-03-09 NOTE — PROGRESS NOTES
1. Have you been to the ER, urgent care clinic since your last visit? Hospitalized since your last visit?no    2. Have you seen or consulted any other health care providers outside of the [address] [place] [address] since your last visit? Include any pap smears or colon screening.  no

## 2021-03-23 ENCOUNTER — CLINICAL SUPPORT (OUTPATIENT)
Dept: FAMILY MEDICINE CLINIC | Age: 81
End: 2021-03-23
Payer: MEDICARE

## 2021-03-23 DIAGNOSIS — E53.8 B12 DEFICIENCY: Primary | ICD-10-CM

## 2021-03-23 PROCEDURE — 96372 THER/PROPH/DIAG INJ SC/IM: CPT | Performed by: FAMILY MEDICINE

## 2021-03-23 RX ORDER — CYANOCOBALAMIN 1000 UG/ML
1000 INJECTION, SOLUTION INTRAMUSCULAR; SUBCUTANEOUS ONCE
Status: COMPLETED | OUTPATIENT
Start: 2021-03-23 | End: 2021-03-23

## 2021-03-23 RX ADMIN — CYANOCOBALAMIN 1000 MCG: 1000 INJECTION, SOLUTION INTRAMUSCULAR; SUBCUTANEOUS at 09:06

## 2021-03-23 NOTE — PROGRESS NOTES
1ml administered in right deltoid. Patient tolerated medication well. AVS provided to patient with medication information. Patient states understanding.

## 2021-04-09 ENCOUNTER — OFFICE VISIT (OUTPATIENT)
Dept: FAMILY MEDICINE CLINIC | Age: 81
End: 2021-04-09
Payer: MEDICARE

## 2021-04-09 VITALS
HEART RATE: 57 BPM | HEIGHT: 70 IN | BODY MASS INDEX: 24.97 KG/M2 | RESPIRATION RATE: 18 BRPM | WEIGHT: 174.4 LBS | OXYGEN SATURATION: 98 % | TEMPERATURE: 96.4 F | DIASTOLIC BLOOD PRESSURE: 86 MMHG | SYSTOLIC BLOOD PRESSURE: 136 MMHG

## 2021-04-09 DIAGNOSIS — F02.80 LATE ONSET ALZHEIMER'S DISEASE WITHOUT BEHAVIORAL DISTURBANCE (HCC): ICD-10-CM

## 2021-04-09 DIAGNOSIS — N41.1 CHRONIC PROSTATITIS: ICD-10-CM

## 2021-04-09 DIAGNOSIS — G30.1 LATE ONSET ALZHEIMER'S DISEASE WITHOUT BEHAVIORAL DISTURBANCE (HCC): ICD-10-CM

## 2021-04-09 DIAGNOSIS — N40.1 BPH WITH OBSTRUCTION/LOWER URINARY TRACT SYMPTOMS: Primary | ICD-10-CM

## 2021-04-09 DIAGNOSIS — N13.8 BPH WITH OBSTRUCTION/LOWER URINARY TRACT SYMPTOMS: Primary | ICD-10-CM

## 2021-04-09 PROCEDURE — 99214 OFFICE O/P EST MOD 30 MIN: CPT | Performed by: FAMILY MEDICINE

## 2021-04-09 PROCEDURE — G8510 SCR DEP NEG, NO PLAN REQD: HCPCS | Performed by: FAMILY MEDICINE

## 2021-04-09 PROCEDURE — G8536 NO DOC ELDER MAL SCRN: HCPCS | Performed by: FAMILY MEDICINE

## 2021-04-09 PROCEDURE — G8427 DOCREV CUR MEDS BY ELIG CLIN: HCPCS | Performed by: FAMILY MEDICINE

## 2021-04-09 PROCEDURE — G8419 CALC BMI OUT NRM PARAM NOF/U: HCPCS | Performed by: FAMILY MEDICINE

## 2021-04-09 PROCEDURE — 1101F PT FALLS ASSESS-DOCD LE1/YR: CPT | Performed by: FAMILY MEDICINE

## 2021-04-09 RX ORDER — CIPROFLOXACIN 500 MG/1
500 TABLET ORAL 2 TIMES DAILY
Qty: 20 TAB | Refills: 0 | Status: SHIPPED | OUTPATIENT
Start: 2021-04-09 | End: 2021-04-19

## 2021-04-09 NOTE — PROGRESS NOTES
Don Mercedes is a 80 y.o. male who presents with the following:  Chief Complaint   Patient presents with    Benign Prostatic Hypertrophy     Urinary frequency       Patient is having more urinary urgency and frequency and wanted to know his options of therapy. The patient stated that he had cut back on his tamsulosin to every other day because he had read about a [place] study that said that taking it every other day was as effective as taking it daily. I told the patient that most of my patients were doing well on once a day but there were several that were placed on 0.8 mg of tamsulosin on a daily basis and they had improvement also. I told the patient is an option beyond that would be a green laser TURP or he could try Proscar which he stated he had tried once in the past and did not have a good result with. Patient stated he really did not want surgery. Patient was also requesting information regarding anything else that could be done for his dementia and he stated that the Aricept would definitely help his memory but he was wondering if there was anything else that could be done. I informed the patient he could go on Namenda twice daily and that had seemed to help many of my other patients who would dealt with dementia but he stated he just did not want to do that at this time but he would read up on the [address] Allergies   Allergen Reactions    Sulfa (Sulfonamide Antibiotics) Unknown (comments)     Pt states its been so long he doesn't remember the reaction.  Other Medication Myalgia     Think  that is was a Sulfa drug, but not sure       Current Outpatient Medications   Medication Sig    ciprofloxacin HCl (CIPRO) 500 mg tablet Take 1 Tab by mouth two (2) times a day for 10 days.     ursodioL (ACTIGALL) 300 mg capsule TAKE 2 CAPSULES BY MOUTH TWICE A DAY    tamsulosin (FLOMAX) 0.4 mg capsule TAKE 1 CAPSULE BY MOUTH EVERY DAY    spironolactone (ALDACTONE) 50 mg tablet Take 1 Tab by mouth daily.  Syringe with Needle, Disp, (BD Luer-Freddy Syringe) 3 mL 23 gauge x 1 1/2\" syrg FOR USE WITH TESTOSTERONE (INTRAMUSCULAR) INJECTIONS EVERY 7 DAYS    testosterone cypionate (DEPOTESTOTERONE CYPIONATE) 200 mg/mL injection INJECT 0.5ML INTRAMUSCULAR WEEKLY    nadoloL (CORGARD) 40 mg tablet TAKE 1 TABLET BY MOUTH EVERY DAY    fluticasone propionate (FLONASE) 50 mcg/actuation nasal spray PUT 1 SPRAY IN EACH NOSTRIL TWICE A DAY FOR CHRONIC STUFFY & RUNNY NOSE    metaxalone (SKELAXIN) 800 mg tablet Take 1 Tab by mouth every eight to twelve (8-12) hours as needed for Muscle Spasm(s).  ipratropium (ATROVENT) 42 mcg (0.06 %) nasal spray SPRAY 1 SPRAY FOUR TIMES DAILY. INDICATIONS: RUNNY NOSE    donepeziL (ARICEPT) 10 mg tablet Take 1 Tab by mouth nightly.  dextran 70-hypromellose (ARTIFICIAL TEARS,[id number],) ophthalmic solution 1 Drop.  meclizine (ANTIVERT) 25 mg tablet Take 25 mg by mouth as needed. Indications: sensation of spinning or whirling    therapeutic multivitamin-minerals (VITAMINS AND MINERALS) tablet Take 1 Tab by mouth. No current facility-administered medications for this visit.         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     Primary biliary cirrhosis ([address]     Vitamin D deficiency        Past Surgical History:   Procedure Laterality Date    HX HEENT  2009    deviated septum repair    HX HEENT  2018    cataract removal    HX ORTHOPAEDIC Right 01/2017    Arthroscopy knee    HX REFRACTIVE SURGERY Left     HX SEPTOPLASTY                           Biopsy       Family History   Problem Relation Age of Onset    Diabetes Father     Stroke Brother     Elevated Lipids Other         children       Social History     Socioeconomic History    Marital status:      Spouse name: Not on file    Number of children: Not on file    Years of education: Not on file    Highest education level: Not on file   Tobacco Use    Smoking status: Former Smoker     Packs/day: 1.00     Quit date: 1974     Years since quittin.9    Smokeless tobacco: Never Used   Substance and Sexual Activity    Alcohol use: No     Alcohol/week: 0.0 standard drinks    Drug use: No       Review of Systems   Constitutional: Negative for chills, fever, malaise/fatigue and weight loss. HENT: Negative for congestion, hearing loss, sore throat and tinnitus. Eyes: Negative for blurred vision, pain and discharge. Respiratory: Negative for cough, shortness of breath and wheezing. Cardiovascular: Negative for chest pain, palpitations, orthopnea, claudication and leg swelling. Gastrointestinal: Negative for abdominal pain, constipation and heartburn. Genitourinary: Positive for frequency and urgency. Negative for dysuria. Musculoskeletal: Negative for falls, joint pain and myalgias. Skin: Negative for itching and rash. Neurological: Negative for dizziness, tingling, tremors and headaches. Endo/Heme/Allergies: Negative for environmental allergies and polydipsia. Psychiatric/Behavioral: Positive for memory loss. Negative for depression and substance abuse. The patient is not nervous/anxious. Visit Vitals  /86   Pulse (!) 57   Temp (!) 96.4 °F (35.8 °C)   Resp 18   Ht 5' 10\" (1.778 m)   Wt 174 lb 6.4 oz (79.1 kg)   SpO2 98%   BMI 25.02 kg/m²     Physical Exam  Vitals signs reviewed. Constitutional:       General: He is not in acute distress. Appearance: Normal appearance. He is normal weight. He is not ill-appearing. HENT:      Head: Normocephalic and atraumatic. Right Ear: Tympanic membrane, ear canal and external ear normal.      Left Ear: Tympanic membrane, ear canal and external ear normal.      Nose: Nose normal. No congestion or rhinorrhea.       Mouth/Throat:      Mouth: Mucous membranes are moist.      Pharynx: No oropharyngeal exudate or posterior oropharyngeal erythema. Eyes:      Extraocular Movements: Extraocular movements intact. Conjunctiva/sclera: Conjunctivae normal.      Pupils: Pupils are equal, round, and reactive to light. Comments: Pupil iris and anterior chamber normal.   Neck:      Musculoskeletal: Normal range of motion and neck supple. Trachea: No tracheal deviation. Cardiovascular:      Rate and Rhythm: Normal rate and regular rhythm. Pulses: Normal pulses. Heart sounds: Normal heart sounds. No murmur. No friction rub. No gallop. Pulmonary:      Effort: Pulmonary effort is normal. No respiratory distress. Breath sounds: Normal breath sounds. No wheezing, rhonchi or rales. Chest:      Chest wall: No tenderness. Abdominal:      General: Bowel sounds are normal. There is no distension. Palpations: Abdomen is soft. There is no mass. Tenderness: There is no abdominal tenderness. There is no guarding or rebound. Hernia: No hernia is present. Musculoskeletal: Normal range of motion. General: No tenderness. Right lower leg: No edema. Left lower leg: No edema. Lymphadenopathy:      Cervical: No cervical adenopathy. Skin:     General: Skin is warm and dry. Findings: No erythema or rash. Neurological:      General: No focal deficit present. Mental Status: He is alert and oriented to person, place, and time. Cranial Nerves: No cranial nerve deficit. Motor: No abnormal muscle tone. Deep Tendon Reflexes: Reflexes are normal and symmetric. Reflexes normal.      Comments: Cranial nerves II through XII intact sensory and motor. Deep tendon reflexes in the biceps triceps knee and ankle are normal and bilaterally symmetrical.   Psychiatric:         Mood and Affect: Mood normal.         Behavior: Behavior normal.         Thought Content:  Thought content normal. ICD-10-CM ICD-9-CM    1. BPH with obstruction/lower urinary tract symptoms  N40.1 600.01     N13.8 599.69    2. Late onset Alzheimer's disease without behavioral disturbance (HCC)  G30.1 331.0     F02.80 294.10    3. Chronic prostatitis  N41.1 601.1 ciprofloxacin HCl (CIPRO) 500 mg tablet       Orders Placed This Encounter    ciprofloxacin HCl (CIPRO) 500 mg tablet     Sig: Take 1 Tab by mouth two (2) times a day for 10 days. Dispense:  20 Tab     Refill:  0   If the patient elects not to take the antibiotic then he could leave it at the pharmacy. Follow-up and Dispositions    · Return if symptoms worsen or fail to improve.          Franceen Rubinstein, MD

## 2021-04-09 NOTE — PROGRESS NOTES
1. Have you been to the ER, urgent care clinic since your last visit? Hospitalized since your last visit?no    2. Have you seen or consulted any other health care providers outside of the [address] [place] [address] since your last visit? Include any pap smears or colon screening.  no

## 2021-04-12 ENCOUNTER — TELEPHONE (OUTPATIENT)
Dept: FAMILY MEDICINE CLINIC | Age: 81
End: 2021-04-12

## 2021-04-12 NOTE — TELEPHONE ENCOUNTER
Pt came by and stated he is having severe diarrhea because of the Cipro. He didn't take it last night or this am because he had to take his wife to the hospital in [place]. What should he do? Change meds?

## 2021-04-12 NOTE — TELEPHONE ENCOUNTER
Everything else could possibly cause diarrhea also. I would suggest that he take probiotics between doses of the medication as well as using Imodium 2 mg 2 of these every 8 hours as long as he has diarrhea. If it persist after he finishes the medication then we would have to consider doing stool studies for C. difficile.

## 2021-04-15 DIAGNOSIS — F02.80 LATE ONSET ALZHEIMER'S DISEASE WITHOUT BEHAVIORAL DISTURBANCE (HCC): ICD-10-CM

## 2021-04-15 DIAGNOSIS — G30.1 LATE ONSET ALZHEIMER'S DISEASE WITHOUT BEHAVIORAL DISTURBANCE (HCC): ICD-10-CM

## 2021-04-15 RX ORDER — DONEPEZIL HYDROCHLORIDE 10 MG/1
TABLET, FILM COATED ORAL
Qty: 90 TAB | Refills: 1 | Status: SHIPPED | OUTPATIENT
Start: 2021-04-15 | End: 2021-11-08

## 2021-04-23 ENCOUNTER — CLINICAL SUPPORT (OUTPATIENT)
Dept: FAMILY MEDICINE CLINIC | Age: 81
End: 2021-04-23
Payer: MEDICARE

## 2021-04-23 DIAGNOSIS — E53.8 VITAMIN B 12 DEFICIENCY: Primary | ICD-10-CM

## 2021-04-23 PROCEDURE — 96372 THER/PROPH/DIAG INJ SC/IM: CPT

## 2021-04-23 RX ORDER — CYANOCOBALAMIN 1000 UG/ML
1000 INJECTION, SOLUTION INTRAMUSCULAR; SUBCUTANEOUS ONCE
Status: COMPLETED | OUTPATIENT
Start: 2021-04-23 | End: 2021-04-23

## 2021-04-23 RX ADMIN — CYANOCOBALAMIN 1000 MCG: 1000 INJECTION, SOLUTION INTRAMUSCULAR; SUBCUTANEOUS at 08:57

## 2021-04-23 NOTE — PATIENT INSTRUCTIONS
Cyanocobalamin (Vitamin B-12) (By injection)   Cyanocobalamin (lff-ze-lh-koe-BAL-a-min)  Treats vitamin B-12 deficiency. Brand Name(s): B-12 Compliance Injection Kit, B-12 Kit, [employer] Use B-12 Compliance, Vitamin Deficiency Injectable System - B12   There may be other brand names for this medicine. When This Medicine Should Not Be Used: This medicine is not right for everyone. Do not use it if you had an allergic reaction to vitamin B-12. How to Use This Medicine:   Injectable  · Your doctor will prescribe your exact dose and tell you how often it should be given. This medicine is given as a shot into one of your muscles. · Missed dose: Call your doctor or pharmacist for instructions. Drugs and Foods to Avoid:   Ask your doctor or pharmacist before using any other medicine, including over-the-counter medicines, vitamins, and herbal products. · Do not use folic acid supplements unless your doctor says it is okay. Warnings While Using This Medicine:   · Tell your doctor if you are pregnant or breastfeeding, or if you have kidney disease. · Keep all medicine out of the reach of children. Never share your medicine with anyone. Possible Side Effects While Using This Medicine:   Call your doctor right away if you notice any of these side effects:  · Allergic reaction: Itching or hives, swelling in your face or hands, swelling or tingling in your mouth or throat, chest tightness, trouble breathing  If you notice other side effects that you think are caused by this medicine, tell your doctor. Call your doctor for medical advice about side effects. You may report side effects to FDA at 7-308-FDA-6548  © 2017 [address] Information is for End User's use only and may not be sold, redistributed or otherwise used for commercial purposes. The above information is an  only. It is not intended as medical advice for individual conditions or treatments.  Talk to your doctor, nurse or pharmacist before following any medical regimen to see if it is safe and effective for you.

## 2021-04-23 NOTE — PROGRESS NOTES
B12 administered in left deltoid. Patient tolerated medication well. AVS provided to patient with medication information. Patient states understanding.

## 2021-05-03 ENCOUNTER — TELEPHONE (OUTPATIENT)
Dept: FAMILY MEDICINE CLINIC | Age: 81
End: 2021-05-03

## 2021-05-03 NOTE — TELEPHONE ENCOUNTER
Received fax from Donepezil HCL 10 MG Tab. Pt thinks he is taking 2 tablets (20MG) daily. Please send new rx with this dose or contact pt is this is not correct.

## 2021-05-25 ENCOUNTER — CLINICAL SUPPORT (OUTPATIENT)
Dept: FAMILY MEDICINE CLINIC | Age: 81
End: 2021-05-25
Payer: MEDICARE

## 2021-05-25 VITALS
DIASTOLIC BLOOD PRESSURE: 72 MMHG | RESPIRATION RATE: 18 BRPM | HEART RATE: 72 BPM | OXYGEN SATURATION: 98 % | SYSTOLIC BLOOD PRESSURE: 128 MMHG

## 2021-05-25 DIAGNOSIS — E53.8 B12 DEFICIENCY: Primary | ICD-10-CM

## 2021-05-25 PROCEDURE — 96372 THER/PROPH/DIAG INJ SC/IM: CPT | Performed by: FAMILY MEDICINE

## 2021-05-25 RX ORDER — CYANOCOBALAMIN 1000 UG/ML
1000 INJECTION, SOLUTION INTRAMUSCULAR; SUBCUTANEOUS
Status: DISCONTINUED | OUTPATIENT
Start: 2021-05-25 | End: 2021-09-09 | Stop reason: CLARIF

## 2021-05-25 RX ADMIN — CYANOCOBALAMIN 1000 MCG: 1000 INJECTION, SOLUTION INTRAMUSCULAR; SUBCUTANEOUS at 09:15

## 2021-06-17 ENCOUNTER — HOSPITAL ENCOUNTER (OUTPATIENT)
Dept: LAB | Age: 81
Discharge: HOME | End: 2021-06-17
Payer: MEDICARE

## 2021-06-17 ENCOUNTER — OFFICE VISIT (OUTPATIENT)
Dept: HEMATOLOGY | Age: 81
End: 2021-06-17
Payer: MEDICARE

## 2021-06-17 VITALS
HEART RATE: 94 BPM | WEIGHT: 168.38 LBS | TEMPERATURE: 97.2 F | BODY MASS INDEX: 24.16 KG/M2 | DIASTOLIC BLOOD PRESSURE: 92 MMHG | SYSTOLIC BLOOD PRESSURE: 160 MMHG | OXYGEN SATURATION: 98 %

## 2021-06-17 DIAGNOSIS — K74.3 PRIMARY BILIARY CHOLANGITIS (HCC): Primary | ICD-10-CM

## 2021-06-17 DIAGNOSIS — K74.3 PRIMARY BILIARY CHOLANGITIS (HCC): ICD-10-CM

## 2021-06-17 LAB
ALBUMIN SERPL-MCNC: 3.3 G/DL (ref 3.4–5)
ALBUMIN/GLOB SERPL: 1.1 {RATIO} (ref 0.8–1.7)
ALP SERPL-CCNC: 93 U/L (ref 45–117)
ALT SERPL-CCNC: 34 U/L (ref 16–61)
ANION GAP SERPL CALC-SCNC: 4 MMOL/L (ref 3–18)
AST SERPL-CCNC: 35 U/L (ref 10–38)
BASOPHILS # BLD: 0.1 K/UL (ref 0–0.1)
BASOPHILS NFR BLD: 1 % (ref 0–2)
BILIRUB DIRECT SERPL-MCNC: 0.4 MG/DL (ref 0–0.2)
BILIRUB SERPL-MCNC: 1.5 MG/DL (ref 0.2–1)
BUN SERPL-MCNC: 19 MG/DL (ref 7–18)
BUN/CREAT SERPL: 14 (ref 12–20)
CALCIUM SERPL-MCNC: 9.1 MG/DL (ref 8.5–10.1)
CHLORIDE SERPL-SCNC: 106 MMOL/L (ref 100–111)
CO2 SERPL-SCNC: 31 MMOL/L (ref 21–32)
CREAT SERPL-MCNC: 1.33 MG/DL (ref 0.6–1.3)
DIFFERENTIAL METHOD BLD: ABNORMAL
EOSINOPHIL # BLD: 0.2 K/UL (ref 0–0.4)
EOSINOPHIL NFR BLD: 5 % (ref 0–5)
ERYTHROCYTE [DISTWIDTH] IN BLOOD BY AUTOMATED COUNT: 16.8 % (ref 11.6–14.5)
GLOBULIN SER CALC-MCNC: 3.1 G/DL (ref 2–4)
GLUCOSE SERPL-MCNC: 66 MG/DL (ref 74–99)
HCT VFR BLD AUTO: 47.2 % (ref 36–48)
HGB BLD-MCNC: 15 G/DL (ref 13–16)
INR PPP: 1.2 (ref 0.8–1.2)
LYMPHOCYTES # BLD: 1.6 K/UL (ref 0.9–3.6)
LYMPHOCYTES NFR BLD: 33 % (ref 21–52)
MCH RBC QN AUTO: 29.4 PG (ref 24–34)
MCHC RBC AUTO-ENTMCNC: 31.8 G/DL (ref 31–37)
MCV RBC AUTO: 92.4 FL (ref 74–97)
MONOCYTES # BLD: 0.6 K/UL (ref 0.05–1.2)
MONOCYTES NFR BLD: 13 % (ref 3–10)
NEUTS SEG # BLD: 2.3 K/UL (ref 1.8–8)
NEUTS SEG NFR BLD: 47 % (ref 40–73)
PLATELET # BLD AUTO: 112 K/UL (ref 135–420)
PMV BLD AUTO: 12.2 FL (ref 9.2–11.8)
POTASSIUM SERPL-SCNC: 4.8 MMOL/L (ref 3.5–5.5)
PROT SERPL-MCNC: 6.4 G/DL (ref 6.4–8.2)
PROTHROMBIN TIME: 14.9 SEC (ref 11.5–15.2)
RBC # BLD AUTO: 5.11 M/UL (ref 4.35–5.65)
SODIUM SERPL-SCNC: 141 MMOL/L (ref 136–145)
WBC # BLD AUTO: 4.8 K/UL (ref 4.6–13.2)

## 2021-06-17 PROCEDURE — G8536 NO DOC ELDER MAL SCRN: HCPCS | Performed by: NURSE PRACTITIONER

## 2021-06-17 PROCEDURE — 99214 OFFICE O/P EST MOD 30 MIN: CPT | Performed by: NURSE PRACTITIONER

## 2021-06-17 PROCEDURE — 82107 ALPHA-FETOPROTEIN L3: CPT

## 2021-06-17 PROCEDURE — 85610 PROTHROMBIN TIME: CPT

## 2021-06-17 PROCEDURE — 85025 COMPLETE CBC W/AUTO DIFF WBC: CPT

## 2021-06-17 PROCEDURE — G8432 DEP SCR NOT DOC, RNG: HCPCS | Performed by: NURSE PRACTITIONER

## 2021-06-17 PROCEDURE — 36415 COLL VENOUS BLD VENIPUNCTURE: CPT

## 2021-06-17 PROCEDURE — G8427 DOCREV CUR MEDS BY ELIG CLIN: HCPCS | Performed by: NURSE PRACTITIONER

## 2021-06-17 PROCEDURE — 80048 BASIC METABOLIC PNL TOTAL CA: CPT

## 2021-06-17 PROCEDURE — 1101F PT FALLS ASSESS-DOCD LE1/YR: CPT | Performed by: NURSE PRACTITIONER

## 2021-06-17 PROCEDURE — 80076 HEPATIC FUNCTION PANEL: CPT

## 2021-06-17 PROCEDURE — G8420 CALC BMI NORM PARAMETERS: HCPCS | Performed by: NURSE PRACTITIONER

## 2021-06-17 RX ORDER — TAMSULOSIN HYDROCHLORIDE 0.4 MG/1
0.4 CAPSULE ORAL EVERY OTHER DAY
COMMUNITY
End: 2021-08-19

## 2021-06-17 NOTE — PROGRESS NOTES
Marylouise Spatz, MD, Maxey Kanner, MD Saunders Born, EMILIE Krishnan, [facility]     April S Trudy, [facility]   MOISES Arvizu NP [address] [facility] [address]    at [address], [facility] Connie Crowe  22.    [phone]    FAX: [address] [place] [address], [address]    [phone]    FAX: [phone]         Patient Care Team:  Calista Reynaga MD as PCP - General (Family Medicine)  Calista Reynaga MD as PCP - [facility]  Izabel Major MD (Gastroenterology)      Problem List  Date Reviewed: 4/9/2021        Codes Class Noted    Venous insufficiency of both lower extremities ICD-10-CM: I87.2  ICD-9-CM: 459.81  3/9/2021        Localized edema ICD-10-CM: R60.0  ICD-9-CM: 782.3  3/9/2021        Chronic prostatitis ICD-10-CM: N41.1  ICD-9-CM: 601.1  5/9/2019        Chronic pain of right knee ICD-10-CM: M25.561, G89.29  ICD-9-CM: 719.46, 338.29  11/27/2018        Lumbar pain with radiation down right leg ICD-10-CM: M54.5  ICD-9-CM: 724.2  11/27/2018        Right hip pain ICD-10-CM: M25.551  ICD-9-CM: 719.45  11/27/2018        Thrombocytopenia ([facility] [address] ICD-10-CM: D69.6  ICD-9-CM: 287.5  8/10/2018        B12 deficiency ICD-10-CM: E53.8  ICD-9-CM: 266.2  8/10/2018        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 The active problem list, all pertinent past medical history, medications, liver histology, endoscopic studies, radiologic findings and laboratory findings related to the liver disorder were reviewed with the patient. The patient is a 80 y.o.  male who was first noted to have [facility] about 14 years ago based on serologies. He has been maintained on 1200 mg of ursodiol without issue. Imaging of the liver performed 02/2017 with ultrasound followed with MRI in 03/2017. Heterogeneous echotexture. No focal liver lesions. Most recent imaging was performed in 12/2020 with ultrasound and was unremarkable. The patient has no extrahepatic autoimmune disorders. The most recent laboratory studies indicate that the liver transaminases are normal, alkaline phosphatase is normal, tests of hepatic synthetic and metabolic function are normal, and the platelet count is depressed. The patient has developed varices with recent hemorrhage. He has undergone serial EGD's by Dr. Real Lawler and he is continuing to band the varices. The patient completes all daily activities without any functional limitations. He has not developed ascites or encephalopathy. No edema. The patient has no complaints which can be attributed to liver disease. The patient has not experienced fatigue, fevers, chills, shortness of breath, chest pain, pain in the right side over the liver, diffuse abdominal pain, nausea, vomiting, constipation, diarrhea, dryeyes, dry mouth, arthralgias, myalgias, yellowing of the eyes or skin, itching, dark urine, problems concentrating, swelling of the abdomen, no recent hematemesis or hematochezia other than event mentioned.     Since the last office appointment the patient has:  Had no changes in the liver disease. Has stopped taking multiple meds. Reports his memory is better since stopping these. ASSESSMENT AND PLAN:  Primary Biliary Cholangitis with cirrhosis. The most recent laboratory studies indicate that the liver transaminases are normal, alkaline phosphatase is normal,  tests of hepatic synthetic and metabolic function are normal, and the platelet count is depressed. Will perform laboratory testing to monitor liver function and degree of liver injury. This will include hepatic panel, a CBC w/ diff, a BMP, a PT/INR, an AFP-L3%. Complications of cirrhosis were discussed in detail. We discussed thrombocytopenia, portal hypertension, varices, GI bleeding, peripheral edema, ascites, hepatic encephalopathy, and hepatocellular carcinoma. We discussed the need for follow ups on a regular basis to monitor for complications. We discussed the need for every 6 month liver imaging studies. 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 Yordy Haq. Follow up with him as directed. Discussed extrahepatic manifestations of PBC such as osteoporosis and elevated cholesterol. The risk of osteoporosis is increased in patients with PBC. DEXA bone density to assess for osteoporosis should be ordered by the patients primary care physician. The patient was advised to take calcium supplementation and Vitamin D. He was advised to take supplemental vitamin A, D, E, and K. Patients with PBC are at increased risk for hypercholesterolemia. However, this is not associated with an increased risk of cardiac disease and MI because this is typically an elevation in HDL cholesterol. There is no contraindication for treatment with a statin if this is felt to be indicated based upon cardiac risk assessment. The patient is receiving treatment with urosdeoxycholic Acid. Continue this at current dose. The patient  and is tolerating the treatment without significant side effects.     His alkaline phosphatase is normal.     Vaccination for viral hepatitis A and B is recommended since the patient has no serologic evidence of previous exposure or vaccination with immunity. [address] screening has recently been performed and does not suggest [address] The next liver imaging study is due next month. This was ordered today. Bone density up to date with osteopenia. All of the above issues were discussed with the patient. All questions were answered. The patient expressed a clear understanding of the above. 25 minutes total time spent with this patient with more than 50% of this time spent counseling and coordinating care as described above. ALLERGIES  Allergies   Allergen Reactions    Sulfa (Sulfonamide Antibiotics) Unknown (comments)     Pt states its been so long he doesn't remember the reaction.  Other Medication Myalgia     Think  that is was a Sulfa drug, but not sure       MEDICATIONS  Current Outpatient Medications   Medication Sig    ursodioL (ACTIGALL) 300 mg capsule TAKE 2 CAPSULES BY MOUTH TWICE A DAY    nadoloL (CORGARD) 40 mg tablet TAKE 1 TABLET BY MOUTH EVERY DAY    testosterone cypionate (DEPOTESTOTERONE CYPIONATE) 200 mg/mL injection INJECT 0.5ML INTRAMUSCULAR WEEKLY    donepeziL (ARICEPT) 10 mg tablet TAKE 1 TABLET BY MOUTH NIGHTLY    tamsulosin (FLOMAX) 0.4 mg capsule TAKE 1 CAPSULE BY MOUTH EVERY DAY    spironolactone (ALDACTONE) 50 mg tablet Take 1 Tab by mouth daily.  Syringe with Needle, Disp, (BD Luer-Freddy Syringe) 3 mL 23 gauge x 1 1/2\" syrg FOR USE WITH TESTOSTERONE (INTRAMUSCULAR) INJECTIONS EVERY 7 DAYS    fluticasone propionate (FLONASE) 50 mcg/actuation nasal spray PUT 1 SPRAY IN EACH NOSTRIL TWICE A DAY FOR CHRONIC STUFFY & RUNNY NOSE    metaxalone (SKELAXIN) 800 mg tablet Take 1 Tab by mouth every eight to twelve (8-12) hours as needed for Muscle Spasm(s).  ipratropium (ATROVENT) 42 mcg (0.06 %) nasal spray SPRAY 1 SPRAY FOUR TIMES DAILY.  INDICATIONS: RUNNY NOSE    dextran 70-hypromellose (ARTIFICIAL TEARS,[id number],) ophthalmic solution 1 Drop.  meclizine (ANTIVERT) 25 mg tablet Take 25 mg by mouth as needed. Indications: sensation of spinning or whirling    therapeutic multivitamin-minerals (VITAMINS AND MINERALS) tablet Take 1 Tab by mouth. Current Facility-Administered Medications   Medication    cyanocobalamin (VITAMIN B12) injection 1,000 mcg       SYSTEM REVIEW NOT RELATED TO LIVER DISEASE OR REVIEWED ABOVE:  Constitution systems: Negative for fever, chills, weight gain, weight loss. Eyes: Negative for visual changes. ENT: Negative for sore throat, painful swallowing. Respiratory: Negative for cough, hemoptysis, SOB. Cardiology: Negative for chest pain, palpitations. GI:  Negative for constipation or diarrhea. : + for urinary frequency, dysuria, hematuria, + nocturia. +   Skin: Negative for rash. Hematology: Negative for easy bruising, blood clots. Musculo-skelatal: Negative for back pain, muscle pain, weakness. Neurologic: Negative for headaches, dizziness, vertigo, memory problems not related to HE. Psychology: Negative for anxiety, depression. FAMILY HISTORY:  The mother passed CHF age 80  The father passed CAD age 76   There is no family history of liver disease. There is no family history of immune disorders. SOCIAL HISTORY:  The patient is . The patient has 4 children,   The patient has never used tobacco products. The patient has never consumed significant amounts of alcohol. The patient retired in . PHYSICAL EXAMINATION:  Visit Vitals  BP (!) 160/92   Pulse 94   Temp 97.2 °F (36.2 °C) (Tympanic)   Wt 168 lb 6 oz (76.4 kg)   SpO2 98%   BMI 24.16 kg/m²     General: No acute distress. Eyes: Sclera anicteric. ENT: No oral lesions. Thyroid normal.  Nodes: No adenopathy. Skin: No spider angiomata. No jaundice. No palmar erythema. Respiratory: Lungs clear to auscultation. Cardiovascular: Regular heart rate. No murmurs. No JVD. Abdomen: Soft non-tender. Liver size normal to percussion/palpation. Spleen not palpable. No obvious ascites. Extremities: No edema. No muscle wasting. No gross arthritic changes. Neurologic: Alert and oriented. Cranial nerves grossly intact. No asterixis. LABORATORY STUDIES:  [facility] [place] [facility] [address] Units 6/17/2021 2/23/2021   WBC 4.6 - 13.2 K/uL 4.8 5.1   ANC 1.8 - 8.0 K/UL 2.3 2.5   HGB 13.0 - 16.0 g/dL 15.0 14.2   HGB      HGB (iSTAT)       - 420 K/uL 112 (L) 124 (L)   INR 0.8 - 1.2   1.2    AST 10 - 38 U/L 35    ALT 16 - 61 U/L 34    Alk Phos 45 - 117 U/L 93    Bili, Total 0.2 - 1.0 MG/DL 1.5 (H)    Bili, Direct 0.0 - 0.2 MG/DL 0.4 (H)    Albumin 3.4 - 5.0 g/dL 3.3 (L)    BUN 7.0 - 18 MG/DL 19 (H) 13   Creat 0.6 - 1.3 MG/DL 1.33 (H) 1.30   Na 136 - 145 mmol/L 141 141   K 3.5 - 5.5 mmol/L 4.8 4.2   Cl 100 - 111 mmol/L 106 106   CO2 21 - 32 mmol/L 31 26   Glucose 74 - 99 mg/dL 66 (L) 102 (H)     Cancer Screening Latest Ref Rng & Units 6/17/2021 12/17/2020   AFP, Serum 0.0 - 8.0 ng/mL 3.6  3.6   AFP-L3% 0.0 - 9.9 % Comment  Comment       SEROLOGIES:  Serologies Latest Ref Rng 3/21/2016   ISSAC Ab, Direct Negative Negative   M2 Ab 0.0 - 20.0 Units 178.8 (H)     Hep B sAB (-)  Hep A total Ab (-)  HCV ab (-)    LIVER HISTOLOGY:  05/2018. TRANSIENT HEPATIC ELASTOGRAPHY:   E Range: 7.4-10.2 kPa  E Mean: 9.1 kPa  E Median: 9.3 kPa  E Std: 1.0 kPa    ENDOSCOPIC PROCEDURES:  04/2016 EGD Dr. Christi Leggett Esophageal varices banded  07/2016 EGD with obliterated Varices per patient. 01/2017. EGD by Dr. Christi Leggett. Large esophageal varices. 2 bands placed. 01/2018. EGD by Dr. Christi Leggett. Patient reports there were no varices. 10/2018. EGD by Dr. Christi Leggett. Esophageal varices which are small in size. Follow up in one year. 11/2019. EGD by Dr. Christi Leggett. Repeat in one year. RADIOLOGY:  03/2016  MRI scan abdomen.   Changes consistent with cirrhosis. No liver mass lesions. No dilated bile ducts. No bile duct strictures. Pericholecystic fluid. No ascites  09/2016. Ultrasound of the liver. The liver is mildly hetogeneous. No hepatic masses. Mel size. 02/2017. Ultrasound of the liver. The liver is slightly heterogeneous in echotexture. There is a subtle isoechoic, partially exophytic questionable lesion in the right lobe measuring approximately 3.7 cm x 3.5 cm x 4 cm.   03/2017. Dynamic MRI of the liver. No significant abnormality. No hepatic lesion identified. 09/2017. Ultrasound of the liver. The liver and pancreas are normal in size, contour, and echogenicity. 05/2018. Ultrasound of the liver. Stable sonographic appearance of the liver. Mild heterogeneous diffuse increased hepatic parenchymal echotexture, ultrasound finding associated with fibrosis from chronic hepatocellular disease. No focal hepatic mass. 06/2019. Ultrasound of the liver. Heterogeneous echotexture of the hepatic parenchyma, likely representing diffuse hepatocellular disease. No focal hepatic lesion. 01/2020. Ultrasound of the liver. The liver measures 15.3 cm longitudinally and is inhomogeneous in appearance. 06/2020. Ultrasound of the liver. Normal sized, inhomogeneous appearing liver. 12/2020. Ultrasound of the liver. Normal sized liver. Evidence of hepatopetal flow of the portal vein. Relatively low velocity of flow. OTHER TESTING:  Not available or performed    [address] in 6 months.       RACHANA Vann  [facility] [place] of [address], [address] [place] [address] Hortensia Boone, [address] [place] [address]   [phone]

## 2021-06-18 LAB
AFP L3 MFR SERPL: NORMAL % (ref 0–9.9)
AFP SERPL-MCNC: 3.6 NG/ML (ref 0–8)

## 2021-06-24 ENCOUNTER — HOSPITAL ENCOUNTER (OUTPATIENT)
Dept: ULTRASOUND IMAGING | Age: 81
Discharge: HOME | End: 2021-06-24
Attending: NURSE PRACTITIONER
Payer: MEDICARE

## 2021-06-24 DIAGNOSIS — K74.3 PRIMARY BILIARY CHOLANGITIS (HCC): ICD-10-CM

## 2021-06-24 PROCEDURE — 76705 ECHO EXAM OF ABDOMEN: CPT

## 2021-06-25 ENCOUNTER — CLINICAL SUPPORT (OUTPATIENT)
Dept: FAMILY MEDICINE CLINIC | Age: 81
End: 2021-06-25
Payer: MEDICARE

## 2021-06-25 VITALS
OXYGEN SATURATION: 99 % | SYSTOLIC BLOOD PRESSURE: 147 MMHG | DIASTOLIC BLOOD PRESSURE: 82 MMHG | RESPIRATION RATE: 18 BRPM | HEART RATE: 68 BPM

## 2021-06-25 DIAGNOSIS — E53.8 B12 DEFICIENCY: Primary | ICD-10-CM

## 2021-06-25 PROCEDURE — 96372 THER/PROPH/DIAG INJ SC/IM: CPT | Performed by: FAMILY MEDICINE

## 2021-06-25 RX ORDER — CYANOCOBALAMIN 1000 UG/ML
1000 INJECTION, SOLUTION INTRAMUSCULAR; SUBCUTANEOUS ONCE
Status: COMPLETED | OUTPATIENT
Start: 2021-06-25 | End: 2021-06-25

## 2021-06-25 RX ADMIN — CYANOCOBALAMIN 1000 MCG: 1000 INJECTION, SOLUTION INTRAMUSCULAR; SUBCUTANEOUS at 09:20

## 2021-07-27 ENCOUNTER — CLINICAL SUPPORT (OUTPATIENT)
Dept: FAMILY MEDICINE CLINIC | Age: 81
End: 2021-07-27
Payer: MEDICARE

## 2021-07-27 VITALS — TEMPERATURE: 97.2 F

## 2021-07-27 DIAGNOSIS — E53.8 B12 DEFICIENCY: Primary | ICD-10-CM

## 2021-07-27 PROCEDURE — 96372 THER/PROPH/DIAG INJ SC/IM: CPT | Performed by: FAMILY MEDICINE

## 2021-07-27 RX ORDER — CYANOCOBALAMIN 1000 UG/ML
1000 INJECTION, SOLUTION INTRAMUSCULAR; SUBCUTANEOUS ONCE
Status: COMPLETED | OUTPATIENT
Start: 2021-07-27 | End: 2021-07-27

## 2021-07-27 RX ADMIN — CYANOCOBALAMIN 1000 MCG: 1000 INJECTION, SOLUTION INTRAMUSCULAR; SUBCUTANEOUS at 07:59

## 2021-07-27 NOTE — PROGRESS NOTES
Vit b12 administered in right deltoid. Patient tolerated medication well. AVS provided to patient with medication information. Patient states understanding.

## 2021-08-03 ENCOUNTER — TELEPHONE (OUTPATIENT)
Dept: HEMATOLOGY | Age: 81
End: 2021-08-03

## 2021-08-03 DIAGNOSIS — K74.3 PRIMARY BILIARY CHOLANGITIS (HCC): Primary | ICD-10-CM

## 2021-08-05 ENCOUNTER — OFFICE VISIT (OUTPATIENT)
Dept: FAMILY MEDICINE CLINIC | Age: 81
End: 2021-08-05
Payer: MEDICARE

## 2021-08-05 VITALS
RESPIRATION RATE: 20 BRPM | OXYGEN SATURATION: 98 % | BODY MASS INDEX: 24.68 KG/M2 | HEIGHT: 70 IN | HEART RATE: 61 BPM | SYSTOLIC BLOOD PRESSURE: 138 MMHG | TEMPERATURE: 98.3 F | WEIGHT: 172.4 LBS | DIASTOLIC BLOOD PRESSURE: 80 MMHG

## 2021-08-05 DIAGNOSIS — R19.7 DIARRHEA, UNSPECIFIED TYPE: ICD-10-CM

## 2021-08-05 DIAGNOSIS — R39.198 DIFFICULTY URINATING: Primary | ICD-10-CM

## 2021-08-05 DIAGNOSIS — K90.49 GASTROINTESTINAL INTOLERANCE TO FOODS: ICD-10-CM

## 2021-08-05 PROBLEM — H02.831 DERMATOCHALASIS OF BOTH UPPER EYELIDS: Status: ACTIVE | Noted: 2020-11-12

## 2021-08-05 PROBLEM — H04.123 DRY EYES: Status: ACTIVE | Noted: 2021-08-05

## 2021-08-05 PROBLEM — H43.393 VITREOUS FLOATERS OF BOTH EYES: Status: ACTIVE | Noted: 2018-09-04

## 2021-08-05 PROBLEM — Z96.1 PSEUDOPHAKIA OF BOTH EYES: Status: ACTIVE | Noted: 2018-09-19

## 2021-08-05 PROBLEM — H26.493 BILATERAL POSTERIOR CAPSULAR OPACIFICATION: Status: ACTIVE | Noted: 2019-11-12

## 2021-08-05 PROBLEM — H02.834 DERMATOCHALASIS OF BOTH UPPER EYELIDS: Status: ACTIVE | Noted: 2020-11-12

## 2021-08-05 LAB
BILIRUB UR QL STRIP: NEGATIVE
GLUCOSE UR-MCNC: NEGATIVE MG/DL
KETONES P FAST UR STRIP-MCNC: NEGATIVE MG/DL
PH UR STRIP: 7 [PH] (ref 4.6–8)
PROT UR QL STRIP: NEGATIVE
SP GR UR STRIP: 1.01 (ref 1–1.03)
UA UROBILINOGEN AMB POC: NORMAL (ref 0.2–1)
URINALYSIS CLARITY POC: CLEAR
URINALYSIS COLOR POC: YELLOW
URINE BLOOD POC: NEGATIVE
URINE LEUKOCYTES POC: NEGATIVE
URINE NITRITES POC: NEGATIVE

## 2021-08-05 PROCEDURE — 99214 OFFICE O/P EST MOD 30 MIN: CPT | Performed by: NURSE PRACTITIONER

## 2021-08-05 PROCEDURE — 81003 URINALYSIS AUTO W/O SCOPE: CPT | Performed by: NURSE PRACTITIONER

## 2021-08-05 PROCEDURE — 36415 COLL VENOUS BLD VENIPUNCTURE: CPT | Performed by: NURSE PRACTITIONER

## 2021-08-05 RX ORDER — MULTIVITAMIN WITH IRON
1 TABLET ORAL DAILY
COMMUNITY
End: 2021-08-19

## 2021-08-05 NOTE — PATIENT INSTRUCTIONS

## 2021-08-05 NOTE — PROGRESS NOTES
Bradley Sheridan is a 80 y.o. male who presents today with c/o   Chief Complaint   Patient presents with    Urinary Retention    Diarrhea     yellow color      Urinary Retention   The history is provided by the patient. This is a new problem. Episode onset: 1 month. The problem occurs every urination. The pain is moderate. There has been no fever. Associated symptoms include frequency, hesitancy, urgency and abdominal pain (distention ). Pertinent negatives include no chills, no sweats, no nausea, no vomiting, no discharge, no hematuria and no penile discharge. Associated symptoms comments: Of note, when reviewing patients chart he was diagnosed with chronic prostatitis in April 2021 and prescribed cipro. He does remember being prescribed cipro and states \"it didn't do much. \".   Abdominal Pain  The history is provided by the patient. This is a new (X 1 month) problem. The problem occurs daily. The problem has not changed since onset. Associated symptoms include abdominal pain (distention ). Pertinent negatives include no chest pain, no headaches and no shortness of breath. Associated symptoms comments: Denies vomiting, fever. Exacerbated by: He is concerned he could have a dairy or other food allergy and would like to get tested. Nothing relieves the symptoms. Assessment/ Plan:       ICD-10-CM ICD-9-CM    1. Difficulty urinating  R39.198 788.99 AMB POC URINALYSIS DIP STICK AUTO W/O MICRO   2. Gastrointestinal intolerance to foods  K90.49 579.8 ALLERGEN PROFILE, FOOD IGE II WITH COMPONENT REFLEXES   3.  Diarrhea, unspecified type  R19.7 787.91 CULTURE, STOOL         Orders Placed This Encounter    CULTURE, STOOL ([facility] Default)    ALLERGEN PROFILE, FOOD IGE II WITH COMPONENT REFLEXES     Standing Status:   Future     Standing Expiration Date:   8/5/2022    AMB POC URINALYSIS DIP STICK AUTO W/O MICRO     AMB POC URINALYSIS DIP STICK AUTO W/O    multivitamin with iron tablet     Sig: Take 1 Tablet by mouth daily.          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) K74.3    Pure hypercholesterolemia E78.00    Thrombocytopenia ([facility] [address] D69.6    B12 deficiency E53.8    Chronic pain of right knee M25.561, G89.29    Lumbar pain with radiation down right leg M54.5    Right hip pain M25.551    Chronic prostatitis N41.1    Venous insufficiency of both lower extremities I87.2    Localized edema R60.0    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    Dermatochalasis of both upper eyelids H02.831, H02.834    Dry eyes H04.123    Pseudophakia of both eyes Z96.1    Vitreous floaters of both eyes H43.393       Past Medical History:   Diagnosis Date    Allergic     Anemia     Esophageal varices (HCC) March 2016    banded x 6    GERD (gastroesophageal reflux disease)     GI bleed March 2016    Hyperlipidemia     Hypogonadism male     Primary biliary cirrhosis (HCC)     Vitamin D deficiency          Current Outpatient Medications:     testosterone cypionate (DEPOTESTOTERONE CYPIONATE) 200 mg/mL injection, INJECT 0.5ML INTRAMUSCULAR WEEKLY, Disp: 10 mL, Rfl: 0    tamsulosin (FLOMAX) 0.4 mg capsule, Take 0.4 mg by mouth every other day., Disp: , Rfl:     ursodioL (ACTIGALL) 300 mg capsule, TAKE 2 CAPSULES BY MOUTH TWICE A DAY, Disp: 360 Capsule, Rfl: 0    nadoloL (CORGARD) 40 mg tablet, TAKE 1 TABLET BY MOUTH EVERY DAY, Disp: 90 Tablet, Rfl: 1    donepeziL (ARICEPT) 10 mg tablet, TAKE 1 TABLET BY MOUTH NIGHTLY, Disp: 90 Tab, Rfl: 1    Syringe with Needle, Disp, (BD Luer-Freddy Syringe) 3 mL 23 gauge x 1 1/2\" syrg, FOR USE WITH TESTOSTERONE (INTRAMUSCULAR) INJECTIONS EVERY 7 DAYS, Disp: 100 Pen Needle, Rfl: 1    fluticasone propionate (FLONASE) 50 mcg/actuation nasal spray, PUT 1 SPRAY IN EACH NOSTRIL TWICE A DAY FOR CHRONIC STUFFY & RUNNY NOSE, Disp: 1 Bottle, Rfl: 5    metaxalone (SKELAXIN) 800 mg tablet, Take 1 Tab by mouth every eight to twelve (8-12) hours as needed for Muscle Spasm(s). , Disp: 30 Tab, Rfl: 2    ipratropium (ATROVENT) 42 mcg (0.06 %) nasal spray, SPRAY 1 SPRAY FOUR TIMES DAILY. INDICATIONS: RUNNY NOSE, Disp: 45 mL, Rfl: 1    dextran 70-hypromellose (ARTIFICIAL TEARS,[id number],) ophthalmic solution, 1 Drop., Disp: , Rfl:     meclizine (ANTIVERT) 25 mg tablet, Take 25 mg by mouth as needed. Indications: sensation of spinning or whirling, Disp: , Rfl:     multivitamin with iron tablet, Take 1 Tablet by mouth daily. , Disp: , Rfl:     Current Facility-Administered Medications:     cyanocobalamin (VITAMIN B12) injection 1,000 mcg, 1,000 mcg, IntraMUSCular, Q30D, Breonna Yates MD, 1,000 mcg at 21 0915    Allergies   Allergen Reactions    Sulfa (Sulfonamide Antibiotics) Unknown (comments)     Pt states its been so long he doesn't remember the reaction.      Other Medication Myalgia     Think  that is was a Sulfa drug, but not sure       Past Surgical History:   Procedure Laterality Date    HX HEENT      deviated septum repair    HX HEENT  2018    cataract removal    HX ORTHOPAEDIC Right 2017    Arthroscopy knee    HX REFRACTIVE SURGERY Left     HX SEPTOPLASTY                           Biopsy       Social History     Tobacco Use   Smoking Status Former Smoker    Packs/day: 1.00    Quit date: 1974    Years since quittin.2   Smokeless Tobacco Never Used       Social History     Socioeconomic History    Marital status:      Spouse name: Not on file    Number of children: Not on file    Years of education: Not on file    Highest education level: Not on file   Tobacco Use    Smoking status: Former Smoker     Packs/day: 1.00     Quit date: 1974     Years since quittin.3    Smokeless tobacco: Never Used   Substance and Sexual Activity    Alcohol use: No     Alcohol/week: 0.0 standard drinks    Drug use: No     Social Determinants of Health     Financial Resource Strain:     Difficulty of Paying Living Expenses:    Food Insecurity:     Worried About Running Out of Food in the Last Year:     [address] in the Last Year:    Transportation Needs:     Lack of Transportation (Medical):  Lack of Transportation (Non-Medical):    Physical Activity:     Days of Exercise per Week:     Minutes of Exercise per Session:    Stress:     Feeling of Stress :    Social Connections:     Frequency of Communication with Friends and Family:     Frequency of Social Gatherings with Friends and Family:     Attends Orthodoxy Services:     Active Member of Clubs or Organizations:     Attends Club or Organization Meetings:     Marital Status:        Family History   Problem Relation Age of Onset    Diabetes Father     Stroke Brother     Elevated Lipids Other         children       ROS:  Review of Systems   Constitutional: Negative for chills, fever and weight loss. Respiratory: Negative for cough, hemoptysis, shortness of breath and wheezing. Cardiovascular: Negative for chest pain, palpitations, orthopnea and leg swelling. Gastrointestinal: Positive for abdominal pain (distention ) and diarrhea. Negative for blood in stool, nausea and vomiting. Genitourinary: Positive for frequency, hesitancy and urgency. Negative for hematuria and penile discharge. Musculoskeletal: Negative for joint pain. Skin: Negative for itching and rash. Neurological: Negative for dizziness, speech change, loss of consciousness and headaches. Endo/Heme/Allergies: Does not bruise/bleed easily. Objective:     Visit Vitals  /80   Pulse 61   Temp 98.3 °F (36.8 °C)   Resp 20   Ht 5' 10\" (1.778 m)   Wt 172 lb 6.4 oz (78.2 kg)   SpO2 98%   BMI 24.74 kg/m²     Body mass index is 24.74 kg/m².     Physical Exam  Constitutional: General: He is not in acute distress. Appearance: Normal appearance. He is not ill-appearing or diaphoretic. HENT:      Head: Normocephalic and atraumatic. Eyes:      Extraocular Movements: Extraocular movements intact. Pupils: Pupils are equal, round, and reactive to light. Cardiovascular:      Rate and Rhythm: Normal rate and regular rhythm. Heart sounds: Normal heart sounds. No murmur heard. No friction rub. Pulmonary:      Effort: Pulmonary effort is normal. No respiratory distress. Breath sounds: Normal breath sounds. No wheezing. Chest:      Chest wall: No tenderness. Abdominal:      General: Bowel sounds are normal. There is distension. Tenderness: There is abdominal tenderness (RLQ). Genitourinary:     Prostate: Normal.      Rectum: Normal.      Comments: Denies tenderness with rectal and prostate exam.  Musculoskeletal:         General: Normal range of motion. Cervical back: Normal range of motion. Skin:     General: Skin is warm and dry. Capillary Refill: Capillary refill takes less than 2 seconds. Neurological:      General: No focal deficit present. Mental Status: He is alert.       Gait: Gait normal.   Psychiatric:         Mood and Affect: Mood normal.           Results for orders placed or performed in visit on 08/05/21   AMB POC URINALYSIS DIP STICK AUTO W/O MICRO   Result Value Ref Range    Color (UA POC) Yellow     Clarity (UA POC) Clear     Glucose (UA POC) Negative Negative    Bilirubin (UA POC) Negative Negative    Ketones (UA POC) Negative Negative    Specific gravity (UA POC) 1.015 1.001 - 1.035    Blood (UA POC) Negative Negative    pH (UA POC) 7.0 4.6 - 8.0    Protein (UA POC) Negative Negative    Urobilinogen (UA POC) 1 mg/dL 0.2 - 1    Nitrites (UA POC) Negative Negative    Leukocyte esterase (UA POC) Negative Negative       Results for orders placed or performed in visit on 08/05/21   AMB POC URINALYSIS DIP STICK AUTO W/O MICRO Result Value Ref Range    Color (UA POC) Yellow     Clarity (UA POC) Clear     Glucose (UA POC) Negative Negative    Bilirubin (UA POC) Negative Negative    Ketones (UA POC) Negative Negative    Specific gravity (UA POC) 1.015 1.001 - 1.035    Blood (UA POC) Negative Negative    pH (UA POC) 7.0 4.6 - 8.0    Protein (UA POC) Negative Negative    Urobilinogen (UA POC) 1 mg/dL 0.2 - 1    Nitrites (UA POC) Negative Negative    Leukocyte esterase (UA POC) Negative Negative           Verbal and written instructions (see AVS) provided. Patient expresses understanding of diagnosis and treatment plan. Pt advised to go to the ER if he experiences worsening abdominal pain, vomiting, fever. Patient verbalizes understanding. Follow-up and Dispositions    · Return in about 2 weeks (around 8/19/2021), or if symptoms worsen or fail to improve.          Katie Mcneal NP

## 2021-08-06 ENCOUNTER — APPOINTMENT (OUTPATIENT)
Dept: CT IMAGING | Age: 81
End: 2021-08-06
Attending: EMERGENCY MEDICINE
Payer: MEDICARE

## 2021-08-06 ENCOUNTER — HOSPITAL ENCOUNTER (EMERGENCY)
Age: 81
Discharge: HOME | End: 2021-08-06
Attending: EMERGENCY MEDICINE
Payer: MEDICARE

## 2021-08-06 VITALS
WEIGHT: 170 LBS | OXYGEN SATURATION: 95 % | HEART RATE: 68 BPM | SYSTOLIC BLOOD PRESSURE: 130 MMHG | BODY MASS INDEX: 24.34 KG/M2 | TEMPERATURE: 99.3 F | HEIGHT: 70 IN | RESPIRATION RATE: 18 BRPM | DIASTOLIC BLOOD PRESSURE: 71 MMHG

## 2021-08-06 DIAGNOSIS — N41.0 ACUTE PROSTATITIS: ICD-10-CM

## 2021-08-06 DIAGNOSIS — K42.9 UMBILICAL HERNIA WITHOUT OBSTRUCTION AND WITHOUT GANGRENE: Primary | ICD-10-CM

## 2021-08-06 DIAGNOSIS — R14.0 BLOATING: ICD-10-CM

## 2021-08-06 LAB
ALBUMIN SERPL-MCNC: 3 G/DL (ref 3.5–5)
ALBUMIN/GLOB SERPL: 1 {RATIO} (ref 1.1–2.2)
ALP SERPL-CCNC: 93 U/L (ref 45–117)
ALT SERPL-CCNC: 33 U/L (ref 12–78)
ANION GAP SERPL CALC-SCNC: 4 MMOL/L (ref 5–15)
APPEARANCE UR: ABNORMAL
AST SERPL-CCNC: 37 U/L (ref 15–37)
ATRIAL RATE: 72 BPM
BACTERIA URNS QL MICRO: ABNORMAL /HPF
BASOPHILS # BLD: 0 K/UL (ref 0–0.1)
BASOPHILS NFR BLD: 0 % (ref 0–1)
BILIRUB SERPL-MCNC: 2 MG/DL (ref 0.2–1)
BILIRUB UR QL: NEGATIVE
BUN SERPL-MCNC: 13 MG/DL (ref 6–20)
BUN/CREAT SERPL: 10 (ref 12–20)
CALCIUM SERPL-MCNC: 8.5 MG/DL (ref 8.5–10.1)
CALCULATED P AXIS, ECG09: 52 DEGREES
CALCULATED R AXIS, ECG10: 58 DEGREES
CALCULATED T AXIS, ECG11: 87 DEGREES
CHLORIDE SERPL-SCNC: 105 MMOL/L (ref 97–108)
CO2 SERPL-SCNC: 30 MMOL/L (ref 21–32)
COLOR UR: ABNORMAL
CREAT SERPL-MCNC: 1.25 MG/DL (ref 0.7–1.3)
DIAGNOSIS, 93000: NORMAL
DIFFERENTIAL METHOD BLD: ABNORMAL
EOSINOPHIL # BLD: 0 K/UL (ref 0–0.4)
EOSINOPHIL NFR BLD: 0 % (ref 0–7)
EPITH CASTS URNS QL MICRO: ABNORMAL /LPF
ERYTHROCYTE [DISTWIDTH] IN BLOOD BY AUTOMATED COUNT: 17.1 % (ref 11.5–14.5)
FLUAV RNA SPEC QL NAA+PROBE: NOT DETECTED
FLUBV RNA SPEC QL NAA+PROBE: NOT DETECTED
GLOBULIN SER CALC-MCNC: 3.1 G/DL (ref 2–4)
GLUCOSE SERPL-MCNC: 106 MG/DL (ref 65–100)
GLUCOSE UR STRIP.AUTO-MCNC: NEGATIVE MG/DL
HCT VFR BLD AUTO: 44.4 % (ref 36.6–50.3)
HGB BLD-MCNC: 14.6 G/DL (ref 12.1–17)
HGB UR QL STRIP: ABNORMAL
IMM GRANULOCYTES # BLD AUTO: 0 K/UL (ref 0–0.04)
IMM GRANULOCYTES NFR BLD AUTO: 0 % (ref 0–0.5)
KETONES UR QL STRIP.AUTO: NEGATIVE MG/DL
LACTATE SERPL-SCNC: 1.3 MMOL/L (ref 0.4–2)
LEUKOCYTE ESTERASE UR QL STRIP.AUTO: NEGATIVE
LIPASE SERPL-CCNC: 160 U/L (ref 73–393)
LIPASE SERPL-CCNC: 161 U/L (ref 73–393)
LYMPHOCYTES # BLD: 0.9 K/UL (ref 0.8–3.5)
LYMPHOCYTES NFR BLD: 17 % (ref 12–49)
MAGNESIUM SERPL-MCNC: 1.9 MG/DL (ref 1.6–2.4)
MCH RBC QN AUTO: 30 PG (ref 26–34)
MCHC RBC AUTO-ENTMCNC: 32.9 G/DL (ref 30–36.5)
MCV RBC AUTO: 91.2 FL (ref 80–99)
MONOCYTES # BLD: 0.2 K/UL (ref 0–1)
MONOCYTES NFR BLD: 3 % (ref 5–13)
NEUTS BAND NFR BLD MANUAL: 1 %
NEUTS SEG # BLD: 3.9 K/UL (ref 1.8–8)
NEUTS SEG NFR BLD: 79 % (ref 32–75)
NITRITE UR QL STRIP.AUTO: NEGATIVE
NRBC # BLD: 0 K/UL (ref 0–0.01)
NRBC BLD-RTO: 0 PER 100 WBC
P-R INTERVAL, ECG05: 180 MS
PH UR STRIP: 8 [PH] (ref 5–8)
PLATELET # BLD AUTO: 83 K/UL (ref 150–400)
PLATELET COMMENTS,PCOM: ABNORMAL
PMV BLD AUTO: 10.9 FL (ref 8.9–12.9)
POTASSIUM SERPL-SCNC: 4.5 MMOL/L (ref 3.5–5.1)
PROT SERPL-MCNC: 6.1 G/DL (ref 6.4–8.2)
PROT UR STRIP-MCNC: NEGATIVE MG/DL
Q-T INTERVAL, ECG07: 412 MS
QRS DURATION, ECG06: 138 MS
QTC CALCULATION (BEZET), ECG08: 451 MS
RBC # BLD AUTO: 4.87 M/UL (ref 4.1–5.7)
RBC #/AREA URNS HPF: ABNORMAL /HPF (ref 0–5)
RBC MORPH BLD: ABNORMAL
SARS-COV-2, COV2: NOT DETECTED
SODIUM SERPL-SCNC: 139 MMOL/L (ref 136–145)
SP GR UR REFRACTOMETRY: 1.01 (ref 1–1.03)
UROBILINOGEN UR QL STRIP.AUTO: 4 EU/DL (ref 0.2–1)
VENTRICULAR RATE, ECG03: 72 BPM
WBC # BLD AUTO: 5 K/UL (ref 4.1–11.1)
WBC URNS QL MICRO: ABNORMAL /HPF (ref 0–4)

## 2021-08-06 PROCEDURE — 74011250636 HC RX REV CODE- 250/636: Performed by: EMERGENCY MEDICINE

## 2021-08-06 PROCEDURE — 74011250637 HC RX REV CODE- 250/637: Performed by: EMERGENCY MEDICINE

## 2021-08-06 PROCEDURE — 81001 URINALYSIS AUTO W/SCOPE: CPT

## 2021-08-06 PROCEDURE — 74176 CT ABD & PELVIS W/O CONTRAST: CPT

## 2021-08-06 PROCEDURE — 93005 ELECTROCARDIOGRAM TRACING: CPT

## 2021-08-06 PROCEDURE — 85025 COMPLETE CBC W/AUTO DIFF WBC: CPT

## 2021-08-06 PROCEDURE — 83605 ASSAY OF LACTIC ACID: CPT

## 2021-08-06 PROCEDURE — 83735 ASSAY OF MAGNESIUM: CPT

## 2021-08-06 PROCEDURE — 80053 COMPREHEN METABOLIC PANEL: CPT

## 2021-08-06 PROCEDURE — 36415 COLL VENOUS BLD VENIPUNCTURE: CPT

## 2021-08-06 PROCEDURE — 87636 SARSCOV2 & INF A&B AMP PRB: CPT

## 2021-08-06 PROCEDURE — 83690 ASSAY OF LIPASE: CPT

## 2021-08-06 PROCEDURE — 99284 EMERGENCY DEPT VISIT MOD MDM: CPT

## 2021-08-06 RX ORDER — ACETAMINOPHEN 500 MG
1000 TABLET ORAL ONCE
Status: COMPLETED | OUTPATIENT
Start: 2021-08-06 | End: 2021-08-06

## 2021-08-06 RX ORDER — CIPROFLOXACIN 500 MG/1
500 TABLET ORAL 2 TIMES DAILY
Qty: 60 TABLET | Refills: 0 | Status: SHIPPED | OUTPATIENT
Start: 2021-08-06 | End: 2021-08-19

## 2021-08-06 RX ADMIN — ACETAMINOPHEN 1000 MG: 500 TABLET ORAL at 18:11

## 2021-08-06 RX ADMIN — SODIUM CHLORIDE 1000 ML: 9 INJECTION, SOLUTION INTRAVENOUS at 16:25

## 2021-08-06 NOTE — ED PROVIDER NOTES
[facility]  EMERGENCY DEPARTMENT HISTORY AND PHYSICAL EXAM         Date of Service: 2021   Patient Name: Paulina Desouza   YOB: 1940  Medical Record Number: [id number]    History of Presenting Illness     Chief Complaint   Patient presents with    Abdominal Pain        History Provided By:  patient    Additional History:   Paulina Desouza is a 80 y.o. male who presents ambulatory to the ED with cc of abdominal pain and intermittent diarrhea for the past several days, as well as suprapubic pain for a month. He saw his PCP yesterday and had a negative UA. He notes that he has periumbilical TTP and his belly button has started to protrude, where it used to go inward. HE denies N/V, F/C. There are no other complaints, changes or physical findings at this time.     Primary Care Provider: Linn Castanon MD   Specialist:    Past History     Past Medical History:   Past Medical History:   Diagnosis Date    Allergic     Anemia     Esophageal varices (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 [address]     Vitamin D deficiency         Past Surgical History:   Past Surgical History:   Procedure Laterality Date    HX HEENT  2009    deviated septum repair    HX HEENT  2018    cataract removal    HX ORTHOPAEDIC Right 2017    Arthroscopy knee    HX REFRACTIVE SURGERY Left     HX SEPTOPLASTY                           Biopsy        Family History:   Family History   Problem Relation Age of Onset    Diabetes Father     Stroke Brother     Elevated Lipids Other         children        Social History:   Social History     Tobacco Use    Smoking status: Former Smoker     Packs/day: 1.00     Quit date: 1974     Years since quittin.2    Smokeless tobacco: Never Used   Substance Use Topics    Alcohol use: No     Alcohol/week: 0.0 standard drinks    Drug use: No        Allergies: Allergies   Allergen Reactions    Sulfa (Sulfonamide Antibiotics) Unknown (comments)     Pt states its been so long he doesn't remember the reaction.  Other Medication Myalgia     Think  that is was a Sulfa drug, but not sure        Review of Systems   Review of Systems   Constitutional: Negative for appetite change, chills and fever. HENT: Negative for congestion. Eyes: Negative for visual disturbance. Respiratory: Negative for cough, shortness of breath and wheezing. Cardiovascular: Negative for chest pain, palpitations and leg swelling. Gastrointestinal: Positive for abdominal pain and diarrhea. Negative for nausea and vomiting. Genitourinary: Negative for dysuria, frequency and urgency. Musculoskeletal: Negative for back pain, joint swelling, myalgias and neck stiffness. Skin: Negative for rash. Neurological: Negative for dizziness, syncope, weakness and headaches. Hematological: Negative for adenopathy. Psychiatric/Behavioral: Negative for behavioral problems and dysphoric mood. Physical Exam  Physical Exam  Vitals and nursing note reviewed. Constitutional:       General: He is not in acute distress. Appearance: He is well-developed. HENT:      Head: Normocephalic and atraumatic. Eyes:      General: No scleral icterus. Conjunctiva/sclera: Conjunctivae normal.      Pupils: Pupils are equal, round, and reactive to light. Cardiovascular:      Rate and Rhythm: Normal rate and regular rhythm. Heart sounds: No murmur heard. No gallop. Pulmonary:      Effort: Pulmonary effort is normal. No respiratory distress. Breath sounds: No stridor. No wheezing or rales. Abdominal:      General: Bowel sounds are normal. There is no distension. Palpations: Abdomen is soft. There is no mass. Tenderness:  There is abdominal tenderness in the right lower quadrant, periumbilical area, suprapubic area and left lower quadrant. There is no guarding or rebound. Hernia: A hernia is present. Hernia is present in the umbilical area. Comments: There was a small periumbilical hernia that I manually reduced. Musculoskeletal:         General: Normal range of motion. Cervical back: Normal range of motion and neck supple. Lymphadenopathy:      Cervical: No cervical adenopathy. Skin:     General: Skin is warm and dry. Findings: No erythema or rash. Neurological:      Mental Status: He is alert and oriented to person, place, and time. Cranial Nerves: No cranial nerve deficit. Coordination: Coordination normal.         Medical Decision Making   I am the first provider for this patient. I reviewed the vital signs, available nursing notes, past medical history, past surgical history, family history and social history. Old Medical Records: PCP notes     Provider Notes:   DDX: Incarcerated hernia, UTI, prostatitis     ED Course:  4:52 PM   Initial assessment performed. The patients presenting problems have been discussed, and they are in agreement with the care plan formulated and outlined with them. I have encouraged them to ask questions as they arise throughout their visit. Progress Notes:  7:32 PM  Unremarkable workup, with CT showing no acute process and labs essentially negative. Suspect prostatitis, given sx and lab results. Also has umbilical hernia that I reduced but needs follow up. Will D/C with GI, Surgery.   Linwood Garcia MD      Procedures:   Procedures    Diagnostic Study Results   Labs -      Recent Results (from the past 12 hour(s))   URINALYSIS W/ RFLX MICROSCOPIC    Collection Time: 08/06/21  3:50 PM   Result Value Ref Range    Color YELLOW/STRAW      Appearance CLOUDY (A) CLEAR      Specific gravity 1.015 1.003 - 1.030      pH (UA) 8.0 5.0 - 8.0      Protein Negative NEG mg/dL    Glucose Negative NEG mg/dL    Ketone Negative NEG mg/dL    Bilirubin Negative NEG Blood TRACE (A) NEG      Urobilinogen 4.0 (H) 0.2 - 1.0 EU/dL    Nitrites Negative NEG      Leukocyte Esterase Negative NEG     URINE MICROSCOPIC ONLY    Collection Time: 08/06/21  3:50 PM   Result Value Ref Range    WBC 0-4 0 - 4 /hpf    RBC 0-5 0 - 5 /hpf    Epithelial cells FEW FEW /lpf    Bacteria 1+ (A) NEG /hpf   CBC WITH AUTOMATED DIFF    Collection Time: 08/06/21  4:08 PM   Result Value Ref Range    WBC 5.0 4.1 - 11.1 K/uL    RBC 4.87 4.10 - 5.70 M/uL    HGB 14.6 12.1 - 17.0 g/dL    HCT 44.4 36.6 - 50.3 %    MCV 91.2 80.0 - 99.0 FL    MCH 30.0 26.0 - 34.0 PG    MCHC 32.9 30.0 - 36.5 g/dL    RDW 17.1 (H) 11.5 - 14.5 %    PLATELET 83 (L) 557 - 400 K/uL    MPV 10.9 8.9 - 12.9 FL    NRBC 0.0 0  WBC    ABSOLUTE NRBC 0.00 0.00 - 0.01 K/uL    NEUTROPHILS 79 (H) 32 - 75 %    BAND NEUTROPHILS 1 %    LYMPHOCYTES 17 12 - 49 %    MONOCYTES 3 (L) 5 - 13 %    EOSINOPHILS 0 0 - 7 %    BASOPHILS 0 0 - 1 %    IMMATURE GRANULOCYTES 0 0.0 - 0.5 %    ABS. NEUTROPHILS 3.9 1.8 - 8.0 K/UL    ABS. LYMPHOCYTES 0.9 0.8 - 3.5 K/UL    ABS. MONOCYTES 0.2 0.0 - 1.0 K/UL    ABS. EOSINOPHILS 0.0 0.0 - 0.4 K/UL    ABS. BASOPHILS 0.0 0.0 - 0.1 K/UL    ABS. IMM. GRANS. 0.0 0.00 - 0.04 K/UL    DF MANUAL      PLATELET COMMENTS DECREASED PLATELETS      RBC COMMENTS ANISOCYTOSIS  1+       METABOLIC PANEL, COMPREHENSIVE    Collection Time: 08/06/21  4:08 PM   Result Value Ref Range    Sodium 139 136 - 145 mmol/L    Potassium 4.5 3.5 - 5.1 mmol/L    Chloride 105 97 - 108 mmol/L    CO2 30 21 - 32 mmol/L    Anion gap 4 (L) 5 - 15 mmol/L    Glucose 106 (H) 65 - 100 mg/dL    BUN 13 6 - 20 MG/DL    Creatinine 1.25 0.70 - 1.30 MG/DL    BUN/Creatinine ratio 10 (L) 12 - 20      GFR est AA >60 >60 ml/min/1.73m2    GFR est non-AA 56 (L) >60 ml/min/1.73m2    Calcium 8.5 8.5 - 10.1 MG/DL    Bilirubin, total 2.0 (H) 0.2 - 1.0 MG/DL    ALT (SGPT) 33 12 - 78 U/L    AST (SGOT) 37 15 - 37 U/L    Alk.  phosphatase 93 45 - 117 U/L    Protein, total 6.1 (L) 6.4 - 8.2 g/dL    Albumin 3.0 (L) 3.5 - 5.0 g/dL    Globulin 3.1 2.0 - 4.0 g/dL    A-G Ratio 1.0 (L) 1.1 - 2.2     LIPASE    Collection Time: 08/06/21  4:08 PM   Result Value Ref Range    Lipase 160 73 - 393 U/L   MAGNESIUM    Collection Time: 08/06/21  4:08 PM   Result Value Ref Range    Magnesium 1.9 1.6 - 2.4 mg/dL   LIPASE    Collection Time: 08/06/21  4:08 PM   Result Value Ref Range    Lipase 161 73 - 393 U/L   LACTIC ACID    Collection Time: 08/06/21  5:04 PM   Result Value Ref Range    Lactic acid 1.3 0.4 - 2.0 MMOL/L   COVID-19 WITH INFLUENZA A/B    Collection Time: 08/06/21  5:51 PM   Result Value Ref Range    SARS-CoV-2 Not detected NOTD      Influenza A by PCR Not detected NOTD      Influenza B by PCR Not detected NOTD     EKG, 12 LEAD, INITIAL    Collection Time: 08/06/21  6:07 PM   Result Value Ref Range    Ventricular Rate 72 BPM    Atrial Rate 72 BPM    P-R Interval 180 ms    QRS Duration 138 ms    Q-T Interval 412 ms    QTC Calculation (Bezet) 451 ms    Calculated P Axis 52 degrees    Calculated R Axis 58 degrees    Calculated T Axis 87 degrees    Diagnosis       ** Poor data quality, interpretation may be adversely affected  Normal sinus rhythm  Left bundle branch block  Abnormal ECG  No previous ECGs available         Radiologic Studies -  The following have been ordered and reviewed:  CT ABD PELV WO CONT   Final Result      Cirrhosis with ascites and varices   Diverticulosis without diverticulitis   Prostatomegaly   Bladder calculi        CT Results  (Last 48 hours)               08/06/21 1627  CT ABD PELV WO CONT Final result    Impression:      Cirrhosis with ascites and varices   Diverticulosis without diverticulitis   Prostatomegaly   Bladder calculi       Narrative:  EXAM: CT ABD PELV WO CONT       INDICATION: LLQ and RLQ, periumbilical pain       COMPARISON: None       CONTRAST:  None. TECHNIQUE:    Thin axial images were obtained through the abdomen and pelvis.  Coronal and   sagittal reformats were generated. Oral contrast was not administered. CT dose   reduction was achieved through use of a standardized protocol tailored for this   examination and automatic exposure control for dose modulation. The absence of intravenous contrast material reduces the sensitivity for   evaluation of the vasculature and solid organs. FINDINGS:    LOWER THORAX: No significant abnormality in the incidentally imaged lower chest.   LIVER: macrolobulated liver. Varices. BILIARY TREE: Gallbladder is within normal limits. CBD is not dilated. SPLEEN: within normal limits. PANCREAS: No focal abnormality. ADRENALS: Unremarkable. KIDNEYS/URETERS: No calculus or hydronephrosis. STOMACH: Unremarkable. SMALL BOWEL: No dilatation or wall thickening. COLON: No dilatation or wall thickening. Diverticulosis   PERITONEUM: No pneumoperitoneum. Positive ascites   RETROPERITONEUM: No lymphadenopathy or aortic aneurysm. REPRODUCTIVE ORGANS: Prostate calcifications   URINARY BLADDER: Multiple bladder calculi   BONES: No destructive bone lesion. ABDOMINAL WALL: No mass or hernia. ADDITIONAL COMMENTS: N/A               CXR Results  (Last 48 hours)    None            Vital Signs-Reviewed the patient's vital signs. Patient Vitals for the past 12 hrs:   Temp Pulse Resp BP SpO2   08/06/21 1857 99.3 °F (37.4 °C) 68 18 130/71 95 %   08/06/21 1711 (!) 101.2 °F (38.4 °C) 86 18 132/81 98 %   08/06/21 1547 98.3 °F (36.8 °C) 61 16 (!) 145/80 97 %       EKG interpretation: (Preliminary)  Rhythm: normal sinus rhythm; and regular . Rate (approx.): 72; Axis: normal; MI interval: normal; QRS interval: prolonged; ST/T wave: normal; Other findings: LBBB. Medications Given in the ED:  Medications   sodium chloride 0.9 % bolus infusion 1,000 mL (0 mL IntraVENous IV Completed 8/6/21 1807)   acetaminophen (TYLENOL) tablet 1,000 mg (1,000 mg Oral Given 8/6/21 1811)       Diagnosis:  Clinical Impression:   1.  Umbilical hernia without obstruction and without gangrene    2. Acute prostatitis    3. Bloating         Plan:  1:   Follow-up Information     Follow up With Specialties Details Why Contact Info    Shadia Yates MD Family Medicine  As needed [address] [phone]      River Tobar MD Gastroenterology   [address] [facility] [address]  [phone]      Cristi Vasquez MD General Surgery   [address]  [phone]            2:   Current Discharge Medication List      START taking these medications    Details   ciprofloxacin HCl (Cipro) 500 mg tablet Take 1 Tablet by mouth two (2) times a day for 30 days. Qty: 60 Tablet, Refills: 0  Start date: 8/6/2021, End date: 9/5/2021           Return to ED if worse. Disposition:  Home  _______________________________   Attestations: This note was performed by Cassie Roa MD in its entirety.   _______________________________

## 2021-08-06 NOTE — ED NOTES
TRANSFER - OUT REPORT:    Verbal report given to [address] [facility]  for routine progression of care       Report consisted of patients Situation, Background, Assessment and   Recommendations(SBAR). Information from the following report(s) SBAR was reviewed with the receiving nurse. Lines:   Peripheral IV 08/06/21 Right Antecubital (Active)   Site Assessment Clean, dry, & intact 08/06/21 1624   Phlebitis Assessment 0 08/06/21 1624   Infiltration Assessment 0 08/06/21 1624   Dressing Status Clean, dry, & intact 08/06/21 1624   Dressing Type Transparent 08/06/21 1624   Hub Color/Line Status Pink 08/06/21 1624       Peripheral IV 08/06/21 Left Antecubital (Active)        Opportunity for questions and clarification was provided.

## 2021-08-07 NOTE — ED NOTES
Discharge instructions & Rxreviewed w/pt and his wife. .. Both verbalized understanding, all questions answered. .the patient ambulated off the ED unit w/o difficulty.

## 2021-08-09 LAB
ALMOND IGE QN: <0.1 KU/L
BRAZIL NUT IGE QN: NORMAL KU/L
CASHEW NUT IGE QN: NORMAL KU/L
CLAM IGE QN: <0.1 KU/L
CLASS DESCRIPTION, 600268: NORMAL
CODFISH IGE QN: <0.1 KU/L
CORN IGE QN: <0.1 KU/L
COW MILK IGE QN: NORMAL KU/L
EGG WHITE IGE QN: NORMAL KU/L
EGG WHITE,FOOD4: <0.1 KU/L
F013 IGE PEANUT: <0.1 KU/L
HAZELNUT (FILBERT): <0.1 KU/L
HAZELNUT IGE QN: NORMAL KU/L
MACADAMIA IGE QN: <0.1 KU/L
MILK,FOOD1: <0.1 KU/L
PECAN/HICK NUT IGE QN: <0.1 KU/L
PISTACHIO IGE QN: <0.1 KU/L
SCALLOP IGE QN: <0.1 KU/L
SESAME SEED IGE QN: <0.1 KU/L
SHRIMP IGE QN: <0.1 KU/L
SOYBEAN IGE QN: <0.1 KU/L
WALNUT IGE QN: NORMAL KU/L
WALNUT: <0.1 KU/L
WHEAT IGE QN: <0.1 KU/L

## 2021-08-10 ENCOUNTER — OFFICE VISIT (OUTPATIENT)
Dept: SURGERY | Age: 81
End: 2021-08-10
Payer: MEDICARE

## 2021-08-10 VITALS
WEIGHT: 163 LBS | BODY MASS INDEX: 23.34 KG/M2 | SYSTOLIC BLOOD PRESSURE: 149 MMHG | DIASTOLIC BLOOD PRESSURE: 94 MMHG | HEIGHT: 70 IN | RESPIRATION RATE: 18 BRPM | HEART RATE: 57 BPM

## 2021-08-10 DIAGNOSIS — K42.9 UMBILICAL HERNIA WITHOUT OBSTRUCTION AND WITHOUT GANGRENE: Primary | ICD-10-CM

## 2021-08-10 LAB
CAMPYLOBACTER STL CULT: NORMAL
E COLI SXT STL QL IA: NEGATIVE
SALM + SHIG STL CULT: NORMAL

## 2021-08-10 PROCEDURE — 99202 OFFICE O/P NEW SF 15 MIN: CPT | Performed by: SURGERY

## 2021-08-10 RX ORDER — METRONIDAZOLE 500 MG/1
500 TABLET ORAL 3 TIMES DAILY
Qty: 30 TABLET | Refills: 0 | Status: SHIPPED | OUTPATIENT
Start: 2021-08-10 | End: 2021-08-19

## 2021-08-10 NOTE — PROGRESS NOTES
Spoke with patient on phone. Verified patients name and . Negative allergen profile results reviewed with patient. Negative stool culture (Salmonella/Shigella, Campylobacter and E coli) reviewed with patient. Patient seen in the ER on 21 and prescribed cipro for suspected prostatitis. He states he cannot take cipro because it makes him feel terrible and he has not taken the cipro prescribed. He still reports diarrhea, but denies fevers at home. Educated patient to come into office and  stool specimen kit to test for c-diff toxin (order placed). Dr. Roslyn Walls consulted regarding patient condition and treatment plan. Recommended to start patient on metronidazole while c-diff toxin pending. Patient called and made aware of antibiotic prescribed. Patient states he will  antibiotic today along with stool specimen kit. If negative result for c-diff then gastroenterology referral for [facility] will be placed.

## 2021-08-10 NOTE — PROGRESS NOTES
Subjective:         LIZET Jonas is a 80 y.o. male was referred for evaluation of an umbilical hernia and diarrhea. Patient was treated approximately 6 weeks ago for possible UTI with cipro. Since the treatment he has started having 3-4 loose bm's per day. Patient also describes camille-umbilical pain over the last few months. The pain is sometimes associated with activity. He does get distended after eating but denies any N/V. The bulge int he umbilicus usually resolves with rest.  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 A CT scan was only remarkable for ascites. WBC and chem were normal except for an elevated bilirubin. Patient had fecal cultures drawn which were neg however c. Diff was not done.        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 ([facility] [address] K74.3    Pure hypercholesterolemia E78.00    Thrombocytopenia (HCC) D69.6    B12 deficiency E53.8    Chronic pain of right knee M25.561, G89.29    Lumbar pain with radiation down right leg M54.5    Right hip pain M25.551    Chronic prostatitis N41.1    Venous insufficiency of both lower extremities I87.2    Localized edema R60.0    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    Dermatochalasis of both upper eyelids H02.831, H02.834    Dry eyes H04.123    Pseudophakia of both eyes Z96.1    Vitreous floaters of both eyes H43.393     Patient Active Problem List    Diagnosis Date Noted    Dry eyes 08/05/2021    Venous insufficiency of both lower extremities 03/09/2021    Localized edema 03/09/2021    Dermatochalasis of both upper eyelids 11/12/2020    Bilateral posterior capsular opacification 11/12/2019    Chronic prostatitis 05/09/2019    Chronic pain of right knee 11/27/2018    Lumbar pain with radiation down right leg 11/27/2018    Right hip pain 11/27/2018    Pseudophakia of both eyes 09/19/2018    Vitreous floaters of both eyes 09/04/2018    Thrombocytopenia ([facility] 08/10/2018    B12 deficiency 08/10/2018    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 01/24/2018    Pure hypercholesterolemia 01/24/2018    BPH with obstruction/lower urinary tract symptoms 01/18/2017    GI bleed 03/01/2016    Hypogonadism male     Vitamin D deficiency      Current Outpatient Medications   Medication Sig Dispense Refill    metroNIDAZOLE (FLAGYL) 500 mg tablet Take 1 Tablet by mouth three (3) times daily for 10 days. Do not drink alcohol while taking this medication. Refrain from drinking alcohol once antibiotic is completed and for 3 days after completion of antibiotic. 30 Tablet 0    ursodioL (ACTIGALL) 300 mg capsule TAKE 2 CAPSULES BY MOUTH TWICE A  Capsule 0    nadoloL (CORGARD) 40 mg tablet TAKE 1 TABLET BY MOUTH EVERY DAY 90 Tablet 1    donepeziL (ARICEPT) 10 mg tablet TAKE 1 TABLET BY MOUTH NIGHTLY 90 Tab 1    Syringe with Needle, Disp, (BD Luer-Freddy Syringe) 3 mL 23 gauge x 1 1/2\" syrg FOR USE WITH TESTOSTERONE (INTRAMUSCULAR) INJECTIONS EVERY 7 DAYS 100 Pen Needle 1    fluticasone propionate (FLONASE) 50 mcg/actuation nasal spray PUT 1 SPRAY IN EACH NOSTRIL TWICE A DAY FOR CHRONIC STUFFY & RUNNY NOSE 1 Bottle 5    ipratropium (ATROVENT) 42 mcg (0.06 %) nasal spray SPRAY 1 SPRAY FOUR TIMES DAILY. INDICATIONS: RUNNY NOSE 45 mL 1    dextran 70-hypromellose (ARTIFICIAL TEARS,[id number],) ophthalmic solution 1 Drop.  meclizine (ANTIVERT) 25 mg tablet Take 25 mg by mouth as needed. Indications: sensation of spinning or whirling      ciprofloxacin HCl (Cipro) 500 mg tablet Take 1 Tablet by mouth two (2) times a day for 30 days. (Patient not taking: Reported on 8/10/2021) 60 Tablet 0    multivitamin with iron tablet Take 1 Tablet by mouth daily.  (Patient not taking: Reported on 8/10/2021)      testosterone cypionate (DEPOTESTOTERONE CYPIONATE) 200 mg/mL injection INJECT 0.5ML INTRAMUSCULAR WEEKLY (Patient not taking: Reported on 8/10/2021) 10 mL 0    tamsulosin (FLOMAX) 0.4 mg capsule Take 0.4 mg by mouth every other day. (Patient not taking: Reported on 8/10/2021)      metaxalone (SKELAXIN) 800 mg tablet Take 1 Tab by mouth every eight to twelve (8-12) hours as needed for Muscle Spasm(s). (Patient not taking: Reported on 8/10/2021) 30 Tab 2     Current Facility-Administered Medications   Medication Dose Route Frequency Provider Last Rate Last Admin    cyanocobalamin (VITAMIN B12) injection 1,000 mcg  1,000 mcg IntraMUSCular Q30D Ray Yates Asp., MD   1,000 mcg at 21 0915     Allergies   Allergen Reactions    Sulfa (Sulfonamide Antibiotics) Unknown (comments)     Pt states its been so long he doesn't remember the reaction.      Other Medication Myalgia     Think  that is was a Sulfa drug, but not sure     Past Medical History:   Diagnosis Date    Allergic     Anemia     Esophageal varices ([address] 2016    banded x 6    GERD (gastroesophageal reflux disease)     GI bleed 2016    Hyperlipidemia     Hypogonadism male     Primary biliary cirrhosis ([address]     Vitamin D deficiency      Past Surgical History:   Procedure Laterality Date    HX HEENT      deviated septum repair    HX HEENT  2018    cataract removal    HX ORTHOPAEDIC Right 2017    Arthroscopy knee    HX REFRACTIVE SURGERY Left     HX SEPTOPLASTY                           Biopsy     Family History   Problem Relation Age of Onset    Diabetes Father     Stroke Brother     Elevated Lipids Other         children     Social History     Tobacco Use    Smoking status: Former Smoker     Packs/day: 1.00     Quit date: 1974     Years since quittin.3    Smokeless tobacco: Never Used   Substance Use Topics    Alcohol use: No     Alcohol/week: 0.0 standard drinks Review of Systems  Review of Systems   Constitutional: Negative for chills, fever, malaise/fatigue and weight loss. HENT: Negative for congestion and sore throat. Respiratory: Negative for cough and wheezing. Cardiovascular: Positive for leg swelling. Negative for chest pain, palpitations, orthopnea and claudication. Gastrointestinal: Positive for abdominal pain, diarrhea and nausea. Negative for blood in stool, constipation, heartburn and vomiting. Genitourinary: Positive for frequency and urgency. Musculoskeletal: Positive for myalgias. Negative for back pain and neck pain. Skin: Negative for itching and rash. Neurological: Negative for weakness and headaches. Objective:     Visit Vitals  BP (!) 149/94 (BP 1 Location: Left upper arm, BP Patient Position: At rest, BP Cuff Size: Adult)   Pulse (!) 57   Resp 18   Ht 5' 10\" (1.778 m)   Wt 163 lb (73.9 kg)   BMI 23.39 kg/m²      Physical Exam  Constitutional:       General: He is not in acute distress. Appearance: Normal appearance. He is not ill-appearing or toxic-appearing. HENT:      Head: Normocephalic and atraumatic. Nose: Nose normal. No congestion. Mouth/Throat:      Mouth: Mucous membranes are moist.      Pharynx: Oropharynx is clear. Eyes:      General: No scleral icterus. Cardiovascular:      Rate and Rhythm: Normal rate. Pulmonary:      Effort: Pulmonary effort is normal. No respiratory distress. Breath sounds: No wheezing. Abdominal:      General: There is no distension. Palpations: Abdomen is soft. Tenderness: There is no guarding. Comments: Reducible umbilical hernia with mild to moderate tenderness, no discoloration or induration. Musculoskeletal:         General: No swelling. Normal range of motion. Cervical back: Normal range of motion and neck supple. No rigidity. Lymphadenopathy:      Cervical: No cervical adenopathy. Skin:     General: Skin is warm.       Coloration: Skin is not jaundiced. Neurological:      General: No focal deficit present. Mental Status: He is alert and oriented to person, place, and time. Physical Exam  Constitutional:       General: He is not in acute distress. Appearance: Normal appearance. He is not ill-appearing or toxic-appearing. HENT:      Head: Normocephalic and atraumatic. Nose: Nose normal. No congestion. Mouth/Throat:      Mouth: Mucous membranes are moist.      Pharynx: Oropharynx is clear. Eyes:      General: No scleral icterus. Cardiovascular:      Rate and Rhythm: Normal rate. Pulmonary:      Effort: Pulmonary effort is normal. No respiratory distress. Breath sounds: No wheezing. Abdominal:      General: There is no distension. Palpations: Abdomen is soft. Tenderness: There is no guarding. Comments: Reducible umbilical hernia with mild to moderate tenderness, no discoloration or induration. Musculoskeletal:         General: No swelling. Normal range of motion. Cervical back: Normal range of motion and neck supple. No rigidity. Lymphadenopathy:      Cervical: No cervical adenopathy. Skin:     General: Skin is warm. Coloration: Skin is not jaundiced. Neurological:      General: No focal deficit present. Mental Status: He is alert and oriented to person, place, and time. Assessment/Plan:   80year old with PBC and an umbilical hernia with 6 weeks of diarrhea     -Agree with starting Flagyl as C. Diff results are pending. Patient should see the effects of flagyl very quickly if the underlying problem is c. Diff. However, if diarrhea does not resolve would follow up with GI service for further diagnostic work up.    -Discussed the need for umbilical hernia repair however, due to the patients ascites I recommend treatment at a tertiary center.   I also discussed that it would be better to get it treated now before symptoms of increased ascites, engorged abdomina veins or coagulapathy complicates surgery.      -I advised patient and wife to seek immediate medical attention if he develops redness, severe pain, N/V, fevers, etc. before treatment of the hernia.

## 2021-08-12 ENCOUNTER — TRANSCRIBE ORDER (OUTPATIENT)
Dept: SCHEDULING | Age: 81
End: 2021-08-12

## 2021-08-12 ENCOUNTER — HOSPITAL ENCOUNTER (OUTPATIENT)
Dept: ULTRASOUND IMAGING | Age: 81
Discharge: HOME | End: 2021-08-12
Attending: UROLOGY
Payer: MEDICARE

## 2021-08-12 DIAGNOSIS — R35.0 URINARY FREQUENCY: ICD-10-CM

## 2021-08-12 DIAGNOSIS — R35.1 NOCTURIA: ICD-10-CM

## 2021-08-12 DIAGNOSIS — R33.9 RETENTION OF URINE, UNSPECIFIED: ICD-10-CM

## 2021-08-12 DIAGNOSIS — R33.9 RETENTION OF URINE, UNSPECIFIED: Primary | ICD-10-CM

## 2021-08-12 PROCEDURE — 51798 US URINE CAPACITY MEASURE: CPT

## 2021-08-13 ENCOUNTER — DOCUMENTATION ONLY (OUTPATIENT)
Dept: FAMILY MEDICINE CLINIC | Age: 81
End: 2021-08-13

## 2021-08-13 LAB — C DIFF TOX A+B STL QL IA: NEGATIVE

## 2021-08-13 NOTE — PROGRESS NOTES
Spoke with patient and his wife. Patients name and  verified. Patient made aware of negative C. Difficile result. Patient did not start Flagyl. He was informed he did not need to start Flagyl since the C. Difficile toxin was negative. While speaking with patient's wife Brendan Recinos) she stated he managed to make an appointment with Dr. Gallito Piña (Urologist) yesterday and had a bladder scan completed. Impression of the bladder scan: post void residual was 168 mL, presence of urinary bladder calculi. Dr. Gallito Piña referred him to [facility] for a urology procedure. Plan is for patient to be seen with Urology by 21. Patient states he will f/u in the office after procedure or sooner if needed.

## 2021-08-18 NOTE — PROGRESS NOTES
Paulina Desouza is a 80 y.o. male who presents today with c/o   Chief Complaint   Patient presents with    Urinary Retention     f/u    Abdominal Pain     f/u     Mr. Ines Salgado is a patient of Dr. Rema Martinez. He presents to the office today to follow-up on his urinary retention and abdominal pain. He was seen in the office on 8/5/21 for urinary retention and diarrhea. His stool cultures were negative, along with his cdiff results. His urine did not show any infection in the office on 8/5/21. He continued to have abdominal pain so he was seen in the [facility] emergency department on 8/6/21 and his CT results showed no acute process and lab work was essentially negative. He was prescribed ciprofloxacin for possible prostatitis. He did not take the Cipro because he has taken it in the past and it does not seem to help. In the ER, he was also asked to F/U with GI for an umbilical hernia that was manually reduced in the ED. He completed his F/U with MD Steven Jaramillo [facility] general surgeon) on 8/10/21 and Nancy Allan discussed the need for umbilical hernia repair. However, due to the patients ascites he recommended treatment at a tertiary center. Dr. Nancy Allan discussed that it would be better to get it treated now before symptoms of increased ascites, engorged abdomina veins or coagulapathy complicates surgery. He does not wish to F/U with GI at this time, but will F/U once his urinary symptoms have improved. He continued to experience discomfort and trouble voiding so he made an appointment with Dr. Franklin Golden (urologist) on 8/12/21. His bladder scan with Dr. Franklin Golden showed a post void residual of 168 mL. Also, presence of urinary bladder calculi. He was then referred to [facility] urology and plans to have a urology procedure on 8/23/21. Today, he reports feeling better since he \"finally has some answers. \" He denies diarrhea/vomiting/fevers at home.  States he is still having frequency and urgency with voiding, but the Azo recommended by Dr. Maryanne Chowdary is making a difference. He also reports taking ibuprofen as needed at night to help him sleep. Overall, he feels much better than two weeks ago when he was first evaluated. He is also requesting to have his labs drawn for his upcoming appt with Liliana Rivera ([facility]). Assessment/ Plan:   1. Urinary retention  -Continue Azo as recommended by Dr. Maryanne Chowdary  -Take ibuprofen OTC as needed for pain  -F/U with [place] [facility] in [facility] 8/23/21 as scheduled  -F/U with Dr. Maryanne Chowdary as scheduled  -RTO as scheduled 11/18/21 or sooner if needed    2.  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 ([facility] [address] K74.3    Pure hypercholesterolemia E78.00    Thrombocytopenia ([facility] [address] D69.6    B12 deficiency E53.8    Chronic pain of right knee M25.561, G89.29    Lumbar pain with radiation down right leg M54.5    Right hip pain M25.551    Chronic prostatitis N41.1    Venous insufficiency of both lower extremities I87.2    Localized edema R60.0    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    Dermatochalasis of both upper eyelids H02.831, H02.834    Dry eyes H04.123    Pseudophakia of both eyes Z96.1    Vitreous floaters of both eyes H43.393       Past Medical History:   Diagnosis Date    Allergic     Anemia  Esophageal varices ([facility] March 2016    banded x 6    GERD (gastroesophageal reflux disease)     GI bleed March 2016    Hyperlipidemia     Hypogonadism male     Primary biliary cirrhosis (HCC)     Vitamin D deficiency          Current Outpatient Medications:     metroNIDAZOLE (FLAGYL) 500 mg tablet, Take 1 Tablet by mouth three (3) times daily for 10 days. Do not drink alcohol while taking this medication. Refrain from drinking alcohol once antibiotic is completed and for 3 days after completion of antibiotic. (Patient not taking: Reported on 8/19/2021), Disp: 30 Tablet, Rfl: 0    ciprofloxacin HCl (Cipro) 500 mg tablet, Take 1 Tablet by mouth two (2) times a day for 30 days. (Patient not taking: Reported on 8/10/2021), Disp: 60 Tablet, Rfl: 0    multivitamin with iron tablet, Take 1 Tablet by mouth daily. (Patient not taking: Reported on 8/10/2021), Disp: , Rfl:     testosterone cypionate (DEPOTESTOTERONE CYPIONATE) 200 mg/mL injection, INJECT 0.5ML INTRAMUSCULAR WEEKLY (Patient not taking: Reported on 8/10/2021), Disp: 10 mL, Rfl: 0    tamsulosin (FLOMAX) 0.4 mg capsule, Take 0.4 mg by mouth every other day.  (Patient not taking: Reported on 8/10/2021), Disp: , Rfl:     ursodioL (ACTIGALL) 300 mg capsule, TAKE 2 CAPSULES BY MOUTH TWICE A DAY, Disp: 360 Capsule, Rfl: 0    nadoloL (CORGARD) 40 mg tablet, TAKE 1 TABLET BY MOUTH EVERY DAY, Disp: 90 Tablet, Rfl: 1    donepeziL (ARICEPT) 10 mg tablet, TAKE 1 TABLET BY MOUTH NIGHTLY, Disp: 90 Tab, Rfl: 1    Syringe with Needle, Disp, (BD Luer-Freddy Syringe) 3 mL 23 gauge x 1 1/2\" syrg, FOR USE WITH TESTOSTERONE (INTRAMUSCULAR) INJECTIONS EVERY 7 DAYS, Disp: 100 Pen Needle, Rfl: 1    fluticasone propionate (FLONASE) 50 mcg/actuation nasal spray, PUT 1 SPRAY IN EACH NOSTRIL TWICE A DAY FOR CHRONIC STUFFY & RUNNY NOSE, Disp: 1 Bottle, Rfl: 5    metaxalone (SKELAXIN) 800 mg tablet, Take 1 Tab by mouth every eight to twelve (8-12) hours as needed for Muscle Spasm(s). (Patient not taking: Reported on 8/10/2021), Disp: 30 Tab, Rfl: 2    ipratropium (ATROVENT) 42 mcg (0.06 %) nasal spray, SPRAY 1 SPRAY FOUR TIMES DAILY. INDICATIONS: RUNNY NOSE, Disp: 45 mL, Rfl: 1    dextran 70-hypromellose (ARTIFICIAL TEARS,[id number],) ophthalmic solution, 1 Drop., Disp: , Rfl:     meclizine (ANTIVERT) 25 mg tablet, Take 25 mg by mouth as needed. Indications: sensation of spinning or whirling, Disp: , Rfl:     Current Facility-Administered Medications:     cyanocobalamin (VITAMIN B12) injection 1,000 mcg, 1,000 mcg, IntraMUSCular, Q30D, Manuela Yates MD, 1,000 mcg at 21 0915    Allergies   Allergen Reactions    Sulfa (Sulfonamide Antibiotics) Unknown (comments)     Pt states its been so long he doesn't remember the reaction.      Other Medication Myalgia     Think  that is was a Sulfa drug, but not sure       Past Surgical History:   Procedure Laterality Date    HX HEENT  2009    deviated septum repair    HX HEENT  2018    cataract removal    HX ORTHOPAEDIC Right 2017    Arthroscopy knee    HX REFRACTIVE SURGERY Left     HX SEPTOPLASTY                           Biopsy       Social History     Tobacco Use   Smoking Status Former Smoker    Packs/day: 1.00    Quit date: 1974    Years since quittin.3   Smokeless Tobacco Never Used       Social History     Socioeconomic History    Marital status:      Spouse name: Not on file    Number of children: Not on file    Years of education: Not on file    Highest education level: Not on file   Tobacco Use    Smoking status: Former Smoker     Packs/day: 1.00     Quit date: 1974     Years since quittin.3    Smokeless tobacco: Never Used   Substance and Sexual Activity    Alcohol use: No     Alcohol/week: 0.0 standard drinks    Drug use: No     Social Determinants of Health     Financial Resource Strain:     Difficulty of Paying Living Expenses:    Food Insecurity:     Worried About Running Out of Food in the Last Year:    [address] in the Last Year:    Transportation Needs:     Lack of Transportation (Medical):  Lack of Transportation (Non-Medical):    Physical Activity:     Days of Exercise per Week:     Minutes of Exercise per Session:    Stress:     Feeling of Stress :    Social Connections:     Frequency of Communication with Friends and Family:     Frequency of Social Gatherings with Friends and Family:     Attends Confucianist Services:     Active Member of Clubs or Organizations:     Attends Club or Organization Meetings:     Marital Status:        Family History   Problem Relation Age of Onset    Diabetes Father     Stroke Brother     Elevated Lipids Other         children       ROS:  Review of Systems   Constitutional: Negative for chills, fever and weight loss. HENT: Negative for hearing loss and tinnitus. Eyes: Negative for blurred vision and double vision. Respiratory: Negative for cough, hemoptysis, shortness of breath and wheezing. Cardiovascular: Negative for chest pain, palpitations, orthopnea and leg swelling. Gastrointestinal: Positive for abdominal pain (no distention, mild right sided abdominal pain). Negative for blood in stool, nausea and vomiting. Genitourinary: Positive for frequency, hesitancy and urgency. Negative for hematuria and penile discharge. Musculoskeletal: Negative for joint pain. Skin: Negative for itching and rash. Neurological: Negative for dizziness, speech change, loss of consciousness and headaches. Endo/Heme/Allergies: Does not bruise/bleed easily. Psychiatric/Behavioral: Negative for depression. Objective:     Visit Vitals  /69   Pulse (!) 55   Temp 97.5 °F (36.4 °C) (Temporal)   Resp 18   Ht 5' 10\" (1.778 m)   Wt 162 lb 8 oz (73.7 kg)   SpO2 97%   BMI 23.32 kg/m²     Body mass index is 23.32 kg/m². Physical Exam  Constitutional:       General: He is not in acute distress.      Appearance: Normal appearance. He is not ill-appearing or diaphoretic. HENT:      Head: Normocephalic and atraumatic. Eyes:      Extraocular Movements: Extraocular movements intact. Pupils: Pupils are equal, round, and reactive to light. Cardiovascular:      Rate and Rhythm: Normal rate and regular rhythm. Heart sounds: Normal heart sounds. No murmur heard. No friction rub. Pulmonary:      Effort: Pulmonary effort is normal. No respiratory distress. Breath sounds: Normal breath sounds. No wheezing. Chest:      Chest wall: No tenderness. Abdominal:      General: Abdomen is flat. Bowel sounds are normal. There is no distension. Palpations: Abdomen is soft. Tenderness: There is no guarding. Comments: Mild pain on palpation of RUQ and RLQ. Musculoskeletal:         General: Normal range of motion. Cervical back: Normal range of motion. Skin:     General: Skin is warm and dry. Capillary Refill: Capillary refill takes less than 2 seconds. Neurological:      General: No focal deficit present. Mental Status: He is alert. Psychiatric:         Mood and Affect: Mood normal.           Results for orders placed or performed during the hospital encounter of 08/06/21   CBC WITH AUTOMATED DIFF   Result Value Ref Range    WBC 5.0 4.1 - 11.1 K/uL    RBC 4.87 4.10 - 5.70 M/uL    HGB 14.6 12.1 - 17.0 g/dL    HCT 44.4 36.6 - 50.3 %    MCV 91.2 80.0 - 99.0 FL    MCH 30.0 26.0 - 34.0 PG    MCHC 32.9 30.0 - 36.5 g/dL    RDW 17.1 (H) 11.5 - 14.5 %    PLATELET 83 (L) 029 - 400 K/uL    MPV 10.9 8.9 - 12.9 FL    NRBC 0.0 0  WBC    ABSOLUTE NRBC 0.00 0.00 - 0.01 K/uL    NEUTROPHILS 79 (H) 32 - 75 %    BAND NEUTROPHILS 1 %    LYMPHOCYTES 17 12 - 49 %    MONOCYTES 3 (L) 5 - 13 %    EOSINOPHILS 0 0 - 7 %    BASOPHILS 0 0 - 1 %    IMMATURE GRANULOCYTES 0 0.0 - 0.5 %    ABS. NEUTROPHILS 3.9 1.8 - 8.0 K/UL    ABS. LYMPHOCYTES 0.9 0.8 - 3.5 K/UL    ABS. MONOCYTES 0.2 0.0 - 1.0 K/UL    ABS. EOSINOPHILS 0.0 0.0 - 0.4 K/UL    ABS. BASOPHILS 0.0 0.0 - 0.1 K/UL    ABS. IMM. GRANS. 0.0 0.00 - 0.04 K/UL    DF MANUAL      PLATELET COMMENTS DECREASED PLATELETS      RBC COMMENTS ANISOCYTOSIS  1+       METABOLIC PANEL, COMPREHENSIVE   Result Value Ref Range    Sodium 139 136 - 145 mmol/L    Potassium 4.5 3.5 - 5.1 mmol/L    Chloride 105 97 - 108 mmol/L    CO2 30 21 - 32 mmol/L    Anion gap 4 (L) 5 - 15 mmol/L    Glucose 106 (H) 65 - 100 mg/dL    BUN 13 6 - 20 MG/DL    Creatinine 1.25 0.70 - 1.30 MG/DL    BUN/Creatinine ratio 10 (L) 12 - 20      GFR est AA >60 >60 ml/min/1.73m2    GFR est non-AA 56 (L) >60 ml/min/1.73m2    Calcium 8.5 8.5 - 10.1 MG/DL    Bilirubin, total 2.0 (H) 0.2 - 1.0 MG/DL    ALT (SGPT) 33 12 - 78 U/L    AST (SGOT) 37 15 - 37 U/L    Alk.  phosphatase 93 45 - 117 U/L    Protein, total 6.1 (L) 6.4 - 8.2 g/dL    Albumin 3.0 (L) 3.5 - 5.0 g/dL    Globulin 3.1 2.0 - 4.0 g/dL    A-G Ratio 1.0 (L) 1.1 - 2.2     URINALYSIS W/ RFLX MICROSCOPIC   Result Value Ref Range    Color YELLOW/STRAW      Appearance CLOUDY (A) CLEAR      Specific gravity 1.015 1.003 - 1.030      pH (UA) 8.0 5.0 - 8.0      Protein Negative NEG mg/dL    Glucose Negative NEG mg/dL    Ketone Negative NEG mg/dL    Bilirubin Negative NEG      Blood TRACE (A) NEG      Urobilinogen 4.0 (H) 0.2 - 1.0 EU/dL    Nitrites Negative NEG      Leukocyte Esterase Negative NEG     LIPASE   Result Value Ref Range    Lipase 160 73 - 393 U/L   MAGNESIUM   Result Value Ref Range    Magnesium 1.9 1.6 - 2.4 mg/dL   LIPASE   Result Value Ref Range    Lipase 161 73 - 393 U/L   LACTIC ACID   Result Value Ref Range    Lactic acid 1.3 0.4 - 2.0 MMOL/L   URINE MICROSCOPIC ONLY   Result Value Ref Range    WBC 0-4 0 - 4 /hpf    RBC 0-5 0 - 5 /hpf    Epithelial cells FEW FEW /lpf    Bacteria 1+ (A) NEG /hpf   COVID-19 WITH INFLUENZA A/B   Result Value Ref Range    SARS-CoV-2 Not detected NOTD      Influenza A by PCR Not detected NOTD      Influenza B by PCR Not detected NOTD     EKG, 12 LEAD, INITIAL   Result Value Ref Range    Ventricular Rate 72 BPM    Atrial Rate 72 BPM    P-R Interval 180 ms    QRS Duration 138 ms    Q-T Interval 412 ms    QTC Calculation (Bezet) 451 ms    Calculated P Axis 52 degrees    Calculated R Axis 58 degrees    Calculated T Axis 87 degrees    Diagnosis       ** Poor data quality, interpretation may be adversely affected  Normal sinus rhythm  Left bundle branch block  Abnormal ECG  No previous ECGs available  Confirmed by Zhao Andrews MD, --- ([id number]) on 8/6/2021 9:55:29 PM         No results found for this visit on 08/19/21. Verbal and written instructions (see AVS) provided. Patient expresses understanding of diagnosis and treatment plan. Pt advised to go to the ER if he experiences worsening abdominal pain, vomiting, fever. Patient verbalizes understanding. Follow-up and Dispositions    · Return in about 3 months (around 11/19/2021), or if symptoms worsen or fail to improve.          Ngoc Nichole NP

## 2021-08-19 ENCOUNTER — OFFICE VISIT (OUTPATIENT)
Dept: FAMILY MEDICINE CLINIC | Age: 81
End: 2021-08-19
Payer: MEDICARE

## 2021-08-19 VITALS
HEIGHT: 70 IN | WEIGHT: 162.5 LBS | TEMPERATURE: 97.5 F | BODY MASS INDEX: 23.26 KG/M2 | RESPIRATION RATE: 18 BRPM | HEART RATE: 55 BPM | SYSTOLIC BLOOD PRESSURE: 139 MMHG | OXYGEN SATURATION: 97 % | DIASTOLIC BLOOD PRESSURE: 69 MMHG

## 2021-08-19 DIAGNOSIS — K74.3 HEPATIC CIRRHOSIS DUE TO PRIMARY BILIARY CHOLANGITIS (HCC): ICD-10-CM

## 2021-08-19 DIAGNOSIS — R33.9 URINARY RETENTION: Primary | ICD-10-CM

## 2021-08-19 PROCEDURE — 36415 COLL VENOUS BLD VENIPUNCTURE: CPT | Performed by: NURSE PRACTITIONER

## 2021-08-19 PROCEDURE — 99213 OFFICE O/P EST LOW 20 MIN: CPT | Performed by: NURSE PRACTITIONER

## 2021-08-19 NOTE — PROGRESS NOTES
1. Have you been to the ER, urgent care clinic since your last visit? Hospitalized since your last visit? No    2. Have you seen or consulted any other health care providers outside of the [address] [place] [address] since your last visit? Include any pap smears or colon screening.  Dr. Radha Vazquez     Chief Complaint   Patient presents with    Urinary Retention     f/u    Abdominal Pain     f/u     Visit Vitals  BP (!) 147/67 (BP 1 Location: Left arm, BP Patient Position: Sitting)   Pulse (!) 50   Temp 97.5 °F (36.4 °C) (Temporal)   Resp 18   Ht 5' 10\" (1.778 m)   Wt 162 lb 8 oz (73.7 kg)   SpO2 97%   BMI 23.32 kg/m²

## 2021-08-20 ENCOUNTER — DOCUMENTATION ONLY (OUTPATIENT)
Dept: FAMILY MEDICINE CLINIC | Age: 81
End: 2021-08-20

## 2021-08-20 ENCOUNTER — TELEPHONE (OUTPATIENT)
Dept: FAMILY MEDICINE CLINIC | Age: 81
End: 2021-08-20

## 2021-08-20 LAB
ALBUMIN SERPL-MCNC: 3.3 G/DL (ref 3.5–5)
ALBUMIN/GLOB SERPL: 1.1 {RATIO} (ref 1.1–2.2)
ALP SERPL-CCNC: 105 U/L (ref 45–117)
ALT SERPL-CCNC: 39 U/L (ref 12–78)
ANION GAP SERPL CALC-SCNC: 2 MMOL/L (ref 5–15)
AST SERPL-CCNC: 37 U/L (ref 15–37)
BASOPHILS # BLD: 0 K/UL (ref 0–0.1)
BASOPHILS NFR BLD: 1 % (ref 0–1)
BILIRUB DIRECT SERPL-MCNC: 0.5 MG/DL (ref 0–0.2)
BILIRUB SERPL-MCNC: 1.8 MG/DL (ref 0.2–1)
BUN SERPL-MCNC: 16 MG/DL (ref 6–20)
BUN/CREAT SERPL: 13 (ref 12–20)
CALCIUM SERPL-MCNC: 8.7 MG/DL (ref 8.5–10.1)
CHLORIDE SERPL-SCNC: 109 MMOL/L (ref 97–108)
CO2 SERPL-SCNC: 28 MMOL/L (ref 21–32)
COMMENT, HOLDF: NORMAL
CREAT SERPL-MCNC: 1.25 MG/DL (ref 0.7–1.3)
DIFFERENTIAL METHOD BLD: ABNORMAL
EOSINOPHIL # BLD: 0.1 K/UL (ref 0–0.4)
EOSINOPHIL NFR BLD: 4 % (ref 0–7)
ERYTHROCYTE [DISTWIDTH] IN BLOOD BY AUTOMATED COUNT: 16.1 % (ref 11.5–14.5)
GLOBULIN SER CALC-MCNC: 2.9 G/DL (ref 2–4)
GLUCOSE SERPL-MCNC: 158 MG/DL (ref 65–100)
HCT VFR BLD AUTO: 44.7 % (ref 36.6–50.3)
HGB BLD-MCNC: 14.1 G/DL (ref 12.1–17)
IMM GRANULOCYTES # BLD AUTO: 0 K/UL (ref 0–0.04)
IMM GRANULOCYTES NFR BLD AUTO: 0 % (ref 0–0.5)
LYMPHOCYTES # BLD: 1.1 K/UL (ref 0.8–3.5)
LYMPHOCYTES NFR BLD: 33 % (ref 12–49)
MCH RBC QN AUTO: 29.9 PG (ref 26–34)
MCHC RBC AUTO-ENTMCNC: 31.5 G/DL (ref 30–36.5)
MCV RBC AUTO: 94.7 FL (ref 80–99)
MONOCYTES # BLD: 0.3 K/UL (ref 0–1)
MONOCYTES NFR BLD: 8 % (ref 5–13)
NEUTS SEG # BLD: 1.9 K/UL (ref 1.8–8)
NEUTS SEG NFR BLD: 54 % (ref 32–75)
NRBC # BLD: 0 K/UL (ref 0–0.01)
NRBC BLD-RTO: 0 PER 100 WBC
PLATELET # BLD AUTO: 120 K/UL (ref 150–400)
PMV BLD AUTO: 12 FL (ref 8.9–12.9)
POTASSIUM SERPL-SCNC: 4.5 MMOL/L (ref 3.5–5.1)
PROT SERPL-MCNC: 6.2 G/DL (ref 6.4–8.2)
RBC # BLD AUTO: 4.72 M/UL (ref 4.1–5.7)
SAMPLES BEING HELD,HOLD: NORMAL
SODIUM SERPL-SCNC: 139 MMOL/L (ref 136–145)
WBC # BLD AUTO: 3.5 K/UL (ref 4.1–11.1)

## 2021-08-20 NOTE — TELEPHONE ENCOUNTER
Pts wife called regarding his lab results. Wants to make sure its ok for them to wait for Estefani Olvera on monday.  Or is there something they should be doing before then

## 2021-08-21 NOTE — PROGRESS NOTES
Spoke with patient on the phone. Patient made aware of lab results. He has a urology procedure on Monday and wanted to make sure his labs were okay before the procedure. He was made aware that lab results are good and we will route them to RACHANA Zhou.  All questions answered

## 2021-08-24 ENCOUNTER — TELEPHONE (OUTPATIENT)
Dept: FAMILY MEDICINE CLINIC | Age: 81
End: 2021-08-24

## 2021-08-27 ENCOUNTER — TRANSCRIBE ORDER (OUTPATIENT)
Dept: SCHEDULING | Age: 81
End: 2021-08-27

## 2021-08-27 DIAGNOSIS — I44.2 COMPLETE HEART BLOCK (HCC): Primary | ICD-10-CM

## 2021-08-27 DIAGNOSIS — I49.5 SICK SINUS SYNDROME (HCC): ICD-10-CM

## 2021-08-30 ENCOUNTER — HOSPITAL ENCOUNTER (OUTPATIENT)
Dept: ULTRASOUND IMAGING | Age: 81
Discharge: HOME | End: 2021-08-30
Attending: INTERNAL MEDICINE
Payer: MEDICARE

## 2021-08-30 DIAGNOSIS — I49.5 SICK SINUS SYNDROME (HCC): ICD-10-CM

## 2021-08-30 DIAGNOSIS — I44.2 COMPLETE HEART BLOCK (HCC): ICD-10-CM

## 2021-08-30 PROCEDURE — 93306 TTE W/DOPPLER COMPLETE: CPT

## 2021-08-30 PROCEDURE — 93306 TTE W/DOPPLER COMPLETE: CPT | Performed by: INTERNAL MEDICINE

## 2021-08-31 LAB
ECHO AO ROOT DIAM: 3.91 CM
ECHO AV PEAK GRADIENT: 10.98 MMHG
ECHO AV PEAK VELOCITY: 165.66 CM/S
ECHO EST RA PRESSURE: 10 MMHG
ECHO LA AREA 4C: 23.3 CM2
ECHO LA MAJOR AXIS: 3.89 CM
ECHO LA VOL 2C: 66.87 ML (ref 18–58)
ECHO LA VOL 4C: 83.73 ML (ref 18–58)
ECHO LA VOL BP: 81.91 ML (ref 18–58)
ECHO LV E' SEPTAL VELOCITY: 6 CM/S
ECHO LV INTERNAL DIMENSION DIASTOLIC: 5.12 CM (ref 4.2–5.9)
ECHO LV INTERNAL DIMENSION SYSTOLIC: 3.59 CM
ECHO LV IVSD: 0.99 CM (ref 0.6–1)
ECHO LV MASS 2D: 196.9 G (ref 88–224)
ECHO LV POSTERIOR WALL DIASTOLIC: 1.07 CM (ref 0.6–1)
ECHO LVOT DIAM: 2.23 CM
ECHO MV A VELOCITY: 104.09 CM/S
ECHO MV E DECELERATION TIME (DT): 302.19 MS
ECHO MV E VELOCITY: 71.87 CM/S
ECHO MV E/A RATIO: 0.69
ECHO MV E/E' SEPTAL: 11.98
ECHO RIGHT VENTRICULAR SYSTOLIC PRESSURE (RVSP): 36.25 MMHG
ECHO TV REGURGITANT MAX VELOCITY: 254.12 CM/S
ECHO TV REGURGITANT PEAK GRADIENT: 26.25 MMHG

## 2021-09-01 ENCOUNTER — HOSPITAL ENCOUNTER (OUTPATIENT)
Dept: LAB | Age: 81
Discharge: HOME | End: 2021-09-01
Payer: MEDICARE

## 2021-09-01 LAB
BASOPHILS # BLD: 0 K/UL (ref 0–0.1)
BASOPHILS NFR BLD: 1 % (ref 0–1)
DIFFERENTIAL METHOD BLD: ABNORMAL
EOSINOPHIL # BLD: 0.4 K/UL (ref 0–0.4)
EOSINOPHIL NFR BLD: 10 % (ref 0–7)
ERYTHROCYTE [DISTWIDTH] IN BLOOD BY AUTOMATED COUNT: 16.3 % (ref 11.5–14.5)
HCT VFR BLD AUTO: 40.7 % (ref 36.6–50.3)
HGB BLD-MCNC: 13.7 G/DL (ref 12.1–17)
IMM GRANULOCYTES # BLD AUTO: 0 K/UL (ref 0–0.04)
IMM GRANULOCYTES NFR BLD AUTO: 1 % (ref 0–0.5)
LYMPHOCYTES # BLD: 0.9 K/UL (ref 0.8–3.5)
LYMPHOCYTES NFR BLD: 21 % (ref 12–49)
MCH RBC QN AUTO: 30.4 PG (ref 26–34)
MCHC RBC AUTO-ENTMCNC: 33.7 G/DL (ref 30–36.5)
MCV RBC AUTO: 90.2 FL (ref 80–99)
MONOCYTES # BLD: 0.5 K/UL (ref 0–1)
MONOCYTES NFR BLD: 13 % (ref 5–13)
NEUTS SEG # BLD: 2.3 K/UL (ref 1.8–8)
NEUTS SEG NFR BLD: 54 % (ref 32–75)
NRBC # BLD: 0 K/UL (ref 0–0.01)
NRBC BLD-RTO: 0 PER 100 WBC
PLATELET # BLD AUTO: 80 K/UL (ref 150–400)
PLATELET COMMENTS,PCOM: ABNORMAL
PMV BLD AUTO: 10.7 FL (ref 8.9–12.9)
RBC # BLD AUTO: 4.51 M/UL (ref 4.1–5.7)
RBC MORPH BLD: ABNORMAL
WBC # BLD AUTO: 4.1 K/UL (ref 4.1–11.1)

## 2021-09-01 PROCEDURE — 36415 COLL VENOUS BLD VENIPUNCTURE: CPT

## 2021-09-01 PROCEDURE — 85025 COMPLETE CBC W/AUTO DIFF WBC: CPT

## 2021-09-02 NOTE — PROGRESS NOTES
Spoke with Tru Barry in the cath lab to make him aware that pt hasnt been vaccinated and was an add on, inquiring if they do rapid covid testing DOS, he said that they have the capability of doing rapid testing.

## 2021-09-03 ENCOUNTER — HOSPITAL ENCOUNTER (OUTPATIENT)
Age: 81
Setting detail: OBSERVATION
Discharge: HOME | End: 2021-09-04
Attending: INTERNAL MEDICINE | Admitting: INTERNAL MEDICINE
Payer: MEDICARE

## 2021-09-03 ENCOUNTER — APPOINTMENT (OUTPATIENT)
Dept: GENERAL RADIOLOGY | Age: 81
End: 2021-09-03
Attending: INTERNAL MEDICINE
Payer: MEDICARE

## 2021-09-03 ENCOUNTER — DOCUMENTATION ONLY (OUTPATIENT)
Dept: FAMILY MEDICINE CLINIC | Age: 81
End: 2021-09-03

## 2021-09-03 DIAGNOSIS — R07.9 CHEST PAIN, UNSPECIFIED TYPE: ICD-10-CM

## 2021-09-03 PROBLEM — I45.9 HEART BLOCK: Status: ACTIVE | Noted: 2021-09-03

## 2021-09-03 PROCEDURE — 74011000272 HC RX REV CODE- 272: Performed by: INTERNAL MEDICINE

## 2021-09-03 PROCEDURE — 74011250636 HC RX REV CODE- 250/636: Performed by: INTERNAL MEDICINE

## 2021-09-03 PROCEDURE — C1892 INTRO/SHEATH,FIXED,PEEL-AWAY: HCPCS | Performed by: INTERNAL MEDICINE

## 2021-09-03 PROCEDURE — 33208 INSRT HEART PM ATRIAL & VENT: CPT | Performed by: INTERNAL MEDICINE

## 2021-09-03 PROCEDURE — 71045 X-RAY EXAM CHEST 1 VIEW: CPT

## 2021-09-03 PROCEDURE — C1785 PMKR, DUAL, RATE-RESP: HCPCS | Performed by: INTERNAL MEDICINE

## 2021-09-03 PROCEDURE — 77030002996 HC SUT SLK J&J -A: Performed by: INTERNAL MEDICINE

## 2021-09-03 PROCEDURE — C1898 LEAD, PMKR, OTHER THAN TRANS: HCPCS | Performed by: INTERNAL MEDICINE

## 2021-09-03 PROCEDURE — C1894 INTRO/SHEATH, NON-LASER: HCPCS | Performed by: INTERNAL MEDICINE

## 2021-09-03 PROCEDURE — 74011250637 HC RX REV CODE- 250/637: Performed by: INTERNAL MEDICINE

## 2021-09-03 PROCEDURE — 74011000250 HC RX REV CODE- 250: Performed by: INTERNAL MEDICINE

## 2021-09-03 PROCEDURE — 74011000636 HC RX REV CODE- 636: Performed by: INTERNAL MEDICINE

## 2021-09-03 PROCEDURE — 77030018729 HC ELECTRD DEFIB PAD CARD -B: Performed by: INTERNAL MEDICINE

## 2021-09-03 PROCEDURE — 99152 MOD SED SAME PHYS/QHP 5/>YRS: CPT | Performed by: INTERNAL MEDICINE

## 2021-09-03 PROCEDURE — 99153 MOD SED SAME PHYS/QHP EA: CPT | Performed by: INTERNAL MEDICINE

## 2021-09-03 PROCEDURE — 99218 HC RM OBSERVATION: CPT

## 2021-09-03 PROCEDURE — 77030031139 HC SUT VCRL2 J&J -A: Performed by: INTERNAL MEDICINE

## 2021-09-03 PROCEDURE — C1893 INTRO/SHEATH, FIXED,NON-PEEL: HCPCS | Performed by: INTERNAL MEDICINE

## 2021-09-03 DEVICE — PACING LEAD
Type: IMPLANTABLE DEVICE | Status: FUNCTIONAL
Brand: TENDRIL™

## 2021-09-03 DEVICE — DR PACEMAKER DDDR
Type: IMPLANTABLE DEVICE | Status: FUNCTIONAL
Brand: ASSURITY MRI™

## 2021-09-03 RX ORDER — HEPARIN SODIUM 200 [USP'U]/100ML
INJECTION, SOLUTION INTRAVENOUS
Status: COMPLETED | OUTPATIENT
Start: 2021-09-03 | End: 2021-09-03

## 2021-09-03 RX ORDER — DONEPEZIL HYDROCHLORIDE 5 MG/1
10 TABLET, FILM COATED ORAL
Status: DISCONTINUED | OUTPATIENT
Start: 2021-09-03 | End: 2021-09-04 | Stop reason: HOSPADM

## 2021-09-03 RX ORDER — MIDAZOLAM HYDROCHLORIDE 1 MG/ML
INJECTION, SOLUTION INTRAMUSCULAR; INTRAVENOUS AS NEEDED
Status: DISCONTINUED | OUTPATIENT
Start: 2021-09-03 | End: 2021-09-03 | Stop reason: HOSPADM

## 2021-09-03 RX ORDER — LIDOCAINE HYDROCHLORIDE AND EPINEPHRINE 10; 10 MG/ML; UG/ML
INJECTION, SOLUTION INFILTRATION; PERINEURAL AS NEEDED
Status: DISCONTINUED | OUTPATIENT
Start: 2021-09-03 | End: 2021-09-03 | Stop reason: HOSPADM

## 2021-09-03 RX ORDER — CEFAZOLIN SODIUM/WATER 2 G/20 ML
SYRINGE (ML) INTRAVENOUS
Status: COMPLETED | OUTPATIENT
Start: 2021-09-03 | End: 2021-09-03

## 2021-09-03 RX ORDER — SODIUM CHLORIDE 9 MG/ML
75 INJECTION, SOLUTION INTRAVENOUS CONTINUOUS
Status: DISPENSED | OUTPATIENT
Start: 2021-09-03 | End: 2021-09-03

## 2021-09-03 RX ORDER — URSODIOL 300 MG/1
300 CAPSULE ORAL 2 TIMES DAILY WITH MEALS
Status: DISCONTINUED | OUTPATIENT
Start: 2021-09-03 | End: 2021-09-04 | Stop reason: HOSPADM

## 2021-09-03 RX ORDER — SODIUM CHLORIDE 0.9 % (FLUSH) 0.9 %
5-40 SYRINGE (ML) INJECTION EVERY 8 HOURS
Status: DISCONTINUED | OUTPATIENT
Start: 2021-09-03 | End: 2021-09-04 | Stop reason: HOSPADM

## 2021-09-03 RX ORDER — ACETAMINOPHEN 325 MG/1
650 TABLET ORAL
Status: DISCONTINUED | OUTPATIENT
Start: 2021-09-03 | End: 2021-09-04 | Stop reason: HOSPADM

## 2021-09-03 RX ORDER — FENTANYL CITRATE 50 UG/ML
INJECTION, SOLUTION INTRAMUSCULAR; INTRAVENOUS AS NEEDED
Status: DISCONTINUED | OUTPATIENT
Start: 2021-09-03 | End: 2021-09-03 | Stop reason: HOSPADM

## 2021-09-03 RX ORDER — TAMSULOSIN HYDROCHLORIDE 0.4 MG/1
0.4 CAPSULE ORAL DAILY
Status: DISCONTINUED | OUTPATIENT
Start: 2021-09-04 | End: 2021-09-04 | Stop reason: HOSPADM

## 2021-09-03 RX ORDER — NADOLOL 40 MG/1
40 TABLET ORAL DAILY
Status: DISCONTINUED | OUTPATIENT
Start: 2021-09-04 | End: 2021-09-04 | Stop reason: HOSPADM

## 2021-09-03 RX ORDER — SODIUM CHLORIDE 0.9 % (FLUSH) 0.9 %
5-40 SYRINGE (ML) INJECTION AS NEEDED
Status: DISCONTINUED | OUTPATIENT
Start: 2021-09-03 | End: 2021-09-04 | Stop reason: HOSPADM

## 2021-09-03 RX ORDER — TAMSULOSIN HYDROCHLORIDE 0.4 MG/1
0.4 CAPSULE ORAL 2 TIMES DAILY
COMMUNITY

## 2021-09-03 RX ADMIN — URSODIOL 300 MG: 300 CAPSULE ORAL at 17:40

## 2021-09-03 RX ADMIN — DONEPEZIL HYDROCHLORIDE 10 MG: 5 TABLET, FILM COATED ORAL at 21:10

## 2021-09-03 RX ADMIN — Medication 10 ML: at 17:40

## 2021-09-03 RX ADMIN — SODIUM CHLORIDE 75 ML/HR: 9 INJECTION, SOLUTION INTRAVENOUS at 17:40

## 2021-09-03 RX ADMIN — Medication 10 ML: at 21:11

## 2021-09-03 RX ADMIN — CEFAZOLIN SODIUM 2 G: 1 INJECTION, POWDER, FOR SOLUTION INTRAMUSCULAR; INTRAVENOUS at 21:09

## 2021-09-03 NOTE — Clinical Note
A venogram was performed on the left subclavian. Injected with single hand injection. Injection volume  = 30 mL.

## 2021-09-03 NOTE — Clinical Note
TRANSFER - OUT REPORT:     Verbal report given to: Shahana Reza. Report consisted of patient's Situation, Background, Assessment and   Recommendations(SBAR). Opportunity for questions and clarification was provided. Patient transported with a Registered Nurse. Patient transported to: [facility].

## 2021-09-03 NOTE — PROGRESS NOTES
Dual chamber pacemaker placed via left axillary approach s complic   [place]    Full report to follow

## 2021-09-04 VITALS
TEMPERATURE: 97.9 F | OXYGEN SATURATION: 92 % | RESPIRATION RATE: 18 BRPM | HEART RATE: 60 BPM | DIASTOLIC BLOOD PRESSURE: 92 MMHG | SYSTOLIC BLOOD PRESSURE: 138 MMHG

## 2021-09-04 PROCEDURE — 74011000250 HC RX REV CODE- 250: Performed by: INTERNAL MEDICINE

## 2021-09-04 PROCEDURE — 74011250636 HC RX REV CODE- 250/636: Performed by: INTERNAL MEDICINE

## 2021-09-04 PROCEDURE — 99218 HC RM OBSERVATION: CPT

## 2021-09-04 PROCEDURE — 74011250637 HC RX REV CODE- 250/637: Performed by: INTERNAL MEDICINE

## 2021-09-04 RX ADMIN — TAMSULOSIN HYDROCHLORIDE 0.4 MG: 0.4 CAPSULE ORAL at 02:10

## 2021-09-04 RX ADMIN — NADOLOL 40 MG: 40 TABLET ORAL at 11:30

## 2021-09-04 RX ADMIN — Medication 10 ML: at 05:59

## 2021-09-04 RX ADMIN — CEFAZOLIN SODIUM 2 G: 1 INJECTION, POWDER, FOR SOLUTION INTRAMUSCULAR; INTRAVENOUS at 05:48

## 2021-09-04 RX ADMIN — URSODIOL 300 MG: 300 CAPSULE ORAL at 11:30

## 2021-09-04 NOTE — ROUTINE PROCESS
DISCHARGE SUMMARY FROM [facility] NURSE    The patient is stable for discharge. I have reviewed the discharge instructions with the patient and spouse. The patient and spouse verbalized understanding. All questions were fully answered. The patient and spouse verbalized no complaints. Hard scripts and medication handouts were given and reviewed with the patient and spouse. Appropriate educational materials and medication side effects teaching were provided also provided. Cardiac monitor and IV line(s) were removed. The following personal items collected during admission were returned to the patient/family  Home medications: none   Dental Appliance:    Vision: Visual Aid: Glasses  Hearing Aid:    Jewelry:    Clothing:    Other Valuables:    Valuables sent to safe: There were no personal belongings, valuables or home medications left at patient's bedside,  or safe. Discussed follow up and medications with patient. Wife asked about keflex for patient as she had discussed it with Dr. Shira Dueñas. Dr Shira Dueñas said if she needed a prescription he would write it up. Patient's wife did not need prescription.

## 2021-09-04 NOTE — DISCHARGE INSTRUCTIONS
Cardiology Discharge Summary     Patient ID:  Eden Maynard  [id number]  64 y.o.  1940    Admit Date: 9/3/2021    Discharge Date: 9/4/2021     Admitting Physician: Yi Tovar MD     Discharge Physician: Yi Tovar MD      Patient Instructions:         Referenced discharge instructions provided by nursing for diet and activity. Follow-up with  Dr Brandon Hazel nurse 2  Weeks     Signed:  Yi Tovar MD  9/4/2021  10:30 AM  DISCHARGE INSTRUCTIONS FOR PATIENTS WITH PACEMAKERS    1. Remember to call for an appointment in 2  weeks [phone] to check healing and implant programming. 2. Medic Alert Bracelets are available from your pharmacist to wear at all times if you choose to wear one. 3. Carry your ID card for pacemaker with you at all times. This card will be given to you in the hospital or mailed to you. 4. The pacemaker will bulge slightly under your skin. The bulge will decrease in size over the next few weeks. Please notify the doctor's office if you notice any of the following around your site:   A.  A bruise that does not go away. B.  Soreness or yellow, green, or brown drainage from the site. C. Any swelling from the site. D. If you have a fever of 100 degrees or higher that lasts for a few days. INCISION CARE       1.  Leave Steri strips  over your site until it starts to fall off, usually in a few weeks. 2.  You may shower after 3 days as long as your incision isnt submerged or directly sprayed upon until well healed. 3.  For comfort, wear loose fitting clothing. 4.  Report any signs of infection, fever, pain, swelling, redness, oozing, or heat at site especially if these symptoms increase after the first 3 to 4 days. ACTIVITY PRECAUTIONS     1. Avoid rough contact with the implant site. 2. No driving for 14 days. 3. Avoid lifting your arm over your head, carrying anything on the affected side, or lifting over 10 pounds for 30 days.   For the first 2 days only bend your arm at the elbow. 4. Any extreme activity such as golf, weight lifting or exercise biking should be restricted for 60 days. 5. Do not carry objects by holding them against your implant site. 6.  No shooting rifles or any type of gun with the affected shoulder permanently. SPECIAL PRECAUTIONS     1. You should avoid all strong magnetic fields, such as arc welding, large transformers, large motors. 2.  You may not have an MRI which uses a strong magnet to take pictures. 3.  Treatments or surgery that requires diathermy or electrocautery should be discussed with your doctor before scheduled. 4. Avoid radio frequency transmitters, including radar. 5. Advise dentist or other medical personnel you see that you have a pacemaker. 6.  Cell phones and microwave oven use is okay. 7.  If you plan to move or take a trip to a new area, the doctor's office will give you a name of a doctor to contact for any problems. ANTIBIOTIC THERAPY    During the first 8 weeks after your pacemaker insertion, you may need antibiotics before any dental work or certain tests or operations. Let the dentist or doctor who is caring for you know that you have had an implanted device.

## 2021-09-04 NOTE — ROUTINE PROCESS
Report received from Rigo Rossi , [address] [place] [address]. SBAR were discussed.     Fay Judd RN

## 2021-09-09 NOTE — PERIOP NOTES
[facility]  Preoperative Instructions        Surgery Date 9/21/2021          Time of Arrival 5:45 am  Covid Testing 9/17/2021 at \[facility]\""    1. On the day of your surgery, please report to the Surgical Services Registration Desk and sign in at your designated time. The Surgery Center is located to the right of the Emergency Room. 2. You must have someone with you to drive you home. You should not drive a car for 24 hours following surgery. Please make arrangements for a friend or family member to stay with you for the first 24 hours after your surgery. 3. Do not have anything to eat or drink (including water, gum, mints, coffee, juice) after midnight. ?This may not apply to medications prescribed by your physician. ?(Please note below the special instructions with medications to take the morning of your procedure.)    4. We recommend you do not drink any alcoholic beverages for 24 hours before and after your surgery. 5. Contact your surgeons office for instructions on the following medications: non-steroidal anti-inflammatory drugs (i.e. Advil, Aleve), vitamins, and supplements. (Some surgeons will want you to stop these medications prior to surgery and others may allow you to take them)  **If you are currently taking Plavix, Coumadin, Aspirin and/or other blood-thinning agents, contact your surgeon for instructions. ** Your surgeon will partner with the physician prescribing these medications to determine if it is safe to stop or if you need to continue taking. Please do not stop taking these medications without instructions from your surgeon    6. Wear comfortable clothes. Wear glasses instead of contacts. Do not bring any money or jewelry. Please bring picture ID, insurance card, and any prearranged co-payment or hospital payment. Do not wear make-up, particularly mascara the morning of your surgery. Do not wear nail polish, particularly if you are having foot /hand surgery. Wear your hair loose or down, no ponytails, buns, juni pins or clips. All body piercings must be removed. Please shower with antibacterial soap for three consecutive days before and on the morning of surgery, but do not apply any lotions, powders or deodorants after the shower on the day of surgery. Please use a fresh towels after each shower. Please sleep in clean clothes and change bed linens the night before surgery. Please do not shave for 48 hours prior to surgery. Shaving of the face is acceptable. 7. You should understand that if you do not follow these instructions your surgery may be cancelled. If your physical condition changes (I.e. fever, cold or flu) please contact your surgeon as soon as possible. 8. It is important that you be on time. If a situation occurs where you may be late, please call [phone] (OR Holding Area). 9. If you have any questions and or problems, please call [phone] (Pre-admission Testing). 10. Your surgery time may be subject to change. You will receive a phone call the evening prior if your time changes. 11.  If having outpatient surgery, you must have someone to drive you here, stay with you during the duration of your stay, and to drive you home at time of discharge. Special Instructions:     TAKE ALL MEDICATIONS DAY OF SURGERY EXCEPT: None      I understand a pre-operative phone call will be made to verify my surgery time. In the event that I am not available, I give permission for a message to be left on my answering service and/or with another person?   Yes [phone] wife Nilesh Perez           ___________________      __________   _________    Lauren Lauren of Patient)             (Witness)                (Date and Time)

## 2021-09-13 DIAGNOSIS — J31.0 NONALLERGIC RHINITIS: ICD-10-CM

## 2021-09-13 DIAGNOSIS — J30.1 SEASONAL ALLERGIC RHINITIS DUE TO POLLEN: ICD-10-CM

## 2021-09-13 RX ORDER — FLUTICASONE PROPIONATE 50 MCG
SPRAY, SUSPENSION (ML) NASAL
Status: CANCELLED | OUTPATIENT
Start: 2021-09-13

## 2021-09-14 NOTE — PERIOP NOTES
Cardiac clearance from Dr. Radha Vuong received, placed on patient's paper chart. Covid testing at [facility] faxed, fax confirmed. Patient's wife notified.

## 2021-09-14 NOTE — H&P
9/21/21  H&P  Shelbie Chirinos is a 80year old male with bladder stone and BPH. Patient has had chronic voiding issues including dysuria, frequency, decreased force of stream and nighttime voiding. He has been placed on Cipro frequent times for suspected UTI. He then saw a urologist in [facility] who performed ultrasound and CT of his abdomen pelvis revealing bladder stones. Prostate measurement is approximately 60 g. KUB reviewed personally and reveals 13 mm bladder stone and other smaller ones. Bladder stones treated, tumor seen but had bradycardia and op terminated. PM placed, now cleared by Cardiology. PAST MEDICAL HISTORY:    Allergies: SULFA (SULFADIAZINE) (Severe)  DENIES: Latex, Shellfish, X-Ray Dye, Iodine. Medications: tamsulosin 0.4 mg capsule (tamsulosin)   donepezil 10 mg tablet (donepezil)   nadolol 40 mg tablet (nadolol)   ursodiol 300 mg capsule (ursodiol)   Azo Bladder Control 300 mg capsule (pumpkin seed extract-soy germ) as needed   ibuprofen 200 mg capsule (ibuprofen) as needed for pain     Problems: BPH with urinary obstruction (ICD-600.01) (JUB29-B58.1)  Bladder stone (WMP88-W31.0)    Illnesses: DENIES: Heart Disease, Pacemaker/Defibrillator, Lung Disease, Diabetes, High Blood Pressure, Bowel Problems, Stroke/Seizure, Kidney Problems, Bleeding Problems, HIV, Hepatitis, or Cancer. Surgeries: Cataract Surgery. Family History: DENIES: Prostate cancer, Kidney cancer, Kidney disease, Kidney stones. Social History: Retired. . Smoking status: Former. Does not drink alcohol. System Review: Admits to: Dry Eyes. DENIES: Unexplained Weight Loss, Dry Mouth, Leg Swelling, Shortness of Breath, Constipation, Involuntary Urine Loss, Lower Extremity Weakness, Dry Skin, Difficulty Walking, Psychiatric Problems, Impaired Sex Drive, Easy Bleeding, Rash.      EXAMINATION: Vitals: Pulse: 51 BP: 152/73 Weight: 166 lbs Height: 5' 10\" BMI: 23.90 kg/m^2  Anus/Perineum: non-tender Rectal: normal tone, no masses Prostate: 1+ symmetric, non-tender, no nodules       URINALYSIS from Voided specimen  Urine Dip: pH: 6.5, Bld: +++, LE: Trace, Nit: Neg, Prot: Neg, Ket: Neg, Gluc: Neg  Urine Micro: WBC: 1-2, RBC: <3, Bacteria: 0    POST-VOID RESIDUAL  US PVR (08/23/2021):  193 ccs     IMPRESSION:    1. BLADDER STONE    2. BPH with urinary obstruction (KDB85-O16.1)    3. Bladder tumor    PLAN: Patient be scheduled for his anesthesia TURB with Gemcitibine. May later need BPH op. Patient understands and agrees with this plan.     Alexandra Contreras MD    cc: Maria E Givens MD

## 2021-09-15 ENCOUNTER — TELEPHONE (OUTPATIENT)
Dept: FAMILY MEDICINE CLINIC | Age: 81
End: 2021-09-15

## 2021-09-16 ENCOUNTER — DOCUMENTATION ONLY (OUTPATIENT)
Dept: FAMILY MEDICINE CLINIC | Age: 81
End: 2021-09-16

## 2021-09-16 DIAGNOSIS — J30.1 SEASONAL ALLERGIC RHINITIS DUE TO POLLEN: ICD-10-CM

## 2021-09-16 DIAGNOSIS — J31.0 NONALLERGIC RHINITIS: ICD-10-CM

## 2021-09-16 RX ORDER — FLUTICASONE PROPIONATE 50 MCG
SPRAY, SUSPENSION (ML) NASAL
Qty: 1 EACH | Refills: 1 | Status: SHIPPED | OUTPATIENT
Start: 2021-09-16 | End: 2021-10-15

## 2021-09-16 NOTE — PROGRESS NOTES
Maria Victoria [place] is requesting a refill on his Flonase nasal spray.  Refill provided per patient request.

## 2021-09-17 ENCOUNTER — HOSPITAL ENCOUNTER (OUTPATIENT)
Dept: PREADMISSION TESTING | Age: 81
Discharge: HOME | End: 2021-09-17
Payer: MEDICARE

## 2021-09-17 PROCEDURE — U0005 INFEC AGEN DETEC AMPLI PROBE: HCPCS

## 2021-09-20 ENCOUNTER — ANESTHESIA EVENT (OUTPATIENT)
Dept: SURGERY | Age: 81
End: 2021-09-20
Payer: MEDICARE

## 2021-09-20 LAB
SARS-COV-2, XPLCVT: NOT DETECTED
SOURCE, COVRS: NORMAL

## 2021-09-21 ENCOUNTER — ANESTHESIA (OUTPATIENT)
Dept: SURGERY | Age: 81
End: 2021-09-21
Payer: MEDICARE

## 2021-09-21 ENCOUNTER — HOSPITAL ENCOUNTER (OUTPATIENT)
Age: 81
Setting detail: OUTPATIENT SURGERY
Discharge: HOME | End: 2021-09-21
Attending: UROLOGY | Admitting: UROLOGY
Payer: MEDICARE

## 2021-09-21 VITALS
BODY MASS INDEX: 22.12 KG/M2 | SYSTOLIC BLOOD PRESSURE: 128 MMHG | HEIGHT: 70 IN | DIASTOLIC BLOOD PRESSURE: 70 MMHG | RESPIRATION RATE: 14 BRPM | OXYGEN SATURATION: 95 % | TEMPERATURE: 98 F | WEIGHT: 154.54 LBS | HEART RATE: 67 BPM

## 2021-09-21 PROCEDURE — 74011250637 HC RX REV CODE- 250/637: Performed by: UROLOGY

## 2021-09-21 PROCEDURE — 77030028158 HC ELECTRD BISOLSR PROST RWOL -B: Performed by: UROLOGY

## 2021-09-21 PROCEDURE — 76210000021 HC REC RM PH II 0.5 TO 1 HR: Performed by: UROLOGY

## 2021-09-21 PROCEDURE — 74011000250 HC RX REV CODE- 250: Performed by: NURSE ANESTHETIST, CERTIFIED REGISTERED

## 2021-09-21 PROCEDURE — 74011250636 HC RX REV CODE- 250/636: Performed by: NURSE ANESTHETIST, CERTIFIED REGISTERED

## 2021-09-21 PROCEDURE — 76210000000 HC OR PH I REC 2 TO 2.5 HR: Performed by: UROLOGY

## 2021-09-21 PROCEDURE — 77030005513 HC CATH URETH FOL11 MDII -B: Performed by: UROLOGY

## 2021-09-21 PROCEDURE — 74011250636 HC RX REV CODE- 250/636: Performed by: UROLOGY

## 2021-09-21 PROCEDURE — 76010000149 HC OR TIME 1 TO 1.5 HR: Performed by: UROLOGY

## 2021-09-21 PROCEDURE — 76060000033 HC ANESTHESIA 1 TO 1.5 HR: Performed by: UROLOGY

## 2021-09-21 PROCEDURE — 74011000258 HC RX REV CODE- 258: Performed by: NURSE ANESTHETIST, CERTIFIED REGISTERED

## 2021-09-21 PROCEDURE — 77030040361 HC SLV COMPR DVT MDII -B: Performed by: UROLOGY

## 2021-09-21 PROCEDURE — 74011000258 HC RX REV CODE- 258: Performed by: UROLOGY

## 2021-09-21 PROCEDURE — 77030008684 HC TU ET CUF COVD -B: Performed by: ANESTHESIOLOGY

## 2021-09-21 PROCEDURE — 77030040831 HC BAG URINE DRNG MDII -A: Performed by: UROLOGY

## 2021-09-21 PROCEDURE — 88307 TISSUE EXAM BY PATHOLOGIST: CPT

## 2021-09-21 PROCEDURE — 74011250636 HC RX REV CODE- 250/636: Performed by: ANESTHESIOLOGY

## 2021-09-21 PROCEDURE — 77030019905 HC CATH URETH INTMIT MDII -A: Performed by: UROLOGY

## 2021-09-21 PROCEDURE — 2709999900 HC NON-CHARGEABLE SUPPLY: Performed by: UROLOGY

## 2021-09-21 PROCEDURE — 74011000250 HC RX REV CODE- 250: Performed by: UROLOGY

## 2021-09-21 PROCEDURE — 77030013079 HC BLNKT BAIR HGGR 3M -A: Performed by: ANESTHESIOLOGY

## 2021-09-21 RX ORDER — SUCCINYLCHOLINE CHLORIDE 20 MG/ML
INJECTION INTRAMUSCULAR; INTRAVENOUS AS NEEDED
Status: DISCONTINUED | OUTPATIENT
Start: 2021-09-21 | End: 2021-09-21 | Stop reason: HOSPADM

## 2021-09-21 RX ORDER — GLYCOPYRROLATE 0.2 MG/ML
INJECTION INTRAMUSCULAR; INTRAVENOUS AS NEEDED
Status: DISCONTINUED | OUTPATIENT
Start: 2021-09-21 | End: 2021-09-21 | Stop reason: HOSPADM

## 2021-09-21 RX ORDER — SODIUM CHLORIDE 0.9 % (FLUSH) 0.9 %
5-40 SYRINGE (ML) INJECTION EVERY 8 HOURS
Status: DISCONTINUED | OUTPATIENT
Start: 2021-09-21 | End: 2021-09-21 | Stop reason: HOSPADM

## 2021-09-21 RX ORDER — NEOSTIGMINE METHYLSULFATE 1 MG/ML
INJECTION, SOLUTION INTRAVENOUS AS NEEDED
Status: DISCONTINUED | OUTPATIENT
Start: 2021-09-21 | End: 2021-09-21 | Stop reason: HOSPADM

## 2021-09-21 RX ORDER — SODIUM CHLORIDE 0.9 % (FLUSH) 0.9 %
5-40 SYRINGE (ML) INJECTION AS NEEDED
Status: DISCONTINUED | OUTPATIENT
Start: 2021-09-21 | End: 2021-09-21 | Stop reason: HOSPADM

## 2021-09-21 RX ORDER — ROCURONIUM BROMIDE 10 MG/ML
INJECTION, SOLUTION INTRAVENOUS AS NEEDED
Status: DISCONTINUED | OUTPATIENT
Start: 2021-09-21 | End: 2021-09-21 | Stop reason: HOSPADM

## 2021-09-21 RX ORDER — SODIUM CHLORIDE, SODIUM LACTATE, POTASSIUM CHLORIDE, CALCIUM CHLORIDE 600; 310; 30; 20 MG/100ML; MG/100ML; MG/100ML; MG/100ML
25 INJECTION, SOLUTION INTRAVENOUS CONTINUOUS
Status: DISCONTINUED | OUTPATIENT
Start: 2021-09-21 | End: 2021-09-21 | Stop reason: HOSPADM

## 2021-09-21 RX ORDER — LIDOCAINE HYDROCHLORIDE 20 MG/ML
INJECTION, SOLUTION EPIDURAL; INFILTRATION; INTRACAUDAL; PERINEURAL AS NEEDED
Status: DISCONTINUED | OUTPATIENT
Start: 2021-09-21 | End: 2021-09-21 | Stop reason: HOSPADM

## 2021-09-21 RX ORDER — FENTANYL CITRATE 50 UG/ML
25 INJECTION, SOLUTION INTRAMUSCULAR; INTRAVENOUS
Status: DISCONTINUED | OUTPATIENT
Start: 2021-09-21 | End: 2021-09-21 | Stop reason: HOSPADM

## 2021-09-21 RX ORDER — ONDANSETRON 2 MG/ML
4 INJECTION INTRAMUSCULAR; INTRAVENOUS AS NEEDED
Status: DISCONTINUED | OUTPATIENT
Start: 2021-09-21 | End: 2021-09-21 | Stop reason: HOSPADM

## 2021-09-21 RX ORDER — ONDANSETRON 2 MG/ML
INJECTION INTRAMUSCULAR; INTRAVENOUS AS NEEDED
Status: DISCONTINUED | OUTPATIENT
Start: 2021-09-21 | End: 2021-09-21 | Stop reason: HOSPADM

## 2021-09-21 RX ORDER — PROPOFOL 10 MG/ML
INJECTION, EMULSION INTRAVENOUS AS NEEDED
Status: DISCONTINUED | OUTPATIENT
Start: 2021-09-21 | End: 2021-09-21 | Stop reason: HOSPADM

## 2021-09-21 RX ORDER — FENTANYL CITRATE 50 UG/ML
INJECTION, SOLUTION INTRAMUSCULAR; INTRAVENOUS AS NEEDED
Status: DISCONTINUED | OUTPATIENT
Start: 2021-09-21 | End: 2021-09-21 | Stop reason: HOSPADM

## 2021-09-21 RX ORDER — DEXAMETHASONE SODIUM PHOSPHATE 4 MG/ML
INJECTION, SOLUTION INTRA-ARTICULAR; INTRALESIONAL; INTRAMUSCULAR; INTRAVENOUS; SOFT TISSUE AS NEEDED
Status: DISCONTINUED | OUTPATIENT
Start: 2021-09-21 | End: 2021-09-21 | Stop reason: HOSPADM

## 2021-09-21 RX ADMIN — ROCURONIUM BROMIDE 25 MG: 10 INJECTION INTRAVENOUS at 07:42

## 2021-09-21 RX ADMIN — ROCURONIUM BROMIDE 10 MG: 10 INJECTION INTRAVENOUS at 07:50

## 2021-09-21 RX ADMIN — SODIUM CHLORIDE, POTASSIUM CHLORIDE, SODIUM LACTATE AND CALCIUM CHLORIDE 25 ML/HR: 600; 310; 30; 20 INJECTION, SOLUTION INTRAVENOUS at 07:18

## 2021-09-21 RX ADMIN — PHENYLEPHRINE HYDROCHLORIDE 25 MCG/MIN: 10 INJECTION INTRAVENOUS at 07:42

## 2021-09-21 RX ADMIN — Medication 3 AMPULE: at 07:18

## 2021-09-21 RX ADMIN — FENTANYL CITRATE 50 MCG: 50 INJECTION, SOLUTION INTRAMUSCULAR; INTRAVENOUS at 08:07

## 2021-09-21 RX ADMIN — NEOSTIGMINE METHYLSULFATE 5 MG: 1 INJECTION, SOLUTION INTRAVENOUS at 08:35

## 2021-09-21 RX ADMIN — FENTANYL CITRATE 25 MCG: 0.05 INJECTION, SOLUTION INTRAMUSCULAR; INTRAVENOUS at 08:58

## 2021-09-21 RX ADMIN — ONDANSETRON HYDROCHLORIDE 4 MG: 2 INJECTION, SOLUTION INTRAMUSCULAR; INTRAVENOUS at 07:45

## 2021-09-21 RX ADMIN — DEXAMETHASONE SODIUM PHOSPHATE 8 MG: 4 INJECTION, SOLUTION INTRAMUSCULAR; INTRAVENOUS at 07:45

## 2021-09-21 RX ADMIN — PROPOFOL 150 MG: 10 INJECTION, EMULSION INTRAVENOUS at 07:37

## 2021-09-21 RX ADMIN — SUCCINYLCHOLINE CHLORIDE 120 MG: 20 INJECTION, SOLUTION INTRAMUSCULAR; INTRAVENOUS at 07:37

## 2021-09-21 RX ADMIN — WATER 2 G: 1 INJECTION INTRAMUSCULAR; INTRAVENOUS; SUBCUTANEOUS at 07:45

## 2021-09-21 RX ADMIN — ROCURONIUM BROMIDE 10 MG: 10 INJECTION INTRAVENOUS at 08:05

## 2021-09-21 RX ADMIN — LIDOCAINE HYDROCHLORIDE 40 MG: 20 INJECTION, SOLUTION EPIDURAL; INFILTRATION; INTRACAUDAL; PERINEURAL at 07:37

## 2021-09-21 RX ADMIN — GLYCOPYRROLATE 0.2 MG: 0.2 INJECTION, SOLUTION INTRAMUSCULAR; INTRAVENOUS at 07:30

## 2021-09-21 RX ADMIN — GLYCOPYRROLATE 0.4 MG: 0.2 INJECTION, SOLUTION INTRAMUSCULAR; INTRAVENOUS at 08:35

## 2021-09-21 RX ADMIN — ROCURONIUM BROMIDE 5 MG: 10 INJECTION INTRAVENOUS at 07:37

## 2021-09-21 RX ADMIN — FENTANYL CITRATE 50 MCG: 50 INJECTION, SOLUTION INTRAMUSCULAR; INTRAVENOUS at 07:30

## 2021-09-21 NOTE — DISCHARGE INSTRUCTIONS
Patient Education        Learning About Transurethral Resection of the Bladder  What is transurethral resection of the bladder? Transurethral resection of the bladder is a surgery that removes abnormal tissue (tumor) from the bladder through the urethra. It is also called transurethral resection of bladder tumor, or TURBT. A tumor in the bladder may be benign (not cancer) or malignant (cancer). This surgery uses a special tool to find and remove a tumor from the bladder. A small sample (biopsy) of the lining of the bladder may also be taken. Any removed tissue will be checked for cancer cells. This surgery may be done to look for cancer. It is also the most common and effective treatment for early-stage bladder cancer. It may also work well for more advanced cancer if all the cancer can be removed and biopsies show that no cancer cells remain. How is transurethral resection of the bladder done? Your doctor will give you medicine to make you sleep or feel relaxed. You will not feel pain. The doctor will put a thin, lighted tool called a cystoscope, or scope, into your urethra. The urethra is the tube that carries urine from the bladder to the outside of the body. Then the doctor will gently guide the scope into your bladder. Your bladder will then be filled with fluid. This stretches the bladder so that your doctor can clearly see the inside of your bladder. Your doctor will use small surgical tools through the scope to remove and/or burn away any abnormal tissue that is found. What can you expect after surgery? You may go home the same day as your surgery or stay in the hospital for an extra day or so. Your doctor may leave a small tube called a catheter in the urethra to help prevent blockage of the urethra. It's often removed before you go home. If not, you'll get instructions on how to care for the catheter. You may feel the need to urinate often for a while after the surgery.  But this should improve with time. It may burn when you urinate. Drink lots of fluids to help with the burning. Your urine also may look pink for up to 2 to 3 weeks after surgery. This is because there may be blood in it. You may have to avoid strenuous activity and heavy lifting for about 3 weeks after your surgery. If cancer is found in your bladder, your doctor will talk with you about what will happen next. Follow-up care is a key part of your treatment and safety. Be sure to make and go to all appointments, and call your doctor if you are having problems. It's also a good idea to know your test results and keep a list of the medicines you take. Where can you learn more? Go to http://www.gray.com/  Enter T508 in the search box to learn more about \"Learning About Transurethral Resection of the Bladder. \"  Current as of: December 17, 2020               Content Version: 13.0  © 2006-2021 [employer]. Care instructions adapted under license by [employer] (which disclaims liability or warranty for this information). If you have questions about a medical condition or this instruction, always ask your healthcare professional. [place] [address] any warranty or liability for your use of this information. TO PREVENT AN INFECTION      1. [address] YOUR HANDS     To prevent infection, good handwashing is the most important thing you or your caregiver can do.  Wash your hands with soap and water or use the hand  we gave you before you touch any wounds. 2. SHOWER     Use the antibacterial soap we gave you when you take a shower.  Shower with this soap until your wounds are healed.  To reach all areas of your body, you may need someone to help you.  Dont forget to clean your belly button with every shower. 3.  USE CLEAN SHEETS     Use freshly cleaned sheets on your bed after surgery.       To keep the surgery site clean, do not allow pets to sleep with you while your wound is still healing. 4. STOP SMOKING     Stop smoking, or at least cut back on smoking     Smoking slows your healing. 5.  CONTROL YOUR BLOOD SUGAR     High blood sugars slow wound healing. If you are diabetic, control your blood sugar levels before and after your surgery. DISCHARGE SUMMARY from Nurse    The following personal items are in your possession at time of discharge:    Dental Appliances: None  Visual Aid: None  Home Medications: None  Jewelry: None  Clothing: None (left in in pt. room)  Other Valuables: None             PATIENT INSTRUCTIONS:    After general anesthesia or intravenous sedation, for 24 hours or while taking prescription Narcotics:  · Limit your activities  · Do not drive and operate hazardous machinery  · Do not make important personal or business decisions  · Do  not drink alcoholic beverages  · If you have not urinated within 8 hours after discharge, please contact your surgeon on call. Report the following to your surgeon:  · Excessive pain, swelling, redness or odor of or around the surgical area  · Temperature over 100.5  · Nausea and vomiting lasting longer than 4 hours or if unable to take medications  · Any signs of decreased circulation or nerve impairment to extremity: change in color, persistent  numbness, tingling, coldness or increase pain  · Any questions    To prevent infection remember to refer to your handout on handwashing given to you by your nurse. *  Please give a list of your current medications to your Primary Care Provider. *  Please update this list whenever your medications are discontinued, doses are      changed, or new medications (including over-the-counter products) are added. *  Please carry medication information at all times in case of emergency situations.           These are general instructions for a healthy lifestyle:    No smoking/ No tobacco products/ Avoid exposure to second hand smoke    Surgeon General's Warning:  Quitting smoking now greatly reduces serious risk to your health. Obesity, smoking, and sedentary lifestyle greatly increases your risk for illness    A healthy diet, regular physical exercise & weight monitoring are important for maintaining a healthy lifestyle    You may be retaining fluid if you have a history of heart failure or if you experience any of the following symptoms:  Weight gain of 3 pounds or more overnight or 5 pounds in a week, increased swelling in our hands or feet or shortness of breath while lying flat in bed. Please call your doctor as soon as you notice any of these symptoms; do not wait until your next office visit. Recognize signs and symptoms of STROKE:    F-face looks uneven    A-arms unable to move or move unevenly    S-speech slurred or non-existent    T-time-call 911 as soon as signs and symptoms begin-DO NOT go       Back to bed or wait to see if you get better-TIME IS BRAIN. Patient Education            The discharge information has been reviewed with the patient. The patient verbalized understanding.

## 2021-09-21 NOTE — OP NOTES
[address]  OPERATIVE REPORT    Name:  Alban Nance  MR#:  [id number]  :  1940  ACCOUNT #:  [de-identified]  DATE OF SERVICE:  2021    PREOPERATIVE DIAGNOSIS:  Bladder tumor, benign prostatic hyperplasia. POSTOPERATIVE DIAGNOSIS:  Bladder tumor, benign prostatic hyperplasia. PROCEDURE PERFORMED:  Cystoscopy, TURB of medium bladder tumor, Cordero catheter placement, gemcitabine instillation. SURGEON:  Star Fulton MD    ASSISTANT:  None. ANESTHESIA:  General.    COMPLICATIONS:  None. SPECIMENS REMOVED:  Bladder tumor. IMPLANTS:  16-Bolivian Cordero catheter. ESTIMATED BLOOD LOSS:  Minimal.    INDICATIONS:  Bladder tumor, recent laser of bladder stones with bladder tumor noted, but intraoperative bradycardic event requiring early termination of the procedure with later cardiac evaluation, and pacemaker placement and cardiac clearance for this procedure today. PROCEDURE IN DETAIL:  He underwent general anesthetic, was placed in dorsal lithotomy position, prepped and draped in sterile fashion. A time-out was called confirming the planned procedure. A 21-Bolivian cystoscope 30-degree lens and video camera was used. The anterior urethra was normal.  The posterior urethra was obstructing to a moderate amount with a small median lobe with mainly lateral lobe hypertrophy. The bladder was severely trabeculated with cellule formation. There was a tumor on the left posterior wall measuring approximately 2 cm. There were few petechiae near by, but on complete examination with 30 and 70 degree lens, no other bladder tumors were seen. The ureteral orifices were normal with left one slight rearward facing. The 26-Bolivian resectoscope sheath with the obturator was then placed. Then using the bipolar resectoscope with loop and the 30-degree lens, I was able to resect this tumor in its entirety into the deep muscle but not fat.   This place of the resection was nonexpanding, it was completely fulgurated as was the rim. The [employer] evacuator was used to remove all the pieces and this was visually confirmed as was excellent hemostasis. The scope was withdrawn. The bladder was catheterized with a 16-Trinidadian Cordero, which was inflated with 10 mL of sterile water. The bladder was drained and 100 mL of gemcitabine was then instilled and the catheter plugged. This completed the procedure which he tolerated well. He was taken to the PACU, where the gemcitabine will be drained out and the catheter removed approximately one hour after instillation. Operative findings were described to his wife. She was also instructed to have him take his Flomax twice a day, watch for retention, take AZO p.r.n. and follow up in the office for path results and PVR in about one week.         MD ANTONIA [email or web address]  D:  09/21/2021 8:48  T:  09/21/2021 12:05  JOB #:  [id number]

## 2021-09-21 NOTE — PERIOP NOTES
4017 - PT'S COVID TEST RESULTED NEG - PT DENIES S/S OF COVID - NO FEVER, COLD, COUGH, SOB, N/V, DIARRHEA. ... PRE-OP TCHING DONE - PT VERBALIZES UNDERSTANDING. STRETCHER IN LOWEST POSITION, CB IN PLACE AND SR UP X2.

## 2021-09-21 NOTE — ANESTHESIA POSTPROCEDURE EVALUATION
Procedure(s):  CYSTOSCOPY, MEDIUM TURB, SOLANO CATHETER, GEMCITABINE. general    Anesthesia Post Evaluation      Multimodal analgesia: multimodal analgesia used between 6 hours prior to anesthesia start to PACU discharge  Patient location during evaluation: bedside  Patient participation: complete - patient participated  Level of consciousness: awake  Pain management: adequate  Airway patency: patent  Anesthetic complications: no  Cardiovascular status: acceptable  Respiratory status: acceptable  Hydration status: acceptable  Post anesthesia nausea and vomiting:  controlled  Final Post Anesthesia Temperature Assessment:  Normothermia (36.0-37.5 degrees C)      INITIAL Post-op Vital signs:   Vitals Value Taken Time   /82 09/21/21 1046   Temp 36.7 °C (98 °F) 09/21/21 1046   Pulse 60 09/21/21 1051   Resp 15 09/21/21 1051   SpO2 95 % 09/21/21 1051   Vitals shown include unvalidated device data.

## 2021-09-21 NOTE — PERIOP NOTES
Handoff Report from Operating Room to PACU  0446  Report received from [facility] CRNA regarding Paulina Desouza. Surgeon(s):  Devan Mercer MD  And Procedure(s) (LRB):  CYSTOSCOPY, MEDIUM TURB, SOLANO CATHETER, GEMCITABINE (N/A)  confirmed   with allergies, drains and dressings discussed. Anesthesia type, drugs, patient history, complications, estimated blood loss, vital signs, intake and output, and last pain medication, lines, reversal medications and temperature were reviewed. 10AM  Solano bag placed to drain Rx (50ml instilled). 100ml emptied.     10:20 AM  Solano removed per MD    10:50 AM  Verbal sign out per Dr Tapia Likens off to Florence James RN pt transferred to Phase II

## 2021-09-21 NOTE — ANESTHESIA PREPROCEDURE EVALUATION
Anesthetic History   No history of anesthetic complications            Review of Systems / Medical History  Patient summary reviewed, nursing notes reviewed and pertinent labs reviewed    Pulmonary  Within defined limits                 Neuro/Psych   Within defined limits           Cardiovascular            Dysrhythmias   Pacemaker    Exercise tolerance: >4 METS  Comments: TTE  · LV: Estimated LVEF is 55 - 60%. Normal cavity size, wall thickness, systolic function (ejection fraction normal) and diastolic function. · LA: Dilated left atrium. Left Atrium volume index is 43 mL/m2. · MV: Mitral valve leaflet calcification. · TV: Mild tricuspid valve regurgitation is present. Right Ventricular Arterial Pressure (RVSP) is 36 mmHg. · PV: Mild pulmonic valve regurgitation is present. EKG 2021  Normal sinus rhythm   Left bundle branch block   GI/Hepatic/Renal     GERD      Liver disease (cirrhosis, varices (PBC))    Comments: Previous UGI bleed 3/2016  Esophageal varices Endo/Other        Blood dyscrasia and arthritis     Other Findings   Comments: Thrombocytopenia    PPM MODE: DDDR. PPM low rate settin.  PPM upper rate settin    Cardiac clearance from Dr. Lay Remedies received    PBC         Physical Exam    Airway  Mallampati: I    Neck ROM: normal range of motion   Mouth opening: Normal     Cardiovascular    Rhythm: regular  Rate: normal         Dental    Dentition: Lower dentition intact, Upper dentition intact and Caps/crowns     Pulmonary  Breath sounds clear to auscultation               Abdominal         Other Findings            Anesthetic Plan    ASA: 3  Anesthesia type: general    Monitoring Plan: BIS      Induction: Intravenous  Anesthetic plan and risks discussed with: Patient

## 2021-09-23 ENCOUNTER — TELEPHONE (OUTPATIENT)
Dept: FAMILY MEDICINE CLINIC | Age: 81
End: 2021-09-23

## 2021-09-24 NOTE — PROGRESS NOTES
Madalyn He is a 80 y.o. male who presents today with c/o   Chief Complaint   Patient presents with    Follow-up       Assessment/ Plan:   Mild urinary retention post TURB procedure  -Continue Azo as recommended  -Take ibuprofen OTC as needed for pain  -[facility]  -F/U with Dr. 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 -RTO as scheduled or sooner if needed    Pacemaker placement d/t heart block  F/U as scheduled   Will refer to Dr. Adrianne Rock on next F/U appt for management  F/U: 1 month    Bladder tumor  F/U w/ Petra Morales MD as scheduled  RTO as needed. Vitamin B12 deficiency  Check B12 level  Will call with results  F/U: 1 month    Low Testosterone  Recheck testosterone  Will call with results  F/U: 1 month    Vertigo  Reports history of vertigo  Cont meclizine as needed-refill provided for PRN use  F/U: as needed    Subjective:       Urinary retention and abdominal pain   He was seen in the office on 8/5/21 for urinary retention and diarrhea. His stool cultures were negative, along with his cdiff results. His urine did not show any infection in the office on 8/5/21. He continued to have abdominal pain so he was seen [facility] on 8/6/21 and his CT results showed no acute process and lab work was essentially negative. He was prescribed ciprofloxacin for possible prostatitis. He did not take the Cipro because he has taken it in the past and it does not seem to help. In the ER, he was also asked to F/U with GI for an umbilical hernia that was manually reduced in the ED. He completed his F/U with MD Chris SELBY [facility] general surgeon) on 8/10/21 and Onofre De La Cruz discussed the need for umbilical hernia repair.  However, due to the patients ascites he recommended treatment at a tertiary center. Dr. Kalani Up discussed that it would be better to get it treated now before symptoms of increased ascites, engorged abdomina veins or coagulapathy complicates surgery. He does not wish to F/U with GI at this time, but will F/U once his urinary symptoms have improved. He continued to experience discomfort and trouble voiding so he made an appointment with Dr. Willie East (urologist) on 8/12/21. His bladder scan with Dr. Willie East showed a post void residual of 168 mL. Also, presence of urinary bladder calculi. He was then referred to [facility] urology and plans to have a urology procedure on 8/23/21. On 8/25/21 laser of bladder stones with bladder tumor noted, but intraoperative bradycardic   event requiring early termination of the procedure with later cardiac evaluation, and pacemaker placement (has pacemaker box set up for home). He later received cardiac clearance for [facility] 9/21/21 Dr. Lauren Ramirez, Luigi-cystoscopy, TURB of medium bladder tumor, fitzpatrick catheter placement, gemcitabine instillation. Patient and patients wife instructed to have him take his Flomax twice a day, watch for retention, take AZO p.r.n. and follow up in the office for path results and PVR in about one week. Today, he reports voiding without difficulty. Still passing small blood clots, but he is not having pain with urination. He is taking AZO and ibuprofen with good relief. He would like to review his medications to make sure he is taking them correctly. He has not had his testosterone or B12 checked since 2018. He would like to have them re-checked prior to resuming his current regimen of testosterone and B12 injections. Will place order for labs to rechecked. He also mentions having a history of 'sensation of the room spinning' and takes meclizine if needed. Will provide a refill today.       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) K74.3    Pure hypercholesterolemia E78.00    Thrombocytopenia (HCC) D69.6    B12 deficiency E53.8    Chronic pain of right knee M25.561, G89.29    Lumbar pain with radiation down right leg M54.5    Right hip pain M25.551    Chronic prostatitis N41.1    Venous insufficiency of both lower extremities I87.2    Localized edema R60.0    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    Dermatochalasis of both upper eyelids H02.831, H02.834    Dry eyes H04.123    Pseudophakia of both eyes Z96.1    Vitreous floaters of both eyes H43.393    Heart block I45.9       Past Medical History:   Diagnosis Date    Adverse effect of anesthesia     bradycardia (HR 21 and junctional rhythm) with outpt laser bladder stone surgery 8/2021    Allergic     Anemia     BPH (benign prostatic hyperplasia)     Esophageal varices ([address] March 2016    banded x 6    GERD (gastroesophageal reflux disease)     GI bleed March 2016    Hyperlipidemia     Hypogonadism male     Left bundle branch block (LBBB)     Dr. Mari Cunningham, pacemaker 9/2021    Primary biliary cirrhosis ([address]     Vitamin D deficiency          Current Outpatient Medications:     meclizine (ANTIVERT) 25 mg tablet, Take 1 Tablet by mouth as needed for Dizziness. Indications: sensation of spinning or whirling, Disp: 30 Tablet, Rfl: 1    fluticasone propionate (FLONASE) 50 mcg/actuation nasal spray, PUT 1 SPRAY IN EACH NOSTRIL TWICE A DAY FOR CHRONIC STUFFY & RUNNY NOSE, Disp: 1 Each, Rfl: 1    ursodioL (ACTIGALL) 300 mg capsule, Take 2 Capsules by mouth two (2) times a day.  TAKE 2 CAPSULES BY MOUTH TWICE A DAY, Disp: 120 Capsule, Rfl: 0    tamsulosin (Flomax) 0.4 mg capsule, Take 0.4 mg by mouth two (2) times a day., Disp: , Rfl:     nadoloL (CORGARD) 40 mg tablet, TAKE 1 TABLET BY MOUTH EVERY DAY, Disp: 90 Tablet, Rfl: 1    donepeziL (ARICEPT) 10 mg tablet, TAKE 1 TABLET BY MOUTH NIGHTLY, Disp: 90 Tab, Rfl: 1    Syringe with Needle, Disp, (BD Luer-Freddy Syringe) 3 mL 23 gauge x 1 1/2\" syrg, FOR USE WITH TESTOSTERONE (INTRAMUSCULAR) INJECTIONS EVERY 7 DAYS, Disp: 100 Pen Needle, Rfl: 1    dextran 70-hypromellose (ARTIFICIAL TEARS,[id number],) ophthalmic solution, 1 Drop., Disp: , Rfl:     Allergies   Allergen Reactions    Sulfa (Sulfonamide Antibiotics) Unknown (comments)     Pt states its been so long he doesn't remember the reaction.  Ciprofloxacin Diarrhea and Myalgia    Other Medication Myalgia     Think  that is was a Sulfa drug, but not sure       Past Surgical History:   Procedure Laterality Date    HX ENDOSCOPY      HX HEENT  2009    deviated septum repair    HX HEENT  2018    cataract removal    HX ORTHOPAEDIC Right 2017    Arthroscopy knee    HX PACEMAKER PLACEMENT  2021    SECONDARY TO L BBB AND JUNCTIONAL RHYTHM DURING SURGERY.       HX REFRACTIVE SURGERY Left     HX SEPTOPLASTY                           Biopsy    HX UROLOGICAL      TURBT/STONES       Social History     Tobacco Use   Smoking Status Former Smoker    Packs/day: 1.00    Quit date: 1974    Years since quittin.4   Smokeless Tobacco Never Used       Social History     Socioeconomic History    Marital status:      Spouse name: Not on file    Number of children: Not on file    Years of education: Not on file    Highest education level: Not on file   Tobacco Use    Smoking status: Former Smoker     Packs/day: 1.00     Quit date: 1974     Years since quittin.4    Smokeless tobacco: Never Used   Substance and Sexual Activity    Alcohol use: No     Alcohol/week: 0.0 standard drinks    Drug use: No     Social Determinants of Health     Financial Resource Strain:     Difficulty of Paying Living Expenses:    Food Insecurity:     Worried About Running Out of Food in the Last Year:    [address] in the Last Year:    Transportation Needs:     Lack of Transportation (Medical):  Lack of Transportation (Non-Medical):    Physical Activity:     Days of Exercise per Week:     Minutes of Exercise per Session:    Stress:     Feeling of Stress :    Social Connections:     Frequency of Communication with Friends and Family:     Frequency of Social Gatherings with Friends and Family:     Attends [employer] Services:     Active Member of Clubs or Organizations:     Attends Club or Organization Meetings:     Marital Status:        Family History   Problem Relation Age of Onset    Diabetes Father     Stroke Brother     Elevated Lipids Other         children     Review of Systems   Constitutional: Negative for chills, fever and weight loss. HENT: Negative for ear discharge, ear pain and sinus pain. Eyes: Negative for blurred vision, double vision, pain and discharge. Respiratory: Negative for cough, hemoptysis, shortness of breath and wheezing. Cardiovascular: Negative for chest pain and palpitations. Gastrointestinal: Negative for abdominal pain, diarrhea, nausea and vomiting. Genitourinary: Positive for hematuria. Negative for dysuria, flank pain and frequency. Musculoskeletal: Negative for back pain, falls, joint pain and myalgias. Skin: Negative for rash. Neurological: Negative for dizziness, speech change, loss of consciousness, weakness and headaches. Endo/Heme/Allergies: Does not bruise/bleed easily. Psychiatric/Behavioral: Negative for depression, hallucinations and suicidal ideas. Objective:     Visit Vitals  /72 (BP 1 Location: Left upper arm)   Pulse 78   Temp 97.8 °F (36.6 °C) (Temporal)   Resp 18   Ht 5' 10\" (1.778 m)   Wt 154 lb (69.9 kg)   SpO2 99%   BMI 22.10 kg/m²     Body mass index is 22.1 kg/m². Physical Exam  Vitals reviewed. Constitutional:       Appearance: Normal appearance.    HENT:      Head: Normocephalic. Nose: Nose normal.   Eyes:      Extraocular Movements: Extraocular movements intact. Conjunctiva/sclera: Conjunctivae normal.      Pupils: Pupils are equal, round, and reactive to light. Cardiovascular:      Rate and Rhythm: Normal rate and regular rhythm. Pulses: Normal pulses. Heart sounds: Normal heart sounds. Pulmonary:      Effort: Pulmonary effort is normal.   Abdominal:      General: Abdomen is flat. Palpations: Abdomen is soft. Musculoskeletal:         General: Normal range of motion. Cervical back: Normal range of motion. Skin:     General: Skin is warm and dry. Capillary Refill: Capillary refill takes less than 2 seconds. Neurological:      General: No focal deficit present. Mental Status: He is alert. Psychiatric:         Mood and Affect: Mood normal.         Behavior: Behavior normal.         Thought Content: Thought content normal.             Results for orders placed or performed during the hospital encounter of 09/17/21   SARS-COV-2   Result Value Ref Range    Specimen source Nasopharyngeal      SARS-CoV-2 Not detected NOTD         No results found for this visit on 09/27/21. Verbal and written instructions (see AVS) provided. Patient expresses understanding of diagnosis and treatment plan. Pt advised to go to the ER if he experiences worsening abdominal pain, vomiting, fever. Patient verbalizes understanding.            Maury Hernandez NP

## 2021-09-27 ENCOUNTER — OFFICE VISIT (OUTPATIENT)
Dept: FAMILY MEDICINE CLINIC | Age: 81
End: 2021-09-27
Payer: MEDICARE

## 2021-09-27 VITALS
RESPIRATION RATE: 18 BRPM | DIASTOLIC BLOOD PRESSURE: 72 MMHG | TEMPERATURE: 97.8 F | HEIGHT: 70 IN | HEART RATE: 78 BPM | BODY MASS INDEX: 22.05 KG/M2 | WEIGHT: 154 LBS | OXYGEN SATURATION: 99 % | SYSTOLIC BLOOD PRESSURE: 136 MMHG

## 2021-09-27 DIAGNOSIS — R33.9 URINARY RETENTION: ICD-10-CM

## 2021-09-27 DIAGNOSIS — R79.89 LOW TESTOSTERONE: ICD-10-CM

## 2021-09-27 DIAGNOSIS — Z95.0 PACEMAKER: Primary | ICD-10-CM

## 2021-09-27 DIAGNOSIS — K74.3 HEPATIC CIRRHOSIS DUE TO PRIMARY BILIARY CHOLANGITIS (HCC): ICD-10-CM

## 2021-09-27 DIAGNOSIS — E53.8 B12 DEFICIENCY: ICD-10-CM

## 2021-09-27 PROCEDURE — G8420 CALC BMI NORM PARAMETERS: HCPCS | Performed by: NURSE PRACTITIONER

## 2021-09-27 PROCEDURE — G8536 NO DOC ELDER MAL SCRN: HCPCS | Performed by: NURSE PRACTITIONER

## 2021-09-27 PROCEDURE — 99214 OFFICE O/P EST MOD 30 MIN: CPT | Performed by: NURSE PRACTITIONER

## 2021-09-27 PROCEDURE — G8432 DEP SCR NOT DOC, RNG: HCPCS | Performed by: NURSE PRACTITIONER

## 2021-09-27 PROCEDURE — 1101F PT FALLS ASSESS-DOCD LE1/YR: CPT | Performed by: NURSE PRACTITIONER

## 2021-09-27 PROCEDURE — G8427 DOCREV CUR MEDS BY ELIG CLIN: HCPCS | Performed by: NURSE PRACTITIONER

## 2021-09-27 RX ORDER — MECLIZINE HYDROCHLORIDE 25 MG/1
25 TABLET ORAL AS NEEDED
Qty: 30 TABLET | Refills: 1 | Status: SHIPPED | OUTPATIENT
Start: 2021-09-27 | End: 2022-01-19

## 2021-09-27 NOTE — PROGRESS NOTES
1. Have you been to the ER, urgent care clinic since your last visit? Hospitalized since your last visit? No    2. Have you seen or consulted any other health care providers outside of the [address] [place] [address] since your last visit? Include any pap smears or colon screening.  Yes When: 9-21-21 bladder surgery

## 2021-09-28 ENCOUNTER — LAB ONLY (OUTPATIENT)
Dept: FAMILY MEDICINE CLINIC | Age: 81
End: 2021-09-28

## 2021-09-28 DIAGNOSIS — K74.3 HEPATIC CIRRHOSIS DUE TO PRIMARY BILIARY CHOLANGITIS (HCC): ICD-10-CM

## 2021-09-28 DIAGNOSIS — E53.8 B12 DEFICIENCY: ICD-10-CM

## 2021-09-28 DIAGNOSIS — R79.89 LOW TESTOSTERONE: ICD-10-CM

## 2021-09-28 LAB
ALBUMIN SERPL-MCNC: 3 G/DL (ref 3.5–5)
ALBUMIN/GLOB SERPL: 1 {RATIO} (ref 1.1–2.2)
ALP SERPL-CCNC: 144 U/L (ref 45–117)
ALT SERPL-CCNC: 56 U/L (ref 12–78)
ANION GAP SERPL CALC-SCNC: 10 MMOL/L (ref 5–15)
AST SERPL-CCNC: 59 U/L (ref 15–37)
BILIRUB SERPL-MCNC: 0.8 MG/DL (ref 0.2–1)
BUN SERPL-MCNC: 17 MG/DL (ref 6–20)
BUN/CREAT SERPL: 16 (ref 12–20)
CALCIUM SERPL-MCNC: 8.8 MG/DL (ref 8.5–10.1)
CHLORIDE SERPL-SCNC: 108 MMOL/L (ref 97–108)
CO2 SERPL-SCNC: 21 MMOL/L (ref 21–32)
CREAT SERPL-MCNC: 1.06 MG/DL (ref 0.7–1.3)
GLOBULIN SER CALC-MCNC: 3.1 G/DL (ref 2–4)
GLUCOSE SERPL-MCNC: 149 MG/DL (ref 65–100)
POTASSIUM SERPL-SCNC: 4.1 MMOL/L (ref 3.5–5.1)
PROT SERPL-MCNC: 6.1 G/DL (ref 6.4–8.2)
SODIUM SERPL-SCNC: 139 MMOL/L (ref 136–145)
VIT B12 SERPL-MCNC: 520 PG/ML (ref 193–986)

## 2021-09-29 NOTE — PROGRESS NOTES
Spoke with patient over the phone. He has not been administering his B12 injections since he was in the hospital. Has not done one in over 2 months. He would like to hold off on the injection and check the B12 level again in a few months. He recently reduced his ursodiol dose after speaking with his liver MD. He will start taking the ursodiol as ordered and will recheck metabolic panel in a few months.

## 2021-10-04 LAB
TESTOST FREE SERPL-MCNC: 0.8 PG/ML (ref 6.6–18.1)
TESTOST SERPL-MCNC: 49 NG/DL (ref 264–916)

## 2021-10-04 NOTE — PROGRESS NOTES
Spoke with patient over the phone. Lab results reviewed. Patient has not been taking his testosterone for over 2 months. He will resume 0.5ml as ordered-he does not need a refill at this time. He plans to F/U and have his testosterone re checked on 11/18/21.

## 2021-10-12 ENCOUNTER — TELEPHONE (OUTPATIENT)
Dept: FAMILY MEDICINE CLINIC | Age: 81
End: 2021-10-12

## 2021-10-13 ENCOUNTER — DOCUMENTATION ONLY (OUTPATIENT)
Dept: FAMILY MEDICINE CLINIC | Age: 81
End: 2021-10-13

## 2021-10-13 NOTE — PROGRESS NOTES
Spoke with patients wife (darryl) over the phone. Made patients wife aware that [facility] does not routinely perform BCG urology procedure. Recommend patient have procedure performed with [address] [place] [address] [facility].

## 2021-10-13 NOTE — PROGRESS NOTES
Patient seen with [facility] in [place] [address] on 10/12/21. Per patient he was told he has a high grade bladder tumor and recommended proceeding with BCG maintenance therapy weekly X , then OV for cysto, possible cystology/FISH  6 weeks after last BCG, Dr. Viv Chaudhry MD to discuss with Dr. Heather Church and [address] [place] [address] location to see if this can be scheduled closer to home. I personally called patient and he informed me of his urology appt and results. I contacted Dr. Skyla Torres office to make him aware and will call his office back at noon to find out the closest location for this procedure to be performed.      Bita Borden NP

## 2021-10-15 DIAGNOSIS — J30.1 SEASONAL ALLERGIC RHINITIS DUE TO POLLEN: ICD-10-CM

## 2021-10-15 DIAGNOSIS — J31.0 NONALLERGIC RHINITIS: ICD-10-CM

## 2021-10-15 RX ORDER — FLUTICASONE PROPIONATE 50 MCG
SPRAY, SUSPENSION (ML) NASAL
Qty: 1 EACH | Refills: 1 | Status: SHIPPED | OUTPATIENT
Start: 2021-10-15 | End: 2021-11-28

## 2021-11-12 NOTE — PROGRESS NOTES
Eleazar Milan is a 80 y.o. 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. F/U in the office in 3 months or sooner if needed    Pacemaker placement d/t heart block  F/U with cardiologist as scheduled   F/U in the office 3 months or sooner if needed    Swelling in lower extremities  Echocardiogram ordered  Patient will make his own appt  Start taking lasix 20mg for swelling in lower extremities  Elevate the lower extremities every day  F/U in the office in 3 months or sooner if needed    Abdominal pain  CT Abdomen w/out contrast ordered- discussed with patient his abdominal pain could be from ascites and fluid buildup and he should F/U with the liver specialist  Patient will make his own appt  We will call with results  F/U in the office in 3 months or sooner if needed    Umbilical hernia  F/U with Donita Patel today as scheduled    Subjective:     Urinary retention and abdominal pain  He was seen in the office on 8/5/21 for urinary retention and diarrhea. His stool cultures were negative, along with his cdiff results. His urine did not show any infection in the office on 8/5/21. He continued to have abdominal pain so he was seen in the [facility] emergency department on 8/6/21 and his CT results showed no acute process and lab work was essentially negative. In the ER, he was also asked to F/U with GI for an umbilical hernia that was manually reduced in the ED.     He completed his F/U with MD Selma SELBY [facility] general surgeon) on 8/10/21 and Donita Patel discussed the need for umbilical hernia repair. However, due to the patients ascites he recommended treatment at a tertiary center. Dr. Reilly Hopes discussed that it would be better to get it treated now before symptoms of increased ascites, engorged abdomina veins or coagulapathy complicates surgery. He has a follow up appointment with Saúl Montgomery today regarding his umbilical hernia. He thinks the umbilical hernia could be causing his constipation. He states the abdominal pain is on the right upper side of his abdomen. States he has diarrhea one week and then he feels constipated the next week. He is requesting a CT scan because he feels like something else is going on. Advised patient he should follow up with the liver specialist because this could be ascites and it is important [place] F/[place] [address]. He has a lot of appointments at this time and wants to get the cardiology appointment and the urology procedures completed before he does his follow up with Kera Aguilar. On 8/25/21 he completed laser sx of bladder stones and a bladder tumor was noted, but intraoperative bradycardic   event requiring early termination of the procedure with later cardiac evaluation, and pacemaker placement (has pacemaker box set up for home). His lower extremities are significantly swollen today which he states is not new. He has a cardiology appointment scheduled for December. Would like patient to start taking lasix, but he reports feeling 'dried up' when taking the lasix. Will order an echocardiogram. He did have a cardiac echo completed on 8/30/21 with the following findings:    · LV: Estimated LVEF is 55 - 60%. Normal cavity size, wall thickness, systolic function (ejection fraction normal) and diastolic function. · LA: Dilated left atrium. Left Atrium volume index is 43 mL/m2. · MV: Mitral valve leaflet calcification. · TV: Mild tricuspid valve regurgitation is present. Right Ventricular Arterial Pressure (RVSP) is 36 mmHg. · PV: Mild pulmonic valve regurgitation is present. He later received cardiac clearance for [facility] 9/21/21 Dr. Sarkis Dumont, Luiig-cystoscopy, TURB of medium bladder tumor, fitzpatrick catheter placement, gemcitabine instillation. He is now completing BCG maintenance therapy for his bladder tumor. He is going to [place] [facility] in [facility] every week for a total of 6 weeks. He has 3 weeks left of treatment.        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) K74.3    Pure hypercholesterolemia E78.00    Thrombocytopenia (HCC) D69.6    B12 deficiency E53.8    Chronic pain of right knee M25.561, G89.29    Lumbar pain with radiation down right leg M54.50, M79.604    Right hip pain M25.551    Chronic prostatitis N41.1    Venous insufficiency of both lower extremities I87.2    Localized edema R60.0    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    Dermatochalasis of both upper eyelids H02.831, H02.834    Dry eyes H04.123    Pseudophakia of both eyes Z96.1    Vitreous floaters of both eyes H43.393    Heart block I45.9    Pacemaker Z95.0       Past Medical History:   Diagnosis Date    Adverse effect of anesthesia     bradycardia (HR 21 and junctional rhythm) with outpt laser bladder stone surgery 8/2021    Allergic     Anemia     BPH (benign prostatic hyperplasia)     Esophageal varices ([address] March 2016    banded x 6    GERD (gastroesophageal reflux disease)     GI bleed March 2016    Hyperlipidemia     Hypogonadism male     Left bundle branch block (LBBB)     Dr. Sedalia Meigs, pacemaker 9/2021    Primary biliary cirrhosis ([address]     Vitamin D deficiency          Current Outpatient Medications:     furosemide (LASIX) 20 mg tablet, Take one tablet every morning for swelling in lower extremities, Disp: 30 Tablet, Rfl: 0    nadoloL (CORGARD) 40 mg tablet, TAKE 1 TABLET BY MOUTH EVERY DAY, Disp: 90 Tablet, Rfl: 1    donepeziL (ARICEPT) 10 mg tablet, TAKE 1 TABLET BY MOUTH EVERY DAY AT NIGHT, Disp: 90 Tablet, Rfl: 1    fluticasone propionate (FLONASE) 50 mcg/actuation nasal spray, PUT 1 SPRAY IN EACH NOSTRIL TWICE A DAY FOR CHRONIC STUFFY & RUNNY NOSE, Disp: 1 Each, Rfl: 1    ursodioL (ACTIGALL) 300 mg capsule, TAKE 2 CAPSULES BY MOUTH TWO (2) TIMES A DAY, Disp: 120 Capsule, Rfl: 0    meclizine (ANTIVERT) 25 mg tablet, Take 1 Tablet by mouth as needed for Dizziness. Indications: sensation of spinning or whirling, Disp: 30 Tablet, Rfl: 1    tamsulosin (Flomax) 0.4 mg capsule, Take 0.4 mg by mouth two (2) times a day., Disp: , Rfl:     Syringe with Needle, Disp, (BD Luer-Freddy Syringe) 3 mL 23 gauge x 1 1/2\" syrg, FOR USE WITH TESTOSTERONE (INTRAMUSCULAR) INJECTIONS EVERY 7 DAYS, Disp: 100 Pen Needle, Rfl: 1    dextran 70-hypromellose (ARTIFICIAL TEARS,[id number],) ophthalmic solution, 1 Drop., Disp: , Rfl:     Allergies   Allergen Reactions    Sulfa (Sulfonamide Antibiotics) Unknown (comments)     Pt states its been so long he doesn't remember the reaction.  Ciprofloxacin Diarrhea and Myalgia    Other Medication Myalgia     Think  that is was a Sulfa drug, but not sure       Past Surgical History:   Procedure Laterality Date    HX ENDOSCOPY      HX HEENT      deviated septum repair    HX HEENT  2018    cataract removal    HX ORTHOPAEDIC Right 2017    Arthroscopy knee    HX PACEMAKER PLACEMENT  2021    SECONDARY TO L BBB AND JUNCTIONAL RHYTHM DURING SURGERY.       HX REFRACTIVE SURGERY Left     HX SEPTOPLASTY                           Biopsy    HX UROLOGICAL      TURBT/STONES       Social History     Tobacco Use   Smoking Status Former Smoker    Packs/day: 1.00    Quit date: 1974    Years since quittin.5   Smokeless Tobacco Never Used       Social History     Socioeconomic History    Marital status:    Tobacco Use    Smoking status: Former Smoker     Packs/day: 1.00     Quit date: 1974     Years since quittin.5    Smokeless tobacco: Never Used   Substance and Sexual Activity    Alcohol use: No     Alcohol/week: 0.0 standard drinks    Drug use: No       Family History   Problem Relation Age of Onset    Diabetes Father     Stroke Brother     Elevated Lipids Other         children     Review of Systems   Constitutional: Negative for fever and weight loss. HENT: Negative for ear discharge, ear pain and sinus pain. Eyes: Negative for blurred vision, double vision, pain and discharge. Respiratory: Negative for cough, hemoptysis, shortness of breath and wheezing. Cardiovascular: Positive for leg swelling (+2 lower extremity swelling. L>R). Negative for chest pain and palpitations. Pacemaker   Gastrointestinal: Positive for abdominal pain (distention. +umbilical hernia). Negative for diarrhea, nausea and vomiting. Right upper quadrant abdominal pain   Genitourinary: Negative for dysuria, flank pain and frequency. Musculoskeletal: Negative for back pain, falls, joint pain and myalgias. Skin: Negative for rash. Neurological: Negative for dizziness, speech change, loss of consciousness, weakness and headaches. Endo/Heme/Allergies: Does not bruise/bleed easily. Psychiatric/Behavioral: Negative for depression, hallucinations and suicidal ideas. Objective:     Visit Vitals  BP (!) 155/88 (BP 1 Location: Left arm, BP Patient Position: Sitting)   Pulse 60   Temp 98.8 °F (37.1 °C) (Temporal)   Resp 18   Ht 5' 10\" (1.778 m)   Wt 182 lb 8 oz (82.8 kg)   SpO2 97%   BMI 26.19 kg/m²     Body mass index is 26.19 kg/m². Physical Exam  Vitals reviewed. Constitutional:       Appearance: Normal appearance. HENT:      Head: Normocephalic.       Nose: Nose normal.   Eyes: Extraocular Movements: Extraocular movements intact. Conjunctiva/sclera: Conjunctivae normal.      Pupils: Pupils are equal, round, and reactive to light. Cardiovascular:      Rate and Rhythm: Normal rate and regular rhythm. Pulses: Normal pulses. Heart sounds: Normal heart sounds. Comments: pacemaker  Pulmonary:      Effort: Pulmonary effort is normal.   Abdominal:      General: Abdomen is flat. Palpations: Abdomen is soft. Tenderness: There is abdominal tenderness in the right upper quadrant. Comments: Distention noted to abdomen. Right upper quadrant TTP   Musculoskeletal:         General: Normal range of motion. Cervical back: Normal range of motion. Right lower le+ Pitting Edema present. Left lower le+ Pitting Edema present. Skin:     General: Skin is warm and dry. Capillary Refill: Capillary refill takes less than 2 seconds. Neurological:      General: No focal deficit present. Mental Status: He is alert. Psychiatric:         Mood and Affect: Mood normal.         Behavior: Behavior normal.         Thought Content: Thought content normal.                 No results found for this visit on 21. Verbal and written instructions (see AVS) provided. Patient expresses understanding of diagnosis and treatment plan. Pt advised to go to the ER if he experiences worsening abdominal pain, vomiting, fever. Patient verbalizes understanding.            Katie Clancy NP

## 2021-11-18 ENCOUNTER — OFFICE VISIT (OUTPATIENT)
Dept: FAMILY MEDICINE CLINIC | Age: 81
End: 2021-11-18
Payer: MEDICARE

## 2021-11-18 ENCOUNTER — OFFICE VISIT (OUTPATIENT)
Dept: SURGERY | Age: 81
End: 2021-11-18

## 2021-11-18 VITALS
HEIGHT: 70 IN | WEIGHT: 182.5 LBS | HEART RATE: 60 BPM | DIASTOLIC BLOOD PRESSURE: 88 MMHG | RESPIRATION RATE: 18 BRPM | OXYGEN SATURATION: 97 % | TEMPERATURE: 98.8 F | BODY MASS INDEX: 26.13 KG/M2 | SYSTOLIC BLOOD PRESSURE: 155 MMHG

## 2021-11-18 VITALS
HEIGHT: 70 IN | WEIGHT: 182 LBS | BODY MASS INDEX: 26.05 KG/M2 | HEART RATE: 62 BPM | SYSTOLIC BLOOD PRESSURE: 144 MMHG | DIASTOLIC BLOOD PRESSURE: 92 MMHG

## 2021-11-18 DIAGNOSIS — K74.3 HEPATIC CIRRHOSIS DUE TO PRIMARY BILIARY CHOLANGITIS (HCC): ICD-10-CM

## 2021-11-18 DIAGNOSIS — Z95.0 PACEMAKER: ICD-10-CM

## 2021-11-18 DIAGNOSIS — M79.89 LOCALIZED SWELLING OF LOWER EXTREMITY: ICD-10-CM

## 2021-11-18 DIAGNOSIS — K74.3 PRIMARY BILIARY CHOLANGITIS (HCC): Primary | ICD-10-CM

## 2021-11-18 DIAGNOSIS — D49.4 BLADDER TUMOR: ICD-10-CM

## 2021-11-18 DIAGNOSIS — R10.11 RIGHT UPPER QUADRANT ABDOMINAL PAIN: Primary | ICD-10-CM

## 2021-11-18 DIAGNOSIS — K42.9 UMBILICAL HERNIA WITHOUT OBSTRUCTION AND WITHOUT GANGRENE: Primary | ICD-10-CM

## 2021-11-18 PROCEDURE — 99214 OFFICE O/P EST MOD 30 MIN: CPT | Performed by: NURSE PRACTITIONER

## 2021-11-18 PROCEDURE — G8432 DEP SCR NOT DOC, RNG: HCPCS | Performed by: NURSE PRACTITIONER

## 2021-11-18 PROCEDURE — G8536 NO DOC ELDER MAL SCRN: HCPCS | Performed by: NURSE PRACTITIONER

## 2021-11-18 PROCEDURE — G8427 DOCREV CUR MEDS BY ELIG CLIN: HCPCS | Performed by: NURSE PRACTITIONER

## 2021-11-18 PROCEDURE — 1101F PT FALLS ASSESS-DOCD LE1/YR: CPT | Performed by: NURSE PRACTITIONER

## 2021-11-18 PROCEDURE — G8419 CALC BMI OUT NRM PARAM NOF/U: HCPCS | Performed by: NURSE PRACTITIONER

## 2021-11-18 RX ORDER — FUROSEMIDE 20 MG/1
TABLET ORAL
Qty: 30 TABLET | Refills: 0 | Status: SHIPPED | OUTPATIENT
Start: 2021-11-18 | End: 2021-12-10

## 2021-11-18 NOTE — PROGRESS NOTES
80year old with multiple co-morbidities including PBC, liver insuff, bladder carcinoma, fluid retention, with an umbilical hernia. Patient was seen previously with recommendation to schedule an appt with surgery at a university that is experienced in dealing with liver issues, better supportive care and could provide the appropriate level of care. Patient has not had a change to make that appt. I discussed the issues related to an incarcerated hernia and advised on repair before his health deteriorates further.

## 2021-11-18 NOTE — PROGRESS NOTES
1. Have you been to the ER, urgent care clinic since your last visit? Hospitalized since your last visit? No    2. Have you seen or consulted any other health care providers outside of the [address] [place] [address] since your last visit? Include any pap smears or colon screening.  Urologist Dr. Jessi Christine    Chief Complaint   Patient presents with    Vitamin B12 Deficiency    Dizziness     Visit Vitals  BP (!) 155/88 (BP 1 Location: Left arm, BP Patient Position: Sitting)   Pulse 60   Temp 98.8 °F (37.1 °C) (Temporal)   Resp 18   Ht 5' 10\" (1.778 m)   Wt 182 lb 8 oz (82.8 kg)   SpO2 97%   BMI 26.19 kg/m²

## 2021-11-24 ENCOUNTER — HOSPITAL ENCOUNTER (OUTPATIENT)
Dept: LAB | Age: 81
Discharge: HOME | End: 2021-11-24
Attending: NURSE PRACTITIONER
Payer: MEDICARE

## 2021-11-24 ENCOUNTER — DOCUMENTATION ONLY (OUTPATIENT)
Dept: FAMILY MEDICINE CLINIC | Age: 81
End: 2021-11-24

## 2021-11-24 ENCOUNTER — HOSPITAL ENCOUNTER (OUTPATIENT)
Dept: ULTRASOUND IMAGING | Age: 81
Discharge: HOME | End: 2021-11-24
Attending: NURSE PRACTITIONER
Payer: MEDICARE

## 2021-11-24 ENCOUNTER — HOSPITAL ENCOUNTER (OUTPATIENT)
Dept: CT IMAGING | Age: 81
Discharge: HOME | End: 2021-11-24
Attending: NURSE PRACTITIONER
Payer: MEDICARE

## 2021-11-24 DIAGNOSIS — R10.11 RIGHT UPPER QUADRANT ABDOMINAL PAIN: ICD-10-CM

## 2021-11-24 DIAGNOSIS — M79.89 LOCALIZED SWELLING OF LOWER EXTREMITY: ICD-10-CM

## 2021-11-24 LAB
ALBUMIN SERPL-MCNC: 3 G/DL (ref 3.5–5)
ALBUMIN/GLOB SERPL: 0.9 {RATIO} (ref 1.1–2.2)
ALP SERPL-CCNC: 119 U/L (ref 45–117)
ALT SERPL-CCNC: 27 U/L (ref 12–78)
ANION GAP SERPL CALC-SCNC: 4 MMOL/L (ref 5–15)
AST SERPL-CCNC: 30 U/L (ref 15–37)
BASOPHILS # BLD: 0 K/UL (ref 0–0.1)
BASOPHILS NFR BLD: 1 % (ref 0–1)
BILIRUB DIRECT SERPL-MCNC: 0.4 MG/DL (ref 0–0.2)
BILIRUB SERPL-MCNC: 1.8 MG/DL (ref 0.2–1)
BUN SERPL-MCNC: 13 MG/DL (ref 6–20)
BUN/CREAT SERPL: 12 (ref 12–20)
CALCIUM SERPL-MCNC: 8.5 MG/DL (ref 8.5–10.1)
CHLORIDE SERPL-SCNC: 105 MMOL/L (ref 97–108)
CO2 SERPL-SCNC: 31 MMOL/L (ref 21–32)
CREAT SERPL-MCNC: 1.12 MG/DL (ref 0.7–1.3)
DIFFERENTIAL METHOD BLD: ABNORMAL
ECHO AO ROOT DIAM: 3.9 CM
ECHO AV PEAK GRADIENT: 8.89 MMHG
ECHO AV PEAK VELOCITY: 149.11 CM/S
ECHO EST RA PRESSURE: 10 MMHG
ECHO LA MAJOR AXIS: 3.56 CM
ECHO LV E' SEPTAL VELOCITY: 3.64 CM/S
ECHO LV INTERNAL DIMENSION DIASTOLIC: 5.09 CM (ref 4.2–5.9)
ECHO LV INTERNAL DIMENSION SYSTOLIC: 3.55 CM
ECHO LV IVSD: 0.86 CM (ref 0.6–1)
ECHO LV MASS 2D: 160.7 G (ref 88–224)
ECHO LV POSTERIOR WALL DIASTOLIC: 0.92 CM (ref 0.6–1)
ECHO LVOT DIAM: 2.24 CM
ECHO MV A VELOCITY: 90.04 CM/S
ECHO MV E DECELERATION TIME (DT): 248.76 MS
ECHO MV E VELOCITY: 62.83 CM/S
ECHO MV E/A RATIO: 0.7
ECHO MV E/E' SEPTAL: 17.26
ECHO RIGHT VENTRICULAR SYSTOLIC PRESSURE (RVSP): 31.33 MMHG
ECHO TV REGURGITANT MAX VELOCITY: 230.88 CM/S
ECHO TV REGURGITANT PEAK GRADIENT: 21.33 MMHG
EOSINOPHIL # BLD: 0.2 K/UL (ref 0–0.4)
EOSINOPHIL NFR BLD: 4 % (ref 0–7)
ERYTHROCYTE [DISTWIDTH] IN BLOOD BY AUTOMATED COUNT: 14.2 % (ref 11.5–14.5)
GLOBULIN SER CALC-MCNC: 3.2 G/DL (ref 2–4)
GLUCOSE SERPL-MCNC: 91 MG/DL (ref 65–100)
HCT VFR BLD AUTO: 43.1 % (ref 36.6–50.3)
HGB BLD-MCNC: 14.1 G/DL (ref 12.1–17)
IMM GRANULOCYTES # BLD AUTO: 0 K/UL (ref 0–0.04)
IMM GRANULOCYTES NFR BLD AUTO: 0 % (ref 0–0.5)
INR PPP: 1.2 (ref 0.9–1.1)
LYMPHOCYTES # BLD: 1.2 K/UL (ref 0.8–3.5)
LYMPHOCYTES NFR BLD: 23 % (ref 12–49)
MCH RBC QN AUTO: 31.2 PG (ref 26–34)
MCHC RBC AUTO-ENTMCNC: 32.7 G/DL (ref 30–36.5)
MCV RBC AUTO: 95.4 FL (ref 80–99)
MONOCYTES # BLD: 0.5 K/UL (ref 0–1)
MONOCYTES NFR BLD: 9 % (ref 5–13)
NEUTS SEG # BLD: 3.4 K/UL (ref 1.8–8)
NEUTS SEG NFR BLD: 63 % (ref 32–75)
NRBC # BLD: 0 K/UL (ref 0–0.01)
NRBC BLD-RTO: 0 PER 100 WBC
PLATELET # BLD AUTO: 108 K/UL (ref 150–400)
PMV BLD AUTO: 10.4 FL (ref 8.9–12.9)
POTASSIUM SERPL-SCNC: 3.9 MMOL/L (ref 3.5–5.1)
PROT SERPL-MCNC: 6.2 G/DL (ref 6.4–8.2)
PROTHROMBIN TIME: 11.6 SEC (ref 9–11.1)
RBC # BLD AUTO: 4.52 M/UL (ref 4.1–5.7)
SODIUM SERPL-SCNC: 140 MMOL/L (ref 136–145)
WBC # BLD AUTO: 5.4 K/UL (ref 4.1–11.1)

## 2021-11-24 PROCEDURE — 80048 BASIC METABOLIC PNL TOTAL CA: CPT

## 2021-11-24 PROCEDURE — 80076 HEPATIC FUNCTION PANEL: CPT

## 2021-11-24 PROCEDURE — 85610 PROTHROMBIN TIME: CPT

## 2021-11-24 PROCEDURE — 82107 ALPHA-FETOPROTEIN L3: CPT

## 2021-11-24 PROCEDURE — 36415 COLL VENOUS BLD VENIPUNCTURE: CPT

## 2021-11-24 PROCEDURE — 93306 TTE W/DOPPLER COMPLETE: CPT

## 2021-11-24 PROCEDURE — 74150 CT ABDOMEN W/O CONTRAST: CPT

## 2021-11-24 PROCEDURE — 85025 COMPLETE CBC W/AUTO DIFF WBC: CPT

## 2021-11-24 PROCEDURE — 93306 TTE W/DOPPLER COMPLETE: CPT | Performed by: INTERNAL MEDICINE

## 2021-11-24 NOTE — PROGRESS NOTES
Spoke with patient over the phone. Patient made aware of CT results, echo results and labs. He has follow-up scheduled with Trina Draper on 1/6/21. He is currently taking lasix for the swelling in his extremities and reports swelling has started to improve. He has also made an appointment with [facility] specialist for December 9th, 2021.

## 2021-11-24 NOTE — PROGRESS NOTES
No significant change in the echocardiogram completed on 11/24/21. Normal left ventricular ejection fraction. Is the swelling in the legs better?

## 2021-11-24 NOTE — PROGRESS NOTES
Patient and patients wife made aware of labs.    [facility]

## 2021-11-25 LAB
AFP L3 MFR SERPL: NORMAL % (ref 0–9.9)
AFP SERPL-MCNC: 4.1 NG/ML (ref 0–8)

## 2021-11-27 DIAGNOSIS — J31.0 NONALLERGIC RHINITIS: ICD-10-CM

## 2021-11-27 DIAGNOSIS — J30.1 SEASONAL ALLERGIC RHINITIS DUE TO POLLEN: ICD-10-CM

## 2021-11-28 RX ORDER — FLUTICASONE PROPIONATE 50 MCG
SPRAY, SUSPENSION (ML) NASAL
Qty: 1 EACH | Refills: 1 | Status: SHIPPED | OUTPATIENT
Start: 2021-11-28 | End: 2022-01-08

## 2021-12-07 ENCOUNTER — TELEPHONE (OUTPATIENT)
Dept: FAMILY MEDICINE CLINIC | Age: 81
End: 2021-12-07

## 2021-12-07 NOTE — TELEPHONE ENCOUNTER
----- Message from Tai Capellan sent at 12/7/2021  2:18 PM EST -----  Subject: Message to Provider    QUESTIONS  Information for Provider? pt wife is calling in stating he provider   prescribed his an antibiotic (amx) and is wanting to know if he can drop   his lacet or not she is wanting to be call back as soon as possible   ---------------------------------------------------------------------------  --------------  CALL BACK INFO  What is the best way for the office to contact you? OK to leave message on   voicemail  Preferred Call Back Phone Number? [phone]  ---------------------------------------------------------------------------  --------------  SCRIPT ANSWERS  Relationship to Patient? Other  Representative Name? leodan   Is the Representative on the appropriate HIPAA document in Epic?  Yes

## 2021-12-07 NOTE — TELEPHONE ENCOUNTER
Spoke with wife, she wanted to know if he could hold off on the lasix for a while while he is taking the antibx. Spoke to magalie she stated that is fine just resume if swelling starts again.

## 2021-12-10 RX ORDER — FUROSEMIDE 20 MG/1
TABLET ORAL
Qty: 30 TABLET | Refills: 0 | Status: SHIPPED | OUTPATIENT
Start: 2021-12-10 | End: 2022-01-08

## 2021-12-29 DIAGNOSIS — J31.0 NONALLERGIC RHINITIS: ICD-10-CM

## 2021-12-29 DIAGNOSIS — J30.1 SEASONAL ALLERGIC RHINITIS DUE TO POLLEN: ICD-10-CM

## 2022-01-08 DIAGNOSIS — J31.0 NONALLERGIC RHINITIS: ICD-10-CM

## 2022-01-08 DIAGNOSIS — J30.1 SEASONAL ALLERGIC RHINITIS DUE TO POLLEN: ICD-10-CM

## 2022-01-08 RX ORDER — FUROSEMIDE 20 MG/1
TABLET ORAL
Qty: 30 TABLET | Refills: 0 | Status: SHIPPED | OUTPATIENT
Start: 2022-01-08 | End: 2022-02-01

## 2022-01-08 RX ORDER — FLUTICASONE PROPIONATE 50 MCG
SPRAY, SUSPENSION (ML) NASAL
Qty: 1 EACH | Refills: 1 | Status: SHIPPED | OUTPATIENT
Start: 2022-01-08 | End: 2022-02-01

## 2022-01-11 ENCOUNTER — LAB ONLY (OUTPATIENT)
Dept: FAMILY MEDICINE CLINIC | Age: 82
End: 2022-01-11
Payer: MEDICARE

## 2022-01-11 DIAGNOSIS — E53.8 B12 DEFICIENCY: Primary | ICD-10-CM

## 2022-01-11 DIAGNOSIS — R79.89 LOW TESTOSTERONE: ICD-10-CM

## 2022-01-11 PROCEDURE — 36415 COLL VENOUS BLD VENIPUNCTURE: CPT | Performed by: NURSE PRACTITIONER

## 2022-01-11 NOTE — PROGRESS NOTES
1. Have you been to the ER, urgent care clinic since your last visit? Hospitalized since your last visit? {Yes when where and reason for visit:20441}    2. Have you seen or consulted any other health care providers outside of the [address] [place] [address] since your last visit? Include any pap smears or colon screening.  {Yes when where and reason for visit:20441}

## 2022-01-12 LAB
FOLATE SERPL-MCNC: 12.9 NG/ML (ref 5–21)
VIT B12 SERPL-MCNC: 343 PG/ML (ref 193–986)

## 2022-01-13 ENCOUNTER — TELEPHONE (OUTPATIENT)
Dept: FAMILY MEDICINE CLINIC | Age: 82
End: 2022-01-13

## 2022-01-13 LAB
TESTOST FREE SERPL-MCNC: 1.1 PG/ML (ref 6.6–18.1)
TESTOST SERPL-MCNC: 76 NG/DL (ref 264–916)

## 2022-01-14 NOTE — PROGRESS NOTES
Testosterone levels are very low. Not sure he is good candidate for testosterone replacement therapy given his heart problems.

## 2022-01-17 ENCOUNTER — TELEPHONE (OUTPATIENT)
Dept: FAMILY MEDICINE CLINIC | Age: 82
End: 2022-01-17

## 2022-01-17 DIAGNOSIS — K74.3 HEPATIC CIRRHOSIS DUE TO PRIMARY BILIARY CHOLANGITIS (HCC): ICD-10-CM

## 2022-01-17 DIAGNOSIS — E55.9 VITAMIN D DEFICIENCY: ICD-10-CM

## 2022-01-17 RX ORDER — NADOLOL 40 MG/1
40 TABLET ORAL DAILY
Qty: 90 TABLET | Refills: 1 | Status: SHIPPED | OUTPATIENT
Start: 2022-01-17 | End: 2022-01-19

## 2022-01-17 RX ORDER — URSODIOL 300 MG/1
CAPSULE ORAL
Qty: 360 CAPSULE | Refills: 3 | Status: SHIPPED | OUTPATIENT
Start: 2022-01-17

## 2022-01-17 NOTE — TELEPHONE ENCOUNTER
Pt received the [employer] message. She would like to have Testosterone medication called to [facility] ASAP.

## 2022-01-18 ENCOUNTER — CLINICAL SUPPORT (OUTPATIENT)
Dept: FAMILY MEDICINE CLINIC | Age: 82
End: 2022-01-18
Payer: MEDICARE

## 2022-01-18 ENCOUNTER — TELEPHONE (OUTPATIENT)
Dept: FAMILY MEDICINE CLINIC | Age: 82
End: 2022-01-18

## 2022-01-18 DIAGNOSIS — E53.8 B12 DEFICIENCY: Primary | ICD-10-CM

## 2022-01-18 DIAGNOSIS — E29.1 HYPOGONADISM MALE: ICD-10-CM

## 2022-01-18 PROCEDURE — 96372 THER/PROPH/DIAG INJ SC/IM: CPT | Performed by: NURSE PRACTITIONER

## 2022-01-18 RX ORDER — CYANOCOBALAMIN 1000 UG/ML
1000 INJECTION, SOLUTION INTRAMUSCULAR; SUBCUTANEOUS ONCE
Status: COMPLETED | OUTPATIENT
Start: 2022-01-18 | End: 2022-01-18

## 2022-01-18 RX ORDER — TESTOSTERONE CYPIONATE 200 MG/ML
INJECTION INTRAMUSCULAR
Qty: 10 ML | Refills: 0 | Status: SHIPPED | OUTPATIENT
Start: 2022-01-18 | End: 2022-02-05

## 2022-01-18 RX ADMIN — CYANOCOBALAMIN 1000 MCG: 1000 INJECTION, SOLUTION INTRAMUSCULAR; SUBCUTANEOUS at 08:37

## 2022-01-18 NOTE — TELEPHONE ENCOUNTER
Patient was taken off of testosterone by Cardiology and patient has been off of it for over a year. Patient will need to follow up with Cardiology to discuss with them and have them clear ok to get back on it per Angelo Yost NP. Patient aware and states understanding.

## 2022-01-18 NOTE — TELEPHONE ENCOUNTER
Spoke to pt's cardiology nurse today who states pt may restart testosterone replacement therapy.  Refill sent

## 2022-01-19 ENCOUNTER — OFFICE VISIT (OUTPATIENT)
Dept: HEMATOLOGY | Age: 82
End: 2022-01-19
Payer: MEDICARE

## 2022-01-19 ENCOUNTER — HOSPITAL ENCOUNTER (OUTPATIENT)
Dept: LAB | Age: 82
Discharge: HOME | End: 2022-01-19
Payer: MEDICARE

## 2022-01-19 ENCOUNTER — TELEPHONE (OUTPATIENT)
Dept: FAMILY MEDICINE CLINIC | Age: 82
End: 2022-01-19

## 2022-01-19 VITALS
DIASTOLIC BLOOD PRESSURE: 67 MMHG | WEIGHT: 164 LBS | HEIGHT: 70 IN | SYSTOLIC BLOOD PRESSURE: 113 MMHG | HEART RATE: 60 BPM | BODY MASS INDEX: 23.48 KG/M2 | OXYGEN SATURATION: 98 % | TEMPERATURE: 97.7 F | RESPIRATION RATE: 15 BRPM

## 2022-01-19 DIAGNOSIS — K74.3 HEPATIC CIRRHOSIS DUE TO PRIMARY BILIARY CHOLANGITIS (HCC): ICD-10-CM

## 2022-01-19 LAB
ALBUMIN SERPL-MCNC: 3.3 G/DL (ref 3.4–5)
ALBUMIN/GLOB SERPL: 1 {RATIO} (ref 0.8–1.7)
ALP SERPL-CCNC: 121 U/L (ref 45–117)
ALT SERPL-CCNC: 34 U/L (ref 16–61)
ANION GAP SERPL CALC-SCNC: 4 MMOL/L (ref 3–18)
AST SERPL-CCNC: 39 U/L (ref 10–38)
BASOPHILS # BLD: 0.1 K/UL (ref 0–0.1)
BASOPHILS NFR BLD: 1 % (ref 0–2)
BILIRUB DIRECT SERPL-MCNC: 0.3 MG/DL (ref 0–0.2)
BILIRUB SERPL-MCNC: 0.9 MG/DL (ref 0.2–1)
BUN SERPL-MCNC: 21 MG/DL (ref 7–18)
BUN/CREAT SERPL: 19 (ref 12–20)
CALCIUM SERPL-MCNC: 8.7 MG/DL (ref 8.5–10.1)
CHLORIDE SERPL-SCNC: 109 MMOL/L (ref 100–111)
CO2 SERPL-SCNC: 26 MMOL/L (ref 21–32)
CREAT SERPL-MCNC: 1.09 MG/DL (ref 0.6–1.3)
DIFFERENTIAL METHOD BLD: ABNORMAL
EOSINOPHIL # BLD: 0.3 K/UL (ref 0–0.4)
EOSINOPHIL NFR BLD: 7 % (ref 0–5)
ERYTHROCYTE [DISTWIDTH] IN BLOOD BY AUTOMATED COUNT: 14 % (ref 11.6–14.5)
GLOBULIN SER CALC-MCNC: 3.4 G/DL (ref 2–4)
GLUCOSE SERPL-MCNC: 102 MG/DL (ref 74–99)
HCT VFR BLD AUTO: 39.7 % (ref 36–48)
HGB BLD-MCNC: 13.2 G/DL (ref 13–16)
IMM GRANULOCYTES # BLD AUTO: 0 K/UL (ref 0–0.04)
IMM GRANULOCYTES NFR BLD AUTO: 0 % (ref 0–0.5)
INR PPP: 1.1 (ref 0.8–1.2)
LYMPHOCYTES # BLD: 1.9 K/UL (ref 0.9–3.6)
LYMPHOCYTES NFR BLD: 44 % (ref 21–52)
MCH RBC QN AUTO: 30.2 PG (ref 24–34)
MCHC RBC AUTO-ENTMCNC: 33.2 G/DL (ref 31–37)
MCV RBC AUTO: 90.8 FL (ref 78–100)
MONOCYTES # BLD: 0.4 K/UL (ref 0.05–1.2)
MONOCYTES NFR BLD: 9 % (ref 3–10)
NEUTS SEG # BLD: 1.6 K/UL (ref 1.8–8)
NEUTS SEG NFR BLD: 38 % (ref 40–73)
NRBC # BLD: 0 K/UL (ref 0–0.01)
NRBC BLD-RTO: 0 PER 100 WBC
PLATELET # BLD AUTO: 92 K/UL (ref 135–420)
PMV BLD AUTO: 11.1 FL (ref 9.2–11.8)
POTASSIUM SERPL-SCNC: 4.5 MMOL/L (ref 3.5–5.5)
PROT SERPL-MCNC: 6.7 G/DL (ref 6.4–8.2)
PROTHROMBIN TIME: 13.4 SEC (ref 11.5–15.2)
RBC # BLD AUTO: 4.37 M/UL (ref 4.35–5.65)
SODIUM SERPL-SCNC: 139 MMOL/L (ref 136–145)
WBC # BLD AUTO: 4.2 K/UL (ref 4.6–13.2)

## 2022-01-19 PROCEDURE — 85025 COMPLETE CBC W/AUTO DIFF WBC: CPT

## 2022-01-19 PROCEDURE — G8536 NO DOC ELDER MAL SCRN: HCPCS | Performed by: NURSE PRACTITIONER

## 2022-01-19 PROCEDURE — G8420 CALC BMI NORM PARAMETERS: HCPCS | Performed by: NURSE PRACTITIONER

## 2022-01-19 PROCEDURE — 99215 OFFICE O/P EST HI 40 MIN: CPT | Performed by: NURSE PRACTITIONER

## 2022-01-19 PROCEDURE — 80076 HEPATIC FUNCTION PANEL: CPT

## 2022-01-19 PROCEDURE — G8432 DEP SCR NOT DOC, RNG: HCPCS | Performed by: NURSE PRACTITIONER

## 2022-01-19 PROCEDURE — 80048 BASIC METABOLIC PNL TOTAL CA: CPT

## 2022-01-19 PROCEDURE — 36415 COLL VENOUS BLD VENIPUNCTURE: CPT

## 2022-01-19 PROCEDURE — 1101F PT FALLS ASSESS-DOCD LE1/YR: CPT | Performed by: NURSE PRACTITIONER

## 2022-01-19 PROCEDURE — G8427 DOCREV CUR MEDS BY ELIG CLIN: HCPCS | Performed by: NURSE PRACTITIONER

## 2022-01-19 PROCEDURE — 82107 ALPHA-FETOPROTEIN L3: CPT

## 2022-01-19 PROCEDURE — 85610 PROTHROMBIN TIME: CPT

## 2022-01-19 RX ORDER — NADOLOL 20 MG/1
20 TABLET ORAL DAILY
Qty: 30 TABLET | Refills: 0 | Status: SHIPPED | OUTPATIENT
Start: 2022-01-19

## 2022-01-19 NOTE — TELEPHONE ENCOUNTER
Prior [facility] is needed for Testosterone Cypionate 200 MG/ML. Go to go.[employer]    Key:  [id number]  Last Name:  Imelda Schulz  :  1940

## 2022-01-19 NOTE — PROGRESS NOTES
[address] [facility], [place], [address], [place], [place]      Malinaosito Campbell, EMILIE Mercedes, [facility]     April S Trudy, [facility]   DANNA Chaudhary-CASTILLO Fischer, [facility]       Alvin Matta [address] 136    at [address], [address] [place] [address], [facility] 22.    [phone]    FAX: [address] [place] [address]    at [address], [address]    [phone]    FAX: [phone]         Patient Care Team:  Joann Herman NP as PCP - General (Nurse Practitioner)  Joann Herman NP as PCP - [facility]  Pinky Alvarenga MD (Gastroenterology)  Arabella Jay MD (Urology)  Geovany Gonzalez MD (Cardio Vascular Surgery)      Problem List  Date Reviewed: 11/18/2021          Codes Class Noted    Pacemaker ICD-10-CM: Z95.0  ICD-9-CM: V45.01  9/27/2021        Heart block ICD-10-CM: I45.9  ICD-9-CM: 426.9  9/3/2021        Dry eyes ICD-10-CM: R23.282  ICD-9-CM: 375.15  8/5/2021    Overview Signed 8/5/2021 10:35 AM by Joann Herman NP     Last Assessment & Plan:   Formatting of this note might be different from the original.  Continue drops PRN             Venous insufficiency of both lower extremities ICD-10-CM: I87.2  ICD-9-CM: 459.81  3/9/2021        Localized edema ICD-10-CM: R60.0  ICD-9-CM: 782.3  3/9/2021        Dermatochalasis of both upper eyelids ICD-10-CM: H02.831, N21.171  ICD-9-CM: 374.87  11/12/2020    Overview Signed 8/5/2021 10:35 AM by Joann Herman NP     Last Assessment & Plan:   Formatting of this note might be different from the original.  With brow ptosis  Becoming visually significant   Patient not yet ready for surgery.              Bilateral posterior capsular opacification ICD-10-CM: Y42.926  ICD-9-CM: 366.50  11/12/2019 Overview Signed 2021 10:35 AM by Sonia Mckeon NP     Last Assessment & Plan:   Formatting of this note might be different from the original.  OS doing well post yag. Doing well sc. Chronic prostatitis ICD-10-CM: N41.1  ICD-9-CM: 601.1  2019        Chronic pain of right knee ICD-10-CM: M25.561, G89.29  ICD-9-CM: 719.46, 338.29  2018        Lumbar pain with radiation down right leg ICD-10-CM: M54.50, M79.604  ICD-9-CM: 724.2  2018        Right hip pain ICD-10-CM: M25.551  ICD-9-CM: 719.45  2018        Pseudophakia of both eyes ICD-10-CM: Z96.1  ICD-9-CM: V43.1  2018    Overview Signed 2021 10:35 AM by Sonia Mckeon NP     Last Assessment & Plan:   Formatting of this note might be different from the original.  Karsten  is doing well post phaco with IOL OU with toric  Rx for glasses has been given . Follow up with Dr. Dilcia Crawley in 1yr. Vitreous floaters of both eyes ICD-10-CM: W39.733  ICD-9-CM: 379.24  2018    Overview Signed 2021 10:35 AM by Sonia Mckeon NP     Last Assessment & Plan:   Formatting of this note might be different from the original.  Floaters - stable. No traction symptoms or associated retinal changes present. Discussed.              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 The active problem list, all pertinent past medical history, medications, liver histology, endoscopic studies, radiologic findings and laboratory findings related to the liver disorder were reviewed with the patient. The patient is a 80 y.o.  male who was first noted to have [facility] [place] about 14 years ago based on serologies. He has been maintained on 1200 mg of ursodiol without issue. Imaging of the liver performed 02/2017 with ultrasound followed with MRI in 03/2017. Heterogeneous echotexture. No focal liver lesions. Most recent imaging was performed in 12/2020 with ultrasound and was unremarkable. The patient has no extrahepatic autoimmune disorders. The most recent laboratory studies indicate that the liver transaminases are normal, alkaline phosphatase is normal, tests of hepatic synthetic and metabolic function are normal, and the platelet count is depressed. The patient has developed varices with recent hemorrhage. He has undergone serial EGD's by Dr. Arley Benitez and he is continuing to band the varices. The patient completes all daily activities without any functional limitations. He has not developed ascites or encephalopathy. No edema. The patient has no complaints which can be attributed to liver disease. The patient has not experienced fatigue, fevers, chills, shortness of breath, chest pain, pain in the right side over the liver, diffuse abdominal pain, nausea, vomiting, constipation, diarrhea, dryeyes, dry mouth, arthralgias, myalgias, yellowing of the eyes or skin, itching, dark urine, problems concentrating, swelling of the abdomen, no recent hematemesis or hematochezia other than event mentioned. Since the last office appointment the patient has:  Had no changes in the liver disease. Has stopped taking multiple meds. Reports his memory is better since stopping these. Since June:   \"So many things have happened\". ... Was prescribed CIPRO for probable UTI. \"It just about killed me\"  \"Things just got worse and worse\"    Has seen urologist due to frequency. \"Stones in the bladder\"  Referred to Uro in [place]. During lithotripsy, \"My heart stopped\" during the process of removing the stones. Referred then to Cardio. Cardio ran multiple tests. Had pacemaker placed and sent back to urology. Finished getting the stones out. \"One stone looked like a tumor\"  This was removed and came back positive for cancer. Favorable bladder tumor. No radiation or chemo. Started on immunotherapy treatment. Finished on 12/21/2021. In the time, \"I also had a hernia\". Repair on hold. ASSESSMENT AND PLAN:  Primary Biliary Cholangitis with cirrhosis. The most recent laboratory studies indicate that the liver transaminases are normal, alkaline phosphatase is normal,  tests of hepatic synthetic and metabolic function are normal, and the platelet count is depressed. Will perform laboratory testing to monitor liver function and degree of liver injury. This will include hepatic panel, a CBC w/ diff, a BMP, a PT/INR, an AFP-L3%. Complications of cirrhosis were discussed in detail. We discussed thrombocytopenia, portal hypertension, varices, GI bleeding, peripheral edema, ascites, hepatic encephalopathy, and hepatocellular carcinoma. We discussed the need for follow ups on a regular basis to monitor for complications. We discussed the need for every 6 month liver imaging studies. 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 Lulú Mendiola. Follow up with him as directed. Discussed extrahepatic manifestations of PBC such as osteoporosis and elevated cholesterol. The risk of osteoporosis is increased in patients with PBC. DEXA bone density to assess for osteoporosis should be ordered by the patients primary care physician. The patient was advised to take calcium supplementation and Vitamin D.  He was advised to take supplemental vitamin A, D, E, and K. Patients with PBC are at increased risk for hypercholesterolemia. However, this is not associated with an increased risk of cardiac disease and MI because this is typically an elevation in HDL cholesterol. There is no contraindication for treatment with a statin if this is felt to be indicated based upon cardiac risk assessment. The patient is receiving treatment with urosdeoxycholic Acid. Continue this at current dose. The patient  and is tolerating the treatment without significant side effects. His alkaline phosphatase is normal.     Vaccination for viral hepatitis A and B is recommended since the patient has no serologic evidence of previous exposure or vaccination with immunity. [address] screening has recently been performed and does not suggest [address] The next liver imaging study is due next month. This was ordered today. Bone density up to date with osteopenia. All of the above issues were discussed with the patient. All questions were answered. The patient expressed a clear understanding of the above. 45 minutes total time spent with this patient with more than 50% of this time spent counseling and coordinating care as described above. ALLERGIES  Allergies   Allergen Reactions    Sulfa (Sulfonamide Antibiotics) Unknown (comments)     Pt states its been so long he doesn't remember the reaction.      Other Medication Myalgia     Think  that is was a Sulfa drug, but not sure       MEDICATIONS  Current Outpatient Medications   Medication Sig    ursodioL (ACTIGALL) 300 mg capsule TAKE 2 CAPSULES BY MOUTH TWICE A DAY    nadoloL (CORGARD) 40 mg tablet TAKE 1 TABLET BY MOUTH EVERY DAY    testosterone cypionate (DEPOTESTOTERONE CYPIONATE) 200 mg/mL injection INJECT 0.5ML INTRAMUSCULAR WEEKLY    donepeziL (ARICEPT) 10 mg tablet TAKE 1 TABLET BY MOUTH NIGHTLY    tamsulosin (FLOMAX) 0.4 mg capsule TAKE 1 CAPSULE BY MOUTH EVERY DAY  spironolactone (ALDACTONE) 50 mg tablet Take 1 Tab by mouth daily.  Syringe with Needle, Disp, (BD Luer-Freddy Syringe) 3 mL 23 gauge x 1 1/2\" syrg FOR USE WITH TESTOSTERONE (INTRAMUSCULAR) INJECTIONS EVERY 7 DAYS    fluticasone propionate (FLONASE) 50 mcg/actuation nasal spray PUT 1 SPRAY IN EACH NOSTRIL TWICE A DAY FOR CHRONIC STUFFY & RUNNY NOSE    metaxalone (SKELAXIN) 800 mg tablet Take 1 Tab by mouth every eight to twelve (8-12) hours as needed for Muscle Spasm(s).  ipratropium (ATROVENT) 42 mcg (0.06 %) nasal spray SPRAY 1 SPRAY FOUR TIMES DAILY. INDICATIONS: RUNNY NOSE    dextran 70-hypromellose (ARTIFICIAL TEARS,[id number],) ophthalmic solution 1 Drop.  meclizine (ANTIVERT) 25 mg tablet Take 25 mg by mouth as needed. Indications: sensation of spinning or whirling    therapeutic multivitamin-minerals (VITAMINS AND MINERALS) tablet Take 1 Tab by mouth. Current Facility-Administered Medications   Medication    cyanocobalamin (VITAMIN B12) injection 1,000 mcg       SYSTEM REVIEW NOT RELATED TO LIVER DISEASE OR REVIEWED ABOVE:  Constitution systems: Negative for fever, chills, weight gain, weight loss. Eyes: Negative for visual changes. ENT: Negative for sore throat, painful swallowing. Respiratory: Negative for cough, hemoptysis, SOB. Cardiology: Negative for chest pain, palpitations. GI:  Negative for constipation or diarrhea. : + for urinary frequency, dysuria, hematuria, + nocturia. +   Skin: Negative for rash. Hematology: Negative for easy bruising, blood clots. Musculo-skelatal: Negative for back pain, muscle pain, weakness. Neurologic: Negative for headaches, dizziness, vertigo, memory problems not related to HE. Psychology: Negative for anxiety, depression. FAMILY HISTORY:  The mother passed CHF age 80  The father passed CAD age 76   There is no family history of liver disease. There is no family history of immune disorders.     SOCIAL HISTORY:  The patient is . The patient has 4 children,   The patient has never used tobacco products. The patient has never consumed significant amounts of alcohol. The patient retired in . PHYSICAL EXAMINATION:  Visit Vitals  BP (!) 160/92   Pulse 94   Temp 97.2 °F (36.2 °C) (Tympanic)   Wt 168 lb 6 oz (76.4 kg)   SpO2 98%   BMI 24.16 kg/m²     General: No acute distress. Eyes: Sclera anicteric. ENT: No oral lesions. Thyroid normal.  Nodes: No adenopathy. Skin: No spider angiomata. No jaundice. No palmar erythema. Respiratory: Lungs clear to auscultation. Cardiovascular: Regular heart rate. No murmurs. No JVD. Abdomen: Soft non-tender. Liver size normal to percussion/palpation. Spleen not palpable. No obvious ascites. Extremities: No edema. No muscle wasting. No gross arthritic changes. Neurologic: Alert and oriented. Cranial nerves grossly intact. No asterixis.       LABORATORY STUDIES:  Liver [place] of [address] Units 1/19/2022 11/24/2021   WBC 4.6 - 13.2 K/uL 4.2 (L) 5.4   ANC 1.8 - 8.0 K/UL 1.6 (L) 3.4   HGB 13.0 - 16.0 g/dL 13.2 14.1    - 420 K/uL 92 (L) 108 (L)   INR 0.8 - 1.2   1.1 1.2 (H)   AST 10 - 38 U/L 39 (H) 30   ALT 16 - 61 U/L 34 27   Alk Phos 45 - 117 U/L 121 (H) 119 (H)   Bili, Total 0.2 - 1.0 MG/DL 0.9 1.8 (H)   Bili, Direct 0.0 - 0.2 MG/DL 0.3 (H) 0.4 (H)   Albumin 3.4 - 5.0 g/dL 3.3 (L) 3.0 (L)   BUN 7.0 - 18 MG/DL 21 (H) 13   Creat 0.6 - 1.3 MG/DL 1.09 1.12   Na 136 - 145 mmol/L 139 140   K 3.5 - 5.5 mmol/L 4.5 3.9   Cl 100 - 111 mmol/L 109 105   CO2 21 - 32 mmol/L 26 31   Glucose 74 - 99 mg/dL 102 (H) 91   Magnesium 1.6 - 2.4 mg/dL       Cancer Screening Latest Ref Rng & Units 1/19/2022 11/24/2021   AFP, Serum 0.0 - 8.0 ng/mL 3.0 4.1   AFP-L3% 0.0 - 9.9 % Comment Comment     SEROLOGIES:  Serologies Latest Ref Rng 3/21/2016   ISSAC Ab, Direct Negative Negative   M2 Ab 0.0 - 20.0 Units 178.8 (H)     Hep B sAB (-)  Hep A total Ab (-)  HCV ab (-)    LIVER HISTOLOGY:  05/2018. TRANSIENT HEPATIC ELASTOGRAPHY:   E Range: 7.4-10.2 kPa  E Mean: 9.1 kPa  E Median: 9.3 kPa  E Std: 1.0 kPa    ENDOSCOPIC PROCEDURES:  04/2016 EGD Dr. Lizzy Borrero Esophageal varices banded  07/2016 EGD with obliterated Varices per patient. 01/2017. EGD by Dr. Lizzy Borrero. Large esophageal varices. 2 bands placed. 01/2018. EGD by Dr. Lizzy Borrero. Patient reports there were no varices. 10/2018. EGD by Dr. Lizzy Borrero. Esophageal varices which are small in size. Follow up in one year. 11/2019. EGD by Dr. Lizzy Borrero. Repeat in one year. Had 2 bands in 2020 by Dr. Lizzy Borrero. RADIOLOGY:  03/2016  MRI scan abdomen. Changes consistent with cirrhosis. No liver mass lesions. No dilated bile ducts. No bile duct strictures. Pericholecystic fluid. No ascites  09/2016. Ultrasound of the liver. The liver is mildly hetogeneous. No hepatic masses. Mel size. 02/2017. Ultrasound of the liver. The liver is slightly heterogeneous in echotexture. There is a subtle isoechoic, partially exophytic questionable lesion in the right lobe measuring approximately 3.7 cm x 3.5 cm x 4 cm.   03/2017. Dynamic MRI of the liver. No significant abnormality. No hepatic lesion identified. 09/2017. Ultrasound of the liver. The liver and pancreas are normal in size, contour, and echogenicity. 05/2018. Ultrasound of the liver. Stable sonographic appearance of the liver. Mild heterogeneous diffuse increased hepatic parenchymal echotexture, ultrasound finding associated with fibrosis from chronic hepatocellular disease. No focal hepatic mass. 06/2019. Ultrasound of the liver. Heterogeneous echotexture of the hepatic parenchyma, likely representing diffuse hepatocellular disease. No focal hepatic lesion. 01/2020. Ultrasound of the liver. The liver measures 15.3 cm longitudinally and is inhomogeneous in appearance. 06/2020. Ultrasound of the liver. Normal sized, inhomogeneous appearing liver. 12/2020. Ultrasound of the liver. Normal sized liver. Evidence of hepatopetal flow of the portal vein. Relatively low velocity of flow. 06/2021. Ultrasound of the liver. The liver is heterogeneous in echotexture with no mass or other focal  Abnormality. 08/2021. CT abdomen wo contrast. LIVER: macrolobulated liver. Varices. 11/2021. CT Abdomen wo contrast.  No mass. Irregularity of the contour of the liver. This is suggestive of  cirrhotic change. Persistence of the previously described varices. OTHER TESTING:  Not available or performed    [address] in 3 months.       DANNA Tineo-CASTILLO  Liver [place] of [facility]  [address], [address] [facility], [address] [place] [address]   [phone]

## 2022-01-21 LAB
AFP L3 MFR SERPL: NORMAL % (ref 0–9.9)
AFP SERPL-MCNC: 3 NG/ML (ref 0–8)

## 2022-01-31 DIAGNOSIS — J31.0 NONALLERGIC RHINITIS: ICD-10-CM

## 2022-01-31 DIAGNOSIS — J30.1 SEASONAL ALLERGIC RHINITIS DUE TO POLLEN: ICD-10-CM

## 2022-02-01 RX ORDER — FUROSEMIDE 20 MG/1
TABLET ORAL
Qty: 30 TABLET | Refills: 0 | Status: SHIPPED | OUTPATIENT
Start: 2022-02-01 | End: 2022-02-26

## 2022-02-01 RX ORDER — FLUTICASONE PROPIONATE 50 MCG
SPRAY, SUSPENSION (ML) NASAL
Qty: 1 EACH | Refills: 0 | Status: SHIPPED | OUTPATIENT
Start: 2022-02-01 | End: 2022-02-26

## 2022-02-04 DIAGNOSIS — E29.1 HYPOGONADISM MALE: ICD-10-CM

## 2022-02-05 RX ORDER — TESTOSTERONE CYPIONATE 200 MG/ML
INJECTION INTRAMUSCULAR
Qty: 10 ML | Refills: 5 | Status: SHIPPED | OUTPATIENT
Start: 2022-02-05 | End: 2022-08-10 | Stop reason: SDUPTHER

## 2022-02-14 ENCOUNTER — DOCUMENTATION ONLY (OUTPATIENT)
Dept: FAMILY MEDICINE CLINIC | Age: 82
End: 2022-02-14

## 2022-02-14 PROBLEM — C67.9 BLADDER CANCER (HCC): Chronic | Status: ACTIVE | Noted: 2019-05-09

## 2022-02-14 NOTE — PROGRESS NOTES
Pt seen by urologist Dr Jaci Suarez on 2-10-22 for follow up for bladder cancer. He underwent laser of bladder stone on 8-2021 and then TURB on 9-2021. Cystoscopy was done. Urethra was without strictures or lesions. There was a small \"median prostatic lobe\" with prostatic urethra obstruction (moderate). Bladder was without lesions but scarring was present. Discussed results. Patient was given the option to cont survellience vs repeat maintenance BCG therapy. Pt chose to cont to monitor and will RTO in 3 months for a repeat cystoscopy. He was prescribed Keflex 500mg BID x 1 day for post-cysto UTI prophylaxis. A urine sample was sent for FISH/Cytology. He will cont Flomax.

## 2022-02-16 ENCOUNTER — TELEPHONE (OUTPATIENT)
Dept: FAMILY MEDICINE CLINIC | Age: 82
End: 2022-02-16

## 2022-02-18 ENCOUNTER — CLINICAL SUPPORT (OUTPATIENT)
Dept: FAMILY MEDICINE CLINIC | Age: 82
End: 2022-02-18
Payer: MEDICARE

## 2022-02-18 VITALS
SYSTOLIC BLOOD PRESSURE: 146 MMHG | RESPIRATION RATE: 16 BRPM | OXYGEN SATURATION: 96 % | HEART RATE: 64 BPM | DIASTOLIC BLOOD PRESSURE: 82 MMHG

## 2022-02-18 DIAGNOSIS — E53.8 B12 DEFICIENCY: Primary | ICD-10-CM

## 2022-02-18 PROCEDURE — 96372 THER/PROPH/DIAG INJ SC/IM: CPT | Performed by: NURSE PRACTITIONER

## 2022-02-18 RX ORDER — CYANOCOBALAMIN 1000 UG/ML
1000 INJECTION, SOLUTION INTRAMUSCULAR; SUBCUTANEOUS ONCE
Status: COMPLETED | OUTPATIENT
Start: 2022-02-18 | End: 2022-02-18

## 2022-02-18 RX ADMIN — CYANOCOBALAMIN 1000 MCG: 1000 INJECTION, SOLUTION INTRAMUSCULAR; SUBCUTANEOUS at 09:26

## 2022-02-18 NOTE — PATIENT INSTRUCTIONS
Cyanocobalamin (Vitamin B-12) (By injection)   Cyanocobalamin (gmu-[id number]-zg-[id number]-BAL-a-min)  Treats vitamin B-12 deficiency. Brand Name(s): B-12 Compliance Injection Kit, B-12 Kit, Physicians EZ Use B-12 Compliance, Vitamin Deficiency Injectable System - B12   There may be other brand names for this medicine. When This Medicine Should Not Be Used: This medicine is not right for everyone. Do not use it if you had an allergic reaction to vitamin B-12. How to Use This Medicine:   Injectable  · Your doctor will prescribe your exact dose and tell you how often it should be given. This medicine is given as a shot into one of your muscles. · Missed dose: Call your doctor or pharmacist for instructions. Drugs and Foods to Avoid:   Ask your doctor or pharmacist before using any other medicine, including over-the-counter medicines, vitamins, and herbal products. · Do not use folic acid supplements unless your doctor says it is okay. Warnings While Using This Medicine:   · Tell your doctor if you are pregnant or breastfeeding, or if you have kidney disease. · Keep all medicine out of the reach of children. Never share your medicine with anyone. Possible Side Effects While Using This Medicine:   Call your doctor right away if you notice any of these side effects:  · Allergic reaction: Itching or hives, swelling in your face or hands, swelling or tingling in your mouth or throat, chest tightness, trouble breathing  If you notice other side effects that you think are caused by this medicine, tell your doctor. Call your doctor for medical advice about side effects. You may report side effects to FDA at 6-664-FDA-5362  © 2017 [facility] Information is for End User's use only and may not be sold, redistributed or otherwise used for commercial purposes. The above information is an  only. It is not intended as medical advice for individual conditions or treatments.  Talk to your doctor, nurse or pharmacist before following any medical regimen to see if it is safe and effective for you.

## 2022-02-18 NOTE — PROGRESS NOTES
B12 injection administered in right deltoid. Patient tolerated medication well. AVS provided to patient with medication information. Patient states understanding.

## 2022-02-26 DIAGNOSIS — J31.0 NONALLERGIC RHINITIS: ICD-10-CM

## 2022-02-26 DIAGNOSIS — J30.1 SEASONAL ALLERGIC RHINITIS DUE TO POLLEN: ICD-10-CM

## 2022-02-26 RX ORDER — FUROSEMIDE 20 MG/1
TABLET ORAL
Qty: 30 TABLET | Refills: 0 | Status: SHIPPED | OUTPATIENT
Start: 2022-02-26 | End: 2022-04-03 | Stop reason: SDUPTHER

## 2022-02-26 RX ORDER — FLUTICASONE PROPIONATE 50 MCG
SPRAY, SUSPENSION (ML) NASAL
Qty: 1 EACH | Refills: 0 | Status: SHIPPED | OUTPATIENT
Start: 2022-02-26 | End: 2022-04-03 | Stop reason: SDUPTHER

## 2022-03-10 ENCOUNTER — DOCUMENTATION ONLY (OUTPATIENT)
Dept: FAMILY MEDICINE CLINIC | Age: 82
End: 2022-03-10

## 2022-03-11 NOTE — PROGRESS NOTES
Pt seen by general surgeon Dr James Auguste with [address] on 3-10-22 for painful incarcerated umbilical hernia. He was taken to the OR for an open umbilical hernia repair with possible use of mesh.

## 2022-03-18 ENCOUNTER — CLINICAL SUPPORT (OUTPATIENT)
Dept: FAMILY MEDICINE CLINIC | Age: 82
End: 2022-03-18
Payer: MEDICARE

## 2022-03-18 DIAGNOSIS — E53.8 B12 DEFICIENCY: Primary | ICD-10-CM

## 2022-03-18 PROBLEM — R60.0 LOCALIZED EDEMA: Status: ACTIVE | Noted: 2021-03-09

## 2022-03-18 PROBLEM — C67.9 BLADDER CANCER (HCC): Status: ACTIVE | Noted: 2019-05-09

## 2022-03-18 PROBLEM — E78.00 PURE HYPERCHOLESTEROLEMIA: Status: ACTIVE | Noted: 2018-01-24

## 2022-03-18 PROBLEM — H26.493 BILATERAL POSTERIOR CAPSULAR OPACIFICATION: Status: ACTIVE | Noted: 2019-11-12

## 2022-03-18 PROBLEM — H04.123 DRY EYES: Status: ACTIVE | Noted: 2021-08-05

## 2022-03-18 PROBLEM — K74.3 HEPATIC CIRRHOSIS DUE TO PRIMARY BILIARY CHOLANGITIS (HCC): Status: ACTIVE | Noted: 2018-01-24

## 2022-03-18 PROCEDURE — 96372 THER/PROPH/DIAG INJ SC/IM: CPT | Performed by: NURSE PRACTITIONER

## 2022-03-18 RX ORDER — CYANOCOBALAMIN 1000 UG/ML
1000 INJECTION, SOLUTION INTRAMUSCULAR; SUBCUTANEOUS ONCE
Status: COMPLETED | OUTPATIENT
Start: 2022-03-18 | End: 2022-03-18

## 2022-03-18 RX ADMIN — CYANOCOBALAMIN 1000 MCG: 1000 INJECTION, SOLUTION INTRAMUSCULAR; SUBCUTANEOUS at 08:27

## 2022-03-19 PROBLEM — Z96.1 PSEUDOPHAKIA OF BOTH EYES: Status: ACTIVE | Noted: 2018-09-19

## 2022-03-19 PROBLEM — H02.831 DERMATOCHALASIS OF BOTH UPPER EYELIDS: Status: ACTIVE | Noted: 2020-11-12

## 2022-03-19 PROBLEM — M79.604 LUMBAR PAIN WITH RADIATION DOWN RIGHT LEG: Status: ACTIVE | Noted: 2018-11-27

## 2022-03-19 PROBLEM — G89.29 CHRONIC PAIN OF RIGHT KNEE: Status: ACTIVE | Noted: 2018-11-27

## 2022-03-19 PROBLEM — I87.2 VENOUS INSUFFICIENCY OF BOTH LOWER EXTREMITIES: Status: ACTIVE | Noted: 2021-03-09

## 2022-03-19 PROBLEM — M25.561 CHRONIC PAIN OF RIGHT KNEE: Status: ACTIVE | Noted: 2018-11-27

## 2022-03-19 PROBLEM — D69.6 THROMBOCYTOPENIA (HCC): Status: ACTIVE | Noted: 2018-08-10

## 2022-03-19 PROBLEM — H02.834 DERMATOCHALASIS OF BOTH UPPER EYELIDS: Status: ACTIVE | Noted: 2020-11-12

## 2022-03-19 PROBLEM — I45.9 HEART BLOCK: Status: ACTIVE | Noted: 2021-09-03

## 2022-03-19 PROBLEM — N40.1 BPH WITH OBSTRUCTION/LOWER URINARY TRACT SYMPTOMS: Status: ACTIVE | Noted: 2017-01-18

## 2022-03-19 PROBLEM — N13.8 BPH WITH OBSTRUCTION/LOWER URINARY TRACT SYMPTOMS: Status: ACTIVE | Noted: 2017-01-18

## 2022-03-19 PROBLEM — Z95.0 PACEMAKER: Status: ACTIVE | Noted: 2021-09-27

## 2022-03-19 PROBLEM — M25.551 RIGHT HIP PAIN: Status: ACTIVE | Noted: 2018-11-27

## 2022-03-19 PROBLEM — H43.393 VITREOUS FLOATERS OF BOTH EYES: Status: ACTIVE | Noted: 2018-09-04

## 2022-03-19 PROBLEM — M54.50 LUMBAR PAIN WITH RADIATION DOWN RIGHT LEG: Status: ACTIVE | Noted: 2018-11-27

## 2022-04-03 DIAGNOSIS — J30.1 SEASONAL ALLERGIC RHINITIS DUE TO POLLEN: ICD-10-CM

## 2022-04-03 DIAGNOSIS — J31.0 NONALLERGIC RHINITIS: ICD-10-CM

## 2022-04-03 RX ORDER — FUROSEMIDE 20 MG/1
TABLET ORAL
Qty: 15 TABLET | Refills: 0 | Status: SHIPPED | OUTPATIENT
Start: 2022-04-03 | End: 2022-09-29

## 2022-04-03 RX ORDER — FLUTICASONE PROPIONATE 50 MCG
SPRAY, SUSPENSION (ML) NASAL
Qty: 1 EACH | Refills: 0 | Status: SHIPPED | OUTPATIENT
Start: 2022-04-03 | End: 2022-04-28

## 2022-04-19 ENCOUNTER — CLINICAL SUPPORT (OUTPATIENT)
Dept: FAMILY MEDICINE CLINIC | Age: 82
End: 2022-04-19
Payer: MEDICARE

## 2022-04-19 DIAGNOSIS — E53.8 B12 DEFICIENCY: Primary | ICD-10-CM

## 2022-04-19 PROCEDURE — 96372 THER/PROPH/DIAG INJ SC/IM: CPT | Performed by: NURSE PRACTITIONER

## 2022-04-19 RX ORDER — CYANOCOBALAMIN 1000 UG/ML
1000 INJECTION, SOLUTION INTRAMUSCULAR; SUBCUTANEOUS ONCE
Status: COMPLETED | OUTPATIENT
Start: 2022-04-19 | End: 2022-04-19

## 2022-04-19 RX ADMIN — CYANOCOBALAMIN 1000 MCG: 1000 INJECTION, SOLUTION INTRAMUSCULAR; SUBCUTANEOUS at 07:41

## 2022-04-21 ENCOUNTER — HOSPITAL ENCOUNTER (OUTPATIENT)
Dept: LAB | Age: 82
Discharge: HOME | End: 2022-04-21
Payer: MEDICARE

## 2022-04-21 ENCOUNTER — OFFICE VISIT (OUTPATIENT)
Dept: HEMATOLOGY | Age: 82
End: 2022-04-21
Payer: MEDICARE

## 2022-04-21 VITALS
OXYGEN SATURATION: 98 % | BODY MASS INDEX: 25.2 KG/M2 | WEIGHT: 176 LBS | RESPIRATION RATE: 15 BRPM | TEMPERATURE: 97.5 F | HEIGHT: 70 IN | DIASTOLIC BLOOD PRESSURE: 79 MMHG | SYSTOLIC BLOOD PRESSURE: 139 MMHG | HEART RATE: 61 BPM

## 2022-04-21 DIAGNOSIS — K74.3 HEPATIC CIRRHOSIS DUE TO PRIMARY BILIARY CHOLANGITIS (HCC): Primary | ICD-10-CM

## 2022-04-21 DIAGNOSIS — K74.3 HEPATIC CIRRHOSIS DUE TO PRIMARY BILIARY CHOLANGITIS (HCC): ICD-10-CM

## 2022-04-21 LAB
ALBUMIN SERPL-MCNC: 3.1 G/DL (ref 3.4–5)
ALBUMIN/GLOB SERPL: 1 {RATIO} (ref 0.8–1.7)
ALP SERPL-CCNC: 102 U/L (ref 45–117)
ALT SERPL-CCNC: 33 U/L (ref 16–61)
ANION GAP SERPL CALC-SCNC: 1 MMOL/L (ref 3–18)
AST SERPL-CCNC: 42 U/L (ref 10–38)
BASOPHILS # BLD: 0 K/UL (ref 0–0.1)
BASOPHILS NFR BLD: 1 % (ref 0–2)
BILIRUB DIRECT SERPL-MCNC: 0.5 MG/DL (ref 0–0.2)
BILIRUB SERPL-MCNC: 1.5 MG/DL (ref 0.2–1)
BUN SERPL-MCNC: 15 MG/DL (ref 7–18)
BUN/CREAT SERPL: 13 (ref 12–20)
CALCIUM SERPL-MCNC: 8.5 MG/DL (ref 8.5–10.1)
CHLORIDE SERPL-SCNC: 109 MMOL/L (ref 100–111)
CO2 SERPL-SCNC: 30 MMOL/L (ref 21–32)
CREAT SERPL-MCNC: 1.18 MG/DL (ref 0.6–1.3)
DIFFERENTIAL METHOD BLD: ABNORMAL
EOSINOPHIL # BLD: 0.2 K/UL (ref 0–0.4)
EOSINOPHIL NFR BLD: 5 % (ref 0–5)
ERYTHROCYTE [DISTWIDTH] IN BLOOD BY AUTOMATED COUNT: 14 % (ref 11.6–14.5)
GLOBULIN SER CALC-MCNC: 3.2 G/DL (ref 2–4)
GLUCOSE SERPL-MCNC: 68 MG/DL (ref 74–99)
HCT VFR BLD AUTO: 40.5 % (ref 36–48)
HGB BLD-MCNC: 13 G/DL (ref 13–16)
IMM GRANULOCYTES # BLD AUTO: 0 K/UL (ref 0–0.04)
IMM GRANULOCYTES NFR BLD AUTO: 0 % (ref 0–0.5)
INR PPP: 1.2 (ref 0.8–1.2)
LYMPHOCYTES # BLD: 1.2 K/UL (ref 0.9–3.6)
LYMPHOCYTES NFR BLD: 35 % (ref 21–52)
MCH RBC QN AUTO: 29.1 PG (ref 24–34)
MCHC RBC AUTO-ENTMCNC: 32.1 G/DL (ref 31–37)
MCV RBC AUTO: 90.6 FL (ref 78–100)
MONOCYTES # BLD: 0.4 K/UL (ref 0.05–1.2)
MONOCYTES NFR BLD: 12 % (ref 3–10)
NEUTS SEG # BLD: 1.6 K/UL (ref 1.8–8)
NEUTS SEG NFR BLD: 47 % (ref 40–73)
NRBC # BLD: 0 K/UL (ref 0–0.01)
NRBC BLD-RTO: 0 PER 100 WBC
PLATELET # BLD AUTO: 101 K/UL (ref 135–420)
PMV BLD AUTO: 10.5 FL (ref 9.2–11.8)
POTASSIUM SERPL-SCNC: 4.1 MMOL/L (ref 3.5–5.5)
PROT SERPL-MCNC: 6.3 G/DL (ref 6.4–8.2)
PROTHROMBIN TIME: 14.5 SEC (ref 11.5–15.2)
RBC # BLD AUTO: 4.47 M/UL (ref 4.35–5.65)
SODIUM SERPL-SCNC: 140 MMOL/L (ref 136–145)
WBC # BLD AUTO: 3.4 K/UL (ref 4.6–13.2)

## 2022-04-21 PROCEDURE — G8536 NO DOC ELDER MAL SCRN: HCPCS | Performed by: NURSE PRACTITIONER

## 2022-04-21 PROCEDURE — 85025 COMPLETE CBC W/AUTO DIFF WBC: CPT

## 2022-04-21 PROCEDURE — 85610 PROTHROMBIN TIME: CPT

## 2022-04-21 PROCEDURE — G8432 DEP SCR NOT DOC, RNG: HCPCS | Performed by: NURSE PRACTITIONER

## 2022-04-21 PROCEDURE — 36415 COLL VENOUS BLD VENIPUNCTURE: CPT

## 2022-04-21 PROCEDURE — 99214 OFFICE O/P EST MOD 30 MIN: CPT | Performed by: NURSE PRACTITIONER

## 2022-04-21 PROCEDURE — 80076 HEPATIC FUNCTION PANEL: CPT

## 2022-04-21 PROCEDURE — 1101F PT FALLS ASSESS-DOCD LE1/YR: CPT | Performed by: NURSE PRACTITIONER

## 2022-04-21 PROCEDURE — G8419 CALC BMI OUT NRM PARAM NOF/U: HCPCS | Performed by: NURSE PRACTITIONER

## 2022-04-21 PROCEDURE — 82107 ALPHA-FETOPROTEIN L3: CPT

## 2022-04-21 PROCEDURE — G8427 DOCREV CUR MEDS BY ELIG CLIN: HCPCS | Performed by: NURSE PRACTITIONER

## 2022-04-21 PROCEDURE — 80048 BASIC METABOLIC PNL TOTAL CA: CPT

## 2022-04-21 NOTE — PROGRESS NOTES
[facility], MD, Susana, Henrry Thorne, [place]      EMILIE Clark, [facility]     Sintia Yates, [facility]   DANNA Harris-CASTILLO Winslow, [facility]       Alvin Matta Alban [address] 136    at [address], [address] [place] [address], [facility] 22.    [phone]    FAX: [address] [place] [address]    at [address], [address], [address]    [phone]    FAX: [phone]         Patient Care Team:  Fernando Mesa NP as PCP - General (Nurse Practitioner)  Fernando Mesa NP as PCP - [address] [place] [address] Provider  Petra Tello MD (Gastroenterology)  Jose Manuel Novak MD (Urology)  Derek Iraheta MD (Cardio Vascular Surgery)      Problem List  Date Reviewed: 1/19/2022          Codes Class Noted    Pacemaker ICD-10-CM: Z95.0  ICD-9-CM: V45.01  9/27/2021        Heart block ICD-10-CM: I45.9  ICD-9-CM: 426.9  9/3/2021        Dry eyes ICD-10-CM: N49.396  ICD-9-CM: 375.15  8/5/2021    Overview Signed 8/5/2021 10:35 AM by Fernando Mesa NP     Last Assessment & Plan:   Formatting of this note might be different from the original.  Continue drops PRN             Venous insufficiency of both lower extremities ICD-10-CM: I87.2  ICD-9-CM: 459.81  3/9/2021        Localized edema ICD-10-CM: R60.0  ICD-9-CM: 782.3  3/9/2021        Dermatochalasis of both upper eyelids ICD-10-CM: H02.831, S15.252  ICD-9-CM: 374.87  11/12/2020    Overview Signed 8/5/2021 10:35 AM by Fernando Mesa NP     Last Assessment & Plan:   Formatting of this note might be different from the original.  With brow ptosis  Becoming visually significant   Patient not yet ready for surgery.              Bilateral posterior capsular opacification ICD-10-CM: S43.490  ICD-9-CM: 366.50  11/12/2019 Overview Signed 8/5/2021 10:35 AM by Lora Aguilar NP     Last Assessment & Plan:   Formatting of this note might be different from the original.  OS doing well post yag. Doing well sc. Bladder cancer (HCC) (Chronic) ICD-10-CM: C67.9  ICD-9-CM: 188.9  5/9/2019        Chronic pain of right knee ICD-10-CM: M25.561, G89.29  ICD-9-CM: 719.46, 338.29  11/27/2018        Lumbar pain with radiation down right leg ICD-10-CM: M54.50, M79.604  ICD-9-CM: 724.2  11/27/2018        Right hip pain ICD-10-CM: M25.551  ICD-9-CM: 719.45  11/27/2018        Pseudophakia of both eyes ICD-10-CM: Z96.1  ICD-9-CM: V43.1  9/19/2018    Overview Signed 8/5/2021 10:35 AM by Lora Aguilar NP     Last Assessment & Plan:   Formatting of this note might be different from the original.  Linda Lan is doing well post phaco with IOL OU with toric  Rx for glasses has been given . Follow up with Dr. Marycarmen Barbosa in 1yr. Vitreous floaters of both eyes ICD-10-CM: E50.436  ICD-9-CM: 379.24  9/4/2018    Overview Signed 8/5/2021 10:35 AM by Lora Aguilar NP     Last Assessment & Plan:   Formatting of this note might be different from the original.  Floaters - stable. No traction symptoms or associated retinal changes present. Discussed.              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 The active problem list, all pertinent past medical history, medications, liver histology, endoscopic studies, radiologic findings and laboratory findings related to the liver disorder were reviewed with the patient. The patient is a [de-identified] y.o.  male who was first noted to have [facility] about 14 years ago based on serologies. He has been maintained on 1200 mg of ursodiol without issue. Imaging of the liver performed 02/2017 with ultrasound followed with MRI in 03/2017. Heterogeneous echotexture. No focal liver lesions. Most recent imaging was performed in 11/2021 with CT and was unremarkable. The patient has no extrahepatic autoimmune disorders. The most recent laboratory studies indicate that the liver transaminases are normal, alkaline phosphatase is normal, tests of hepatic synthetic and metabolic function are normal, and the platelet count is depressed. The patient has developed varices with recent hemorrhage. He has undergone serial EGD's by Dr. Grabiel Palm and he is continuing to band the varices. The patient completes all daily activities without any functional limitations. He has not developed ascites or encephalopathy. No edema. The patient has no complaints which can be attributed to liver disease. The patient has not experienced fatigue, fevers, chills, shortness of breath, chest pain, pain in the right side over the liver, diffuse abdominal pain, nausea, vomiting, constipation, diarrhea, dryeyes, dry mouth, arthralgias, myalgias, yellowing of the eyes or skin, itching, dark urine, problems concentrating, swelling of the abdomen, no recent hematemesis or hematochezia other than event mentioned. Since the last office appointment the patient has:  Had no changes in the liver disease. Has stopped taking multiple meds. Reports his memory is better since stopping these. Since June:   \"So many things have happened\". ... Was prescribed CIPRO for probable UTI. \"It just about killed me\"  \"Things just got worse and worse\"  Has seen urologist due to frequency. \"Stones in the bladder\"  Referred to Uro in [place]. During lithotripsy, \"My heart stopped\" during the process of removing the stones. Referred then to Cardio. Cardio ran multiple tests. Had pacemaker placed and sent back to urology. Finished getting the stones out. \"One stone looked like a tumor\"  This was removed and came back positive for cancer. Favorable bladder tumor. No radiation or chemo. Started on immunotherapy treatment. Finished on 12/21/2021. In the time, \"I also had a hernia\". Repair of the hernia was performed in 03/2022 by Dr. Fredy Lam. States that he feels really good. Back to urologist in 05/2022. ASSESSMENT AND PLAN:  Primary Biliary Cholangitis with cirrhosis. The most recent laboratory studies indicate that the liver transaminases are normal, alkaline phosphatase is normal,  tests of hepatic synthetic and metabolic function are normal, and the platelet count is depressed. Will perform laboratory testing to monitor liver function and degree of liver injury. This will include hepatic panel, a CBC w/ diff, a BMP, a PT/INR, an AFP-L3%. Complications of cirrhosis were discussed in detail. We discussed thrombocytopenia, portal hypertension, varices, GI bleeding, peripheral edema, ascites, hepatic encephalopathy, and hepatocellular carcinoma. We discussed the need for follow ups on a regular basis to monitor for complications. We discussed the need for every 6 month liver imaging studies. The need to perform an assessment of liver fibrosis was discussed with the patient. The Fibroscan can assess liver fibrosis and determine if a patient has advanced fibrosis or cirrhosis without the need for liver biopsy. This will be performed at the next office visit.     If the Fibroscan suggests advanced fibrosis then a liver biopsy should be considered. The Fibroscan can be repeated annually or as often as clinically indicated to assess for fibrosis progression and/or regression. 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 Chepe Parr. Follow up with him as directed. Discussed extrahepatic manifestations of PBC such as osteoporosis and elevated cholesterol. The risk of osteoporosis is increased in patients with PBC. DEXA bone density to assess for osteoporosis should be ordered by the patients primary care physician. The patient was advised to take calcium supplementation and Vitamin D. He was advised to take supplemental vitamin A, D, E, and K. Patients with PBC are at increased risk for hypercholesterolemia. However, this is not associated with an increased risk of cardiac disease and MI because this is typically an elevation in HDL cholesterol. There is no contraindication for treatment with a statin if this is felt to be indicated based upon cardiac risk assessment. The patient is receiving treatment with urosdeoxycholic Acid. Continue this at current dose. The patient  and is tolerating the treatment without significant side effects. His alkaline phosphatase is normal.     Vaccination for viral hepatitis A and B is recommended since the patient has no serologic evidence of previous exposure or vaccination with immunity. [address] screening has recently been performed and does not suggest [address] The next liver imaging study is due next month. This was ordered today. Bone density up to date with osteopenia. All of the above issues were discussed with the patient. All questions were answered. The patient expressed a clear understanding of the above. 45 minutes total time spent with this patient with more than 50% of this time spent counseling and coordinating care as described above.        ALLERGIES  Allergies   Allergen Reactions    Sulfa (Sulfonamide Antibiotics) Unknown (comments)     Pt states its been so long he doesn't remember the reaction.  Other Medication Myalgia     Think  that is was a Sulfa drug, but not sure       MEDICATIONS  Current Outpatient Medications   Medication Sig    ursodioL (ACTIGALL) 300 mg capsule TAKE 2 CAPSULES BY MOUTH TWICE A DAY    nadoloL (CORGARD) 40 mg tablet TAKE 1 TABLET BY MOUTH EVERY DAY    testosterone cypionate (DEPOTESTOTERONE CYPIONATE) 200 mg/mL injection INJECT 0.5ML INTRAMUSCULAR WEEKLY    donepeziL (ARICEPT) 10 mg tablet TAKE 1 TABLET BY MOUTH NIGHTLY    tamsulosin (FLOMAX) 0.4 mg capsule TAKE 1 CAPSULE BY MOUTH EVERY DAY    spironolactone (ALDACTONE) 50 mg tablet Take 1 Tab by mouth daily.  Syringe with Needle, Disp, (BD Luer-Freddy Syringe) 3 mL 23 gauge x 1 1/2\" syrg FOR USE WITH TESTOSTERONE (INTRAMUSCULAR) INJECTIONS EVERY 7 DAYS    fluticasone propionate (FLONASE) 50 mcg/actuation nasal spray PUT 1 SPRAY IN EACH NOSTRIL TWICE A DAY FOR CHRONIC STUFFY & RUNNY NOSE    metaxalone (SKELAXIN) 800 mg tablet Take 1 Tab by mouth every eight to twelve (8-12) hours as needed for Muscle Spasm(s).  ipratropium (ATROVENT) 42 mcg (0.06 %) nasal spray SPRAY 1 SPRAY FOUR TIMES DAILY. INDICATIONS: RUNNY NOSE    dextran 70-hypromellose (ARTIFICIAL TEARS,[id number],) ophthalmic solution 1 Drop.  meclizine (ANTIVERT) 25 mg tablet Take 25 mg by mouth as needed. Indications: sensation of spinning or whirling    therapeutic multivitamin-minerals (VITAMINS AND MINERALS) tablet Take 1 Tab by mouth. Current Facility-Administered Medications   Medication    cyanocobalamin (VITAMIN B12) injection 1,000 mcg       SYSTEM REVIEW NOT RELATED TO LIVER DISEASE OR REVIEWED ABOVE:  Constitution systems: Negative for fever, chills, weight gain, weight loss. Eyes: Negative for visual changes. ENT: Negative for sore throat, painful swallowing. Respiratory: Negative for cough, hemoptysis, SOB.    Cardiology: Negative for chest pain, palpitations. GI:  Negative for constipation or diarrhea. : + for urinary frequency, dysuria, hematuria, + nocturia. +   Skin: Negative for rash. Hematology: Negative for easy bruising, blood clots. Musculo-skelatal: Negative for back pain, muscle pain, weakness. Neurologic: Negative for headaches, dizziness, vertigo, memory problems not related to HE. Psychology: Negative for anxiety, depression. FAMILY HISTORY:  The mother passed CHF age 80  The father passed CAD age 76   There is no family history of liver disease. There is no family history of immune disorders. SOCIAL HISTORY:  The patient is . The patient has 4 children,   The patient has never used tobacco products. The patient has never consumed significant amounts of alcohol. The patient retired in . PHYSICAL EXAMINATION:  Visit Vitals  BP (!) 160/92   Pulse 94   Temp 97.2 °F (36.2 °C) (Tympanic)   Wt 168 lb 6 oz (76.4 kg)   SpO2 98%   BMI 24.16 kg/m²     General: No acute distress. Eyes: Sclera anicteric. ENT: No oral lesions. Thyroid normal.  Nodes: No adenopathy. Skin: No spider angiomata. No jaundice. No palmar erythema. Respiratory: Lungs clear to auscultation. Cardiovascular: Regular heart rate. No murmurs. No JVD. Abdomen: Soft non-tender. Liver size normal to percussion/palpation. Spleen not palpable. No obvious ascites. Extremities: No edema. No muscle wasting. No gross arthritic changes. Neurologic: Alert and oriented. Cranial nerves grossly intact. No asterixis.       LABORATORY STUDIES:  Liver [place] of [address] Units 4/21/2022 1/19/2022   WBC 4.6 - 13.2 K/uL 3.4 (L) 4.2 (L)   ANC 1.8 - 8.0 K/UL 1.6 (L) 1.6 (L)   HGB 13.0 - 16.0 g/dL 13.0 13.2    - 420 K/uL 101 (L) 92 (L)   INR 0.8 - 1.2   1.2 1.1   AST 10 - 38 U/L 42 (H) 39 (H)   ALT 16 - 61 U/L 33 34   Alk Phos 45 - 117 U/L 102 121 (H)   Bili, Total 0.2 - 1.0 MG/DL 1.5 (H) 0.9   Bili, Direct 0.0 - 0.2 MG/DL 0.5 (H) 0.3 (H)   Albumin 3.4 - 5.0 g/dL 3.1 (L) 3.3 (L)   BUN 7.0 - 18 MG/DL 15 21 (H)   Creat 0.6 - 1.3 MG/DL 1.18 1.09   Na 136 - 145 mmol/L 140 139   K 3.5 - 5.5 mmol/L 4.1 4.5   Cl 100 - 111 mmol/L 109 109   CO2 21 - 32 mmol/L 30 26   Glucose 74 - 99 mg/dL 68 (L) 102 (H)   Magnesium 1.6 - 2.4 mg/dL       Cancer Screening Latest Ref Rng & Units 4/21/2022 1/19/2022 11/24/2021   AFP, Serum 0.0 - 8.4 ng/mL 3.1 3.0 4.1   AFP-L3% 0.0 - 9.9 % Comment Comment Comment     SEROLOGIES:  Serologies Latest Ref Rng 3/21/2016   ISSAC Ab, Direct Negative Negative   M2 Ab 0.0 - 20.0 Units 178.8 (H)     Hep B sAB (-)  Hep A total Ab (-)  HCV ab (-)    LIVER HISTOLOGY:  05/2018. TRANSIENT HEPATIC ELASTOGRAPHY:   E Range: 7.4-10.2 kPa  E Mean: 9.1 kPa  E Median: 9.3 kPa  E Std: 1.0 kPa    ENDOSCOPIC PROCEDURES:  04/2016 EGD Dr. Buster Peabody Esophageal varices banded  07/2016 EGD with obliterated Varices per patient. 01/2017. EGD by Dr. Buster Peabody. Large esophageal varices. 2 bands placed. 01/2018. EGD by Dr. Buster Peabody. Patient reports there were no varices. 10/2018. EGD by Dr. Buster Peabody. Esophageal varices which are small in size. Follow up in one year. 11/2019. EGD by Dr. Buster Peabody. Repeat in one year. Had 2 bands in 2020 by Dr. Buster Peabody. RADIOLOGY:  03/2016  MRI scan abdomen. Changes consistent with cirrhosis. No liver mass lesions. No dilated bile ducts. No bile duct strictures. Pericholecystic fluid. No ascites  09/2016. Ultrasound of the liver. The liver is mildly hetogeneous. No hepatic masses. Mel size. 02/2017. Ultrasound of the liver. The liver is slightly heterogeneous in echotexture. There is a subtle isoechoic, partially exophytic questionable lesion in the right lobe measuring approximately 3.7 cm x 3.5 cm x 4 cm.   03/2017. Dynamic MRI of the liver. No significant abnormality. No hepatic lesion identified. 09/2017. Ultrasound of the liver.   The liver and pancreas are normal in size, contour, and echogenicity. 05/2018. Ultrasound of the liver. Stable sonographic appearance of the liver. Mild heterogeneous diffuse increased hepatic parenchymal echotexture, ultrasound finding associated with fibrosis from chronic hepatocellular disease. No focal hepatic mass. 06/2019. Ultrasound of the liver. Heterogeneous echotexture of the hepatic parenchyma, likely representing diffuse hepatocellular disease. No focal hepatic lesion. 01/2020. Ultrasound of the liver. The liver measures 15.3 cm longitudinally and is inhomogeneous in appearance. 06/2020. Ultrasound of the liver. Normal sized, inhomogeneous appearing liver. 12/2020. Ultrasound of the liver. Normal sized liver. Evidence of hepatopetal flow of the portal vein. Relatively low velocity of flow. 06/2021. Ultrasound of the liver. The liver is heterogeneous in echotexture with no mass or other focal  Abnormality. 08/2021. CT abdomen wo contrast. LIVER: macrolobulated liver. Varices. 11/2021. CT Abdomen wo contrast.  No mass. Irregularity of the contour of the liver. This is suggestive of  cirrhotic change. Persistence of the previously described varices. OTHER TESTING:  Not available or performed    [address] in 4 months for fibroscan and continued monitoring.         RACHANA Gonzalez  Liver [place] of [address], [address] [address] Jodee Liriano, [facility]   [phone]

## 2022-04-22 LAB
AFP L3 MFR SERPL: NORMAL % (ref 0–9.9)
AFP SERPL-MCNC: 3.1 NG/ML (ref 0–8.4)

## 2022-04-28 DIAGNOSIS — J31.0 NONALLERGIC RHINITIS: ICD-10-CM

## 2022-04-28 DIAGNOSIS — J30.1 SEASONAL ALLERGIC RHINITIS DUE TO POLLEN: ICD-10-CM

## 2022-04-28 RX ORDER — FLUTICASONE PROPIONATE 50 MCG
SPRAY, SUSPENSION (ML) NASAL
Qty: 1 EACH | Refills: 4 | Status: SHIPPED | OUTPATIENT
Start: 2022-04-28 | End: 2022-06-30

## 2022-04-29 ENCOUNTER — HOSPITAL ENCOUNTER (OUTPATIENT)
Dept: ULTRASOUND IMAGING | Age: 82
Discharge: HOME | End: 2022-04-29
Attending: NURSE PRACTITIONER
Payer: MEDICARE

## 2022-04-29 DIAGNOSIS — K74.3 HEPATIC CIRRHOSIS DUE TO PRIMARY BILIARY CHOLANGITIS (HCC): ICD-10-CM

## 2022-04-29 PROCEDURE — 76705 ECHO EXAM OF ABDOMEN: CPT

## 2022-05-03 NOTE — PROGRESS NOTES
Please let him know that his ultrasound is unremarkable. No hepatic masses. Consistent with cirrhosis. Mild ascites. He can take the lasix to get rid of the ascites. Thank you.

## 2022-05-04 ENCOUNTER — TELEPHONE (OUTPATIENT)
Dept: HEMATOLOGY | Age: 82
End: 2022-05-04

## 2022-05-04 NOTE — TELEPHONE ENCOUNTER
----- Message from John Sexton NP sent at 5/3/2022  9:51 AM EDT -----  Please let him know that his ultrasound is unremarkable. No hepatic masses. Consistent with cirrhosis. Mild ascites. He can take the lasix to get rid of the ascites. Thank you.

## 2022-05-05 DIAGNOSIS — F02.80 LATE ONSET ALZHEIMER'S DISEASE WITHOUT BEHAVIORAL DISTURBANCE (HCC): ICD-10-CM

## 2022-05-05 DIAGNOSIS — G30.1 LATE ONSET ALZHEIMER'S DISEASE WITHOUT BEHAVIORAL DISTURBANCE (HCC): ICD-10-CM

## 2022-05-05 RX ORDER — DONEPEZIL HYDROCHLORIDE 10 MG/1
10 TABLET, FILM COATED ORAL
Qty: 30 TABLET | Refills: 0 | Status: SHIPPED | OUTPATIENT
Start: 2022-05-05 | End: 2022-09-28 | Stop reason: SDUPTHER

## 2022-05-19 ENCOUNTER — HOSPITAL ENCOUNTER (EMERGENCY)
Age: 82
Discharge: HOME | End: 2022-05-19
Attending: EMERGENCY MEDICINE
Payer: MEDICARE

## 2022-05-19 VITALS
HEIGHT: 70 IN | RESPIRATION RATE: 16 BRPM | WEIGHT: 170 LBS | HEART RATE: 67 BPM | OXYGEN SATURATION: 97 % | TEMPERATURE: 97.8 F | DIASTOLIC BLOOD PRESSURE: 68 MMHG | SYSTOLIC BLOOD PRESSURE: 128 MMHG | BODY MASS INDEX: 24.34 KG/M2

## 2022-05-19 DIAGNOSIS — R19.7 DIARRHEA, UNSPECIFIED TYPE: Primary | ICD-10-CM

## 2022-05-19 LAB
ALBUMIN SERPL-MCNC: 2.8 G/DL (ref 3.5–5)
ALBUMIN/GLOB SERPL: 0.9 {RATIO} (ref 1.1–2.2)
ALP SERPL-CCNC: 114 U/L (ref 45–117)
ALT SERPL-CCNC: 28 U/L (ref 12–78)
ANION GAP SERPL CALC-SCNC: 9 MMOL/L (ref 5–15)
AST SERPL-CCNC: 32 U/L (ref 15–37)
BASOPHILS # BLD: 0 K/UL (ref 0–0.1)
BASOPHILS NFR BLD: 0 % (ref 0–1)
BILIRUB SERPL-MCNC: 0.8 MG/DL (ref 0.2–1)
BUN SERPL-MCNC: 16 MG/DL (ref 6–20)
BUN/CREAT SERPL: 13 (ref 12–20)
CALCIUM SERPL-MCNC: 7.9 MG/DL (ref 8.5–10.1)
CHLORIDE SERPL-SCNC: 107 MMOL/L (ref 97–108)
CO2 SERPL-SCNC: 24 MMOL/L (ref 21–32)
COMMENT, HOLDF: NORMAL
CREAT SERPL-MCNC: 1.19 MG/DL (ref 0.7–1.3)
DIFFERENTIAL METHOD BLD: ABNORMAL
EOSINOPHIL # BLD: 0.1 K/UL (ref 0–0.4)
EOSINOPHIL NFR BLD: 4 % (ref 0–7)
ERYTHROCYTE [DISTWIDTH] IN BLOOD BY AUTOMATED COUNT: 14.9 % (ref 11.5–14.5)
GLOBULIN SER CALC-MCNC: 3.1 G/DL (ref 2–4)
GLUCOSE SERPL-MCNC: 121 MG/DL (ref 65–100)
HCT VFR BLD AUTO: 37.9 % (ref 36.6–50.3)
HGB BLD-MCNC: 12.7 G/DL (ref 12.1–17)
IMM GRANULOCYTES # BLD AUTO: 0 K/UL (ref 0–0.04)
IMM GRANULOCYTES NFR BLD AUTO: 0 % (ref 0–0.5)
LYMPHOCYTES # BLD: 1.2 K/UL (ref 0.8–3.5)
LYMPHOCYTES NFR BLD: 40 % (ref 12–49)
MAGNESIUM SERPL-MCNC: 2.1 MG/DL (ref 1.6–2.4)
MCH RBC QN AUTO: 28.5 PG (ref 26–34)
MCHC RBC AUTO-ENTMCNC: 33.5 G/DL (ref 30–36.5)
MCV RBC AUTO: 85.2 FL (ref 80–99)
MONOCYTES # BLD: 0.3 K/UL (ref 0–1)
MONOCYTES NFR BLD: 12 % (ref 5–13)
NEUTS SEG # BLD: 1.3 K/UL (ref 1.8–8)
NEUTS SEG NFR BLD: 44 % (ref 32–75)
NRBC # BLD: 0 K/UL (ref 0–0.01)
NRBC BLD-RTO: 0 PER 100 WBC
PLATELET # BLD AUTO: 93 K/UL (ref 150–400)
PLATELET COMMENTS,PCOM: ABNORMAL
PMV BLD AUTO: 10.4 FL (ref 8.9–12.9)
POTASSIUM SERPL-SCNC: 3.8 MMOL/L (ref 3.5–5.1)
PROT SERPL-MCNC: 5.9 G/DL (ref 6.4–8.2)
RBC # BLD AUTO: 4.45 M/UL (ref 4.1–5.7)
RBC MORPH BLD: ABNORMAL
SAMPLES BEING HELD,HOLD: NORMAL
SODIUM SERPL-SCNC: 140 MMOL/L (ref 136–145)
WBC # BLD AUTO: 2.9 K/UL (ref 4.1–11.1)

## 2022-05-19 PROCEDURE — 80053 COMPREHEN METABOLIC PANEL: CPT

## 2022-05-19 PROCEDURE — 96360 HYDRATION IV INFUSION INIT: CPT

## 2022-05-19 PROCEDURE — 36415 COLL VENOUS BLD VENIPUNCTURE: CPT

## 2022-05-19 PROCEDURE — 99284 EMERGENCY DEPT VISIT MOD MDM: CPT

## 2022-05-19 PROCEDURE — 74011250636 HC RX REV CODE- 250/636: Performed by: EMERGENCY MEDICINE

## 2022-05-19 PROCEDURE — 83735 ASSAY OF MAGNESIUM: CPT

## 2022-05-19 PROCEDURE — 85025 COMPLETE CBC W/AUTO DIFF WBC: CPT

## 2022-05-19 RX ADMIN — SODIUM CHLORIDE 1000 ML: 9 INJECTION, SOLUTION INTRAVENOUS at 13:00

## 2022-05-19 NOTE — ED PROVIDER NOTES
EMERGENCY DEPARTMENT HISTORY AND PHYSICAL EXAM      Date: 5/19/2022  Patient Name: Jessie Spear    History of Presenting Illness     Chief Complaint   Patient presents with    Constipation    Diarrhea       History Provided By: Patient    HPI: Jessie Spear, [de-identified] y.o. male with PMHx as noted below presents the emergency department with chief complaint of alternating diarrhea and constipation. Patient states this has been going on for over a year. Patient states he is frustrated as he currently has diarrhea however does not want to take antidiarrheals as this will cause some several days of constipation. Patient states he seen multiple physicians and has been to gastroenterology to discuss this with no answers to his symptoms. There have been no changes over the last few days. He is denying any blood in the diarrhea, recent travel or antibiotic use. Patient is also denying any abdominal pain at this time    Pt denies any other alleviating or exacerbating factors. Additionally, pt specifically denies any recent fever, chills, headache, nausea, vomiting, abdominal pain, CP, SOB, lightheadedness, dizziness, numbness, weakness, BLE swelling, heart palpitations, urinary sxs, , constipation, melena, hematochezia, cough, or congestion. PCP: Shila Odom NP    Current Outpatient Medications   Medication Sig Dispense Refill    donepeziL (ARICEPT) 10 mg tablet Take 1 Tablet by mouth nightly.  30 Tablet 0    fluticasone propionate (FLONASE) 50 mcg/actuation nasal spray USE 1 SPRAY IN EACH NOSTRIL TWICE A DAY FOR CHRONIC STUFFY AND RUNNY NOSE 1 Each 4    furosemide (LASIX) 20 mg tablet Take one tablet as needed for swelling in legs  Indications: visible water retention 15 Tablet 0    Syringe with Needle, Disp, (BD Luer-Freddy Syringe) 3 mL 23 gauge x 1 1/2\" syrg FOR USE WITH TESTOSTERONE (INTRAMUSCULAR) INJECTIONS EVERY 7 DAYS 12 Pen Needle 5    testosterone cypionate (DEPOTESTOTERONE CYPIONATE) 200 mg/mL injection INJECT 0.5ML INTRAMUSCULARLY ONCE A WEEK 10 mL 5    nadoloL (CORGARD) 20 mg tablet Take 1 Tablet by mouth daily. 30 Tablet 0    ursodioL (ACTIGALL) 300 mg capsule TAKE 2 CAPSULES BY MOUTH TWO (2) TIMES A  Capsule 3    tamsulosin (Flomax) 0.4 mg capsule Take 0.4 mg by mouth two (2) times a day.  dextran 70-hypromellose (ARTIFICIAL TEARS,[id number],) ophthalmic solution 1 Drop. Past History     Past Medical History:  Past Medical History:   Diagnosis Date    Adverse effect of anesthesia     bradycardia (HR 21 and junctional rhythm) with outpt laser bladder stone surgery 2021    Allergic     Anemia     BPH (benign prostatic hyperplasia)     Esophageal varices (HCC) 2016    banded x 6    GERD (gastroesophageal reflux disease)     GI bleed 2016    Hyperlipidemia     Hypogonadism male     Left bundle branch block (LBBB)     Dr. Massimo Galindo, pacemaker 2021    Primary biliary cirrhosis ([address]     Vitamin D deficiency        Past Surgical History:  Past Surgical History:   Procedure Laterality Date    HX ENDOSCOPY      HX HEENT      deviated septum repair    HX HEENT  2018    cataract removal    HX ORTHOPAEDIC Right 2017    Arthroscopy knee    HX PACEMAKER PLACEMENT  2021    SECONDARY TO L BBB AND JUNCTIONAL RHYTHM DURING SURGERY.  HX REFRACTIVE SURGERY Left     HX SEPTOPLASTY                           Biopsy    HX UROLOGICAL      TURBT/STONES       Family History:  Family History   Problem Relation Age of Onset    Diabetes Father     Stroke Brother     Elevated Lipids Other         children       Social History:  Social History     Tobacco Use    Smoking status: Former Smoker     Packs/day: 1.00     Quit date: 1974     Years since quittin.0    Smokeless tobacco: Never Used   Substance Use Topics    Alcohol use: No     Alcohol/week: 0.0 standard drinks    Drug use: No       Allergies:   Allergies   Allergen Reactions    Sulfa (Sulfonamide Antibiotics) Unknown (comments)     Pt states its been so long he doesn't remember the reaction.  Ciprofloxacin Diarrhea and Myalgia    Other Medication Myalgia     Think  that is was a Sulfa drug, but not sure         Review of Systems   Review of Systems  Constitutional: Negative for fever, chills, and fatigue. HENT: Negative for congestion, sore throat, rhinorrhea, sneezing and neck stiffness   Eyes: Negative for discharge and redness. Respiratory: Negative for  shortness of breath, wheezing   Cardiovascular: Negative for chest pain, palpitations   Gastrointestinal: Positive diarrhea. Negative for nausea, vomiting, abdominal pain, constipation, and blood in stool. Genitourinary: Negative for dysuria, hematuria, flank pain, decreased urine volume, discharge,   Musculoskeletal: Negative for myalgias or joint pain . Skin: Negative for rash or lesions . Neurological: Negative weakness, light-headedness, numbness and headaches. Physical Exam   Physical Exam    GENERAL: alert and oriented, no acute distress  EYES: PEERL, No injection, discharge or icterus. ENT: Mucous membranes pink and moist.  NECK: Supple  LUNGS: Airway patent. Non-labored respirations. Breath sounds clear with good air entry bilaterally. HEART: Regular rate and rhythm. No peripheral edema  ABDOMEN: Non-distended and non-tender, without guarding or rebound.   SKIN:  warm, dry  MSK/EXTREMITIES: Without swelling, tenderness or deformity, symmetric with normal ROM  NEUROLOGICAL: Alert, oriented      Diagnostic Study Results     Labs -     Recent Results (from the past 12 hour(s))   CBC WITH AUTOMATED DIFF    Collection Time: 05/19/22 12:59 PM   Result Value Ref Range    WBC 2.9 (L) 4.1 - 11.1 K/uL    RBC 4.45 4.10 - 5.70 M/uL    HGB 12.7 12.1 - 17.0 g/dL    HCT 37.9 36.6 - 50.3 %    MCV 85.2 80.0 - 99.0 FL    MCH 28.5 26.0 - 34.0 PG    MCHC 33.5 30.0 - 36.5 g/dL    RDW 14.9 (H) 11.5 - 14.5 %    PLATELET 93 (L) 150 - 400 K/uL    MPV 10.4 8.9 - 12.9 FL    NRBC 0.0 0.0  WBC    ABSOLUTE NRBC 0.00 0.00 - 0.01 K/uL    NEUTROPHILS 44 32 - 75 %    LYMPHOCYTES 40 12 - 49 %    MONOCYTES 12 5 - 13 %    EOSINOPHILS 4 0 - 7 %    BASOPHILS 0 0 - 1 %    IMMATURE GRANULOCYTES 0 0 - 0.5 %    ABS. NEUTROPHILS 1.3 (L) 1.8 - 8.0 K/UL    ABS. LYMPHOCYTES 1.2 0.8 - 3.5 K/UL    ABS. MONOCYTES 0.3 0.0 - 1.0 K/UL    ABS. EOSINOPHILS 0.1 0.0 - 0.4 K/UL    ABS. BASOPHILS 0.0 0.0 - 0.1 K/UL    ABS. IMM. GRANS. 0.0 0.00 - 0.04 K/UL    DF AUTOMATED      PLATELET COMMENTS DECREASED PLATELETS      RBC COMMENTS NORMOCYTIC, NORMOCHROMIC     METABOLIC PANEL, COMPREHENSIVE    Collection Time: 05/19/22 12:59 PM   Result Value Ref Range    Sodium 140 136 - 145 mmol/L    Potassium 3.8 3.5 - 5.1 mmol/L    Chloride 107 97 - 108 mmol/L    CO2 24 21 - 32 mmol/L    Anion gap 9 5 - 15 mmol/L    Glucose 121 (H) 65 - 100 mg/dL    BUN 16 6 - 20 MG/DL    Creatinine 1.19 0.70 - 1.30 MG/DL    BUN/Creatinine ratio 13 12 - 20      GFR est AA >60 >60 ml/min/1.73m2    GFR est non-AA 59 (L) >60 ml/min/1.73m2    Calcium 7.9 (L) 8.5 - 10.1 MG/DL    Bilirubin, total 0.8 0.2 - 1.0 MG/DL    ALT (SGPT) 28 12 - 78 U/L    AST (SGOT) 32 15 - 37 U/L    Alk. phosphatase 114 45 - 117 U/L    Protein, total 5.9 (L) 6.4 - 8.2 g/dL    Albumin 2.8 (L) 3.5 - 5.0 g/dL    Globulin 3.1 2.0 - 4.0 g/dL    A-G Ratio 0.9 (L) 1.1 - 2.2     MAGNESIUM    Collection Time: 05/19/22 12:59 PM   Result Value Ref Range    Magnesium 2.1 1.6 - 2.4 mg/dL   SAMPLES BEING HELD    Collection Time: 05/19/22 12:59 PM   Result Value Ref Range    SAMPLES BEING HELD 1SST, 1BLUE     COMMENT        Add-on orders for these samples will be processed based on acceptable specimen integrity and analyte stability, which may vary by analyte.        Radiologic Studies -   No orders to display     CT Results  (Last 48 hours)    None        CXR Results  (Last 48 hours)    None            Medical Decision Making     Yuan Gardner Samantha Braun MD am the first provider for this patient and am the attending of record for this patient encounter. I reviewed the vital signs, available nursing notes, past medical history, past surgical history, family history and social history. Vital Signs-Reviewed the patient's vital signs. Patient Vitals for the past 12 hrs:   Temp Pulse Resp BP SpO2   05/19/22 1400  67 16 128/68 97 %   05/19/22 1223 97.8 °F (36.6 °C) 60 16 132/69 97 %       Records Reviewed: Nursing Notes and Old Medical Records    Provider Notes (Medical Decision Making): On presentation, the patient is well appearing, in no acute distress with normal vital signs. Patient presents to the emergency room for diarrhea. Differential diagnosis considered  includes gastroenteritis, gastritis, enteritis, inflammatory bowel disease, bowel  obstruction, pancreatitis, cholecystitis, appendicitis, c.diff colitis, travelers diarrhea,  diverticulitis or food-borne illness. Patient's labs were overall reassuring and showed no acute abnormalities of concern. . Abdominal examination did not demonstrate any acute surgical issues and remained benign on repeat exam, no indication for emergent imaging with 1 year of symptoms. . Labs anddiagnostics were reviewed and within acceptable limits. . Patient denied recent travel to any endemic areas. Stools were reportedly non-bloody and no antibiotics were felt to bewarranted at this time. The patient denied recent antibiotic use and overall concern for C.diff was low. Empiric treatment for this was not felt to be necessary. Patient is encouraged to maintain hydration to replete any fluid losses, and should return to the ERfor severe pain, intractable vomiting, fevers, bloody stools, or dehydration. The patientunderstood the diagnosis and treatment and had no further questions. Patient should silvana primary care physician for follow-up, and was otherwise deemed stable for discharge to  Home.     ED Course: Initial assessment performed. The patients presenting problems have been discussed, and they are in agreement with the care plan formulated and outlined with them. I have encouraged them to ask questions as they arise throughout their visit. PROGRESS  Mckay Chirinos's  results have been reviewed with him. He has been counseled regarding his diagnosis. He verbally conveys understanding and agreement of the signs, symptoms, diagnosis, treatment and prognosis and additionally agrees to follow up as recommended with Dr. Favio Martell NP in 24 - 48 hours. He also agrees with the care-plan and conveys that all of his questions have been answered. I have also put together some discharge instructions for him that include: 1) educational information regarding their diagnosis, 2) how to care for their diagnosis at home, as well a 3) list of reasons why they would want to return to the ED prior to their follow-up appointment, should their condition change. Disposition:  home    PLAN:  1. Discharge Medication List as of 5/19/2022  1:59 PM        2. Follow-up Information     Follow up With Specialties Details Why Contact Info    Favio Martell NP Nurse Practitioner Schedule an appointment as soon as possible for a visit in 2 days  [id number] [place] [address].      [address] [facility] Emergency Medicine  If symptoms worsen [address] [address]  [phone]    Natalia Craft MD Hepatology Schedule an appointment as soon as possible for a visit in 2 days  [address] [place] [address] Rd  [phone]          Return to ED if worse     Diagnosis     Clinical Impression:   1. Diarrhea, unspecified type        Please note that this dictation was completed with Dragon, computer voice recognition software.   Quite often unanticipated grammatical, syntax, homophones, and other interpretive errors are inadvertently transcribed by the computer software. Please disregard these errors. Additionally, please excuse any errors that have escaped final proofreading.

## 2022-05-19 NOTE — ED TRIAGE NOTES
Patient presents to the ED with a complaint of constipation followed by episodes of diarrhea. Unable to follow up with his hernia MD until June. Sx have been intermittent for about one year.

## 2022-05-27 NOTE — PROGRESS NOTES
3340 [facility], MD, MD Arti Arellano, EMILIE Junior, ACNP-BC     April S Trudy, [facility]   Rajiv Davis, MOISES Jordan 136    at [address], [address] Connie Crowe  22.    [phone]    FAX: [address]    at [facility]    [address], [address]    98 jim Bazan [facility], [address]    [phone]    FAX: [phone]         Patient Care Team:  Josh Herndon MD as PCP - General (Family Practice)  Helga Mcgraw MD (Gastroenterology)      Problem List  Date Reviewed: 6/13/2019          Codes Class Noted    Chronic prostatitis ICD-10-CM: N41.1  ICD-9-CM: 601.1  5/9/2019        Chronic pain of right knee ICD-10-CM: M25.561, G89.29  ICD-9-CM: 719.46, 338.29  11/27/2018        Lumbar pain with radiation down right leg ICD-10-CM: M54.5  ICD-9-CM: 724.2  11/27/2018        Right hip pain ICD-10-CM: M25.551  ICD-9-CM: 719.45  11/27/2018        Thrombocytopenia ([facility] ICD-10-CM: D69.6  ICD-9-CM: 287.5  8/10/2018        B12 deficiency ICD-10-CM: E53.8  ICD-9-CM: 266.2  8/10/2018        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   The active problem list, Department of Anesthesiology  Postprocedure Note    Patient: Kemal Horan  MRN: [de-identified]  YOB: 1961  Date of evaluation: 5/27/2022  Time:  12:08 PM     Procedure Summary     Date: 05/27/22 Room / Location: [address]    Anesthesia Start: 0198 Anesthesia Stop: 7869    Amy Garcia MD Responsible Provider: Jaron Pollard MD    Anesthesia Type: general ASA Status: 3          Anesthesia Type: No value filed. Fco Phase I:      Fco Phase II:      Last vitals: Reviewed and per EMR flowsheets.        Anesthesia Post Evaluation    Patient location during evaluation: PACU  Level of consciousness: awake  Pain score: 0  Airway patency: patent  Nausea & Vomiting: no vomiting and no nausea  Complications: no  Cardiovascular status: hemodynamically stable  Respiratory status: acceptable  Hydration status: stable all pertinent past medical history, medications, liver histology, endoscopic studies, radiologic findings and laboratory findings related to the liver disorder were reviewed with the patient. The patient is a 70 y.o.  male who was first noted to have [facility] about 14 years ago based on serologies. He has been maintained on 1200 mg of ursodiol without issue. Imaging of the liver performed 02/2017 with ultrasound followed with MRI in 03/2017. Heterogeneous echotexture. No focal liver lesions. Most recent imaging was performed in 05/2018 with ultrasound and was unremarkable. The patient has no extrahepatic autoimmune disorders. The most recent laboratory studies indicate that the liver transaminases are normal, alkaline phosphatase is normal, tests of hepatic synthetic and metabolic function are normal, and the platelet count is normal.      The patient has developed varices with recent hemorrhage. He has undergone serial EGD's by Dr. Cindy Wang and he is continuing to band the varices. The most recent EGD was in 10/2018. The patient completes all daily activities without any functional limitations. He has not developed ascites or encephalopathy. No edema. The patient has no complaints which can be attributed to liver disease. The patient has not experienced fatigue, fevers, chills, shortness of breath, chest pain, pain in the right side over the liver, diffuse abdominal pain, nausea, vomiting, constipation, diarrhea, dryeyes, dry mouth, arthralgias, myalgias, yellowing of the eyes or skin, itching, dark urine, problems concentrating, swelling of the abdomen, no recent hematemesis or hematochezia other than event mentioned. ALLERGIES  Allergies   Allergen Reactions    Sulfa (Sulfonamide Antibiotics) Unknown (comments)     Pt states its been so long he doesn't remember the reaction.      Other Medication Myalgia     Think  that is was a Sulfa drug, but not sure MEDICATIONS  Current Outpatient Medications   Medication Sig    testosterone cypionate (DEPOTESTOTERONE CYPIONATE) 200 mg/mL injection INJECT 1/2 ML INTRAMUSCULARLY ONCE A WEEK    nadolol (CORGARD) 40 mg tablet TAKE 1 TABLET BY MOUTH EVERY DAY    ursodiol (ACTIGALL) 300 mg capsule TAKE 2 CAPSULES BY MOUTH TWICE A DAY    meclizine (ANTIVERT) 25 mg tablet Take 25 mg by mouth.  therapeutic multivitamin-minerals (VITAMINS AND MINERALS) tablet Take 1 Tab by mouth.  cyanocobalamin (VITAMIN B-12) 1,000 mcg/mL injection 1,000 mcg by IntraMUSCular route once.  Dutasteride-Tamsulosin 0.5-0.4 mg CM24 Take 1 Cap by mouth daily. No current facility-administered medications for this visit. SYSTEM REVIEW NOT RELATED TO LIVER DISEASE OR REVIEWED ABOVE:  Constitution systems: Negative for fever, chills, weight gain, weight loss. Eyes: Negative for visual changes. ENT: Negative for sore throat, painful swallowing. Respiratory: Negative for cough, hemoptysis, SOB. Cardiology: Negative for chest pain, palpitations. GI:  Negative for constipation or diarrhea. : + for urinary frequency, dysuria, hematuria, + nocturia. +   Skin: Negative for rash. Hematology: Negative for easy bruising, blood clots. Musculo-skelatal: Negative for back pain, muscle pain, weakness. Neurologic: Negative for headaches, dizziness, vertigo, memory problems not related to HE. Psychology: Negative for anxiety, depression. FAMILY HISTORY:  The mother passed CHF age 80  The father passed CAD age 76   There is no family history of liver disease. There is no family history of immune disorders. SOCIAL HISTORY:  The patient is . The patient has 4 children,   The patient has never used tobacco products. The patient has never consumed significant amounts of alcohol. The patient retired in .     PHYSICAL EXAMINATION:  Visit Vitals  /71 (BP 1 Location: Left arm, BP Patient Position: Sitting)   Pulse (!) 53   Temp 96.6 °F (35.9 °C)   Ht 5' 10\" (1.778 m)   Wt 166 lb (75.3 kg)   SpO2 98%   BMI 23.82 kg/m²     General: No acute distress. Eyes: Sclera anicteric. ENT: No oral lesions. Thyroid normal.  Nodes: No adenopathy. Skin: No spider angiomata. No jaundice. No palmar erythema. Respiratory: Lungs clear to auscultation. Cardiovascular: Regular heart rate. No murmurs. No JVD. Abdomen: Soft non-tender. Liver size normal to percussion/palpation. Spleen not palpable. No obvious ascites. Extremities: No edema. No muscle wasting. No gross arthritic changes. Neurologic: Alert and oriented. Cranial nerves grossly intact. No asterixis. LABORATORY STUDIES:  Liver [place] of [address] [place] [address] & Units 6/21/2019 4/9/2019 1/28/2019   WBC 4.1 - 11.1 K/uL 3.9 (L) 5.5 4.5   ANC 1.8 - 8.0 K/UL 1.9 2.7 2.4   HGB 12.1 - 17.0 g/dL 14.9 14.7 15.0   HGB   15.2    HGB (iSTAT)   15.2     - 400 K/uL 123 (L) 133 (L) 135 (L)   INR 0.9 - 1.1   1.1 1.2    AST 15 - 37 U/L 43 (H) 45 (H) 34   ALT 12 - 78 U/L 38 31 26   Alk Phos 45 - 117 U/L 84 90 86   Bili, Total 0.2 - 1.0 MG/DL 1.4 (H) 1.4 (H) 1.3 (H)   Bili, Direct 0.0 - 0.2 MG/DL 0.3 (H)     Albumin 3.5 - 5.0 g/dL 3.2 (L) 3.8 3.7   BUN 6 - 20 MG/DL 17 12 11   Creat 0.70 - 1.30 MG/DL 1.29 1.07 1.03   Creat (iSTAT) 0.6 - 1.3 MG/DL      Na 136 - 145 mmol/L 139 141 140   K 3.5 - 5.1 mmol/L 4.5 4.2 4.4   Cl 97 - 108 mmol/L 104 104 102   CO2 21 - 32 mmol/L 31 21 24   Glucose 65 - 100 mg/dL 92 122 (H) 142 (H)   Ammonia 11 - 32 UMOL/L        Cancer Screening Latest Ref Rng & Units 6/21/2019   AFP Tumor Marker 0.0 - 8.3 ng/mL    AFP, Serum 0.0 - 8.0 ng/mL 2.3   AFP-L3% 0.0 - 9.9 % Comment     SEROLOGIES:  Serologies Latest Ref Rng 3/21/2016   ISSAC Ab, Direct Negative Negative   M2 Ab 0.0 - 20.0 Units 178.8 (H)     Hep B sAB (-)  Hep A total Ab (-)  HCV ab (-)    LIVER HISTOLOGY:  04/2016: Not available. 05/2018.   TRANSIENT HEPATIC ELASTOGRAPHY:   E Range: 7.4-10.2 kPa  E Mean: 9.1 kPa  E Median: 9.3 kPa  E Std: 1.0 kPa    ENDOSCOPIC PROCEDURES:  04/2016 EGD Dr. Jus Petersen Esophageal varices banded  07/2016 EGD with obliterated Varices per patient. 01/2017. EGD by Dr. Jus Petersen. Large esophageal varices. 2 bands placed. 01/2018. EGD by Dr. Jus Petersen. Patient reports there were no varices. 10/2018. EGD by Dr. Jus Petersen. Esophageal varices which are small in size. Follow up in one year. RADIOLOGY:  03/2016  MRI scan abdomen. Changes consistent with cirrhosis. No liver mass lesions. No dilated bile ducts. No bile duct strictures. Pericholecystic fluid. No ascites  09/2016. Ultrasound of the liver. The liver is mildly hetogeneous. No hepatic masses. Mel size. 02/2017. Ultrasound of the liver. The liver is slightly heterogeneous in echotexture. There is a subtle isoechoic, partially exophytic questionable lesion in the right lobe measuring approximately 3.7 cm x 3.5 cm x 4 cm.   03/2017. Dynamic MRI of the liver. No significant abnormality. No hepatic lesion identified. 09/2017. Ultrasound of the liver. The liver and pancreas are normal in size, contour, and echogenicity. 05/2018. Ultrasound of the liver. Stable sonographic appearance of the liver. Mild heterogeneous diffuse increased hepatic parenchymal echotexture, ultrasound finding associated with fibrosis from chronic hepatocellular disease. No focal hepatic mass. OTHER TESTING:  Not available or performed    ASSESSMENT AND PLAN:  Primary Biliary Cholangitis with cirrhosis. The most recent laboratory studies indicate that the liver transaminases are normal, alkaline phosphatase is normal,  tests of hepatic synthetic and metabolic function are normal, and the platelet count is normal.  Will perform laboratory testing to monitor liver function and degree of liver injury. This will include hepatic panel, a CBC w/ diff, a BMP, a PT/INR, an AFP-L3%.     Complications of cirrhosis were discussed in detail. We discussed thrombocytopenia, portal hypertension, varices, GI bleeding, peripheral edema, ascites, hepatic encephalopathy, and hepatocellular carcinoma. We discussed the need for follow ups on a regular basis to monitor for complications. We discussed the need for every 6 month liver imaging studies. 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 Sammy De La Paz. Follow up with him as directed. Discussed extrahepatic manifestations of PBC such as osteoporosis and elevated cholesterol. The risk of osteoporosis is increased in patients with PBC. DEXA bone density to assess for osteoporosis should be ordered by the patients primary care physician. The patient was advised to take calcium supplementation and Vitamin D. He was advised to take supplemental vitamin A, D, E, and K. Patients with PBC are at increased risk for hypercholesterolemia. However, this is not associated with an increased risk of cardiac disease and MI because this is typically an elevation in HDL cholesterol. There is no contraindication for treatment with a statin if this is felt to be indicated based upon cardiac risk assessment. The patient is receiving treatment with urosdeoxycholic Acid. Continue this at current dose. The patient  and is tolerating the treatment without significant side effects. His alkaline phosphatase is normal.     Vaccination for viral hepatitis A and B is recommended since the patient has no serologic evidence of previous exposure or vaccination with immunity. [address] screening has recently been performed and does not suggest [address] The next liver imaging study is due now and will be performed with  ultrasound and shear wave elastography. Elastography  can assess liver fibrosis and determine if a patient has advanced fibrosis or cirrhosis without the need for liver biopsy. This was ordered today. Anemia may be secondary to portal hypertension and varices.   Iron stores will be assessed with today's labs. Bone density up to date with osteopenia. All of the above issues were discussed with the patient. All questions were answered. The patient expressed a clear understanding of the above. 30 minutes total time spent with this patient with more than 50% of this time spent counseling and coordinating care as described above. [address] [address] in 6 months.       Delphine Felty, NP   Liver [place] of [facility], [address] [place] [address]   [phone]

## 2022-06-13 ENCOUNTER — ANESTHESIA EVENT (OUTPATIENT)
Dept: ENDOSCOPY | Age: 82
End: 2022-06-13
Payer: MEDICARE

## 2022-06-13 ENCOUNTER — ANESTHESIA (OUTPATIENT)
Dept: ENDOSCOPY | Age: 82
End: 2022-06-13
Payer: MEDICARE

## 2022-06-13 ENCOUNTER — HOSPITAL ENCOUNTER (OUTPATIENT)
Age: 82
Setting detail: OUTPATIENT SURGERY
Discharge: HOME | End: 2022-06-13
Attending: INTERNAL MEDICINE | Admitting: INTERNAL MEDICINE
Payer: MEDICARE

## 2022-06-13 VITALS
RESPIRATION RATE: 16 BRPM | WEIGHT: 170.6 LBS | TEMPERATURE: 97.6 F | BODY MASS INDEX: 24.42 KG/M2 | HEART RATE: 58 BPM | HEIGHT: 70 IN | DIASTOLIC BLOOD PRESSURE: 90 MMHG | OXYGEN SATURATION: 100 % | SYSTOLIC BLOOD PRESSURE: 168 MMHG

## 2022-06-13 DIAGNOSIS — K31.89 PORTAL HYPERTENSIVE GASTROPATHY (HCC): ICD-10-CM

## 2022-06-13 DIAGNOSIS — K76.6 PORTAL HYPERTENSIVE GASTROPATHY (HCC): ICD-10-CM

## 2022-06-13 DIAGNOSIS — I85.10 SECONDARY ESOPHAGEAL VARICES WITHOUT BLEEDING (HCC): ICD-10-CM

## 2022-06-13 PROCEDURE — 74011250636 HC RX REV CODE- 250/636: Performed by: INTERNAL MEDICINE

## 2022-06-13 PROCEDURE — 76060000031 HC ANESTHESIA FIRST 0.5 HR: Performed by: INTERNAL MEDICINE

## 2022-06-13 PROCEDURE — 76040000019: Performed by: INTERNAL MEDICINE

## 2022-06-13 PROCEDURE — 77030014243 HC BND LIG VRCES BSC -D: Performed by: INTERNAL MEDICINE

## 2022-06-13 PROCEDURE — 43244 EGD VARICES LIGATION: CPT | Performed by: INTERNAL MEDICINE

## 2022-06-13 PROCEDURE — 74011000250 HC RX REV CODE- 250: Performed by: ANESTHESIOLOGY

## 2022-06-13 PROCEDURE — 2709999900 HC NON-CHARGEABLE SUPPLY: Performed by: INTERNAL MEDICINE

## 2022-06-13 PROCEDURE — 74011250636 HC RX REV CODE- 250/636: Performed by: ANESTHESIOLOGY

## 2022-06-13 RX ORDER — INSULIN LISPRO 100 [IU]/ML
INJECTION, SOLUTION INTRAVENOUS; SUBCUTANEOUS ONCE
Status: CANCELLED | OUTPATIENT
Start: 2022-06-13 | End: 2022-06-13

## 2022-06-13 RX ORDER — SODIUM CHLORIDE 0.9 % (FLUSH) 0.9 %
5-40 SYRINGE (ML) INJECTION AS NEEDED
Status: CANCELLED | OUTPATIENT
Start: 2022-06-13

## 2022-06-13 RX ORDER — MAGNESIUM SULFATE 100 %
4 CRYSTALS MISCELLANEOUS AS NEEDED
Status: CANCELLED | OUTPATIENT
Start: 2022-06-13

## 2022-06-13 RX ORDER — SODIUM CHLORIDE 9 MG/ML
100 INJECTION, SOLUTION INTRAVENOUS CONTINUOUS
Status: DISCONTINUED | OUTPATIENT
Start: 2022-06-13 | End: 2022-06-13 | Stop reason: HOSPADM

## 2022-06-13 RX ORDER — NALOXONE HYDROCHLORIDE 0.4 MG/ML
0.4 INJECTION, SOLUTION INTRAMUSCULAR; INTRAVENOUS; SUBCUTANEOUS
Status: CANCELLED | OUTPATIENT
Start: 2022-06-13 | End: 2022-06-13

## 2022-06-13 RX ORDER — SODIUM CHLORIDE 0.9 % (FLUSH) 0.9 %
5-40 SYRINGE (ML) INJECTION EVERY 8 HOURS
Status: CANCELLED | OUTPATIENT
Start: 2022-06-13

## 2022-06-13 RX ORDER — EPINEPHRINE 0.1 MG/ML
1 INJECTION INTRACARDIAC; INTRAVENOUS
Status: CANCELLED | OUTPATIENT
Start: 2022-06-13 | End: 2022-06-14

## 2022-06-13 RX ORDER — FLUMAZENIL 0.1 MG/ML
0.2 INJECTION INTRAVENOUS
Status: CANCELLED | OUTPATIENT
Start: 2022-06-13 | End: 2022-06-13

## 2022-06-13 RX ORDER — ATROPINE SULFATE 0.1 MG/ML
0.5 INJECTION INTRAVENOUS
Status: CANCELLED | OUTPATIENT
Start: 2022-06-13 | End: 2022-06-14

## 2022-06-13 RX ORDER — FENTANYL CITRATE 50 UG/ML
25 INJECTION, SOLUTION INTRAMUSCULAR; INTRAVENOUS AS NEEDED
Status: DISCONTINUED | OUTPATIENT
Start: 2022-06-13 | End: 2022-06-13 | Stop reason: HOSPADM

## 2022-06-13 RX ORDER — LIDOCAINE HYDROCHLORIDE 20 MG/ML
INJECTION, SOLUTION EPIDURAL; INFILTRATION; INTRACAUDAL; PERINEURAL AS NEEDED
Status: DISCONTINUED | OUTPATIENT
Start: 2022-06-13 | End: 2022-06-13 | Stop reason: HOSPADM

## 2022-06-13 RX ORDER — ALBUTEROL SULFATE 0.83 MG/ML
2.5 SOLUTION RESPIRATORY (INHALATION) AS NEEDED
Status: CANCELLED | OUTPATIENT
Start: 2022-06-13

## 2022-06-13 RX ORDER — PROPOFOL 10 MG/ML
INJECTION, EMULSION INTRAVENOUS AS NEEDED
Status: DISCONTINUED | OUTPATIENT
Start: 2022-06-13 | End: 2022-06-13 | Stop reason: HOSPADM

## 2022-06-13 RX ORDER — DEXTROMETHORPHAN/PSEUDOEPHED 2.5-7.5/.8
1.2 DROPS ORAL
Status: CANCELLED | OUTPATIENT
Start: 2022-06-13

## 2022-06-13 RX ORDER — SODIUM CHLORIDE, SODIUM LACTATE, POTASSIUM CHLORIDE, CALCIUM CHLORIDE 600; 310; 30; 20 MG/100ML; MG/100ML; MG/100ML; MG/100ML
75 INJECTION, SOLUTION INTRAVENOUS CONTINUOUS
Status: CANCELLED | OUTPATIENT
Start: 2022-06-13

## 2022-06-13 RX ADMIN — LIDOCAINE HYDROCHLORIDE 80 MG: 20 INJECTION, SOLUTION EPIDURAL; INFILTRATION; INTRACAUDAL; PERINEURAL at 10:22

## 2022-06-13 RX ADMIN — PROPOFOL 20 MG: 10 INJECTION, EMULSION INTRAVENOUS at 10:29

## 2022-06-13 RX ADMIN — PROPOFOL 30 MG: 10 INJECTION, EMULSION INTRAVENOUS at 10:26

## 2022-06-13 RX ADMIN — FENTANYL CITRATE 25 MCG: 50 INJECTION, SOLUTION INTRAMUSCULAR; INTRAVENOUS at 11:04

## 2022-06-13 RX ADMIN — FENTANYL CITRATE 25 MCG: 50 INJECTION, SOLUTION INTRAMUSCULAR; INTRAVENOUS at 11:18

## 2022-06-13 RX ADMIN — SODIUM CHLORIDE 100 ML/HR: 9 INJECTION, SOLUTION INTRAVENOUS at 10:02

## 2022-06-13 RX ADMIN — PROPOFOL 100 MG: 10 INJECTION, EMULSION INTRAVENOUS at 10:22

## 2022-06-13 NOTE — PROCEDURES
3340 [facility], MD, FACP, [place], [place]      EMILIE Gomes, [facility]-BC     Sintai Yates, [facility]   RACHANA Roman, [facility]       Alvin Matta [facility] 136    at [address], [facility] Connie Crowe  22. [phone]    FAX: [address] [place] [address], [address] [place] [address] - [address]    [phone]    FAX: [phone]         UPPER ENDOSCOPY PROCEDURE NOTE    Chino Rojas  1940    INDICATION: Cirrhosis. Screening for esophageal varices with variceal ligation if indicated. : Seamus Singer MD    SURGICAL ASSISTANT:  None    PROSTHETIC DEVISES, TISSUE GRAFTS, ORGAN TRANSPLANTS:  Not applicable     ANESTHESIA/SEDATION: Propofol was administered by anesthesia    PROCEDURE DESCRIPTION:  Informed consent was obtained from the patient for the procedure. All risks and benefits of the procedure explained. The procedure was performed in the endoscopy suite. The patient was laying on a stretcher and moved to the left lateral decubitus position prior to administration of sedation. Sedation was administered by anesthesiology. See their note for details. The endoscope was inserted into the mouth and advanced under direct vision to the second portion of the duodenum. Careful inspection of upper gastrointestinal tract was made as the endoscope was inserted and withdrawn. Retroflexion of the endoscope to view of the cardia of the stomach was performed. After withdrawing the endoscope the banding devise was placed on the tip of the endoscope. The scope was then reinserted under direct inspection and advanced to the esophagus.   Banding of esophageal varices was performed as described below.  The scope was then removed. FINDINGS:  Esophagus:    Multiple Medium varices were present. There was no stigmata of recent bleeding. Banding of esophageal varices was performed. Good hemostsis with no active bleeding was observed at the end of the procedure. Scars of previous banding. 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 Duodenum:   Normal bulb and second portion    SPECIMENS COLLECTED:   None    INTERVENTIONS:   6 bands placed were placed on esophageal varices. COMPLICATIONS: None. The patient tolerated the procedure well. EBL: Negligible. RECOMMENDATIONS:  Observe until discharge parameters are achieved. Liquid diet today. Soft food tomorrow. Resume general diet thereafter. Repeat endoscopy to reassess varices and need for additional banding in 3 months. Follow-up Liver [place] [facility] [place] office as scheduled.       Bobbi Blair [place]  [address]  [address] [place] [address], [address] [place] [address], [address]  12 [facility]

## 2022-06-13 NOTE — H&P
3340 [facility], MD, [place], Henrry Rosario Thorne, [place]      EMILIE Figueroa, [facility]     April S Trudy, [facility]   Samantha Messina P-CASTILLO Desouza, [facility]       Alvin Matta [facility] 136    at [address], [address] [address].    [phone]    FAX: [address] [place] [address], [address]    [phone]    FAX: [phone]         PRE-PROCEDURE NOTE - EGD    H and P from last office visit reviewed. Allergies reviewed. Out-patient medicaton list reviewed. 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 ([facility] K74.3    Pure hypercholesterolemia E78.00    Thrombocytopenia (HCC) D69.6    B12 deficiency E53.8    Chronic pain of right knee M25.561, G89.29    Lumbar pain with radiation down right leg M54.50, M79.604    Right hip pain M25.551    Bladder cancer (HCC) C67.9    Venous insufficiency of both lower extremities I87.2    Localized edema R60.0    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    Dermatochalasis of both upper eyelids H02.831, H02.834    Dry eyes H04.123    Pseudophakia of both eyes Z96.1    Vitreous floaters of both eyes H43.393    Heart block I45.9    Pacemaker Z95.0       Allergies   Allergen Reactions    Sulfa (Sulfonamide Antibiotics) Unknown (comments)     Pt states its been so long he doesn't remember the reaction.      Ciprofloxacin Diarrhea and Myalgia    Other Medication Myalgia     Think  that is was a Sulfa drug, but not sure       No current facility-administered medications on file prior to encounter. Current Outpatient Medications on File Prior to Encounter   Medication Sig Dispense Refill    nadoloL (CORGARD) 20 mg tablet Take 1 Tablet by mouth daily. 30 Tablet 0    ursodioL (ACTIGALL) 300 mg capsule TAKE 2 CAPSULES BY MOUTH TWO (2) TIMES A  Capsule 3    furosemide (LASIX) 20 mg tablet Take one tablet as needed for swelling in legs  Indications: visible water retention (Patient not taking: Reported on 6/13/2022) 15 Tablet 0    Syringe with Needle, Disp, (BD Luer-Freddy Syringe) 3 mL 23 gauge x 1 1/2\" syrg FOR USE WITH TESTOSTERONE (INTRAMUSCULAR) INJECTIONS EVERY 7 DAYS 12 Pen Needle 5    testosterone cypionate (DEPOTESTOTERONE CYPIONATE) 200 mg/mL injection INJECT 0.5ML INTRAMUSCULARLY ONCE A WEEK 10 mL 5    tamsulosin (Flomax) 0.4 mg capsule Take 0.4 mg by mouth two (2) times a day.  dextran 70-hypromellose (ARTIFICIAL TEARS,[id number],) ophthalmic solution 1 Drop. For EGD to assess for esophageal and gastric varices. Plan to perform banding if indicated based upon variceal size and appearance. The risks of the procedure were discussed with the patient. These included reaction to anesthesia, pain, perforation and bleeding. All questions were answered. The patient wishes to proceed with the procedure. The patient was counseled at length about the risks of kiara Covid-19 in the camille-operative and post-operative states including the recovery window of their procedure. The patient was made aware that kiara Covid-19 after a surgical procedure may worsen their prognosis for recovering from the virus and lend to a higher morbidity and or mortality risk. The patient was given the options of postponing their procedure. All of the risks, benefits, and alternatives were discussed. The patient does  wish to proceed with the procedure.     PHYSICAL EXAMINATION:  Visit Vitals  BP (!) 143/79   Pulse 60   Temp 98 °F (36.7 °C)   Resp 16   Ht 5' 10\" (1.778 m)   Wt 170 lb 9.6 oz (77.4 kg)   SpO2 100%   BMI 24.48 kg/m²       General: No acute distress. Eyes: Sclera anicteric. ENT: No oral lesions. Thyroid normal.  Nodes: No adenopathy. Skin: No spider angiomata. No jaundice. No palmar erythema. Respiratory: Lungs clear to auscultation. Cardiovascular: Regular heart rate. No murmurs. No JVD. Abdomen: Soft non-tender, liver size normal to percussion/palpation. Spleen not palpable. No obvious ascites. Extremities: No edema. No muscle wasting. No gross arthritic changes. Neurologic: Alert and oriented. Cranial nerves grossly intact. No asterixis. MOST RECENT LABORATORY STUDIES:  Lab Results   Component Value Date/Time    WBC 2.9 (L) 05/19/2022 12:59 PM    Hemoglobin (POC) 15.2 04/09/2019 03:28 PM    HGB 12.7 05/19/2022 12:59 PM    HCT (POC) 41.6 (A) 03/06/2014 04:05 PM    HCT 37.9 05/19/2022 12:59 PM    PLATELET 93 (L) 80/95/4501 12:59 PM    MCV 85.2 05/19/2022 12:59 PM     Lab Results   Component Value Date/Time    INR 1.2 04/21/2022 10:47 AM    INR 1.1 01/19/2022 04:30 PM    INR 1.2 (H) 11/24/2021 08:28 AM    Prothrombin time 14.5 04/21/2022 10:47 AM    Prothrombin time 13.4 01/19/2022 04:30 PM    Prothrombin time 11.6 (H) 11/24/2021 08:28 AM       ASSESSMENT AND PLAN:  EGD to assess for esophageal and/or gastric varices.   Sedation per anesthesiology      MD Alaina Cutler 13 of [address], [address]  [phone]  [address]

## 2022-06-13 NOTE — DISCHARGE INSTRUCTIONS
DISCHARGE SUMMARY from Nurse    PATIENT INSTRUCTIONS:    After general anesthesia or intravenous sedation, for 24 hours or while taking prescription Narcotics:  · Limit your activities  · Do not drive and operate hazardous machinery  · Do not make important personal or business decisions  · Do  not drink alcoholic beverages  · If you have not urinated within 8 hours after discharge, please contact your surgeon on call. Report the following to your surgeon:  · Excessive pain, swelling, redness or odor of or around the surgical area  · Temperature over 100.5  · Nausea and vomiting lasting longer than 4 hours or if unable to take medications  · Any signs of decreased circulation or nerve impairment to extremity: change in color, persistent  numbness, tingling, coldness or increase pain  · Any questions    What to do at Home:  Recommended activity: as above ,     If you experience any of the following symptoms as above , please follow up with Doctor Alec Cruz. *  Please give a list of your current medications to your Primary Care Provider. *  Please update this list whenever your medications are discontinued, doses are      changed, or new medications (including over-the-counter products) are added. *  Please carry medication information at all times in case of emergency situations. These are general instructions for a healthy lifestyle:    No smoking/ No tobacco products/ Avoid exposure to second hand smoke  Surgeon General's Warning:  Quitting smoking now greatly reduces serious risk to your health.     Obesity, smoking, and sedentary lifestyle greatly increases your risk for illness    A healthy diet, regular physical exercise & weight monitoring are important for maintaining a healthy lifestyle    You may be retaining fluid if you have a history of heart failure or if you experience any of the following symptoms:  Weight gain of 3 pounds or more overnight or 5 pounds in a week, increased swelling in our hands or feet or shortness of breath while lying flat in bed. Please call your doctor as soon as you notice any of these symptoms; do not wait until your next office visit. The discharge information has been reviewed with the patient and spouse. The patient and spouse verbalized understanding. Discharge medications reviewed with the patient and spouse and appropriate educational materials and side effects teaching were provided. ___________________________________________________________________________________________________________________________________[facility] [address] [place], [address], [facility], [place]      EMILIE Orellana, [facility]     April S Trudy, AGPCNP-BC   Rhonda Marie, FNP-C    Adonis Lang, [facility]       Alvin Matta [facility] [place] [address]    at [address], [address] Connie Crowe  22. [phone]    FAX: [address] [place] [address], [address]    [phone]    FAX: [phone]         ENDOSCOPY WITH BANDING DISCHARGE INSTRUCTIONS    Landon Vasquez  1940  Date: 6/13/2022    DISCOMFORT:  Use lozenges or warm salt water gargle for sore thoat  Apply warm compress to IV site if red. If redness or soreness persists call the office. You may experience gas and bloating. Walking and belching will help relieve this. You may experience chest pain or discomfort or feel as though food is \"sticking\" in your food pipe for a few days after the procedure. This is a normal feeling after banding of esophageal varices. CHEST PRESSURE:  Banding of dilated veins (varices) in the food tube (esophagus) can be painful in some persons.   The pain is caused by squeezing the varices and lining of the esophagus by the bands used to seal the varices. You can take liquid pain medication either acetaminophen or alternative. The best treatment for this is to drink a an \"Icee\" or \"Slurpee\" since the ice crystals will freeze the nerve endings in the food tube and relieve the pain. DIET:  Regular food may dislodge the bands placed on the varices. For this reason you should only have liquid for the rest of today. Eat only soft food that does not need to be chewed all day tomorrow. You may advance to your regular diet in 2 days. ACTIVITY:  Spend the remainder of the day resting. Avoid any strenuous activity. You may not operate a vehicle for 12 hours. You may not engage in an occupation involving machinery or appliances for rest of today. Avoid making any critical decisions for at least 24 hour. Call the [employer] [employer] if you have any of the following:  Increasing chest or abdominal pain, nausea, vomiting, vomiting blood, abdominal distension or swelling, fever or chills, bloody discharge from nose or mouth or shortness of breath. Follow-up Instructions:  Call Dr. Irlanda Sims for any questions or problems at the phone number listed above. If a biopsy was performed, you will be contacted by the office staff or Dr [address] [place] within 1 week. If you have not heard from us by then you may call the office at the phone number listed above to inquire about the results. 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 6 bands were placed to seal the varices and reduce the risk of bleeding. Will repeat EGD to evaluate for additional varices and need for more banding in 3 months. Keep follow-up appointment with Lars Schaffer on 8/16/2022. DISCHARGE SUMMARY from the Nurse:   The following personal items collected during your admission are returned to you:   Dental Appliance: Dental Appliances: None  Vision: Visual Aid: Glasses,At bedside,With patient  Hearing Aid:    Zac Davis: Clothing:    Other Valuables:    Valuables sent to safe:                Learning About Coronavirus (COVID-19)  Coronavirus [phone]): Overview  What is coronavirus (COVID-19)? The coronavirus disease (COVID-19) is caused by a virus. It is an illness that was first found in [place], [place], in December 2019. It has since spread worldwide. The virus can cause fever, cough, and trouble breathing. In severe cases, it can cause pneumonia and make it hard to breathe without help. It can cause death. Coronaviruses are a large group of viruses. They cause the common cold. They also cause more serious illnesses like Middle East respiratory syndrome (MERS) and severe acute respiratory syndrome (SARS). COVID-19 is caused by a novel coronavirus. That means it's a new type that has not been seen in people before. This virus spreads person-to-person through droplets from coughing and sneezing. It can also spread when you are close to someone who is infected. And it can spread when you touch something that has the virus on it, such as a doorknob or a tabletop. What can you do to protect yourself from coronavirus (COVID-19)? The best way to protect yourself from getting sick is to:  · Avoid areas where there is an outbreak. · Avoid contact with people who may be infected. · Wash your hands often with soap or alcohol-based hand sanitizers. · Avoid crowds and try to stay at least 6 feet away from other people. · Wash your hands often, especially after you cough or sneeze. Use soap and water, and scrub for at least 20 seconds. If soap and water aren't available, use an alcohol-based hand . · Avoid touching your mouth, nose, and eyes. What can you do to avoid spreading the virus to others? To help avoid spreading the virus to others:  · Cover your mouth with a tissue when you cough or sneeze. Then throw the tissue in the trash. · Use a disinfectant to clean things that you touch often.   · Stay home if you are sick or have been exposed to the virus. Don't go to school, work, or public areas. And don't use public transportation. · If you are sick:  ? Leave your home only if you need to get medical care. But call the doctor's office first so they know you're coming. And wear a face mask, if you have one.  ? If you have a face mask, wear it whenever you're around other people. It can help stop the spread of the virus when you cough or sneeze. ? Clean and disinfect your home every day. Use household  and disinfectant wipes or sprays. Take special care to clean things that you grab with your hands. These include doorknobs, remote controls, phones, and handles on your refrigerator and microwave. And don't forget countertops, tabletops, bathrooms, and computer keyboards. When to call for help  Call 911 anytime you think you may need emergency care. For example, call if:  · You have severe trouble breathing. (You can't talk at all.)  · You have constant chest pain or pressure. · You are severely dizzy or lightheaded. · You are confused or can't think clearly. · Your face and lips have a blue color. · You pass out (lose consciousness) or are very hard to wake up. Call your doctor now if you develop symptoms such as:  · Shortness of breath. · Fever. · Cough. If you need to get care, call ahead to the doctor's office for instructions before you go. Make sure you wear a face mask, if you have one, to prevent exposing other people to the virus. Where can you get the latest information? The following health organizations are tracking and studying this virus. Their websites contain the most up-to-date information. [employer] also learn what to do if you think you may have been exposed to the virus. · U.S. Centers for Disease Control and Prevention (CDC): The CDC provides updated news about the disease and travel advice. The website also tells you how to prevent the spread of infection.  www.cdc.gov  · [address] Organization (WHO): WHO offers information about the virus outbreaks. WHO also has travel advice. www.who.int  Current as of: April 1, 2020               Content Version: 12.4  © 6067-9343 Health[employer], [employer]orporated. Care instructions adapted under license by your healthcare professional. If you have questions about a medical condition or this instruction, always ask your healthcare professional. [address] any warranty or liability for your use of this information.   Patient armband removed and shredded

## 2022-06-13 NOTE — ANESTHESIA POSTPROCEDURE EVALUATION
Procedure(s):  ESOPHAGOGASTRODUODENOSCOPY (EGD) WITH MAC; banding x 6.     MAC    Anesthesia Post Evaluation        Patient location during evaluation: PACU  Patient participation: complete - patient participated  Level of consciousness: awake and alert  Pain score: 3  Pain management: satisfactory to patient  Airway patency: patent  Anesthetic complications: no  Cardiovascular status: acceptable  Respiratory status: acceptable  Hydration status: acceptable  Post anesthesia nausea and vomiting:  none  Final Post Anesthesia Temperature Assessment:  Normothermia (36.0-37.5 degrees C)      INITIAL Post-op Vital signs:   Vitals Value Taken Time   /93 06/13/22 1122   Temp 36.4 °C (97.6 °F) 06/13/22 1042   Pulse 57 06/13/22 1122   Resp 16 06/13/22 1122   SpO2 100 % 06/13/22 1122

## 2022-06-13 NOTE — PERIOP NOTES
Assisted to BR - PT is weak , back to bed with 2 assist.     Face to face  Discharged  instruction given to spouse/pfand agreed with the plan. Discharged includes diet- special diet , activity limitations , medication to continue and f/u appointment. D/c to home in stable condition with care of family.

## 2022-06-13 NOTE — ANESTHESIA PREPROCEDURE EVALUATION
Relevant Problems   No relevant active problems       Anesthetic History   No history of anesthetic complications            Review of Systems / Medical History  Patient summary reviewed, nursing notes reviewed and pertinent labs reviewed    Pulmonary  Within defined limits                 Neuro/Psych   Within defined limits           Cardiovascular    Hypertension: well controlled        Dysrhythmias       Exercise tolerance: >4 METS  Comments: Pacemaker   GI/Hepatic/Renal     GERD: well controlled      Liver disease     Endo/Other  Within defined limits           Other Findings              Physical Exam    Airway  Mallampati: II  TM Distance: > 6 cm  Neck ROM: normal range of motion   Mouth opening: Normal     Cardiovascular    Rhythm: regular  Rate: normal         Dental    Dentition: Poor dentition     Pulmonary  Breath sounds clear to auscultation               Abdominal         Other Findings            Anesthetic Plan    ASA: 3  Anesthesia type: MAC          Induction: Intravenous  Anesthetic plan and risks discussed with: Patient

## 2022-06-29 ENCOUNTER — DOCUMENTATION ONLY (OUTPATIENT)
Dept: FAMILY MEDICINE CLINIC | Age: 82
End: 2022-06-29

## 2022-06-29 NOTE — PROGRESS NOTES
Pt seen by urologist Dr Tawnya Chauhan on 6-23-22 to follow up for bladder cancer. He is s/p laser of bladder stone (8/2021) and TURB of medium bladder tumor with gemcitibine (9-2021). Path showed papillary urothelial carcinoma, non-invasive, high grade smooth muscle is present. BCG tx's ended . Neg FISH/cytology on 2-17-22. He was asymptomatic at his appt. UA showed 1-2 RBC. Underwent cystoscopy- it showed moderate obstruction of the prostatic urethra. There was trabeculation of the bladder wall and some debris within the bladder. He was tx'd with Cephalexin 500mg TID x 1 week and told to cont Flomax.

## 2022-06-30 ENCOUNTER — OFFICE VISIT (OUTPATIENT)
Dept: FAMILY MEDICINE CLINIC | Age: 82
End: 2022-06-30
Payer: MEDICARE

## 2022-06-30 VITALS
RESPIRATION RATE: 18 BRPM | WEIGHT: 174 LBS | TEMPERATURE: 99.3 F | HEIGHT: 70 IN | DIASTOLIC BLOOD PRESSURE: 87 MMHG | SYSTOLIC BLOOD PRESSURE: 149 MMHG | OXYGEN SATURATION: 97 % | HEART RATE: 73 BPM | BODY MASS INDEX: 24.91 KG/M2

## 2022-06-30 DIAGNOSIS — F02.80 LATE ONSET ALZHEIMER'S DISEASE WITHOUT BEHAVIORAL DISTURBANCE (HCC): ICD-10-CM

## 2022-06-30 DIAGNOSIS — C67.9 MALIGNANT NEOPLASM OF URINARY BLADDER, UNSPECIFIED SITE (HCC): ICD-10-CM

## 2022-06-30 DIAGNOSIS — N40.0 BENIGN PROSTATIC HYPERPLASIA WITHOUT LOWER URINARY TRACT SYMPTOMS: ICD-10-CM

## 2022-06-30 DIAGNOSIS — R19.05 PERIUMBILICAL MASS: ICD-10-CM

## 2022-06-30 DIAGNOSIS — D69.6 THROMBOCYTOPENIA (HCC): ICD-10-CM

## 2022-06-30 DIAGNOSIS — G30.1 LATE ONSET ALZHEIMER'S DISEASE WITHOUT BEHAVIORAL DISTURBANCE (HCC): ICD-10-CM

## 2022-06-30 DIAGNOSIS — M19.041 PRIMARY OSTEOARTHRITIS OF RIGHT HAND: ICD-10-CM

## 2022-06-30 DIAGNOSIS — K74.3 HEPATIC CIRRHOSIS DUE TO PRIMARY BILIARY CHOLANGITIS (HCC): ICD-10-CM

## 2022-06-30 DIAGNOSIS — Z00.00 MEDICARE ANNUAL WELLNESS VISIT, SUBSEQUENT: Primary | ICD-10-CM

## 2022-06-30 DIAGNOSIS — E29.1 HYPOGONADISM MALE: ICD-10-CM

## 2022-06-30 PROBLEM — I49.5 SICK SINUS SYNDROME (HCC): Status: ACTIVE | Noted: 2022-06-30

## 2022-06-30 PROBLEM — N18.30 CHRONIC RENAL DISEASE, STAGE III (HCC): Status: RESOLVED | Noted: 2022-06-30 | Resolved: 2022-06-30

## 2022-06-30 PROBLEM — N18.30 CHRONIC RENAL DISEASE, STAGE III (HCC): Status: ACTIVE | Noted: 2022-06-30

## 2022-06-30 PROBLEM — I49.5 SICK SINUS SYNDROME (HCC): Status: RESOLVED | Noted: 2022-06-30 | Resolved: 2022-06-30

## 2022-06-30 PROCEDURE — G0439 PPPS, SUBSEQ VISIT: HCPCS | Performed by: NURSE PRACTITIONER

## 2022-06-30 PROCEDURE — 36415 COLL VENOUS BLD VENIPUNCTURE: CPT | Performed by: NURSE PRACTITIONER

## 2022-06-30 PROCEDURE — 99214 OFFICE O/P EST MOD 30 MIN: CPT | Performed by: NURSE PRACTITIONER

## 2022-06-30 RX ORDER — MEMANTINE HYDROCHLORIDE 5 MG/1
5 TABLET ORAL DAILY
Qty: 90 TABLET | Refills: 0 | Status: SHIPPED | OUTPATIENT
Start: 2022-06-30 | End: 2022-08-16

## 2022-06-30 NOTE — PATIENT INSTRUCTIONS
Medicare Wellness Visit, Male    The best way to live healthy is to have a lifestyle where you eat a well-balanced diet, exercise regularly, limit alcohol use, and quit all forms of tobacco/nicotine, if applicable. Regular preventive services are another way to keep healthy. Preventive services (vaccines, screening tests, monitoring & exams) can help personalize your care plan, which helps you manage your own care. Screening tests can find health problems at the earliest stages, when they are easiest to treat. [facility] follows the current, evidence-based guidelines published by the [facility] Derek Sultana ([facility] [[facility]]STF) when recommending preventive services for our patients. Because we follow these guidelines, sometimes recommendations change over time as research supports it. (For example, a prostate screening blood test is no longer routinely recommended for men with no symptoms). Of course, you and your doctor may decide to screen more often for some diseases, based on your risk and co-morbidities (chronic disease you are already diagnosed with). Preventive services for you include:  - Medicare offers their members a free annual wellness visit, which is time for you and your primary care provider to discuss and plan for your preventive service needs. Take advantage of this benefit every year!  -All adults over age 72 should receive the recommended pneumonia vaccines. Current USPSTF guidelines recommend a series of two vaccines for the best pneumonia protection.   -All adults should have a flu vaccine yearly and tetanus vaccine every 10 years.  -All adults age 48 and older should receive the shingles vaccines (series of two vaccines).        -All adults age 38-68 who are overweight should have a diabetes screening test once every three years.   -Other screening tests & preventive services for persons with diabetes include: an eye exam to screen for diabetic retinopathy, a kidney function test, a foot exam, and stricter control over your cholesterol.   -Cardiovascular screening for adults with routine risk involves an electrocardiogram (ECG) at intervals determined by the provider.   -Colorectal cancer screening should be done for adults age 54-65 with no increased risk factors for colorectal cancer. There are a number of acceptable methods of screening for this type of cancer. Each test has its own benefits and drawbacks. Discuss with your provider what is most appropriate for you during your annual wellness visit. The different tests include: colonoscopy (considered the best screening method), a fecal occult blood test, a fecal DNA test, and sigmoidoscopy.  -All adults born between [facility] should be screened once for Hepatitis C.  -An Abdominal Aortic Aneurysm (AAA) Screening is recommended for men age 73-68 who has ever smoked in their lifetime.      Here is a list of your current Health Maintenance items (your personalized list of preventive services) with a due date:  Health Maintenance Due   Topic Date Due    COVID-19 Vaccine (1) Never done    Shingles Vaccine (1 of 2) Never done    Pneumococcal Vaccine (2 - PCV) 11/30/2016

## 2022-06-30 NOTE — PROGRESS NOTES
1. \"Have you been to the ER, urgent care clinic since your last visit? Hospitalized since your last visit? \" No    2. \"Have you seen or consulted any other health care providers outside of the [address] [place] [address] since your last visit? \" Yes     3. For patients aged 39-70: Has the patient had a colonoscopy / FIT/ Cologuard? NA - based on age      If the patient is female:    4. For patients aged 41-77: Has the patient had a mammogram within the past 2 years? NA - based on age or sex      11. For patients aged 21-65: Has the patient had a pap smear? NA - based on age or sex    Chief Complaint   Patient presents with    Hypertension    Annual Wellness Visit    Follow-up     Visit Vitals  BP (!) 164/75 (BP 1 Location: Left arm, BP Patient Position: Sitting)   Pulse 73   Temp 99.3 °F (37.4 °C) (Temporal)   Resp 18   Ht 5' 10\" (1.778 m)   Wt 174 lb (78.9 kg)   SpO2 97%   BMI 24.97 kg/m²       This is the Subsequent Medicare Annual Wellness Exam, performed 12 months or more after the Initial AWV or the last Subsequent AWV    I have reviewed the patient's medical history in detail and updated the computerized patient record. Assessment/Plan   Education and counseling provided:  Are appropriate based on today's review and evaluation    1. Medicare annual wellness visit, subsequent   Due to time constraints we did not discus the Shingrix or Covid vaccines today    Depression Risk Factor Screening     3 most recent PHQ Screens 6/30/2022   PHQ Not Done -   Little interest or pleasure in doing things Not at all   Feeling down, depressed, irritable, or hopeless Not at all   Total Score PHQ 2 0       Alcohol & Drug Abuse Risk Screen    Do you average more than 1 drink per night or more than 7 drinks a week: No    In the past three months have you have had more than 4 drinks containing alcohol on one occasion: No          Functional Ability and Level of Safety    Hearing: Hearing is good.       Activities of Daily Living: The home contains: handrails  Patient does total self care      Ambulation: with no difficulty     Fall Risk:  Fall Risk Assessment, last 12 mths 6/30/2022   Able to walk? Yes   Fall in past 12 months? 0   Do you feel unsteady? 0   Are you worried about falling 0      Abuse Screen:  Patient is not abused       Cognitive Screening    Has your family/caregiver stated any concerns about your memory: no     Cognitive Screening: Normal - Clock Drawing Test    Health Maintenance Due     Health Maintenance Due   Topic Date Due    COVID-19 Vaccine (1) Never done    Shingrix Vaccine Age 50> (1 of 2) Never done    Pneumococcal 65+ years (2 - PCV) 11/30/2016       Functional Ability:   Does the patient exhibit a steady gait? yes   How long did it take the patient to get up and walk from a sitting position? 4 sec   Is the patient self reliant?  (ie can do own laundry, meals, household chores)  yes     Does the patient handle his/her own medications? yes     Does the patient handle his/her own money? yes     Is the patients home safe (ie good lighting, handrails on stairs and bath, etc.)? yes     Did you notice or did patient express any hearing difficulties? no     Did you notice or did patient express any vision difficulties?   no     Were distance and reading eye charts used? no       Advance Care Planning:   Patient was offered the opportunity to discuss advance care planning:  yes     Does patient have an Advance Directive:  no   If no, did you provide information on Caring Connections?   yes     ADL Assessment 6/30/2022   Feeding yourself No Help Needed   Getting from bed to chair No Help Needed   Getting dressed No Help Needed   Bathing or showering No Help Needed   Walk across the room (includes cane/walker) No Help Needed   Using the telphone No Help Needed   Taking your medications No Help Needed   Preparing meals No Help Needed   Managing money (expenses/bills) No Help Needed   Moderately strenuous housework (laundry) No Help Needed   Shopping for personal items (toiletries/medicines) No Help Needed   Shopping for groceries No Help Needed   Driving No Help Needed   Climbing a flight of stairs No Help Needed   Getting to places beyond walking distances No Help Needed       Abuse Screening Questionnaire 6/30/2022   Do you ever feel afraid of your partner? N   Are you in a relationship with someone who physically or mentally threatens you? N   Is it safe for you to go home?  Y     Patient Care Team   Patient Care Team:  Lyssa Miranda NP as PCP - General (Nurse Practitioner)  Lyssa Miranda NP as PCP - 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 ([address] K74.3    Pure hypercholesterolemia E78.00    Thrombocytopenia ([address] D69.6    B12 deficiency E53.8    Chronic pain of right knee M25.561, G89.29    Lumbar pain with radiation down right leg M54.50, M79.604    Right hip pain M25.551    Bladder cancer ([address] C67.9    Venous insufficiency of both lower extremities I87.2    Localized edema R60.0    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    Dermatochalasis of both upper eyelids H02.831, H02.834    Dry eyes H04.123    Pseudophakia of both eyes Z96.1    Vitreous floaters of both eyes H43.393    Heart block I45.9    Pacemaker Z95.0     Past Medical History:   Diagnosis Date    Adverse effect of anesthesia     bradycardia (HR 21 and junctional rhythm) with outpt laser bladder stone surgery 8/2021    Allergic     Anemia     BPH (benign prostatic hyperplasia)     Esophageal varices ([address] March 2016 banded x 6    GERD (gastroesophageal reflux disease)     GI bleed March 2016    Hyperlipidemia     Hypogonadism male     Left bundle branch block (LBBB)     Dr. Joaquín Hudson, pacemaker 9/2021    Primary biliary cirrhosis ([facility]     Vitamin D deficiency       Past Surgical History:   Procedure Laterality Date    HX ENDOSCOPY      HX HEENT  2009    deviated septum repair    HX HEENT  2018    cataract removal    HX ORTHOPAEDIC Right 01/2017    Arthroscopy knee    HX PACEMAKER      HX PACEMAKER PLACEMENT  09/03/2021    SECONDARY TO L BBB AND JUNCTIONAL RHYTHM DURING SURGERY.  HX REFRACTIVE SURGERY Left     HX SEPTOPLASTY                           Biopsy    HX UROLOGICAL  2021    TURBT/STONES     Current Outpatient Medications   Medication Sig Dispense Refill    donepeziL (ARICEPT) 10 mg tablet Take 1 Tablet by mouth nightly. 30 Tablet 0    furosemide (LASIX) 20 mg tablet Take one tablet as needed for swelling in legs  Indications: visible water retention 15 Tablet 0    Syringe with Needle, Disp, (BD Luer-Freddy Syringe) 3 mL 23 gauge x 1 1/2\" syrg FOR USE WITH TESTOSTERONE (INTRAMUSCULAR) INJECTIONS EVERY 7 DAYS 12 Pen Needle 5    testosterone cypionate (DEPOTESTOTERONE CYPIONATE) 200 mg/mL injection INJECT 0.5ML INTRAMUSCULARLY ONCE A WEEK 10 mL 5    nadoloL (CORGARD) 20 mg tablet Take 1 Tablet by mouth daily. 30 Tablet 0    ursodioL (ACTIGALL) 300 mg capsule TAKE 2 CAPSULES BY MOUTH TWO (2) TIMES A  Capsule 3    tamsulosin (Flomax) 0.4 mg capsule Take 0.4 mg by mouth two (2) times a day.  dextran 70-hypromellose (ARTIFICIAL TEARS,[id number],) ophthalmic solution 1 Drop.       fluticasone propionate (FLONASE) 50 mcg/actuation nasal spray USE 1 SPRAY IN EACH NOSTRIL TWICE A DAY FOR CHRONIC STUFFY AND RUNNY NOSE (Patient not taking: Reported on 6/13/2022) 1 Each 4     Allergies   Allergen Reactions    Sulfa (Sulfonamide Antibiotics) Unknown (comments)     Pt states its been so long he doesn't remember the reaction.      Ciprofloxacin Diarrhea and Myalgia    Other Medication Myalgia     Think  that is was a Sulfa drug, but not sure       Family History   Problem Relation Age of Onset    Diabetes Father     Stroke Brother     Elevated Lipids Other         children     Social History     Tobacco Use    Smoking status: Former Smoker     Packs/day: 1.00     Quit date: 1974     Years since quittin.1    Smokeless tobacco: Never Used   Substance Use Topics    Alcohol use: No     Alcohol/week: 0.0 standard drinks         Jv Gaston NP

## 2022-06-30 NOTE — PROGRESS NOTES
Subjective:     CC: AWV, HTN    HPI:  Mable Barros is a [de-identified] y.o. male who presents today for an AWV and a routine 6 month check up for bladder cancer and other chronic medical problems. Bladder Cancer  He is followed by urologist Dr Abiola Collins, recently seen on 6-23-22. He is s/p laser of bladder stone (8/2021) and TURB of medium bladder tumor with gemcitibine (9-2021). Path showed papillary urothelial carcinoma, non-invasive, high grade smooth muscle is present. BCG tx's ended . Neg FISH/cytology on 2-17-22. He was asymptomatic at his appt. UA showed 1-2 RBC. Underwent cystoscopy- it showed moderate obstruction of the prostatic urethra. There was trabeculation of the bladder wall and some debris within the bladder. He was tx'd with Cephalexin 500mg TID x 1 week and told to cont Flomax. He will follow up in 3 months for another cystoscopy. Follow up in September. 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 He is followed by Fritz Sanchez NP at [address]. Last seen in 4-2022. Per his note: \"He has been maintained on 1200 mg of ursodiol without issue.     Imaging of the liver performed 02/2017 with ultrasound followed with MRI in 03/2017. Heterogeneous echotexture. No focal liver lesions. Most recent imaging was performed in 11/2021 with CT and was unremarkable.       The most recent laboratory studies indicate that the liver transaminases are normal, alkaline phosphatase is normal, tests of hepatic synthetic and metabolic function are normal, and the platelet count is depressed. \"    Lashawn Floyd recently had an endoscopy done. Multiple medium varices were present- banding was done. There was no stigmata of recent bleeding. Scars of previous banding were present. There was mild portal hypertensive gastropathy of the body of the stomach. No gastric varices identified. Duodenum: Normal bulb and second portion    He is on lasix daily for swelling.  Has not yet taken it today. Umbilical hernia  Repair of the hernia was performed in 03/2022 by Dr. Shabnam Diana. He has noticed a few protrusions surrounding the umbilicus. They are painful. He went back to see his surgeon about them but per patient the surgeon did not think it was related to the surgery. He recommended coming here to be evaluated for other causes. Will order CT scan today. Pacemaker  On 8/25/21 during his laser tx of bladder stones, he developed intraoperative bradycardia requiring early termination of the procedure. He met with cardiology, dx'd with heart block. A pacemaker was placed. ECHO showed:    · LV: Estimated LVEF is 55 - 60%. Normal cavity size, wall thickness, systolic function (ejection fraction normal) and diastolic function. · LA: Dilated left atrium. Left Atrium volume index is 43 mL/m2. · MV: Mitral valve leaflet calcification. · TV: Mild tricuspid valve regurgitation is present. Right Ventricular Arterial Pressure (RVSP) is 36 mmHg. · PV: Mild pulmonic valve regurgitation is present. HTN  He is on Nadalol and Lasix daily. BP today elevated at 149/87 but he has not yet taken his Lasix today. Hypogonadism  Lab Results   Component Value Date/Time    Testosterone 76 (L) 01/11/2022 08:50 AM     He is on testosterone injections 100mg IM q week. Hgb WNL 5/2022. Due for testosterone level and PSA check today. BPH  On Flomax. Denies any urinary symptoms today. Due for PSA today. Lab Results   Component Value Date/Time    Prostate Specific Ag 1.7 02/23/2021 09:05 PM    Prostate Specific Ag 1.3 05/09/2019 10:39 AM    Prostate Specific Ag 1.0 01/24/2018 11:01 AM       Dementia  He is on Aricept 10mg daily but feels it is not helping. He cannot remember peoples' names among other things. His wife has to prompt him. We discussed the need to add Namenda today but he is hesitant to take new medications due to fear of side effects.     OA of hands  He is also c/o pain and stiffness in the R thumb and index fingers. He cannot brush his teeth or  the steering wheel for too long without pain. He is taking Ibuprofen prn with good relief but pain will not go away. We discussed the option of a referral to ortho for a joint injection but he declines at this time and would like to cont to monitor his symptoms and let me know if the problem gets worse.          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) K74.3    Pure hypercholesterolemia E78.00    Thrombocytopenia ([address] D69.6    B12 deficiency E53.8    Chronic pain of right knee M25.561, G89.29    Lumbar pain with radiation down right leg M54.50, M79.604    Right hip pain M25.551    Bladder cancer (HCC) C67.9    Venous insufficiency of both lower extremities I87.2    Localized edema R60.0    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    Dermatochalasis of both upper eyelids H02.831, H02.834    Dry eyes H04.123    Pseudophakia of both eyes Z96.1    Vitreous floaters of both eyes H43.393    Heart block I45.9    Pacemaker Z95.0       Past Medical History:   Diagnosis Date    Adverse effect of anesthesia     bradycardia (HR 21 and junctional rhythm) with outpt laser bladder stone surgery 8/2021    Allergic     Anemia     BPH (benign prostatic hyperplasia)     Esophageal varices ([address] March 2016    banded x 6    GERD (gastroesophageal reflux disease)     GI bleed March 2016    Hyperlipidemia     Hypogonadism male     Left bundle branch block (LBBB)     Dr. Jamey Machuca, pacemaker 9/2021    Primary biliary cirrhosis ([address]     Vitamin D deficiency          Current Outpatient Medications:     donepeziL (ARICEPT) 10 mg tablet, Take 1 Tablet by mouth nightly., Disp: 30 Tablet, Rfl: 0    fluticasone propionate (FLONASE) 50 mcg/actuation nasal spray, USE 1 SPRAY IN EACH NOSTRIL TWICE A DAY FOR CHRONIC STUFFY AND RUNNY NOSE (Patient not taking: Reported on 6/13/2022), Disp: 1 Each, Rfl: 4    furosemide (LASIX) 20 mg tablet, Take one tablet as needed for swelling in legs  Indications: visible water retention (Patient not taking: Reported on 6/13/2022), Disp: 15 Tablet, Rfl: 0    Syringe with Needle, Disp, (BD Luer-Freddy Syringe) 3 mL 23 gauge x 1 1/2\" syrg, FOR USE WITH TESTOSTERONE (INTRAMUSCULAR) INJECTIONS EVERY 7 DAYS, Disp: 12 Pen Needle, Rfl: 5    testosterone cypionate (DEPOTESTOTERONE CYPIONATE) 200 mg/mL injection, INJECT 0.5ML INTRAMUSCULARLY ONCE A WEEK, Disp: 10 mL, Rfl: 5    nadoloL (CORGARD) 20 mg tablet, Take 1 Tablet by mouth daily. , Disp: 30 Tablet, Rfl: 0    ursodioL (ACTIGALL) 300 mg capsule, TAKE 2 CAPSULES BY MOUTH TWO (2) TIMES A DAY, Disp: 360 Capsule, Rfl: 3    tamsulosin (Flomax) 0.4 mg capsule, Take 0.4 mg by mouth two (2) times a day., Disp: , Rfl:     dextran 70-hypromellose (ARTIFICIAL TEARS,[id number],) ophthalmic solution, 1 Drop., Disp: , Rfl:     Allergies   Allergen Reactions    Sulfa (Sulfonamide Antibiotics) Unknown (comments)     Pt states its been so long he doesn't remember the reaction.  Ciprofloxacin Diarrhea and Myalgia    Other Medication Myalgia     Think  that is was a Sulfa drug, but not sure       Past Surgical History:   Procedure Laterality Date    HX ENDOSCOPY      HX HEENT  2009    deviated septum repair    HX HEENT  2018    cataract removal    HX ORTHOPAEDIC Right 01/2017    Arthroscopy knee    HX PACEMAKER      HX PACEMAKER PLACEMENT  09/03/2021    SECONDARY TO L BBB AND JUNCTIONAL RHYTHM DURING SURGERY.       HX REFRACTIVE SURGERY Left     HX SEPTOPLASTY                           Biopsy    HX UROLOGICAL  2021    TURBT/STONES       Social History     Tobacco Use   Smoking Status Former Smoker    Packs/day: 1.00    Quit date: 5/2/1974    Years since quitting: 48.1   Smokeless Tobacco Never Used       Social History     Socioeconomic History    Marital status:    Tobacco Use    Smoking status: Former Smoker     Packs/day: 1.00     Quit date: 1974     Years since quittin.1    Smokeless tobacco: Never Used   Substance and Sexual Activity    Alcohol use: No     Alcohol/week: 0.0 standard drinks    Drug use: No       Family History   Problem Relation Age of Onset    Diabetes Father     Stroke Brother     Elevated Lipids Other         children     ROS:  Gen: denies fever, chills, or fatigue  HEENT:denies H/A, nasal congestion, or sore throat  Resp: denies dyspnea, cough, or wheezing  CV: denies chest pain, pressure, or palpitations  Extremeties: +swelling in ankles  GI[de-identified] + painful abdominal protrusions, denies nausea or vomiting, +chronic intermittent diarrhea and constipation  : denies dysuria, hematuria, urinary frequency or urgency  Musculoskeletal: + pain in right thumb and index finger with stiffness, denies muscle cramps  Neuro: +dementia denies numbness/tingling, dizziness, or seizure activity  Skin: denies rashes or new lesions       Objective:     Visit Vitals  BP (!) 149/87   Pulse 73   Temp 99.3 °F (37.4 °C) (Temporal)   Resp 18   Ht 5' 10\" (1.778 m)   Wt 174 lb (78.9 kg)   SpO2 97%   BMI 24.97 kg/m²       General: Alert and oriented. No acute distress. Well nourished. HEENT :  Eyes: Sclera white, conjunctiva clear. PERRLA. EOMs and visual fields intact. Neck: Supple with FROM. Lungs: Breathing even and unlabored. All lobes clear to auscultation bilaterally   Heart :RRR, S1 and S2 normal intensity, no extra heart sounds  Extremities: +mild edema to bilateral ankles   Abdomen: Soft and distended. +ascites present. There is a firm, tender, non-mobile mass in the umbilicus.  There is another firm, tender, non-mobile mass just above it and another one adjacent to it in the left lower quadrant  Musculoskeletal: R thumb: +base of right thumb is TTP and edematous, good ROM  R index finger: Unable to flex. Neuro: Cranial nerves grossly normal. Answers questions appropriately. Does not repeat himself. Psych: Mood and thought content appropriate for situation. Dressed appropriately and with good hygiene. Skin: Warm, dry, and intact. No rashes or lesion. 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 Hypogonadism  Check testosterone levels and PSA  Recent hgb WNL  Cont testosterone injections  F/U 3 months    Dementia  Memory loss worsening  Add Namenda 5mg daily- side effects reviewed- he is hesitant to try this  Cont Aricept  Has supervision at home from wife  F/U 3 months            Verbal and written instructions (see AVS) provided. Patient expresses understanding of diagnosis and treatment plan. Pt advised to go to the ER if he experiences worsening abdominal pain, vomiting, fever. Patient verbalizes understanding. I saw this patient with nurse practitioner student Geremias Riggins.       Chelo Moore NP

## 2022-07-02 LAB
PSA SERPL-MCNC: 1.3 NG/ML (ref 0–4)
REFLEX CRITERIA: NORMAL

## 2022-07-03 LAB
TESTOST FREE SERPL-MCNC: 44.1 PG/ML (ref 6.6–18.1)
TESTOST SERPL-MCNC: >1500 NG/DL (ref 264–916)

## 2022-07-05 DIAGNOSIS — E29.1 HYPOGONADISM MALE: Primary | ICD-10-CM

## 2022-07-05 NOTE — PROGRESS NOTES
Testosterone level is too high, he needs to hold the dose for 1 month and then RTO to recheck testosterone level before restarting. PSA looks good.

## 2022-07-12 ENCOUNTER — HOSPITAL ENCOUNTER (OUTPATIENT)
Dept: CT IMAGING | Age: 82
Discharge: HOME | End: 2022-07-12
Payer: MEDICARE

## 2022-07-12 DIAGNOSIS — R19.05 PERIUMBILICAL MASS: ICD-10-CM

## 2022-07-12 PROCEDURE — 74150 CT ABDOMEN W/O CONTRAST: CPT

## 2022-08-04 ENCOUNTER — LAB ONLY (OUTPATIENT)
Dept: FAMILY MEDICINE CLINIC | Age: 82
End: 2022-08-04
Payer: MEDICARE

## 2022-08-04 DIAGNOSIS — E29.1 HYPOGONADISM MALE: ICD-10-CM

## 2022-08-04 PROCEDURE — 36415 COLL VENOUS BLD VENIPUNCTURE: CPT | Performed by: NURSE PRACTITIONER

## 2022-08-09 LAB
TESTOST FREE SERPL-MCNC: 0.6 PG/ML (ref 6.6–18.1)
TESTOST SERPL-MCNC: 135 NG/DL (ref 264–916)

## 2022-08-10 DIAGNOSIS — R79.89 LOW TESTOSTERONE: Primary | ICD-10-CM

## 2022-08-10 DIAGNOSIS — E29.1 HYPOGONADISM MALE: ICD-10-CM

## 2022-08-10 RX ORDER — TESTOSTERONE CYPIONATE 200 MG/ML
INJECTION INTRAMUSCULAR
Qty: 10 ML | Refills: 1 | Status: SHIPPED | OUTPATIENT
Start: 2022-08-10 | End: 2022-10-05 | Stop reason: SDUPTHER

## 2022-08-10 NOTE — PROGRESS NOTES
Testosterone level has dropped below the normal limit.  OK to restart testosterone injections but id like him to schedule a lab visit in 3 months to recheck level

## 2022-08-16 ENCOUNTER — OFFICE VISIT (OUTPATIENT)
Dept: HEMATOLOGY | Age: 82
End: 2022-08-16
Payer: MEDICARE

## 2022-08-16 VITALS
DIASTOLIC BLOOD PRESSURE: 97 MMHG | BODY MASS INDEX: 23.34 KG/M2 | TEMPERATURE: 98.2 F | RESPIRATION RATE: 16 BRPM | WEIGHT: 163 LBS | SYSTOLIC BLOOD PRESSURE: 164 MMHG | HEIGHT: 70 IN | OXYGEN SATURATION: 98 % | HEART RATE: 60 BPM

## 2022-08-16 DIAGNOSIS — K74.3 HEPATIC CIRRHOSIS DUE TO PRIMARY BILIARY CHOLANGITIS (HCC): Primary | ICD-10-CM

## 2022-08-16 PROCEDURE — G8432 DEP SCR NOT DOC, RNG: HCPCS | Performed by: NURSE PRACTITIONER

## 2022-08-16 PROCEDURE — 1101F PT FALLS ASSESS-DOCD LE1/YR: CPT | Performed by: NURSE PRACTITIONER

## 2022-08-16 PROCEDURE — 91200 LIVER ELASTOGRAPHY: CPT | Performed by: NURSE PRACTITIONER

## 2022-08-16 PROCEDURE — 1123F ACP DISCUSS/DSCN MKR DOCD: CPT | Performed by: NURSE PRACTITIONER

## 2022-08-16 PROCEDURE — G8420 CALC BMI NORM PARAMETERS: HCPCS | Performed by: NURSE PRACTITIONER

## 2022-08-16 PROCEDURE — G8427 DOCREV CUR MEDS BY ELIG CLIN: HCPCS | Performed by: NURSE PRACTITIONER

## 2022-08-16 PROCEDURE — G8536 NO DOC ELDER MAL SCRN: HCPCS | Performed by: NURSE PRACTITIONER

## 2022-08-16 PROCEDURE — 99214 OFFICE O/P EST MOD 30 MIN: CPT | Performed by: NURSE PRACTITIONER

## 2022-08-16 NOTE — PROGRESS NOTES
[facility] \[facility]\"", [place] PICKARD, [facility], [place]      EMLIIE Bain, [facility]     Sintia Yates, [facility]   Juliette Parnell FNP-CASTILLO Michel, [facility]       Alvin Matta [facility] [address]    at [address], [address] [place] [address], [facility].    [phone]    FAX: [address] [place] [address]    at [address], [address], [address]    [phone]    FAX: [phone]         Patient Care Team:  Marina Gomez NP as PCP - General (Nurse Practitioner)  Marina Gomez NP as PCP - [address] [place] [address] Provider  Cain Serna MD (Urology)  Carmine Schrader MD (Cardio Vascular Surgery)      Problem List  Date Reviewed: 8/16/2022            Codes Class Noted    Late onset Alzheimer's disease without behavioral disturbance ([facility] [address] ICD-10-CM: G30.1, F02.80  ICD-9-CM: 331.0, 294.10  6/30/2022        Pacemaker ICD-10-CM: Z95.0  ICD-9-CM: V45.01  9/27/2021        Heart block ICD-10-CM: I45.9  ICD-9-CM: 426.9  9/3/2021        Dry eyes ICD-10-CM: S92.699  ICD-9-CM: 375.15  8/5/2021    Overview Signed 8/5/2021 10:35 AM by Bashir Rojas NP     Last Assessment & Plan:   Formatting of this note might be different from the original.  Continue drops PRN             Venous insufficiency of both lower extremities ICD-10-CM: I87.2  ICD-9-CM: 459.81  3/9/2021        Localized edema ICD-10-CM: R60.0  ICD-9-CM: 782.3  3/9/2021        Dermatochalasis of both upper eyelids ICD-10-CM: H02.831, N04.262  ICD-9-CM: 374.87  11/12/2020    Overview Signed 8/5/2021 10:35 AM by Bashir Rojas NP     Last Assessment & Plan:   Formatting of this note might be different from the original.  With brow ptosis  Becoming visually significant   Patient not yet ready for surgery. Bilateral posterior capsular opacification ICD-10-CM: P27.312  ICD-9-CM: 366.50  11/12/2019    Overview Signed 8/5/2021 10:35 AM by Alma Rosa Sepulveda NP     Last Assessment & Plan:   Formatting of this note might be different from the original.  OS doing well post yag. Doing well sc. Bladder cancer (HCC) (Chronic) ICD-10-CM: C67.9  ICD-9-CM: 188.9  5/9/2019        Chronic pain of right knee ICD-10-CM: M25.561, G89.29  ICD-9-CM: 719.46, 338.29  11/27/2018        Lumbar pain with radiation down right leg ICD-10-CM: M54.50, M79.604  ICD-9-CM: 724.2  11/27/2018        Right hip pain ICD-10-CM: M25.551  ICD-9-CM: 719.45  11/27/2018        Pseudophakia of both eyes ICD-10-CM: Z96.1  ICD-9-CM: V43.1  9/19/2018    Overview Signed 8/5/2021 10:35 AM by Alma Rosa Sepulveda NP     Last Assessment & Plan:   Formatting of this note might be different from the original.  Temi Hsu is doing well post phaco with IOL OU with toric  Rx for glasses has been given . Follow up with Dr. Jonna Lozada in 1yr. Vitreous floaters of both eyes ICD-10-CM: T40.713  ICD-9-CM: 379.24  9/4/2018    Overview Signed 8/5/2021 10:35 AM by Alma Rosa Sepulveda NP     Last Assessment & Plan:   Formatting of this note might be different from the original.  Floaters - stable. No traction symptoms or associated retinal changes present. Discussed.              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 The active problem list, all pertinent past medical history, medications, liver histology, endoscopic studies, radiologic findings and laboratory findings related to the liver disorder were reviewed with the patient. The patient is a [de-identified] y.o.  male who was first noted to have [facility] about 14 years ago based on serologies. He has been maintained on 1200 mg of ursodiol without issue. Imaging of the liver performed 02/2017 with ultrasound followed with MRI in 03/2017. Heterogeneous echotexture. No focal liver lesions. Most recent imaging was performed in 07/2022 with CT and the liver was unremarkable. The patient has no extrahepatic autoimmune disorders. The most recent laboratory studies indicate that the liver transaminases are normal, alkaline phosphatase is normal, tests of hepatic synthetic and metabolic function are normal, and the platelet count is depressed. The patient has developed varices with recent hemorrhage. He has undergone serial EGD's by Dr. Estephania Otto and he is continuing to band the varices. The patient completes all daily activities without any functional limitations. He has not developed ascites or encephalopathy. No edema. The patient has no complaints which can be attributed to liver disease. The patient has not experienced fatigue, fevers, chills, shortness of breath, chest pain, pain in the right side over the liver, diffuse abdominal pain, nausea, vomiting, constipation, diarrhea, dryeyes, dry mouth, arthralgias, myalgias, yellowing of the eyes or skin, itching, dark urine, problems concentrating, swelling of the abdomen, no recent hematemesis or hematochezia other than event mentioned.     Since the last office appointment the patient has:  Had no changes in the liver disease. ASSESSMENT AND PLAN:  Primary Biliary Cholangitis with cirrhosis. The most recent laboratory studies indicate that the liver transaminases are normal, alkaline phosphatase is normal,  tests of hepatic synthetic and metabolic function are normal, and the platelet count is depressed. Will perform laboratory testing to monitor liver function and degree of liver injury. This will include hepatic panel, a CBC w/ diff, a BMP, a PT/INR, an AFP-L3%. Complications of cirrhosis were discussed in detail. We discussed thrombocytopenia, portal hypertension, varices, GI bleeding, peripheral edema, ascites, hepatic encephalopathy, and hepatocellular carcinoma. We discussed the need for follow ups on a regular basis to monitor for complications. We discussed the need for every 6 month liver imaging studies. The need to perform an assessment of liver fibrosis was discussed with the patient. The Fibroscan can assess liver fibrosis and determine if a patient has advanced fibrosis or cirrhosis without the need for liver biopsy. This was performed in the office during today's appointment. Results of the scan indicate that the liver has essentially normal stiffness with a suggested Metavir fibrosis score of F1. There is no evidence of fatty liver disease either. The Fibroscan can be repeated annually or as often as clinically indicated to assess for fibrosis progression and/or regression. 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 Jus Weiss. Follow up with him as directed. Discussed extrahepatic manifestations of PBC such as osteoporosis and elevated cholesterol. The risk of osteoporosis is increased in patients with PBC. DEXA bone density to assess for osteoporosis should be ordered by the patients primary care physician. The patient was advised to take calcium supplementation and Vitamin D. He was advised to take supplemental vitamin A, D, E, and K.      Patients with PBC are at increased risk for hypercholesterolemia. However, this is not associated with an increased risk of cardiac disease and MI because this is typically an elevation in HDL cholesterol. There is no contraindication for treatment with a statin if this is felt to be indicated based upon cardiac risk assessment. The patient is receiving treatment with urosdeoxycholic Acid. Continue this at current dose. The patient  and is tolerating the treatment without significant side effects. His alkaline phosphatase is normal.     Vaccination for viral hepatitis A and B is recommended since the patient has no serologic evidence of previous exposure or vaccination with immunity. [address] screening has recently been performed and does not suggest [address] The next liver imaging study is due next month. This was ordered today. Bone density up to date with osteopenia. All of the above issues were discussed with the patient. All questions were answered. The patient expressed a clear understanding of the above. 45 minutes total time spent with this patient with more than 50% of this time spent counseling and coordinating care as described above. ALLERGIES  Allergies   Allergen Reactions    Sulfa (Sulfonamide Antibiotics) Unknown (comments)     Pt states its been so long he doesn't remember the reaction. Other Medication Myalgia     Think  that is was a Sulfa drug, but not sure       MEDICATIONS  Current Outpatient Medications   Medication Sig    ursodioL (ACTIGALL) 300 mg capsule TAKE 2 CAPSULES BY MOUTH TWICE A DAY    nadoloL (CORGARD) 40 mg tablet TAKE 1 TABLET BY MOUTH EVERY DAY    testosterone cypionate (DEPOTESTOTERONE CYPIONATE) 200 mg/mL injection INJECT 0.5ML INTRAMUSCULAR WEEKLY    donepeziL (ARICEPT) 10 mg tablet TAKE 1 TABLET BY MOUTH NIGHTLY    tamsulosin (FLOMAX) 0.4 mg capsule TAKE 1 CAPSULE BY MOUTH EVERY DAY    spironolactone (ALDACTONE) 50 mg tablet Take 1 Tab by mouth daily.     Syringe with Needle, Disp, (BD Luer-Freddy Syringe) 3 mL 23 gauge x 1 1/2\" syrg FOR USE WITH TESTOSTERONE (INTRAMUSCULAR) INJECTIONS EVERY 7 DAYS    fluticasone propionate (FLONASE) 50 mcg/actuation nasal spray PUT 1 SPRAY IN EACH NOSTRIL TWICE A DAY FOR CHRONIC STUFFY & RUNNY NOSE    metaxalone (SKELAXIN) 800 mg tablet Take 1 Tab by mouth every eight to twelve (8-12) hours as needed for Muscle Spasm(s). ipratropium (ATROVENT) 42 mcg (0.06 %) nasal spray SPRAY 1 SPRAY FOUR TIMES DAILY. INDICATIONS: RUNNY NOSE    dextran 70-hypromellose (ARTIFICIAL TEARS,[id number],) ophthalmic solution 1 Drop. meclizine (ANTIVERT) 25 mg tablet Take 25 mg by mouth as needed. Indications: sensation of spinning or whirling    therapeutic multivitamin-minerals (VITAMINS AND MINERALS) tablet Take 1 Tab by mouth. Current Facility-Administered Medications   Medication    cyanocobalamin (VITAMIN B12) injection 1,000 mcg       SYSTEM REVIEW NOT RELATED TO LIVER DISEASE OR REVIEWED ABOVE:  Constitution systems: Negative for fever, chills, weight gain, weight loss. Eyes: Negative for visual changes. ENT: Negative for sore throat, painful swallowing. Respiratory: Negative for cough, hemoptysis, SOB. Cardiology: Negative for chest pain, palpitations. GI:  Negative for constipation or diarrhea. : + for urinary frequency, dysuria, hematuria, + nocturia. +   Skin: Negative for rash. Hematology: Negative for easy bruising, blood clots. Musculo-skelatal: Negative for back pain, muscle pain, weakness. Neurologic: Negative for headaches, dizziness, vertigo, memory problems not related to HE. Psychology: Negative for anxiety, depression. FAMILY HISTORY:  The mother passed CHF age 80  The father passed CAD age 76   There is no family history of liver disease. There is no family history of immune disorders. SOCIAL HISTORY:  The patient is . The patient has 4 children,   The patient has never used tobacco products. The patient has never consumed significant amounts of alcohol. The patient retired in . PHYSICAL EXAMINATION:  Visit Vitals  BP (!) 160/92   Pulse 94   Temp 97.2 °F (36.2 °C) (Tympanic)   Wt 168 lb 6 oz (76.4 kg)   SpO2 98%   BMI 24.16 kg/m²     General: No acute distress. Eyes: Sclera anicteric. ENT: No oral lesions. Thyroid normal.  Nodes: No adenopathy. Skin: No spider angiomata. No jaundice. No palmar erythema. Respiratory: Lungs clear to auscultation. Cardiovascular: Regular heart rate. No murmurs. No JVD. Abdomen: Soft non-tender. Liver size normal to percussion/palpation. Spleen not palpable. No obvious ascites. Extremities: No edema. No muscle wasting. No gross arthritic changes. Neurologic: Alert and oriented. Cranial nerves grossly intact. No asterixis. LABORATORY STUDIES:  [facility] [place] [id number]  [address] & Units 5/19/2022 4/21/2022   WBC 4.1 - 11.1 K/uL 2.9 (L) 3.4 (L)   ANC 1.8 - 8.0 K/UL 1.3 (L) 1.6 (L)   HGB 12.1 - 17.0 g/dL 12.7 13.0    - 400 K/uL 93 (L) 101 (L)   INR 0.8 - 1.2    1.2   AST 15 - 37 U/L 32 42 (H)   ALT 12 - 78 U/L 28 33   Alk Phos 45 - 117 U/L 114 102   Bili, Total 0.2 - 1.0 MG/DL 0.8 1.5 (H)   Bili, Direct 0.0 - 0.2 MG/DL  0.5 (H)   Albumin 3.5 - 5.0 g/dL 2.8 (L) 3.1 (L)   BUN 6 - 20 MG/DL 16 15   Creat 0.70 - 1.30 MG/DL 1.19 1.18   Na 136 - 145 mmol/L 140 140   K 3.5 - 5.1 mmol/L 3.8 4.1   Cl 97 - 108 mmol/L 107 109   CO2 21 - 32 mmol/L 24 30   Glucose 65 - 100 mg/dL 121 (H) 68 (L)   Magnesium 1.6 - 2.4 mg/dL 2.1      Cancer Screening Latest Ref Rng & Units 4/21/2022 1/19/2022 11/24/2021   AFP, Serum 0.0 - 8.4 ng/mL 3.1 3.0 4.1   AFP-L3% 0.0 - 9.9 % Comment Comment Comment     SEROLOGIES:  Serologies Latest Ref Rng 3/21/2016   ISSAC Ab, Direct Negative Negative   M2 Ab 0.0 - 20.0 Units 178.8 (H)     Hep B sAB (-)  Hep A total Ab (-)  HCV ab (-)    LIVER HISTOLOGY:  05/2018.   TRANSIENT HEPATIC ELASTOGRAPHY:   E Range: 7. 4-10.2 kPa  E Mean: 9.1 kPa  E Median: 9.3 kPa  E Std: 1.0 kPa    08/2022. [facility]. EkPa was 6.9. IQR/med 10%. . The results suggested a fibrosis level of F1. The CAP score suggests there is no significant hepatic steatosis. ENDOSCOPIC PROCEDURES:  04/2016 EGD Dr. Aron Wong Esophageal varices banded  07/2016 EGD with obliterated Varices per patient. 01/2017. EGD by Dr. Aron Wong. Large esophageal varices. 2 bands placed. 01/2018. EGD by Dr. Aron Wong. Patient reports there were no varices. 10/2018. EGD by Dr. Aron Wong. Esophageal varices which are small in size. Follow up in one year. 11/2019. EGD by Dr. Aron Wong. Repeat in one year. Had 2 bands in 2020 by Dr. Aron Wong. RADIOLOGY:  03/2016  MRI scan abdomen. Changes consistent with cirrhosis. No liver mass lesions. No dilated bile ducts. No bile duct strictures. Pericholecystic fluid. No ascites  09/2016. Ultrasound of the liver. The liver is mildly hetogeneous. No hepatic masses. Mel size. 02/2017. Ultrasound of the liver. The liver is slightly heterogeneous in echotexture. There is a subtle isoechoic, partially exophytic questionable lesion in the right lobe measuring approximately 3.7 cm x 3.5 cm x 4 cm.   03/2017. Dynamic MRI of the liver. No significant abnormality. No hepatic lesion identified. 09/2017. Ultrasound of the liver. The liver and pancreas are normal in size, contour, and echogenicity. 05/2018. Ultrasound of the liver. Stable sonographic appearance of the liver. Mild heterogeneous diffuse increased hepatic parenchymal echotexture, ultrasound finding associated with fibrosis from chronic hepatocellular disease. No focal hepatic mass. 06/2019. Ultrasound of the liver. Heterogeneous echotexture of the hepatic parenchyma, likely representing diffuse hepatocellular disease. No focal hepatic lesion. 01/2020. Ultrasound of the liver.   The liver measures 15.3 cm longitudinally and is inhomogeneous in appearance. 06/2020. Ultrasound of the liver. Normal sized, inhomogeneous appearing liver. 12/2020. Ultrasound of the liver. Normal sized liver. Evidence of hepatopetal flow of the portal vein. Relatively low velocity of flow. 06/2021. Ultrasound of the liver. The liver is heterogeneous in echotexture with no mass or other focal  Abnormality. 08/2021. CT abdomen wo contrast. LIVER: macrolobulated liver. Varices. 11/2021. CT Abdomen wo contrast.  No mass. Irregularity of the contour of the liver. This is suggestive of  cirrhotic change. Persistence of the previously described varices. 04/2022. Ultrasound of the liver. The liver is small, heterogeneous and nodular.  07/2022. CT abdomen wo contrast.  Cirrhotic morphology. No definite mass. OTHER TESTING:  Not available or performed    [address] in 6 months for continued monitoring.       RACHANA Quispe  [facility] [place] [facility]  [address] [facility], [address] [facility], [address] [facility]   [phone]

## 2022-09-01 NOTE — Clinical Note
ID band present and verified. Family is in patient room. Banner Transposition Flap Text: The defect edges were debeveled with a #15 scalpel blade.  Given the location of the defect and the proximity to free margins a Banner transposition flap was deemed most appropriate.  Using a sterile surgical marker, an appropriate flap drawn around the defect. The area thus outlined was incised deep to adipose tissue with a #15 scalpel blade.  The skin margins were undermined to an appropriate distance in all directions utilizing iris scissors.

## 2022-09-28 ENCOUNTER — OFFICE VISIT (OUTPATIENT)
Dept: FAMILY MEDICINE CLINIC | Age: 82
End: 2022-09-28
Payer: MEDICARE

## 2022-09-28 VITALS
DIASTOLIC BLOOD PRESSURE: 80 MMHG | OXYGEN SATURATION: 98 % | SYSTOLIC BLOOD PRESSURE: 140 MMHG | BODY MASS INDEX: 25.17 KG/M2 | HEIGHT: 70 IN | WEIGHT: 175.8 LBS | HEART RATE: 68 BPM | RESPIRATION RATE: 16 BRPM | TEMPERATURE: 97.9 F

## 2022-09-28 DIAGNOSIS — F02.80 LATE ONSET ALZHEIMER'S DISEASE WITHOUT BEHAVIORAL DISTURBANCE (HCC): ICD-10-CM

## 2022-09-28 DIAGNOSIS — J30.1 SEASONAL ALLERGIC RHINITIS DUE TO POLLEN: ICD-10-CM

## 2022-09-28 DIAGNOSIS — Z95.0 PACEMAKER: ICD-10-CM

## 2022-09-28 DIAGNOSIS — K74.3 HEPATIC CIRRHOSIS DUE TO PRIMARY BILIARY CHOLANGITIS (HCC): ICD-10-CM

## 2022-09-28 DIAGNOSIS — I10 PRIMARY HYPERTENSION: ICD-10-CM

## 2022-09-28 DIAGNOSIS — G30.1 LATE ONSET ALZHEIMER'S DISEASE WITHOUT BEHAVIORAL DISTURBANCE (HCC): ICD-10-CM

## 2022-09-28 DIAGNOSIS — K74.3 PRIMARY BILIARY CHOLANGITIS (HCC): ICD-10-CM

## 2022-09-28 DIAGNOSIS — N40.0 BENIGN PROSTATIC HYPERPLASIA WITHOUT LOWER URINARY TRACT SYMPTOMS: ICD-10-CM

## 2022-09-28 DIAGNOSIS — G89.29 CHRONIC RIGHT-SIDED LOW BACK PAIN WITH RIGHT-SIDED SCIATICA: Primary | ICD-10-CM

## 2022-09-28 DIAGNOSIS — M54.41 CHRONIC RIGHT-SIDED LOW BACK PAIN WITH RIGHT-SIDED SCIATICA: Primary | ICD-10-CM

## 2022-09-28 DIAGNOSIS — R79.89 LOW TESTOSTERONE: ICD-10-CM

## 2022-09-28 PROBLEM — N18.30 CHRONIC RENAL DISEASE, STAGE III (HCC): Status: ACTIVE | Noted: 2022-09-28

## 2022-09-28 PROCEDURE — 36415 COLL VENOUS BLD VENIPUNCTURE: CPT | Performed by: NURSE PRACTITIONER

## 2022-09-28 PROCEDURE — 99214 OFFICE O/P EST MOD 30 MIN: CPT | Performed by: NURSE PRACTITIONER

## 2022-09-28 RX ORDER — FLUTICASONE PROPIONATE 50 MCG
SPRAY, SUSPENSION (ML) NASAL
Qty: 1 EACH | Refills: 0 | Status: SHIPPED | OUTPATIENT
Start: 2022-09-28 | End: 2022-10-22

## 2022-09-28 RX ORDER — DONEPEZIL HYDROCHLORIDE 10 MG/1
10 TABLET, FILM COATED ORAL
Qty: 90 TABLET | Refills: 1 | Status: SHIPPED | OUTPATIENT
Start: 2022-09-28

## 2022-09-28 RX ORDER — DICLOFENAC SODIUM 10 MG/G
GEL TOPICAL
Qty: 50 G | Refills: 0 | Status: SHIPPED | OUTPATIENT
Start: 2022-09-28

## 2022-09-28 NOTE — PROGRESS NOTES
Identified pt with two pt identifiers(name and ). Reviewed record in preparation for visit and have obtained necessary documentation. Chief Complaint   Patient presents with    Back Pain         1. \"Have you been to the ER, urgent care clinic since your last visit? Hospitalized since your last visit? \" No    2. \"Have you seen or consulted any other health care providers outside of the [address] [place] [address] since your last visit? \" No     3. For patients aged 39-70: Has the patient had a colonoscopy / FIT/ Cologuard? NA - based on age      If the patient is female:    4. For patients aged 41-77: Has the patient had a mammogram within the past 2 years? NA - based on age or sex      11. For patients aged 21-65: Has the patient had a pap smear?  NA - based on age or sex      Symptom review:    NO  Fever   NO  Shaking chills  NO  Cough  NO Headaches  NO  Body aches  NO  Coughing up blood  NO  Chest congestion  NO  Chest pain  NO  Shortness of breath  NO  Profound Loss of smell/taste  NO  Nausea/Vomiting   NO  Loose stool/Diarrhea  NO  any skin issues

## 2022-09-28 NOTE — PROGRESS NOTES
Subjective:     CC: back pain    HPI:  Mohini Sevilla is a [de-identified] y.o. male who presents today for with c/o back pain. This is also a 3 month check up for bladder cancer and other chronic medical problems. He has chronic intermittent low right back pain that radiates down to his buttocks. He cannot take Tylenol due to liver disease. He has tried Ibuprofen and heating pad without relief . Will send script for Voltaren gel. OA of hands  He is also c/o pain and stiffness in the R thumb and index fingers. He cannot brush his teeth or  the steering wheel for too long without pain. He is taking Ibuprofen prn with good relief but pain will not go away. We discussed the option of a referral to ortho for a joint injection but he declines. Will send script for Voltaren gel. Bladder Cancer  He is followed by urologist Dr Paresh Stewart, recently seen on 6-23-22. He is s/p laser of bladder stone (8/2021) and TURB of medium bladder tumor with gemcitibine (9-2021). Path showed papillary urothelial carcinoma, non-invasive, high grade smooth muscle is present. BCG tx's ended . Neg FISH/cytology on 2-17-22. He was asymptomatic at his appt. UA showed 1-2 RBC. Underwent cystoscopy- it showed moderate obstruction of the prostatic urethra. There was trabeculation of the bladder wall and some debris within the bladder. He was tx'd with Cephalexin 500mg TID x 1 week and told to cont Flomax. He was supposed to have another cystoscopy earlier this month (in September) but he cannot remember if this was done. 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 He is followed by Pily De La Torre NP and Dr Cecily Luis at [facility]. Per his note in 4-2022: \"He has been maintained on 1200 mg of ursodiol without issue. Most recent CT imaging was performed in 7-2022. It showed:    1. Small umbilical hernia containing mixed fat and fluid density.  Periumbilical  varices may be present as well, but would be below the resolution of this exam  to delineate. 2.  Cirrhosis. Small volume ascites. Increasing splenomegaly. Paraesophageal,  perigastric, and perisplenic varices. 3.  Cecal and ascending colonic wall thickening, maybe on the basis of portal  disease, but correlate for infectious or inflammatory colitis. 4.  Punctate nonobstructing left lower pole renal calculus. Of note he did have a repair of an umbilical hernia in 40/4239 by Dr. Rod Fontenot. LFTs have been normal. Platelet count has been depressed. He recently had an endoscopy done. Multiple medium varices were present- banding was done. There was no stigmata of recent bleeding. Scars of previous banding were present. There was mild portal hypertensive gastropathy of the body of the stomach. No gastric varices identified. Duodenum: Normal bulb and second portion. He has another EGD scheduled for 10-17-22. He is non-compliant with lasix. Leg very swollen today and his weight is up. He has been taking the Lasix only every other week due to urinary frequency. Lab Results   Component Value Date/Time    Creatinine, POC 1.2 03/16/2017 01:58 PM    Creatinine 1.19 05/19/2022 12:59 PM       Pacemaker  On 8/25/21 during his laser tx of bladder stones, he developed intraoperative bradycardia requiring early termination of the procedure. He met with cardiology, dx'd with heart block. A pacemaker was placed. ECHO showed:    LV: Estimated LVEF is 55 - 60%. Normal cavity size, wall thickness, systolic function (ejection fraction normal) and diastolic function. LA: Dilated left atrium. Left Atrium volume index is 43 mL/m2. MV: Mitral valve leaflet calcification. TV: Mild tricuspid valve regurgitation is present. Right Ventricular Arterial Pressure (RVSP) is 36 mmHg. PV: Mild pulmonic valve regurgitation is present. He believes he is due back to see Dr Kael Fofana now. Advised to schedule an appt.      HTN  He is on Nadalol and Lasix daily. BP borderline today but he has not been taking his lasix regularly. Hypogonadism  Lab Results   Component Value Date/Time    Testosterone 135 (L) 08/04/2022 08:04 AM     He is on testosterone injections 100mg IM q week. Hgb WNL 5/2022. On 6-30-22 his level was >1500 so he was advised to stop injections for 1 month. Level then fell below normal as of 8-4-22 so he resumed his injections. Will recheck today. BPH  On Flomax. Denies any urinary symptoms today. Lab Results   Component Value Date/Time    Prostate Specific Ag 1.3 06/30/2022 09:26 AM    Prostate Specific Ag 1.7 02/23/2021 09:05 PM    Prostate Specific Ag 1.3 05/09/2019 10:39 AM       Dementia  He is on Aricept 10mg daily but stopped it a month or 2 ago because he felt it was not helping. Memory now worse. He will restart it. Namenda contraindicated with his comorbidities. Lives with wife.        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    Pure hypercholesterolemia E78.00    Thrombocytopenia (HCC) D69.6    B12 deficiency E53.8    Chronic pain of right knee M25.561, G89.29    Lumbar pain with radiation down right leg M54.50, M79.604    Right hip pain M25.551    Bladder cancer (HCC) C67.9    Venous insufficiency of both lower extremities I87.2    Localized edema R60.0    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    Dermatochalasis of both upper eyelids H02.831, H02.834    Dry eyes H04.123    Pseudophakia of both eyes Z96.1    Vitreous floaters of both eyes H43.393    Heart block I45.9    Pacemaker Z95.0    Late onset Alzheimer's disease without behavioral disturbance (HCC) G30.1, F02.80       Past Medical History:   Diagnosis Date    Adverse effect of anesthesia     bradycardia (HR 21 and junctional rhythm) with outpt laser bladder stone surgery 8/2021    Allergic     Anemia     BPH (benign prostatic hyperplasia)     Esophageal varices ([address] March 2016    banded x 6    GERD (gastroesophageal reflux disease)     GI bleed March 2016    Hyperlipidemia     Hypogonadism male     Left bundle branch block (LBBB)     Dr. Griffin Robins, pacemaker 9/2021    Primary biliary cirrhosis (HCC)     Vitamin D deficiency          Current Outpatient Medications:     testosterone cypionate (DEPOTESTOTERONE CYPIONATE) 200 mg/mL injection, INJECT 0.5ML INTRAMUSCULARLY ONCE A WEEK  Indications: abnormal function and activity of the testis, Disp: 10 mL, Rfl: 1    donepeziL (ARICEPT) 10 mg tablet, Take 1 Tablet by mouth nightly., Disp: 30 Tablet, Rfl: 0    furosemide (LASIX) 20 mg tablet, Take one tablet as needed for swelling in legs  Indications: visible water retention, Disp: 15 Tablet, Rfl: 0    Syringe with Needle, Disp, (BD Luer-Freddy Syringe) 3 mL 23 gauge x 1 1/2\" syrg, FOR USE WITH TESTOSTERONE (INTRAMUSCULAR) INJECTIONS EVERY 7 DAYS, Disp: 12 Pen Needle, Rfl: 5    nadoloL (CORGARD) 20 mg tablet, Take 1 Tablet by mouth daily. , Disp: 30 Tablet, Rfl: 0    ursodioL (ACTIGALL) 300 mg capsule, TAKE 2 CAPSULES BY MOUTH TWO (2) TIMES A DAY, Disp: 360 Capsule, Rfl: 3    tamsulosin (FLOMAX) 0.4 mg capsule, Take 0.4 mg by mouth two (2) times a day., Disp: , Rfl:     dextran 70-hypromellose (ARTIFICIAL TEARS) ophthalmic solution, 1 Drop., Disp: , Rfl:     Allergies   Allergen Reactions    Sulfa (Sulfonamide Antibiotics) Unknown (comments)     Pt states its been so long he doesn't remember the reaction.      Ciprofloxacin Diarrhea and Myalgia    Other Medication Myalgia     Think  that is was a Sulfa drug, but not sure       Past Surgical History:   Procedure Laterality Date    HX ENDOSCOPY      HX HEENT  2009    deviated septum repair    HX HEENT  2018    cataract removal    HX ORTHOPAEDIC Right 01/2017    Arthroscopy knee    HX PACEMAKER      HX PACEMAKER PLACEMENT  09/03/2021 SECONDARY TO L BBB AND JUNCTIONAL RHYTHM DURING SURGERY. HX REFRACTIVE SURGERY Left     HX SEPTOPLASTY                           Biopsy    HX UROLOGICAL      TURBT/STONES       Social History     Tobacco Use   Smoking Status Former    Packs/day: 1.00    Types: Cigarettes    Quit date: 1974    Years since quittin.4   Smokeless Tobacco Never       Social History     Socioeconomic History    Marital status:    Tobacco Use    Smoking status: Former     Packs/day: 1.00     Types: Cigarettes     Quit date: 1974     Years since quittin.4    Smokeless tobacco: Never   Vaping Use    Vaping Use: Never used   Substance and Sexual Activity    Alcohol use: No     Alcohol/week: 0.0 standard drinks    Drug use: No       Family History   Problem Relation Age of Onset    Diabetes Father     Stroke Brother     Elevated Lipids Other         children     ROS:  Gen: denies fever, chills, or fatigue  HEENT:denies H/A, +itchy watery eyes and nasal drainage, denies ear pain or sore throat  Resp: denies dyspnea, cough, or wheezing  CV: denies chest pain, pressure, or palpitations  Extremeties: +swelling in ankles  GI[de-identified] + painful abdominal protrusions, denies nausea or vomiting, +chronic intermittent diarrhea and constipation  : denies dysuria, hematuria, urinary frequency or urgency  Musculoskeletal: + pain in hands with stiffness, +right low back and butt pack denies muscle cramps  Neuro: +worsening memory denies numbness/tingling, dizziness, or seizure activity  Skin: denies rashes or new lesions       Objective:     Visit Vitals  BP (!) 140/80   Pulse 68   Temp 97.9 °F (36.6 °C) (Oral)   Resp 16   Ht 5' 10\" (1.778 m)   Wt 175 lb 12.8 oz (79.7 kg)   SpO2 98%   BMI 25.22 kg/m²           General: Alert and oriented. No acute distress. Well nourished. HEENT :  Eyes: Sclera white, conjunctiva clear. PERRLA. EOMs and visual fields intact. Neck: Supple with FROM. Lungs: Breathing even and unlabored.  All lobes clear to auscultation bilaterally   Heart :RRR, S1 and S2 normal intensity, no extra heart sounds  Extremities: +moderate edema to bilateral ankles   Abdomen: Soft and distended. +ascites present. There is a firm, tender, non-mobile mass in the umbilicus. There is another firm, tender, non-mobile mass just above it and another one adjacent to it in the left lower quadrant  Back: No TTP  Musculoskeletal: +joint swelling to both hands  Neuro: Cranial nerves grossly normal. Answers questions appropriately. Does not repeat himself. Psych: Mood and thought content appropriate for situation. Dressed appropriately and with good hygiene. Skin: Warm, dry, and intact. No rashes or lesion. 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    HTN  BP stable  Encouraged better compliance with Lasix- advised to take every other day instead of every other week  Cont Nadolol- verify dose with Dr Sarah Barbosa or go to ER for CP, SOB, or worsening swelling    OA of right hand  Script sent for Voltaren gel- use as directed    R lower back pain   Script sent for Voltaren gel- use as directed    Hypogonadism  Check testosterone level  He has restarted testosterone injections    Dementia  Memory loss worsening since stopping Aricept  Restart Aricept  Namenda contraindication with his cardiovascular and liver disease  Has supervision at home from wife    Seasonal allergies  Start Flonase nasal sripay- script sent- take as directed    F/U 3 months            Verbal and written instructions (see AVS) provided. Patient expresses understanding of diagnosis and treatment plan. Pt advised to go to the ER if he experiences worsening abdominal pain, vomiting, fever.  Patient verbalizes understanding.              Jimbo Sparrow, MOISES

## 2022-09-29 LAB
ALBUMIN SERPL-MCNC: 3.2 G/DL (ref 3.5–5)
ALBUMIN/GLOB SERPL: 1 {RATIO} (ref 1.1–2.2)
ALP SERPL-CCNC: 103 U/L (ref 45–117)
ALT SERPL-CCNC: 32 U/L (ref 12–78)
ANION GAP SERPL CALC-SCNC: 2 MMOL/L (ref 5–15)
AST SERPL-CCNC: 35 U/L (ref 15–37)
BASOPHILS # BLD: 0 K/UL (ref 0–0.1)
BASOPHILS NFR BLD: 1 % (ref 0–1)
BILIRUB DIRECT SERPL-MCNC: 0.4 MG/DL (ref 0–0.2)
BILIRUB SERPL-MCNC: 1.3 MG/DL (ref 0.2–1)
BUN SERPL-MCNC: 16 MG/DL (ref 6–20)
BUN/CREAT SERPL: 12 (ref 12–20)
CALCIUM SERPL-MCNC: 9.5 MG/DL (ref 8.5–10.1)
CHLORIDE SERPL-SCNC: 109 MMOL/L (ref 97–108)
CO2 SERPL-SCNC: 29 MMOL/L (ref 21–32)
CREAT SERPL-MCNC: 1.29 MG/DL (ref 0.7–1.3)
DIFFERENTIAL METHOD BLD: ABNORMAL
EOSINOPHIL # BLD: 0.2 K/UL (ref 0–0.4)
EOSINOPHIL NFR BLD: 5 % (ref 0–7)
ERYTHROCYTE [DISTWIDTH] IN BLOOD BY AUTOMATED COUNT: 17.5 % (ref 11.5–14.5)
GLOBULIN SER CALC-MCNC: 3.2 G/DL (ref 2–4)
GLUCOSE SERPL-MCNC: 123 MG/DL (ref 65–100)
HCT VFR BLD AUTO: 41.2 % (ref 36.6–50.3)
HGB BLD-MCNC: 13.4 G/DL (ref 12.1–17)
IMM GRANULOCYTES # BLD AUTO: 0 K/UL (ref 0–0.04)
IMM GRANULOCYTES NFR BLD AUTO: 0 % (ref 0–0.5)
INR PPP: 1.3 (ref 0.9–1.1)
LYMPHOCYTES # BLD: 1.2 K/UL (ref 0.8–3.5)
LYMPHOCYTES NFR BLD: 32 % (ref 12–49)
MCH RBC QN AUTO: 30.7 PG (ref 26–34)
MCHC RBC AUTO-ENTMCNC: 32.5 G/DL (ref 30–36.5)
MCV RBC AUTO: 94.5 FL (ref 80–99)
MONOCYTES # BLD: 0.3 K/UL (ref 0–1)
MONOCYTES NFR BLD: 9 % (ref 5–13)
NEUTS SEG # BLD: 1.9 K/UL (ref 1.8–8)
NEUTS SEG NFR BLD: 53 % (ref 32–75)
NRBC # BLD: 0 K/UL (ref 0–0.01)
NRBC BLD-RTO: 0 PER 100 WBC
PLATELET # BLD AUTO: 88 K/UL (ref 150–400)
PMV BLD AUTO: 10.3 FL (ref 8.9–12.9)
POTASSIUM SERPL-SCNC: 4.4 MMOL/L (ref 3.5–5.1)
PROT SERPL-MCNC: 6.4 G/DL (ref 6.4–8.2)
PROTHROMBIN TIME: 13.8 SEC (ref 9–11.1)
RBC # BLD AUTO: 4.36 M/UL (ref 4.1–5.7)
RBC MORPH BLD: ABNORMAL
SODIUM SERPL-SCNC: 140 MMOL/L (ref 136–145)
WBC # BLD AUTO: 3.6 K/UL (ref 4.1–11.1)

## 2022-09-29 RX ORDER — FUROSEMIDE 20 MG/1
TABLET ORAL
Qty: 15 TABLET | Refills: 0 | Status: SHIPPED | OUTPATIENT
Start: 2022-09-29 | End: 2022-10-10 | Stop reason: SDUPTHER

## 2022-09-30 LAB
AFP L3 MFR SERPL: NORMAL % (ref 0–9.9)
AFP SERPL-MCNC: 3.7 NG/ML (ref 0–8.4)

## 2022-10-05 DIAGNOSIS — E29.1 HYPOGONADISM MALE: ICD-10-CM

## 2022-10-05 LAB
TESTOST FREE SERPL-MCNC: 22.8 PG/ML (ref 6.6–18.1)
TESTOST SERPL-MCNC: >1500 NG/DL (ref 264–916)

## 2022-10-05 RX ORDER — TESTOSTERONE CYPIONATE 200 MG/ML
INJECTION INTRAMUSCULAR
Qty: 10 ML | Refills: 1
Start: 2022-10-05

## 2022-10-05 NOTE — PROGRESS NOTES
Spoke with patient. Confirmed 2 patient identifiers, name and . Patient aware of results and states understanding.

## 2022-10-05 NOTE — PROGRESS NOTES
Testosterone level too high.  Please stop for 1 month then restart at one injection [address]

## 2022-10-10 RX ORDER — FUROSEMIDE 20 MG/1
TABLET ORAL
Qty: 15 TABLET | Refills: 0 | Status: SHIPPED | OUTPATIENT
Start: 2022-10-10

## 2022-10-17 ENCOUNTER — HOSPITAL ENCOUNTER (OUTPATIENT)
Age: 82
Setting detail: OUTPATIENT SURGERY
Discharge: HOME | End: 2022-10-17
Attending: INTERNAL MEDICINE | Admitting: INTERNAL MEDICINE
Payer: MEDICARE

## 2022-10-17 ENCOUNTER — ANESTHESIA (OUTPATIENT)
Dept: ENDOSCOPY | Age: 82
End: 2022-10-17
Payer: MEDICARE

## 2022-10-17 ENCOUNTER — ANESTHESIA EVENT (OUTPATIENT)
Dept: ENDOSCOPY | Age: 82
End: 2022-10-17
Payer: MEDICARE

## 2022-10-17 VITALS
TEMPERATURE: 97.5 F | BODY MASS INDEX: 25.95 KG/M2 | SYSTOLIC BLOOD PRESSURE: 135 MMHG | OXYGEN SATURATION: 97 % | HEART RATE: 60 BPM | WEIGHT: 175.2 LBS | HEIGHT: 69 IN | RESPIRATION RATE: 16 BRPM | DIASTOLIC BLOOD PRESSURE: 82 MMHG

## 2022-10-17 DIAGNOSIS — K29.70 GASTRITIS WITHOUT BLEEDING, UNSPECIFIED CHRONICITY, UNSPECIFIED GASTRITIS TYPE: Primary | ICD-10-CM

## 2022-10-17 PROCEDURE — 76060000031 HC ANESTHESIA FIRST 0.5 HR: Performed by: INTERNAL MEDICINE

## 2022-10-17 PROCEDURE — 2709999900 HC NON-CHARGEABLE SUPPLY: Performed by: INTERNAL MEDICINE

## 2022-10-17 PROCEDURE — 74011000250 HC RX REV CODE- 250: Performed by: NURSE ANESTHETIST, CERTIFIED REGISTERED

## 2022-10-17 PROCEDURE — 76040000019: Performed by: INTERNAL MEDICINE

## 2022-10-17 PROCEDURE — 74011250636 HC RX REV CODE- 250/636: Performed by: INTERNAL MEDICINE

## 2022-10-17 PROCEDURE — 88342 IMHCHEM/IMCYTCHM 1ST ANTB: CPT

## 2022-10-17 PROCEDURE — 77030021593 HC FCPS BIOP ENDOSC BSC -A: Performed by: INTERNAL MEDICINE

## 2022-10-17 PROCEDURE — 77030020268 HC MISC GENERAL SUPPLY: Performed by: INTERNAL MEDICINE

## 2022-10-17 PROCEDURE — 43239 EGD BIOPSY SINGLE/MULTIPLE: CPT | Performed by: INTERNAL MEDICINE

## 2022-10-17 PROCEDURE — 74011250636 HC RX REV CODE- 250/636: Performed by: NURSE ANESTHETIST, CERTIFIED REGISTERED

## 2022-10-17 PROCEDURE — 88305 TISSUE EXAM BY PATHOLOGIST: CPT

## 2022-10-17 RX ORDER — DEXMEDETOMIDINE HYDROCHLORIDE 100 UG/ML
INJECTION, SOLUTION INTRAVENOUS AS NEEDED
Status: DISCONTINUED | OUTPATIENT
Start: 2022-10-17 | End: 2022-10-17 | Stop reason: HOSPADM

## 2022-10-17 RX ORDER — NALOXONE HYDROCHLORIDE 0.4 MG/ML
0.4 INJECTION, SOLUTION INTRAMUSCULAR; INTRAVENOUS; SUBCUTANEOUS
Status: CANCELLED | OUTPATIENT
Start: 2022-10-17 | End: 2022-10-17

## 2022-10-17 RX ORDER — SODIUM CHLORIDE 0.9 % (FLUSH) 0.9 %
5-40 SYRINGE (ML) INJECTION AS NEEDED
Status: CANCELLED | OUTPATIENT
Start: 2022-10-17

## 2022-10-17 RX ORDER — DEXTROMETHORPHAN/PSEUDOEPHED 2.5-7.5/.8
1.2 DROPS ORAL
Status: CANCELLED | OUTPATIENT
Start: 2022-10-17

## 2022-10-17 RX ORDER — EPINEPHRINE 0.1 MG/ML
1 INJECTION INTRACARDIAC; INTRAVENOUS
Status: CANCELLED | OUTPATIENT
Start: 2022-10-17 | End: 2022-10-18

## 2022-10-17 RX ORDER — SODIUM CHLORIDE 9 MG/ML
100 INJECTION, SOLUTION INTRAVENOUS CONTINUOUS
Status: DISCONTINUED | OUTPATIENT
Start: 2022-10-17 | End: 2022-10-17 | Stop reason: HOSPADM

## 2022-10-17 RX ORDER — ATROPINE SULFATE 0.1 MG/ML
0.5 INJECTION INTRAVENOUS
Status: CANCELLED | OUTPATIENT
Start: 2022-10-17 | End: 2022-10-18

## 2022-10-17 RX ORDER — FLUMAZENIL 0.1 MG/ML
0.2 INJECTION INTRAVENOUS
Status: CANCELLED | OUTPATIENT
Start: 2022-10-17 | End: 2022-10-17

## 2022-10-17 RX ORDER — SODIUM CHLORIDE 0.9 % (FLUSH) 0.9 %
5-40 SYRINGE (ML) INJECTION EVERY 8 HOURS
Status: CANCELLED | OUTPATIENT
Start: 2022-10-17

## 2022-10-17 RX ORDER — LIDOCAINE HYDROCHLORIDE 20 MG/ML
INJECTION, SOLUTION EPIDURAL; INFILTRATION; INTRACAUDAL; PERINEURAL AS NEEDED
Status: DISCONTINUED | OUTPATIENT
Start: 2022-10-17 | End: 2022-10-17 | Stop reason: HOSPADM

## 2022-10-17 RX ORDER — PROPOFOL 10 MG/ML
INJECTION, EMULSION INTRAVENOUS AS NEEDED
Status: DISCONTINUED | OUTPATIENT
Start: 2022-10-17 | End: 2022-10-17 | Stop reason: HOSPADM

## 2022-10-17 RX ADMIN — PROPOFOL 50 MG: 10 INJECTION, EMULSION INTRAVENOUS at 10:52

## 2022-10-17 RX ADMIN — LIDOCAINE HYDROCHLORIDE 100 MG: 20 INJECTION, SOLUTION INTRAVENOUS at 10:51

## 2022-10-17 RX ADMIN — DEXMEDETOMIDINE HYDROCHLORIDE 10 MCG: 100 INJECTION, SOLUTION INTRAVENOUS at 10:51

## 2022-10-17 RX ADMIN — SODIUM CHLORIDE 100 ML/HR: 9 INJECTION, SOLUTION INTRAVENOUS at 10:26

## 2022-10-17 RX ADMIN — PROPOFOL 20 MG: 10 INJECTION, EMULSION INTRAVENOUS at 10:56

## 2022-10-17 RX ADMIN — PROPOFOL 30 MG: 10 INJECTION, EMULSION INTRAVENOUS at 10:59

## 2022-10-17 RX ADMIN — PROPOFOL 30 MG: 10 INJECTION, EMULSION INTRAVENOUS at 10:54

## 2022-10-17 RX ADMIN — PROPOFOL 20 MG: 10 INJECTION, EMULSION INTRAVENOUS at 11:01

## 2022-10-17 NOTE — ANESTHESIA PREPROCEDURE EVALUATION
Relevant Problems   NEUROLOGY   (+) Late onset Alzheimer's disease without behavioral disturbance (HCC)      CARDIOVASCULAR   (+) Heart block   (+) Pacemaker      GASTROINTESTINAL   (+) 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)      RENAL FAILURE   (+) Chronic renal disease, stage III      PERSONAL HX & FAMILY HX OF CANCER   (+) Bladder cancer (HCC)       Anesthetic History   No history of anesthetic complications            Review of Systems / Medical History  Patient summary reviewed, nursing notes reviewed and pertinent labs reviewed    Pulmonary  Within defined limits                 Neuro/Psych   Within defined limits           Cardiovascular            Dysrhythmias     Pertinent negatives: No hypertension, past MI, CAD, PAD, angina and CHF  Exercise tolerance: >4 METS  Comments: Bradycardia s/p pacemaker   GI/Hepatic/Renal     GERD: well controlled      Liver disease  Pertinent negatives: No renal disease  Comments: Primary biliary cholangitis with cirrhosis Endo/Other          Pertinent negatives: No diabetes, hypothyroidism, hyperthyroidism and obesity   Other Findings              Physical Exam    Airway  Mallampati: III  TM Distance: 4 - 6 cm  Neck ROM: normal range of motion   Mouth opening: Normal     Cardiovascular  Regular rate and rhythm,  S1 and S2 normal,  no murmur, click, rub, or gallop             Dental  No notable dental hx       Pulmonary  Breath sounds clear to auscultation               Abdominal  GI exam deferred       Other Findings            Anesthetic Plan    ASA: 3  Anesthesia type: MAC          Induction: Intravenous  Anesthetic plan and risks discussed with: Patient and Family

## 2022-10-17 NOTE — ANESTHESIA POSTPROCEDURE EVALUATION
Post-Anesthesia Evaluation & Assessment    Visit Vitals  /81   Pulse 60   Temp 36.4 °C (97.5 °F)   Resp 14   Ht 5' 9\" (1.753 m)   Wt 79.5 kg (175 lb 3.2 oz)   SpO2 95%   BMI 25.87 kg/m²       No untreated/active PONV    Post-operative hydration adequate. Adequate post-operative analgesia per PACU discharge criteria    Mental status & level of consciousness: alert and oriented x 3    Respiratory status: patent unassisted airway     No apparent anesthetic complications requiring additional post-anesthetic care    Patient has met all discharge requirements.             Ghulam Oneill, CRNA

## 2022-10-17 NOTE — PROCEDURES
[address], MD, FACP, [place], [place]      EMILIE Nunez, [facility]-BC   Yo Beckett, [facility]   Elier Boucher, FNP-C   Dilshad Coy, FNP-C    Rufina Berrios, [facility]       [facility]    at [address], [facility] Connie Crowe  22.    [phone]    FAX: [address] [place] [address] Drive, [address] [place] [address] - [address]    [phone]    FAX: [phone]         UPPER ENDOSCOPY PROCEDURE NOTE    Felipa Arredondo  1940    INDICATION: Cirrhosis. Screening for esophageal varices with variceal ligation if  indicated. : Vasu Montgomery MD    SURGICAL ASSISTANT:  None    PROSTHETIC DEVISES, TISSUE GRAFTS, ORGAN TRANSPLANTS:  Not applicable     ANESTHESIA/SEDATION: MAC Sedation per anesthesiology      PROCEDURE DESCRIPTION:  [employer] consent was obtained from the patient for the procedure. All risks and benefits of the procedure explained. The procedure was performed in the endoscopy suite. The patient was laying on a stretcher and moved to the left lateral decubitus position prior to administration of sedation. Sedation was administered by anesthesiology. See their note for details. The endoscope was inserted into the mouth and advanced under direct vision to the second portion of the duodenum. Careful inspection of upper gastrointestinal tract was made as the endoscope was inserted and withdrawn. Retroflexion of the endoscope to view of the cardia of the stomach was performed. The findings and interventions are described below. FINDINGS:  Esophagus:    Scars of previous banding    Stomach:   Gastritis of the antrum  No gastric varices identified.     Duodenum:   Normal bulb and second portion    SPECIMENS COLLECTED:   2 biopsies were taken from the antrum. The specimens were placed in preservative and transported to Pathology after the procedure. INTERVENTIONS:  1 ENDOCLIP placed to stop oozing      COMPLICATIONS: None. The patient tolerated the procedure well. EBL: Negligible. RECOMMENDATIONS:  Observe until discharge parameters are achieved. May resume previous diet. Repeat endoscopy to reassess varices and need for banding in 1 year. Follow-up [facility] office as scheduled.       Darius Madrid MD  [address]  Hayes Merlos [address] [place], [place] [address].  [phone]  FAX:  [address]

## 2022-10-17 NOTE — DISCHARGE INSTRUCTIONS
[address] [facility], MD, FACP, [place], [place]      EMILIE Shelley, [facility]   Heather Ramon, [facility]   Gaila Prader, P-C   Jeb Canales P-C    Darlyn Sloan, [facility]       Alvin Matta [address]    at [address], [facility] Connie Crowe  22.    [phone]    FAX: [address] [place] [address], [address]    [phone]    FAX: [phone]         ENDOSCOPY DISCHARGE INSTRUCTIONS      Anila Arthur  1940  Date: 10/17/2022    DISCOMFORT:  Use lozenges or warm salt water gargle for sore thoat  Apply warm compress to IV site if red. If redness or soreness persists call the office. You may experience gas and bloating. Walking and belching will help relieve this. You may experience chest discomfort or pressure especially if banding of esophageal varices was performed. DIET:  You may resume your normal diet. ACTIVITY:  Spend the remainder of the day resting. Avoid any strenuous activity. You may not operate a vehicle for 12 hours. You may not engage in an occupation involving machinery or appliances for rest of today. Avoid making any critical or financial decisions for at least 24 hour. Call the [employer] [address] if you have any of the following:  Increasing chest or abdominal pain, nausea, vomiting, vomiting blood, abdominal distension or swelling, fever or chills, bloody discharge from nose or mouth or shortness of breath. Follow-up Instructions:  Call Dr. Pao Rojas for any questions or problems at the phone number listed above. If a biopsy was performed, you will be contacted by the office staff or Dr Pao Rojas within 1 week.    If you have not heard from us by then you may call the office at the phone number listed above to inquire about the results. ENDOSCOPY FINDINGS:  Your endoscopy demonstrated mild irritation I the stomach. A biopsy of the stomach was taken. Will repeat EGD to look for development of varices in 1 year. Keep follow-up appointment with Sanjana Rutherford on 2/16/2023. DISCHARGE SUMMARY from the Nurse: The following personal items collected during your admission are returned to you:   Dental Appliance: Dental Appliances: None  Vision: Visual Aid: Glasses, At home  Hearing Aid:    Jewelry:    Clothing:    Other Valuables:    Valuables sent to safe:                          Learning About Coronavirus (COVID-19)  Coronavirus (COVID-19): Overview  What is coronavirus ([id number])? The coronavirus disease (COVID-19) is caused by a virus. It is an illness that was first found in [place], [place], in December 2019. It has since spread worldwide. The virus can cause fever, cough, and trouble breathing. In severe cases, it can cause pneumonia and make it hard to breathe without help. It can cause death. Coronaviruses are a large group of viruses. They cause the common cold. They also cause more serious illnesses like Middle East respiratory syndrome (MERS) and severe acute respiratory syndrome (SARS). COVID-19 is caused by a novel coronavirus. That means it's a new type that has not been seen in people before. This virus spreads person-to-person through droplets from coughing and sneezing. It can also spread when you are close to someone who is infected. And it can spread when you touch something that has the virus on it, such as a doorknob or a tabletop. What can you do to protect yourself from coronavirus (COVID-19)? The best way to protect yourself from getting sick is to: Avoid areas where there is an outbreak. Avoid contact with people who may be infected. Wash your hands often with soap or alcohol-based hand sanitizers.   Avoid crowds and try to stay at least 6 feet away from other people. Wash your hands often, especially after you cough or sneeze. Use soap and water, and scrub for at least 20 seconds. If soap and water aren't available, use an alcohol-based hand . Avoid touching your mouth, nose, and eyes. What can you do to avoid spreading the virus to others? To help avoid spreading the virus to others:  Cover your mouth with a tissue when you cough or sneeze. Then throw the tissue in the trash. Use a disinfectant to clean things that you touch often. Stay home if you are sick or have been exposed to the virus. Don't go to school, work, or public areas. And don't use public transportation. If you are sick:  Leave your home only if you need to get medical care. But call the doctor's office first so they know you're coming. And wear a face mask, if you have one. If you have a face mask, wear it whenever you're around other people. It can help stop the spread of the virus when you cough or sneeze. Clean and disinfect your home every day. Use household  and disinfectant wipes or sprays. Take special care to clean things that you grab with your hands. These include doorknobs, remote controls, phones, and handles on your refrigerator and microwave. And don't forget countertops, tabletops, bathrooms, and computer keyboards. When to call for help  Call 911 anytime you think you may need emergency care. For example, call if:  You have severe trouble breathing. (You can't talk at all.)  You have constant chest pain or pressure. You are severely dizzy or lightheaded. You are confused or can't think clearly. Your face and lips have a blue color. You pass out (lose consciousness) or are very hard to wake up. Call your doctor now if you develop symptoms such as:  Shortness of breath. Fever. Cough. If you need to get care, call ahead to the doctor's office for instructions before you go.  Make sure you wear a face mask, if you have one, to prevent exposing other people to the virus. Where can you get the latest information? The following health organizations are tracking and studying this virus. Their websites contain the most up-to-date information. Amritjim Ochoa also learn what to do if you think you may have been exposed to the virus. U.S. Centers for Disease Control and Prevention (CDC): The CDC provides updated news about the disease and travel advice. The website also tells you how to prevent the spread of infection. www.cdc.gov  World Health Organization [facility]): WHO offers information about the virus outbreaks. WHO also has travel advice. www.who.int  Current as of: April 1, 2020               Content Version: 12.4  © 2006-2020 Healthwise, Incorporated. Care instructions adapted under license by your healthcare professional. If you have questions about a medical condition or this instruction, always ask your healthcare professional. [address] any warranty or liability for your use of this information. Rossy Davis DISCHARGE SUMMARY from Nurse    PATIENT INSTRUCTIONS:    After general anesthesia or intravenous sedation, for 24 hours or while taking prescription Narcotics:  Limit your activities  Do not drive and operate hazardous machinery  Do not make important personal or business decisions  Do  not drink alcoholic beverages  If you have not urinated within 8 hours after discharge, please contact your surgeon on call. Report the following to your surgeon:  Excessive pain, swelling, redness or odor of or around the surgical area  Temperature over 100.5  Nausea and vomiting lasting longer than 4 hours or if unable to take medications  Any signs of decreased circulation or nerve impairment to extremity: change in color, persistent  numbness, tingling, coldness or increase pain  Any questions    What to do at Home:  Recommended activity: Activity as tolerated and no driving for today.     If you experience any of the following symptoms fever, chills, bleeding, chest pain, uncontrolled pain, nausea, vomiting, shortness of breath, please follow up with Dr Jany Metz. *  Please give a list of your current medications to your Primary Care Provider. *  Please update this list whenever your medications are discontinued, doses are      changed, or new medications (including over-the-counter products) are added. *  Please carry medication information at all times in case of emergency situations. These are general instructions for a healthy lifestyle:    No smoking/ No tobacco products/ Avoid exposure to second hand smoke  Surgeon General's Warning:  Quitting smoking now greatly reduces serious risk to your health. Obesity, smoking, and sedentary lifestyle greatly increases your risk for illness    A healthy diet, regular physical exercise & weight monitoring are important for maintaining a healthy lifestyle    You may be retaining fluid if you have a history of heart failure or if you experience any of the following symptoms:  Weight gain of 3 pounds or more overnight or 5 pounds in a week, increased swelling in our hands or feet or shortness of breath while lying flat in bed. Please call your doctor as soon as you notice any of these symptoms; do not wait until your next office visit. The discharge information has been reviewed with the patient and spouse. The patient and spouse verbalized understanding. Discharge medications reviewed with the patient and spouse and appropriate educational materials and side effects teaching were provided.     Patient armband removed and shredded    ___________________________________________________________________________________________________________________________________

## 2022-10-17 NOTE — H&P
[address] [facility], MD, FACP, [place], [place]      EMILIE Nunez, [facility]-BC   Yo Beckett, [facility]   Elier Boucher, FNP-CASTILLO Coy, FNP-C    Rufina Berrios, [facility]       Alvin Matta [facility] 136    at [address], [address] Connie Crowe  22.    [phone]    FAX: [address] [place] [address], [address], [address]    [phone]    FAX: [phone]         PRE-PROCEDURE NOTE - EGD    H and P from last office visit reviewed. Allergies reviewed. Out-patient medicaton list reviewed. 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    Pure hypercholesterolemia E78.00    Thrombocytopenia (HCC) D69.6    B12 deficiency E53.8    Chronic pain of right knee M25.561, G89.29    Lumbar pain with radiation down right leg M54.50, M79.604    Right hip pain M25.551    Bladder cancer (HCC) C67.9    Venous insufficiency of both lower extremities I87.2    Localized edema R60.0    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    Dermatochalasis of both upper eyelids H02.831, H02.834    Dry eyes H04.123    Pseudophakia of both eyes Z96.1    Vitreous floaters of both eyes H43.393    Heart block I45.9    Pacemaker Z95.0    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      Ciprofloxacin Diarrhea and Myalgia    Other Medication Myalgia     Think  that is was a Sulfa drug, but not sure       No current facility-administered medications on file prior to encounter. Current Outpatient Medications on File Prior to Encounter   Medication Sig Dispense Refill    Syringe with Needle, Disp, (BD Luer-Freddy Syringe) 3 mL 23 gauge x 1 1/2\" syrg FOR USE WITH TESTOSTERONE (INTRAMUSCULAR) INJECTIONS EVERY 7 DAYS 12 Pen Needle 5    nadoloL (CORGARD) 20 mg tablet Take 1 Tablet by mouth daily. 30 Tablet 0    ursodioL (ACTIGALL) 300 mg capsule TAKE 2 CAPSULES BY MOUTH TWO (2) TIMES A  Capsule 3    tamsulosin (FLOMAX) 0.4 mg capsule Take 0.4 mg by mouth two (2) times a day. dextran 70-hypromellose (ARTIFICIAL TEARS) ophthalmic solution 1 Drop. For EGD to assess for esophageal and gastric varices. Plan to perform banding if indicated based upon variceal size and appearance. The risks of the procedure were discussed with the patient. These included reaction to anesthesia, pain, perforation and bleeding. All questions were answered. The patient wishes to proceed with the procedure. The patient was counseled at length about the risks of kiara Covid-19 in the camille-operative and post-operative states including the recovery window of their procedure. The patient was made aware that kiara Covid-19 after a surgical procedure may worsen their prognosis for recovering from the virus and lend to a higher morbidity and or mortality risk. The patient was given the options of postponing their procedure. All of the risks, benefits, and alternatives were discussed. The patient does  wish to proceed with the procedure. PHYSICAL EXAMINATION:  Visit Vitals  BP (!) 162/94   Pulse 61   Temp 97.8 °F (36.6 °C)   Resp 14   Ht 5' 9\" (1.753 m)   Wt 175 lb 3.2 oz (79.5 kg)   SpO2 98%   BMI 25.87 kg/m²       General: No acute distress. Eyes: Sclera anicteric. ENT: No oral lesions. Thyroid normal.  Nodes: No adenopathy. Skin: No spider angiomata. No jaundice. No palmar erythema. Respiratory: Lungs clear to auscultation. Cardiovascular: Regular heart rate. No murmurs. No JVD. Abdomen: Soft non-tender, liver size normal to percussion/palpation. Spleen not palpable. No obvious ascites. Extremities: No edema. No muscle wasting. No gross arthritic changes. Neurologic: Alert and oriented. Cranial nerves grossly intact. No asterixis. MOST RECENT LABORATORY STUDIES:  Lab Results   Component Value Date/Time    WBC 3.6 (L) 09/28/2022 10:40 AM    Hemoglobin (POC) 15.2 04/09/2019 03:28 PM    HGB 13.4 09/28/2022 10:40 AM    HCT (POC) 41.6 (A) 03/06/2014 04:05 PM    HCT 41.2 09/28/2022 10:40 AM    PLATELET 88 (L) 52/90/8483 10:40 AM    MCV 94.5 09/28/2022 10:40 AM     Lab Results   Component Value Date/Time    INR 1.3 (H) 09/28/2022 10:49 AM    INR 1.2 04/21/2022 10:47 AM    INR 1.1 01/19/2022 04:30 PM    Prothrombin time 13.8 (H) 09/28/2022 10:49 AM    Prothrombin time 14.5 04/21/2022 10:47 AM    Prothrombin time 13.4 01/19/2022 04:30 PM       ASSESSMENT AND PLAN:  EGD to assess for esophageal and/or gastric varices.   Sedation per anesthesiology      Anamika Callahan MD  [address] [phone]  FAX: [address] [place] [address]

## 2022-10-17 NOTE — PERIOP NOTES
TRANSFER - IN REPORT:    Verbal report received from June RN(name) on [place]  being received from procedure room(unit) for routine progression of care      Report consisted of patients Situation, Background, Assessment and   Recommendations(SBAR). Information from the following report(s) SBAR, Procedure Summary, Intake/Output, MAR, and Recent Results was reviewed with the receiving nurse. Opportunity for questions and clarification was provided. Assessment completed upon patients arrival to unit and care assumed.

## 2022-10-19 DIAGNOSIS — K74.3 HEPATIC CIRRHOSIS DUE TO PRIMARY BILIARY CHOLANGITIS (HCC): ICD-10-CM

## 2022-10-21 ENCOUNTER — TELEPHONE (OUTPATIENT)
Dept: HEMATOLOGY | Age: 82
End: 2022-10-21

## 2022-10-21 NOTE — TELEPHONE ENCOUNTER
Called patient and stopped the BB today. He was instructed to check his BP and if it starts to climb, he is to inform me and I will start him on anther antihypertensive agent.

## 2022-10-22 RX ORDER — FLUTICASONE PROPIONATE 50 MCG
SPRAY, SUSPENSION (ML) NASAL
Qty: 16 G | Refills: 2 | Status: SHIPPED | OUTPATIENT
Start: 2022-10-22

## 2022-10-24 RX ORDER — NADOLOL 40 MG/1
TABLET ORAL
Qty: 90 TABLET | Refills: 1 | OUTPATIENT
Start: 2022-10-24

## 2022-11-01 ENCOUNTER — TELEPHONE (OUTPATIENT)
Dept: FAMILY MEDICINE CLINIC | Age: 82
End: 2022-11-01

## 2022-11-01 DIAGNOSIS — E53.8 B12 DEFICIENCY: ICD-10-CM

## 2022-11-01 DIAGNOSIS — R79.89 LOW TESTOSTERONE: Primary | ICD-10-CM

## 2022-11-01 NOTE — TELEPHONE ENCOUNTER
Yes, future lab orders placed to check both testosterone and B12 levels.  When he restarts testosterone, he should give the shot every OTHER month

## 2022-11-01 NOTE — TELEPHONE ENCOUNTER
Pt was told to stop his testosterone and restart it again this month. He has also not had his b12 shot in quite some time. Pt wants to know if he needs bw to check his levels before restarting these two  things?

## 2022-11-03 ENCOUNTER — LAB ONLY (OUTPATIENT)
Dept: FAMILY MEDICINE CLINIC | Age: 82
End: 2022-11-03
Payer: MEDICARE

## 2022-11-03 DIAGNOSIS — E53.8 B12 DEFICIENCY: ICD-10-CM

## 2022-11-03 DIAGNOSIS — R79.89 LOW TESTOSTERONE: ICD-10-CM

## 2022-11-03 PROCEDURE — 36415 COLL VENOUS BLD VENIPUNCTURE: CPT | Performed by: NURSE PRACTITIONER

## 2022-11-04 LAB — VIT B12 SERPL-MCNC: 312 PG/ML (ref 193–986)

## 2022-11-04 RX ORDER — FUROSEMIDE 20 MG/1
TABLET ORAL
Qty: 15 TABLET | Refills: 0 | OUTPATIENT
Start: 2022-11-04

## 2022-11-06 LAB
TESTOST FREE SERPL-MCNC: 1 PG/ML (ref 6.6–18.1)
TESTOST SERPL-MCNC: 382 NG/DL (ref 264–916)

## 2022-11-06 RX ORDER — FUROSEMIDE 20 MG/1
TABLET ORAL
Qty: 90 TABLET | Refills: 0 | Status: SHIPPED | OUTPATIENT
Start: 2022-11-06

## 2022-11-07 NOTE — PROGRESS NOTES
Testosterone level looks good, cont with testosterone injection every OTHER week.  B12 level looks good

## 2022-11-08 ENCOUNTER — DOCUMENTATION ONLY (OUTPATIENT)
Dept: FAMILY MEDICINE CLINIC | Age: 82
End: 2022-11-08

## 2022-11-08 ENCOUNTER — TELEPHONE (OUTPATIENT)
Dept: FAMILY MEDICINE CLINIC | Age: 82
End: 2022-11-08

## 2023-01-01 NOTE — PROGRESS NOTES
CT of abdomen shows a small umbilical hernia containing mixed fat and fluid. The protrusions surrounding his naval could be varices or engorged veins as a result from his liver disease. I would he notify his liver specialist about these results and see if he wants to do any further imaging for further evaluation. It also looks like he has some inflammation or infection in the colon- does he see a GI spevcialisyt? Mother/Father

## 2023-01-18 DIAGNOSIS — E55.9 VITAMIN D DEFICIENCY: ICD-10-CM

## 2023-01-18 RX ORDER — URSODIOL 300 MG/1
CAPSULE ORAL
Qty: 360 CAPSULE | Refills: 3 | Status: SHIPPED | OUTPATIENT
Start: 2023-01-18

## 2023-01-19 RX ORDER — FLUTICASONE PROPIONATE 50 MCG
SPRAY, SUSPENSION (ML) NASAL
Qty: 1 EACH | Refills: 5 | Status: SHIPPED | OUTPATIENT
Start: 2023-01-19

## 2023-02-01 ENCOUNTER — HOSPITAL ENCOUNTER (OUTPATIENT)
Dept: ULTRASOUND IMAGING | Age: 83
Discharge: HOME | End: 2023-02-01
Attending: NURSE PRACTITIONER
Payer: MEDICARE

## 2023-02-01 DIAGNOSIS — K74.3 HEPATIC CIRRHOSIS DUE TO PRIMARY BILIARY CHOLANGITIS (HCC): ICD-10-CM

## 2023-02-01 PROCEDURE — 76705 ECHO EXAM OF ABDOMEN: CPT

## 2023-02-16 ENCOUNTER — HOSPITAL ENCOUNTER (OUTPATIENT)
Facility: HOSPITAL | Age: 83
Setting detail: SPECIMEN
Discharge: HOME | End: 2023-02-16
Payer: COMMERCIAL

## 2023-02-16 ENCOUNTER — OFFICE VISIT (OUTPATIENT)
Age: 83
End: 2023-02-16
Payer: COMMERCIAL

## 2023-02-16 VITALS
WEIGHT: 161 LBS | DIASTOLIC BLOOD PRESSURE: 77 MMHG | HEART RATE: 77 BPM | OXYGEN SATURATION: 98 % | SYSTOLIC BLOOD PRESSURE: 128 MMHG | HEIGHT: 69 IN | TEMPERATURE: 97.7 F | BODY MASS INDEX: 23.85 KG/M2

## 2023-02-16 DIAGNOSIS — K74.3 PRIMARY BILIARY CIRRHOSIS (HCC): ICD-10-CM

## 2023-02-16 DIAGNOSIS — K74.3 PRIMARY BILIARY CIRRHOSIS (HCC): Primary | ICD-10-CM

## 2023-02-16 LAB
ALBUMIN SERPL-MCNC: 3.3 G/DL (ref 3.4–5)
ALBUMIN/GLOB SERPL: 1 (ref 0.8–1.7)
ALP SERPL-CCNC: 135 U/L (ref 45–117)
ALT SERPL-CCNC: 28 U/L (ref 16–61)
ANION GAP SERPL CALC-SCNC: 3 MMOL/L (ref 3–18)
AST SERPL-CCNC: 39 U/L (ref 10–38)
BASOPHILS # BLD: 0.1 K/UL (ref 0–0.1)
BASOPHILS NFR BLD: 2 % (ref 0–2)
BILIRUB DIRECT SERPL-MCNC: 0.5 MG/DL (ref 0–0.2)
BILIRUB SERPL-MCNC: 1.2 MG/DL (ref 0.2–1)
BUN SERPL-MCNC: 24 MG/DL (ref 7–18)
BUN/CREAT SERPL: 21 (ref 12–20)
CALCIUM SERPL-MCNC: 9 MG/DL (ref 8.5–10.1)
CHLORIDE SERPL-SCNC: 110 MMOL/L (ref 100–111)
CO2 SERPL-SCNC: 28 MMOL/L (ref 21–32)
CREAT SERPL-MCNC: 1.16 MG/DL (ref 0.6–1.3)
DIFFERENTIAL METHOD BLD: ABNORMAL
EOSINOPHIL # BLD: 0.4 K/UL (ref 0–0.4)
EOSINOPHIL NFR BLD: 10 % (ref 0–5)
ERYTHROCYTE [DISTWIDTH] IN BLOOD BY AUTOMATED COUNT: 14.3 % (ref 11.6–14.5)
GLOBULIN SER CALC-MCNC: 3.2 G/DL (ref 2–4)
GLUCOSE SERPL-MCNC: 70 MG/DL (ref 74–99)
HCT VFR BLD AUTO: 36.6 % (ref 36–48)
HGB BLD-MCNC: 12.4 G/DL (ref 13–16)
IMM GRANULOCYTES # BLD AUTO: 0 K/UL (ref 0–0.04)
IMM GRANULOCYTES NFR BLD AUTO: 0 % (ref 0–0.5)
INR PPP: 1.1 (ref 0.8–1.2)
LYMPHOCYTES # BLD: 1.5 K/UL (ref 0.9–3.6)
LYMPHOCYTES NFR BLD: 40 % (ref 21–52)
MCH RBC QN AUTO: 32.9 PG (ref 24–34)
MCHC RBC AUTO-ENTMCNC: 33.9 G/DL (ref 31–37)
MCV RBC AUTO: 97.1 FL (ref 78–100)
MONOCYTES # BLD: 0.3 K/UL (ref 0.05–1.2)
MONOCYTES NFR BLD: 8 % (ref 3–10)
NEUTS SEG # BLD: 1.4 K/UL (ref 1.8–8)
NEUTS SEG NFR BLD: 39 % (ref 40–73)
NRBC # BLD: 0 K/UL (ref 0–0.01)
NRBC BLD-RTO: 0 PER 100 WBC
PLATELET # BLD AUTO: 93 K/UL (ref 135–420)
PMV BLD AUTO: 10.9 FL (ref 9.2–11.8)
POTASSIUM SERPL-SCNC: 4.3 MMOL/L (ref 3.5–5.5)
PROT SERPL-MCNC: 6.5 G/DL (ref 6.4–8.2)
PROTHROMBIN TIME: 14.2 SEC (ref 11.5–15.2)
RBC # BLD AUTO: 3.77 M/UL (ref 4.35–5.65)
SODIUM SERPL-SCNC: 141 MMOL/L (ref 136–145)
WBC # BLD AUTO: 3.6 K/UL (ref 4.6–13.2)

## 2023-02-16 PROCEDURE — 85610 PROTHROMBIN TIME: CPT

## 2023-02-16 PROCEDURE — 80076 HEPATIC FUNCTION PANEL: CPT

## 2023-02-16 PROCEDURE — 36415 COLL VENOUS BLD VENIPUNCTURE: CPT

## 2023-02-16 PROCEDURE — 82107 ALPHA-FETOPROTEIN L3: CPT

## 2023-02-16 PROCEDURE — 80048 BASIC METABOLIC PNL TOTAL CA: CPT

## 2023-02-16 PROCEDURE — 1123F ACP DISCUSS/DSCN MKR DOCD: CPT | Performed by: NURSE PRACTITIONER

## 2023-02-16 PROCEDURE — 99214 OFFICE O/P EST MOD 30 MIN: CPT | Performed by: NURSE PRACTITIONER

## 2023-02-16 PROCEDURE — 85025 COMPLETE CBC W/AUTO DIFF WBC: CPT

## 2023-02-16 RX ORDER — TAMSULOSIN HYDROCHLORIDE 0.4 MG/1
0.4 CAPSULE ORAL 2 TIMES DAILY
COMMUNITY

## 2023-02-16 RX ORDER — DONEPEZIL HYDROCHLORIDE 10 MG/1
10 TABLET, FILM COATED ORAL
COMMUNITY
Start: 2021-11-08

## 2023-02-16 RX ORDER — FLUTICASONE PROPIONATE 50 MCG
SPRAY, SUSPENSION (ML) NASAL
COMMUNITY
Start: 2022-02-26

## 2023-02-16 RX ORDER — FUROSEMIDE 20 MG/1
TABLET ORAL
COMMUNITY
Start: 2021-11-18

## 2023-02-16 NOTE — PROGRESS NOTES
[address] [facility], [place], [address], [place], [place]      SAMANTHA Angulo, ACNP-BC     April S Sean, [facility], PhoenixNP-BC   Raffaele Ruiz FNP-ROSA Marquez, [facility]       Caleb Deputado Randy De Wan 136    at [address], 81 [facility] [place], [facility] 22.    [phone]    FAX: [address] [place] [address] Drive, [address], [address]    [phone]    FAX: [phone]       Patient Care Team:  Jessica Montesinos NP as PCP - General (Nurse Practitioner)  Jessica Montesinos NP as PCP - [address] [place] [address] Provider  Segundo Nassar MD (Urology)  Liane Vincent MD (Cardio Vascular Surgery)      Problem List  Date Reviewed: 8/16/2022            Codes Class Noted    Late onset Alzheimer's disease without behavioral disturbance ([facility] [[email or web address].) ICD-10-CM: G30.1, F02.80  ICD-9-CM: 331.0, 294.10  6/30/2022        Pacemaker ICD-10-CM: Z95.0  ICD-9-CM: V45.01  9/27/2021        Heart block ICD-10-CM: I45.9  ICD-9-CM: 426.9  9/3/2021        Dry eyes ICD-10-CM: X29.115  ICD-9-CM: 375.15  8/5/2021    Overview Signed 8/5/2021 10:35 AM by Annika Mcdonald NP     Last Assessment & Plan:   Formatting of this note might be different from the original.  Continue drops PRN             Venous insufficiency of both lower extremities ICD-10-CM: I87.2  ICD-9-CM: 459.81  3/9/2021        Localized edema ICD-10-CM: R60.0  ICD-9-CM: 782.3  3/9/2021        Dermatochalasis of both upper eyelids ICD-10-CM: H02.831, P76.560  ICD-9-CM: 374.87  11/12/2020    Overview Signed 8/5/2021 10:35 AM by Annika Mcdonald NP     Last Assessment & Plan:   Formatting of this note might be different from the original.  With brow ptosis  Becoming visually significant   Patient not yet ready for surgery. Bilateral posterior capsular opacification ICD-10-CM: O44.459  ICD-9-CM: 366.50  11/12/2019    Overview Signed 8/5/2021 10:35 AM by Cady Paez NP     Last Assessment & Plan:   Formatting of this note might be different from the original.  OS doing well post yag. Doing well sc. Bladder cancer (HCC) (Chronic) ICD-10-CM: C67.9  ICD-9-CM: 188.9  5/9/2019        Chronic pain of right knee ICD-10-CM: M25.561, G89.29  ICD-9-CM: 719.46, 338.29  11/27/2018        Lumbar pain with radiation down right leg ICD-10-CM: M54.50, M79.604  ICD-9-CM: 724.2  11/27/2018        Right hip pain ICD-10-CM: M25.551  ICD-9-CM: 719.45  11/27/2018        Pseudophakia of both eyes ICD-10-CM: Z96.1  ICD-9-CM: V43.1  9/19/2018    Overview Signed 8/5/2021 10:35 AM by Cady Paez NP     Last Assessment & Plan:   Formatting of this note might be different from the original.  Janey Donaldson is doing well post phaco with IOL OU with toric  Rx for glasses has been given . Follow up with Dr. Leisa Vega in 1yr. Vitreous floaters of both eyes ICD-10-CM: Z53.145  ICD-9-CM: 379.24  9/4/2018    Overview Signed 8/5/2021 10:35 AM by Cady Paez NP     Last Assessment & Plan:   Formatting of this note might be different from the original.  Floaters - stable. No traction symptoms or associated retinal changes present. Discussed.              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 The active problem list, all pertinent past medical history, medications, liver histology, endoscopic studies, radiologic findings and laboratory findings related to the liver disorder were reviewed with the patient. The patient is a [de-identified] y.o.  male who was first noted to have [facility] about 14 years ago based on serologies. He has been maintained on 1200 mg of ursodiol without issue. Imaging of the liver performed 02/2017 with ultrasound followed with MRI in 03/2017. Heterogeneous echotexture. No focal liver lesions. Most recent imaging was performed in 07/2022 with CT and the liver was unremarkable. The patient has no extrahepatic autoimmune disorders. The most recent laboratory studies indicate that the liver transaminases are normal, alkaline phosphatase is elevate, tests of hepatic synthetic and metabolic function are normal, and the platelet count is depressed. The patient has developed varices with recent hemorrhage. He has undergone serial EGD's by Dr. Mojgan Ford and he is continuing to band the varices. The patient completes all daily activities without any functional limitations. He has not developed ascites or encephalopathy. No edema. The patient has no complaints which can be attributed to liver disease. The patient has not experienced fatigue, fevers, chills, shortness of breath, chest pain, pain in the right side over the liver, diffuse abdominal pain, nausea, vomiting, constipation, diarrhea, dryeyes, dry mouth, arthralgias, myalgias, yellowing of the eyes or skin, itching, dark urine, problems concentrating, swelling of the abdomen, no recent hematemesis or hematochezia other than event mentioned. Since the last office appointment the patient has:  Had no changes in the liver disease.     ASSESSMENT AND PLAN:  Primary Biliary Cholangitis with cirrhosis. The most recent laboratory studies indicate that the liver transaminases are normal, alkaline phosphatase is elevated,  tests of hepatic synthetic and metabolic function are normal, and the platelet count is depressed. Will perform laboratory testing to monitor liver function and degree of liver injury. This will include hepatic panel, a CBC w/ diff, a BMP, a PT/INR, an AFP-L3%. Complications of cirrhosis were discussed in detail. We discussed thrombocytopenia, portal hypertension, varices, GI bleeding, peripheral edema, ascites, hepatic encephalopathy, and hepatocellular carcinoma. We discussed the need for follow ups on a regular basis to monitor for complications. We discussed the need for every 6 month liver imaging studies. The need to perform an assessment of liver fibrosis was discussed with the patient. The Fibroscan can assess liver fibrosis and determine if a patient has advanced fibrosis or cirrhosis without the need for liver biopsy. This was performed in the office during the patient's 08/2022 appointment. Results of the scan indicate that the liver has essentially normal stiffness with a suggested Metavir fibrosis score of F1. There is no evidence of fatty liver disease either. The Fibroscan can be repeated annually or as often as clinically indicated to assess for fibrosis progression and/or regression. This will be performed when the patient returns for follow up in 6 months. EGD was performed by Dr. Bre Rai in 10/2022. Scar from previous banding noted. Will repeat the EGD in 10/2023. Discussed extrahepatic manifestations of PBC such as osteoporosis and elevated cholesterol. The risk of osteoporosis is increased in patients with PBC. DEXA bone density to assess for osteoporosis should be ordered by the patients primary care physician. The patient was advised to take calcium supplementation and Vitamin D.  He was advised to take supplemental vitamin A, D, E, and K. Patients with PBC are at increased risk for hypercholesterolemia. However, this is not associated with an increased risk of cardiac disease and MI because this is typically an elevation in HDL cholesterol. There is no contraindication for treatment with a statin if this is felt to be indicated based upon cardiac risk assessment. The patient is receiving treatment with urosdeoxycholic Acid. Continue this at current dose. The patient  and is tolerating the treatment without significant side effects. His alkaline phosphatase is normal.     Vaccination for viral hepatitis A and B is recommended since the patient has no serologic evidence of previous exposure or vaccination with immunity. [address] screening has recently been performed and does not suggest [address] The next liver imaging study is due in 07/2023. This was ordered today. Bone density up to date with osteopenia. All of the above issues were discussed with the patient. All questions were answered. The patient expressed a clear understanding of the above. ALLERGIES  Allergies   Allergen Reactions    Sulfa (Sulfonamide Antibiotics) Unknown (comments)     Pt states its been so long he doesn't remember the reaction. Other Medication Myalgia     Think  that is was a Sulfa drug, but not sure       MEDICATIONS  Current Outpatient Medications   Medication Sig    ursodioL (ACTIGALL) 300 mg capsule TAKE 2 CAPSULES BY MOUTH TWICE A DAY    nadoloL (CORGARD) 40 mg tablet TAKE 1 TABLET BY MOUTH EVERY DAY    testosterone cypionate (DEPOTESTOTERONE CYPIONATE) 200 mg/mL injection INJECT 0.5ML INTRAMUSCULAR WEEKLY    donepeziL (ARICEPT) 10 mg tablet TAKE 1 TABLET BY MOUTH NIGHTLY    tamsulosin (FLOMAX) 0.4 mg capsule TAKE 1 CAPSULE BY MOUTH EVERY DAY    spironolactone (ALDACTONE) 50 mg tablet Take 1 Tab by mouth daily.     Syringe with Needle, Disp, (BD Luer-Rik Syringe) 3 mL 23 gauge x 1 1/2\" syrg FOR USE WITH TESTOSTERONE (INTRAMUSCULAR) INJECTIONS EVERY 7 DAYS    fluticasone propionate (FLONASE) 50 mcg/actuation nasal spray PUT 1 SPRAY IN EACH NOSTRIL TWICE A DAY FOR CHRONIC STUFFY & RUNNY NOSE    metaxalone (SKELAXIN) 800 mg tablet Take 1 Tab by mouth every eight to twelve (8-12) hours as needed for Muscle Spasm(s). ipratropium (ATROVENT) 42 mcg (0.06 %) nasal spray SPRAY 1 SPRAY FOUR TIMES DAILY. INDICATIONS: RUNNY NOSE    dextran 70-hypromellose (ARTIFICIAL TEARS,LAAS45-PVLEH,) ophthalmic solution 1 Drop. meclizine (ANTIVERT) 25 mg tablet Take 25 mg by mouth as needed. Indications: sensation of spinning or whirling    therapeutic multivitamin-minerals (VITAMINS AND MINERALS) tablet Take 1 Tab by mouth. Current Facility-Administered Medications   Medication    cyanocobalamin (VITAMIN B12) injection 1,000 mcg       SYSTEM REVIEW NOT RELATED TO LIVER DISEASE OR REVIEWED ABOVE:  Constitution systems: Negative for fever, chills, weight gain, weight loss. Eyes: Negative for visual changes. ENT: Negative for sore throat, painful swallowing. Respiratory: Negative for cough, hemoptysis, SOB. Cardiology: Negative for chest pain, palpitations. GI:  Negative for constipation or diarrhea. : + for urinary frequency, dysuria, hematuria, + nocturia. Skin: Negative for rash. Hematology: Negative for easy bruising, blood clots. Musculo-skelatal: Negative for back pain, muscle pain, weakness. Neurologic: Negative for headaches, dizziness, vertigo, memory problems not related to HE. Psychology: Negative for anxiety, depression. FAMILY HISTORY:  The mother passed CHF age 80  The father passed CAD age 76   There is no family history of liver disease. There is no family history of immune disorders. SOCIAL HISTORY:  The patient is . The patient has 4 children,   The patient has never used tobacco products. The patient has never consumed significant amounts of alcohol.     The patient retired in . PHYSICAL EXAMINATION:  Visit Vitals  BP (!) 160/92   Pulse 94   Temp 97.2 °F (36.2 °C) (Tympanic)   Wt 168 lb 6 oz (76.4 kg)   SpO2 98%   BMI 24.16 kg/m²     General: No acute distress. Eyes: Sclera anicteric. ENT: No oral lesions. Thyroid normal.  Nodes: No adenopathy. Skin: No spider angiomata. No jaundice. No palmar erythema. Respiratory: Lungs clear to auscultation. Cardiovascular: Regular heart rate. No murmurs. No JVD. Abdomen: Soft non-tender. Liver size normal to percussion/palpation. Spleen not palpable. No obvious ascites. Extremities: No edema. No muscle wasting. No gross arthritic changes. Neurologic: Alert and oriented. Cranial nerves grossly intact. No asterixis.       LABORATORY STUDIES:  [facility] [place] of [address] [place] [address] & Units 2/16/2023 9/28/2022   WBC 4.6 - 13.2 K/uL 3.6 (L) 3.6 (L)   ANC 1.8 - 8.0 K/UL  1.9   ANC 1.8 - 8.0 K/UL 1.4 (L)    HGB 13.0 - 16.0 g/dL 12.4 (L) 13.4    - 420 K/uL 93 (L) 88 (L)   INR 0.8 - 1.2   1.1 1.3 (H)   AST 10 - 38 U/L 39 (H) 35   ALT 16 - 61 U/L 28 32   Alk Phos 45 - 117 U/L 135 (H) 103   Bili, Total 0.2 - 1.0 MG/DL 1.2 (H) 1.3 (H)   Bili, Direct 0.0 - 0.2 MG/DL 0.5 (H) 0.4 (H)   Albumin 3.4 - 5.0 g/dL 3.3 (L) 3.2 (L)   BUN 7.0 - 18 MG/DL 24 (H) 16   Creat 0.6 - 1.3 MG/DL 1.16 1.29   Na 136 - 145 mmol/L 141 140   K 3.5 - 5.5 mmol/L 4.3 4.4   Cl 100 - 111 mmol/L 110 109 (H)   CO2 21 - 32 mmol/L 28 29   Glucose 74 - 99 mg/dL 70 (L) 123 (H)   Magnesium 1.6 - 2.4 mg/dL        [facility] [place] [facility] Latest Ref Rng & Units 5/19/2022 4/21/2022   WBC 4.1 - 11.1 K/uL 2.9 (L) 3.4 (L)   ANC 1.8 - 8.0 K/UL 1.3 (L) 1.6 (L)   HGB 12.1 - 17.0 g/dL 12.7 13.0    - 400 K/uL 93 (L) 101 (L)   INR 0.8 - 1.2    1.2   AST 15 - 37 U/L 32 42 (H)   ALT 12 - 78 U/L 28 33   Alk Phos 45 - 117 U/L 114 102   Bili, Total 0.2 - 1.0 MG/DL 0.8 1.5 (H)   Bili, Direct 0.0 - 0.2 MG/DL  0.5 (H)   Albumin 3.5 - 5.0 g/dL 2.8 (L) 3.1 (L)   BUN 6 - 20 MG/DL 16 15   Creat 0.70 - 1.30 MG/DL 1.19 1.18   Na 136 - 145 mmol/L 140 140   K 3.5 - 5.1 mmol/L 3.8 4.1   Cl 97 - 108 mmol/L 107 109   CO2 21 - 32 mmol/L 24 30   Glucose 65 - 100 mg/dL 121 (H) 68 (L)   Magnesium 1.6 - 2.4 mg/dL 2.1      Cancer Screening Latest Ref Rng & Units 9/28/2022   AFP, Serum 0.0 - 8.4 ng/mL 3.7   AFP-L3% 0.0 - 9.9 % Comment     Cancer Screening Latest Ref Rng & Units 4/21/2022 1/19/2022 11/24/2021   AFP, Serum 0.0 - 8.4 ng/mL 3.1 3.0 4.1   AFP-L3% 0.0 - 9.9 % Comment Comment Comment     SEROLOGIES:  Serologies Latest Ref Rng 3/21/2016   CORRIE Ab, Direct Negative Negative   M2 Ab 0.0 - 20.0 Units 178.8 (H)     Hep B sAB (-)  Hep A total Ab (-)  HCV ab (-)    LIVER HISTOLOGY:  05/2018. TRANSIENT HEPATIC ELASTOGRAPHY:   E Range: 7.4-10.2 kPa  E Mean: 9.1 kPa  E Median: 9.3 kPa  E Std: 1.0 kPa    08/2022. FibroScan performed at [address]. EkPa was 6.9. IQR/med 10%. . The results suggested a fibrosis level of F1. The CAP score suggests there is no significant hepatic steatosis. ENDOSCOPIC PROCEDURES:  04/2016 EGD Dr. Adams Leone Esophageal varices banded  07/2016 EGD with obliterated Varices per patient. 01/2017. EGD by Dr. Adams Leone. Large esophageal varices. 2 bands placed. 01/2018. EGD by Dr. Adams Leone. Patient reports there were no varices. 10/2018. EGD by Dr. Adams Leone. Esophageal varices which are small in size. Follow up in one year. 11/2019. EGD by Dr. Adams Leone. Repeat in one year. Had 2 bands in 2020 by Dr. Adams Leone. 10/2022. EGD by Dr. Emily Alfredo. Scars of previous banding. Repeat in one year. RADIOLOGY:  03/2016  MRI scan abdomen. Changes consistent with cirrhosis. No liver mass lesions. No dilated bile ducts. No bile duct strictures. Pericholecystic fluid. No ascites  09/2016. Ultrasound of the liver. The liver is mildly hetogeneous. No hepatic masses. Karmen size. 02/2017. Ultrasound of the liver.   The liver is slightly heterogeneous in echotexture. There is a subtle isoechoic, partially exophytic questionable lesion in the right lobe measuring approximately 3.7 cm x 3.5 cm x 4 cm.   03/2017. Dynamic MRI of the liver. No significant abnormality. No hepatic lesion identified. 09/2017. Ultrasound of the liver. The liver and pancreas are normal in size, contour, and echogenicity. 05/2018. Ultrasound of the liver. Stable sonographic appearance of the liver. Mild heterogeneous diffuse increased hepatic parenchymal echotexture, ultrasound finding associated with fibrosis from chronic hepatocellular disease. No focal hepatic mass. 06/2019. Ultrasound of the liver. Heterogeneous echotexture of the hepatic parenchyma, likely representing diffuse hepatocellular disease. No focal hepatic lesion. 01/2020. Ultrasound of the liver. The liver measures 15.3 cm longitudinally and is inhomogeneous in appearance. 06/2020. Ultrasound of the liver. Normal sized, inhomogeneous appearing liver. 12/2020. Ultrasound of the liver. Normal sized liver. Evidence of hepatopetal flow of the portal vein. Relatively low velocity of flow. 06/2021. Ultrasound of the liver. The liver is heterogeneous in echotexture with no mass or other focal  Abnormality. 08/2021. CT abdomen wo contrast. LIVER: macrolobulated liver. Varices. 11/2021. CT Abdomen wo contrast.  No mass. Irregularity of the contour of the liver. This is suggestive of  cirrhotic change. Persistence of the previously described varices. 04/2022. Ultrasound of the liver. The liver is small, heterogeneous and nodular.  07/2022. CT abdomen wo contrast.  Cirrhotic morphology. No definite mass. 02/2023. Ultrasound of the liver. The liver is heterogeneous with no other focal abnormality. OTHER TESTING:  Not available or performed    [address] in 6 months for fibroscan and continued monitoring.       Robson Dominique, BORAP-C  Liver [facility]  [facility] [place], [address] [facility], 3[facility]   [phone]

## 2023-02-17 LAB
AFP L3 MFR SERPL: NORMAL % (ref 0–9.9)
AFP SERPL-MCNC: 3.3 NG/ML (ref 0–6.4)

## 2023-03-16 ENCOUNTER — HOSPITAL ENCOUNTER (OUTPATIENT)
Dept: ULTRASOUND IMAGING | Age: 83
Discharge: HOME | End: 2023-03-16
Attending: UROLOGY
Payer: MEDICARE

## 2023-03-16 DIAGNOSIS — R33.9 BLADDER RETENTION: ICD-10-CM

## 2023-03-16 PROCEDURE — 51798 US URINE CAPACITY MEASURE: CPT

## 2023-03-22 DIAGNOSIS — G30.1 LATE ONSET ALZHEIMER'S DISEASE WITHOUT BEHAVIORAL DISTURBANCE (HCC): ICD-10-CM

## 2023-03-22 DIAGNOSIS — F02.80 LATE ONSET ALZHEIMER'S DISEASE WITHOUT BEHAVIORAL DISTURBANCE (HCC): ICD-10-CM

## 2023-03-22 RX ORDER — DONEPEZIL HYDROCHLORIDE 10 MG/1
10 TABLET, FILM COATED ORAL
Qty: 90 TABLET | Refills: 1 | Status: SHIPPED | OUTPATIENT
Start: 2023-03-22

## 2023-03-23 ENCOUNTER — DOCUMENTATION ONLY (OUTPATIENT)
Dept: FAMILY MEDICINE CLINIC | Age: 83
End: 2023-03-23

## 2023-03-23 NOTE — PROGRESS NOTES
Seen by urology (Dr. Lainey Darby) 3-14-23. A pre and post void bladder ultrasound was ordered. He will return for follow up to discuss results.

## 2023-05-09 ENCOUNTER — HOSPITAL ENCOUNTER (OUTPATIENT)
Facility: HOSPITAL | Age: 83
Discharge: HOME | End: 2023-05-12
Payer: COMMERCIAL

## 2023-05-09 DIAGNOSIS — K74.3 PRIMARY BILIARY CIRRHOSIS (HCC): ICD-10-CM

## 2023-05-09 PROCEDURE — 76857 US EXAM PELVIC LIMITED: CPT

## 2023-05-26 RX ORDER — URSODIOL 300 MG/1
2 CAPSULE ORAL 2 TIMES DAILY
COMMUNITY
Start: 2023-01-18

## 2023-05-26 RX ORDER — TESTOSTERONE CYPIONATE 200 MG/ML
INJECTION, SOLUTION INTRAMUSCULAR
COMMUNITY
Start: 2022-10-05

## 2023-08-01 ENCOUNTER — OFFICE VISIT (OUTPATIENT)
Age: 83
End: 2023-08-01

## 2023-08-01 VITALS
BODY MASS INDEX: 24.35 KG/M2 | SYSTOLIC BLOOD PRESSURE: 135 MMHG | OXYGEN SATURATION: 95 % | RESPIRATION RATE: 20 BRPM | HEART RATE: 65 BPM | WEIGHT: 164.4 LBS | TEMPERATURE: 97.7 F | DIASTOLIC BLOOD PRESSURE: 81 MMHG | HEIGHT: 69 IN

## 2023-08-01 DIAGNOSIS — K74.3 PRIMARY BILIARY CHOLANGITIS (HCC): ICD-10-CM

## 2023-08-01 DIAGNOSIS — I10 ESSENTIAL (PRIMARY) HYPERTENSION: ICD-10-CM

## 2023-08-01 DIAGNOSIS — G30.1 ALZHEIMER'S DISEASE WITH LATE ONSET (CODE) (HCC): ICD-10-CM

## 2023-08-01 DIAGNOSIS — M19.041 PRIMARY OSTEOARTHRITIS OF BOTH HANDS: ICD-10-CM

## 2023-08-01 DIAGNOSIS — G89.29 CHRONIC LEFT-SIDED LOW BACK PAIN WITH RIGHT-SIDED SCIATICA: ICD-10-CM

## 2023-08-01 DIAGNOSIS — M54.41 CHRONIC LEFT-SIDED LOW BACK PAIN WITH RIGHT-SIDED SCIATICA: ICD-10-CM

## 2023-08-01 DIAGNOSIS — C67.9 MALIGNANT NEOPLASM OF URINARY BLADDER, UNSPECIFIED SITE (HCC): ICD-10-CM

## 2023-08-01 DIAGNOSIS — L23.9 ALLERGIC DERMATITIS: Primary | ICD-10-CM

## 2023-08-01 DIAGNOSIS — M19.042 PRIMARY OSTEOARTHRITIS OF BOTH HANDS: ICD-10-CM

## 2023-08-01 RX ORDER — MEMANTINE HYDROCHLORIDE 5 MG/1
5 TABLET ORAL 2 TIMES DAILY
Qty: 60 TABLET | Refills: 0 | Status: SHIPPED | OUTPATIENT
Start: 2023-08-01

## 2023-08-01 RX ORDER — PREDNISONE 20 MG/1
20 TABLET ORAL 2 TIMES DAILY
Qty: 10 TABLET | Refills: 0 | Status: SHIPPED | OUTPATIENT
Start: 2023-08-01 | End: 2023-08-06

## 2023-08-01 SDOH — ECONOMIC STABILITY: HOUSING INSECURITY
IN THE LAST 12 MONTHS, WAS THERE A TIME WHEN YOU DID NOT HAVE A STEADY PLACE TO SLEEP OR SLEPT IN A SHELTER (INCLUDING NOW)?: NO

## 2023-08-01 SDOH — ECONOMIC STABILITY: INCOME INSECURITY: HOW HARD IS IT FOR YOU TO PAY FOR THE VERY BASICS LIKE FOOD, HOUSING, MEDICAL CARE, AND HEATING?: NOT VERY HARD

## 2023-08-01 SDOH — ECONOMIC STABILITY: FOOD INSECURITY: WITHIN THE PAST 12 MONTHS, THE FOOD YOU BOUGHT JUST DIDN'T LAST AND YOU DIDN'T HAVE MONEY TO GET MORE.: NEVER TRUE

## 2023-08-01 SDOH — ECONOMIC STABILITY: FOOD INSECURITY: WITHIN THE PAST 12 MONTHS, YOU WORRIED THAT YOUR FOOD WOULD RUN OUT BEFORE YOU GOT MONEY TO BUY MORE.: NEVER TRUE

## 2023-08-01 ASSESSMENT — PATIENT HEALTH QUESTIONNAIRE - PHQ9
1. LITTLE INTEREST OR PLEASURE IN DOING THINGS: 0
SUM OF ALL RESPONSES TO PHQ QUESTIONS 1-9: 0
SUM OF ALL RESPONSES TO PHQ9 QUESTIONS 1 & 2: 0
SUM OF ALL RESPONSES TO PHQ QUESTIONS 1-9: 0
SUM OF ALL RESPONSES TO PHQ QUESTIONS 1-9: 0
2. FEELING DOWN, DEPRESSED OR HOPELESS: 0
SUM OF ALL RESPONSES TO PHQ QUESTIONS 1-9: 0

## 2023-08-03 RX ORDER — NADOLOL 40 MG/1
20 TABLET ORAL DAILY
COMMUNITY

## 2023-08-08 DIAGNOSIS — K74.3 PRIMARY BILIARY CIRRHOSIS (HCC): Primary | ICD-10-CM

## 2023-08-08 DIAGNOSIS — Z09 ENCOUNTER FOR FOLLOW-UP EXAMINATION AFTER COMPLETED TREATMENT FOR CONDITIONS OTHER THAN MALIGNANT NEOPLASM: ICD-10-CM

## 2023-08-16 ENCOUNTER — HOSPITAL ENCOUNTER (OUTPATIENT)
Facility: HOSPITAL | Age: 83
Discharge: HOME | End: 2023-08-19
Payer: MEDICARE

## 2023-08-16 DIAGNOSIS — Z09 ENCOUNTER FOR FOLLOW-UP EXAMINATION AFTER COMPLETED TREATMENT FOR CONDITIONS OTHER THAN MALIGNANT NEOPLASM: ICD-10-CM

## 2023-08-16 DIAGNOSIS — K74.3 PRIMARY BILIARY CIRRHOSIS (HCC): ICD-10-CM

## 2023-08-16 PROCEDURE — 77080 DXA BONE DENSITY AXIAL: CPT

## 2023-08-18 DIAGNOSIS — M85.89 OSTEOPENIA OF MULTIPLE SITES: Primary | ICD-10-CM

## 2023-08-18 RX ORDER — MELATONIN
1000 DAILY
Qty: 90 TABLET | Refills: 1 | COMMUNITY
Start: 2023-08-18

## 2023-08-22 ENCOUNTER — OFFICE VISIT (OUTPATIENT)
Age: 83
End: 2023-08-22
Payer: MEDICARE

## 2023-08-22 ENCOUNTER — HOSPITAL ENCOUNTER (OUTPATIENT)
Facility: HOSPITAL | Age: 83
Setting detail: SPECIMEN
Discharge: HOME | End: 2023-08-25
Payer: MEDICARE

## 2023-08-22 VITALS
HEIGHT: 69 IN | DIASTOLIC BLOOD PRESSURE: 70 MMHG | WEIGHT: 161 LBS | BODY MASS INDEX: 23.85 KG/M2 | SYSTOLIC BLOOD PRESSURE: 160 MMHG | TEMPERATURE: 96.9 F | OXYGEN SATURATION: 97 % | HEART RATE: 72 BPM

## 2023-08-22 DIAGNOSIS — K74.3 PRIMARY BILIARY CIRRHOSIS (HCC): Primary | ICD-10-CM

## 2023-08-22 DIAGNOSIS — K74.3 PRIMARY BILIARY CIRRHOSIS (HCC): ICD-10-CM

## 2023-08-22 LAB
ALBUMIN SERPL-MCNC: 3.1 G/DL (ref 3.4–5)
ALBUMIN/GLOB SERPL: 1 (ref 0.8–1.7)
ALP SERPL-CCNC: 111 U/L (ref 45–117)
ALT SERPL-CCNC: 28 U/L (ref 16–61)
ANION GAP SERPL CALC-SCNC: 3 MMOL/L (ref 3–18)
AST SERPL-CCNC: 38 U/L (ref 10–38)
BASOPHILS # BLD: 0 K/UL (ref 0–0.1)
BASOPHILS NFR BLD: 1 % (ref 0–2)
BILIRUB DIRECT SERPL-MCNC: 0.4 MG/DL (ref 0–0.2)
BILIRUB SERPL-MCNC: 1 MG/DL (ref 0.2–1)
BUN SERPL-MCNC: 18 MG/DL (ref 7–18)
BUN/CREAT SERPL: 19 (ref 12–20)
CALCIUM SERPL-MCNC: 8.9 MG/DL (ref 8.5–10.1)
CHLORIDE SERPL-SCNC: 110 MMOL/L (ref 100–111)
CO2 SERPL-SCNC: 28 MMOL/L (ref 21–32)
CREAT SERPL-MCNC: 0.94 MG/DL (ref 0.6–1.3)
DIFFERENTIAL METHOD BLD: ABNORMAL
EOSINOPHIL # BLD: 0.2 K/UL (ref 0–0.4)
EOSINOPHIL NFR BLD: 6 % (ref 0–5)
ERYTHROCYTE [DISTWIDTH] IN BLOOD BY AUTOMATED COUNT: 12.9 % (ref 11.6–14.5)
GLOBULIN SER CALC-MCNC: 3.1 G/DL (ref 2–4)
GLUCOSE SERPL-MCNC: 92 MG/DL (ref 74–99)
HCT VFR BLD AUTO: 33.6 % (ref 36–48)
HGB BLD-MCNC: 11.3 G/DL (ref 13–16)
IMM GRANULOCYTES # BLD AUTO: 0 K/UL (ref 0–0.04)
IMM GRANULOCYTES NFR BLD AUTO: 0 % (ref 0–0.5)
LYMPHOCYTES # BLD: 0.9 K/UL (ref 0.9–3.6)
LYMPHOCYTES NFR BLD: 37 % (ref 21–52)
MCH RBC QN AUTO: 32.9 PG (ref 24–34)
MCHC RBC AUTO-ENTMCNC: 33.6 G/DL (ref 31–37)
MCV RBC AUTO: 98 FL (ref 78–100)
MONOCYTES # BLD: 0.2 K/UL (ref 0.05–1.2)
MONOCYTES NFR BLD: 10 % (ref 3–10)
NEUTS SEG # BLD: 1.1 K/UL (ref 1.8–8)
NEUTS SEG NFR BLD: 46 % (ref 40–73)
NRBC # BLD: 0 K/UL (ref 0–0.01)
NRBC BLD-RTO: 0 PER 100 WBC
PLATELET # BLD AUTO: 74 K/UL (ref 135–420)
PMV BLD AUTO: 10.5 FL (ref 9.2–11.8)
POTASSIUM SERPL-SCNC: 4 MMOL/L (ref 3.5–5.5)
PROT SERPL-MCNC: 6.2 G/DL (ref 6.4–8.2)
RBC # BLD AUTO: 3.43 M/UL (ref 4.35–5.65)
SODIUM SERPL-SCNC: 141 MMOL/L (ref 136–145)
WBC # BLD AUTO: 2.5 K/UL (ref 4.6–13.2)

## 2023-08-22 PROCEDURE — 1123F ACP DISCUSS/DSCN MKR DOCD: CPT | Performed by: NURSE PRACTITIONER

## 2023-08-22 PROCEDURE — 36415 COLL VENOUS BLD VENIPUNCTURE: CPT

## 2023-08-22 PROCEDURE — 85025 COMPLETE CBC W/AUTO DIFF WBC: CPT

## 2023-08-22 PROCEDURE — 99214 OFFICE O/P EST MOD 30 MIN: CPT | Performed by: NURSE PRACTITIONER

## 2023-08-22 PROCEDURE — 80048 BASIC METABOLIC PNL TOTAL CA: CPT

## 2023-08-22 PROCEDURE — 91200 LIVER ELASTOGRAPHY: CPT | Performed by: NURSE PRACTITIONER

## 2023-08-22 PROCEDURE — 80076 HEPATIC FUNCTION PANEL: CPT

## 2023-08-22 ASSESSMENT — PATIENT HEALTH QUESTIONNAIRE - PHQ9
SUM OF ALL RESPONSES TO PHQ9 QUESTIONS 1 & 2: 0
SUM OF ALL RESPONSES TO PHQ QUESTIONS 1-9: 0
SUM OF ALL RESPONSES TO PHQ QUESTIONS 1-9: 0
1. LITTLE INTEREST OR PLEASURE IN DOING THINGS: 0
SUM OF ALL RESPONSES TO PHQ QUESTIONS 1-9: 0
2. FEELING DOWN, DEPRESSED OR HOPELESS: 0
SUM OF ALL RESPONSES TO PHQ QUESTIONS 1-9: 0

## 2023-08-22 NOTE — PROGRESS NOTES
[address] [facility] 5314 MD Allison, FACP, [place]ena Fletcher PA-C April S Ashworth, AGPCNP-BC   Beto Jarrettbulmaro, [facility]-AG   Bebe Angel, FNP-C  Corey Avalos FNP-C   Alfonso Patel, AGPCNP-BC   Hari Hsieh, [facility]      [address]   at [facility]   [facility], [address] [facility], [address]   [phone]   FAX: [address]   at [facility], [facility], [address]   [phone]   FAX: [phone]       Patient Care Team:  Michele Jaquez NP as PCP - General (Nurse Practitioner)  Michele Jaquez NP as PCP - [facility]  Eleanor Hill MD (Urology)  Chandrakant Flores MD (Cardio Vascular Surgery)      Patient Active Problem List   Diagnosis    Bladder cancer [facility])    B12 deficiency    Localized edema    Pure hypercholesterolemia    Dry eyes    Bilateral posterior capsular opacification    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 ([address])    BPH with obstruction/lower urinary tract symptoms    Pseudophakia of both eyes    Vitreous floaters of both eyes    Chronic pain of right knee    Hypogonadism male    Pacemaker    Thrombocytopenia ([address])    Lumbar pain with radiation down right leg    Right hip pain    Dermatochalasis of both upper eyelids    Venous insufficiency of both lower extremities    Heart block    Vitamin D deficiency    Late onset Alzheimer's disease without behavioral disturbance ([address])    Chronic renal disease, stage III ([address])    Osteopenia of multiple sites         [address] returns to the [address] [place] [address],[address] today for fibroscan assessment of hepatic fibrosis and for education and management of cirrhosis due to Primary Biliary Cholangitis.   The active problem list, all pertinent past medical history, medications, liver histology, endoscopic

## 2023-09-05 RX ORDER — MEMANTINE HYDROCHLORIDE 5 MG/1
5 TABLET ORAL 2 TIMES DAILY
Qty: 180 TABLET | Refills: 0 | Status: SHIPPED | OUTPATIENT
Start: 2023-09-05

## 2023-09-06 ENCOUNTER — OFFICE VISIT (OUTPATIENT)
Age: 83
End: 2023-09-06
Payer: MEDICARE

## 2023-09-06 VITALS
OXYGEN SATURATION: 95 % | BODY MASS INDEX: 23.67 KG/M2 | HEART RATE: 79 BPM | WEIGHT: 159.8 LBS | SYSTOLIC BLOOD PRESSURE: 107 MMHG | HEIGHT: 69 IN | DIASTOLIC BLOOD PRESSURE: 50 MMHG

## 2023-09-06 DIAGNOSIS — M19.041 PRIMARY OSTEOARTHRITIS OF BOTH HANDS: ICD-10-CM

## 2023-09-06 DIAGNOSIS — G30.1 ALZHEIMER'S DISEASE WITH LATE ONSET (CODE) (HCC): ICD-10-CM

## 2023-09-06 DIAGNOSIS — R26.89 POOR BALANCE: ICD-10-CM

## 2023-09-06 DIAGNOSIS — E29.1 HYPOGONADISM IN MALE: ICD-10-CM

## 2023-09-06 DIAGNOSIS — Z91.81 AT HIGH RISK FOR FALLS: ICD-10-CM

## 2023-09-06 DIAGNOSIS — Z00.00 MEDICARE ANNUAL WELLNESS VISIT, SUBSEQUENT: Primary | ICD-10-CM

## 2023-09-06 DIAGNOSIS — G89.29 CHRONIC LEFT-SIDED LOW BACK PAIN WITH RIGHT-SIDED SCIATICA: ICD-10-CM

## 2023-09-06 DIAGNOSIS — M19.042 PRIMARY OSTEOARTHRITIS OF BOTH HANDS: ICD-10-CM

## 2023-09-06 DIAGNOSIS — M54.41 CHRONIC LEFT-SIDED LOW BACK PAIN WITH RIGHT-SIDED SCIATICA: ICD-10-CM

## 2023-09-06 PROCEDURE — G0439 PPPS, SUBSEQ VISIT: HCPCS | Performed by: NURSE PRACTITIONER

## 2023-09-06 PROCEDURE — 1123F ACP DISCUSS/DSCN MKR DOCD: CPT | Performed by: NURSE PRACTITIONER

## 2023-09-06 PROCEDURE — 99214 OFFICE O/P EST MOD 30 MIN: CPT | Performed by: NURSE PRACTITIONER

## 2023-09-06 ASSESSMENT — PATIENT HEALTH QUESTIONNAIRE - PHQ9
SUM OF ALL RESPONSES TO PHQ QUESTIONS 1-9: 2
1. LITTLE INTEREST OR PLEASURE IN DOING THINGS: 1
SUM OF ALL RESPONSES TO PHQ QUESTIONS 1-9: 2
2. FEELING DOWN, DEPRESSED OR HOPELESS: 1
SUM OF ALL RESPONSES TO PHQ QUESTIONS 1-9: 2
SUM OF ALL RESPONSES TO PHQ9 QUESTIONS 1 & 2: 2
SUM OF ALL RESPONSES TO PHQ QUESTIONS 1-9: 2

## 2023-09-06 ASSESSMENT — LIFESTYLE VARIABLES
HOW OFTEN DO YOU HAVE A DRINK CONTAINING ALCOHOL: NEVER
HOW MANY STANDARD DRINKS CONTAINING ALCOHOL DO YOU HAVE ON A TYPICAL DAY: PATIENT DOES NOT DRINK

## 2023-09-06 NOTE — PATIENT INSTRUCTIONS

## 2023-09-06 NOTE — PROGRESS NOTES
Subjective:     CC: dermatitis, back pain, dementia- follow up    Luiza Mcknight is a 82 y.o. male who presents today for a 1 month follow up for dermatitis, back pain, and dementia. At his last OV he had an itchy rash all over his body that had started a week prior. He was not sure if his wife switched laundry detergents or soaps. He had not started any new meds or eaten any new foods. He denies any exposure to poison ivy or recent travel. He was prescribed a prednisone dose pack and the rash resolved. He was also c/o chronic intermittent worsening low right back pain that radiated down to his buttocks and the left leg. His left leg will go numb at times, while driving. He cannot take Tylenol due to liver disease. He tried Ibuprofen and heating pad without relief. He was prescribed a prednisone dose pack but this was ineffective. States he tried PT in the past and it helped temporarily. Will refer today    Impaired balance  He states he has to hold on to the walls or furniture at home to prevent falls. We will see if PT will work on his strength and balance, as well. OA of hands  He was also c/o pain and stiffness in the R thumb and index fingers. He cannot brush his teeth or  the steering wheel for too long without pain. Sometimes he will get muscle spasms and his hands will contract and \"lock up. \" He has been taking Ibuprofen prn pain. The prednisone did not really help. He has tried OTC pain creams. He is not interested in joint injections at this time. Dementia  At the last OV he stated his memory was worsening. He had been taking Aricept 10mg daily so we added a small dose of Namenda 5mg BID. Today he states he has not started it yet because he wasn't sure if he was supposed to take both of them together or not. Of note this medication should be used with caution in patients with liver impairment.       Hx of low Testosterone   He used to take testosterone injections and would like to

## 2023-09-07 ENCOUNTER — CLINICAL DOCUMENTATION (OUTPATIENT)
Age: 83
End: 2023-09-07

## 2023-09-07 ENCOUNTER — NURSE ONLY (OUTPATIENT)
Age: 83
End: 2023-09-07

## 2023-09-07 ENCOUNTER — HOSPITAL ENCOUNTER (OUTPATIENT)
Facility: HOSPITAL | Age: 83
Discharge: HOME | End: 2023-09-07
Payer: MEDICARE

## 2023-09-07 DIAGNOSIS — G89.29 CHRONIC LEFT-SIDED LOW BACK PAIN WITH RIGHT-SIDED SCIATICA: ICD-10-CM

## 2023-09-07 DIAGNOSIS — M54.41 CHRONIC LEFT-SIDED LOW BACK PAIN WITH RIGHT-SIDED SCIATICA: ICD-10-CM

## 2023-09-07 DIAGNOSIS — E29.1 HYPOGONADISM IN MALE: ICD-10-CM

## 2023-09-07 PROCEDURE — 72110 X-RAY EXAM L-2 SPINE 4/>VWS: CPT

## 2023-09-09 PROBLEM — M51.369 DDD (DEGENERATIVE DISC DISEASE), LUMBAR: Status: ACTIVE | Noted: 2023-09-09

## 2023-09-09 PROBLEM — M51.36 DDD (DEGENERATIVE DISC DISEASE), LUMBAR: Status: ACTIVE | Noted: 2023-09-09

## 2023-09-10 LAB — TESTOST SERPL-MCNC: <3 NG/DL (ref 264–916)

## 2023-09-15 LAB
TESTOST FREE SERPL-MCNC: <0.2 PG/ML (ref 6.6–18.1)
TESTOST SERPL-MCNC: <3 NG/DL (ref 264–916)

## 2023-09-18 DIAGNOSIS — E29.1 HYPOGONADISM IN MALE: Primary | ICD-10-CM

## 2023-09-18 RX ORDER — TESTOSTERONE CYPIONATE 200 MG/ML
INJECTION, SOLUTION INTRAMUSCULAR
Qty: 1 ML | Refills: 1 | Status: SHIPPED | OUTPATIENT
Start: 2023-09-18 | End: 2023-11-18

## 2023-09-19 ENCOUNTER — TELEPHONE (OUTPATIENT)
Age: 83
End: 2023-09-19

## 2023-09-19 NOTE — TELEPHONE ENCOUNTER
Prior Travis Feast is needed for Testosterone Cypionate 200 MG / ML. Go to go."[employer]    KEY:  [id number]

## 2023-09-25 ENCOUNTER — NURSE ONLY (OUTPATIENT)
Age: 83
End: 2023-09-25
Payer: MEDICARE

## 2023-09-25 DIAGNOSIS — E29.1 HYPOGONADISM IN MALE: Primary | ICD-10-CM

## 2023-09-25 PROCEDURE — 96372 THER/PROPH/DIAG INJ SC/IM: CPT | Performed by: NURSE PRACTITIONER

## 2023-09-25 RX ORDER — TESTOSTERONE CYPIONATE 200 MG/ML
200 INJECTION, SOLUTION INTRAMUSCULAR ONCE
Status: COMPLETED | OUTPATIENT
Start: 2023-09-25 | End: 2023-09-25

## 2023-09-25 RX ADMIN — TESTOSTERONE CYPIONATE 200 MG: 200 INJECTION, SOLUTION INTRAMUSCULAR at 09:08

## 2023-09-25 NOTE — PROGRESS NOTES
After obtaining consent, and per orders of NP, injection of testosterone cypionate given in Left upper quad. gluteus by Zack Lord LPN. Patient instructed to remain in clinic for 20 minutes afterwards, and to report any adverse reaction to me immediately.

## 2023-09-25 NOTE — PATIENT INSTRUCTIONS
difficult urination, increased urination at night, loss of bladder control;  high levels of calcium in the blood --stomach pain, constipation, increased thirst or urination, muscle pain or weakness, joint pain, confusion, and feeling tired or restless; or  high potassium level --nausea, weakness, tingly feeling, chest pain, irregular heartbeats, loss of movement;  liver problems --right-sided upper stomach pain, vomiting, loss of appetite, dark urine, jaundice (yellowing of the skin or eyes). signs of a blood clot deep in the body --swelling, warmth, or redness in an arm or leg;  signs of a blood clot in the lung --chest pain, sudden cough, wheezing, rapid breathing, coughing up blood; or  signs of a stroke --sudden numbness or weakness (especially on one side of the body), severe headache, slurred speech, balance problems. Women receiving testosterone may develop male characteristics, which could be irreversible if treatment is continued. Call your doctor at once if you notice any of these signs of excess testosterone:  acne;  changes in your menstrual periods (including missed periods);  male-pattern hair growth (such as on the chin or chest);  hoarse or deepened voice; or  enlarged clitoris. Your testosterone injections may be delayed or permanently discontinued if you have certain side effects. Common side effects (in men or women) may include:  breast swelling;  acne, increased facial or body hair growth, male-pattern baldness;  increased or decreased interest in sex;  headache, anxiety, depressed mood;  increased blood pressure;  numbness or tingly feeling;  abnormal liver function tests;  high red blood cell counts (hematocrit or hemoglobin);  increased PSA (prostate-specific antigen); or  pain, bruising, bleeding, redness, or a hard lump where the medicine was injected. This is not a complete list of side effects and others may occur. Call your doctor for medical advice about side effects.  You may

## 2023-09-30 DIAGNOSIS — G30.1 ALZHEIMER'S DISEASE WITH LATE ONSET (CODE) (HCC): ICD-10-CM

## 2023-10-02 RX ORDER — DONEPEZIL HYDROCHLORIDE 10 MG/1
10 TABLET, FILM COATED ORAL
Qty: 90 TABLET | Refills: 1 | Status: SHIPPED | OUTPATIENT
Start: 2023-10-02

## 2023-10-02 NOTE — TELEPHONE ENCOUNTER
Patient requesting refill on     Requested Prescriptions     Pending Prescriptions Disp Refills    donepezil (ARICEPT) 10 MG tablet [Pharmacy Med Name: DONEPEZIL HCL 10 MG TABLET] 90 tablet 1     Sig: TAKE 1 TABLET BY MOUTH NIGHTLY        Last OV 9/6/2023

## 2023-10-06 ENCOUNTER — OFFICE VISIT (OUTPATIENT)
Age: 83
End: 2023-10-06
Payer: MEDICARE

## 2023-10-06 VITALS
OXYGEN SATURATION: 96 % | HEART RATE: 72 BPM | TEMPERATURE: 97.9 F | WEIGHT: 160 LBS | RESPIRATION RATE: 16 BRPM | DIASTOLIC BLOOD PRESSURE: 72 MMHG | BODY MASS INDEX: 23.63 KG/M2 | SYSTOLIC BLOOD PRESSURE: 129 MMHG

## 2023-10-06 DIAGNOSIS — G30.1 ALZHEIMER'S DISEASE WITH LATE ONSET (CODE) (HCC): ICD-10-CM

## 2023-10-06 DIAGNOSIS — R05.1 ACUTE COUGH: ICD-10-CM

## 2023-10-06 DIAGNOSIS — K74.3 HEPATIC CIRRHOSIS DUE TO PRIMARY BILIARY CHOLANGITIS (HCC): ICD-10-CM

## 2023-10-06 DIAGNOSIS — J01.90 ACUTE NON-RECURRENT SINUSITIS, UNSPECIFIED LOCATION: Primary | ICD-10-CM

## 2023-10-06 DIAGNOSIS — G89.29 CHRONIC LEFT-SIDED LOW BACK PAIN WITH RIGHT-SIDED SCIATICA: ICD-10-CM

## 2023-10-06 DIAGNOSIS — E29.1 HYPOGONADISM IN MALE: ICD-10-CM

## 2023-10-06 DIAGNOSIS — R26.89 POOR BALANCE: ICD-10-CM

## 2023-10-06 DIAGNOSIS — M54.41 CHRONIC LEFT-SIDED LOW BACK PAIN WITH RIGHT-SIDED SCIATICA: ICD-10-CM

## 2023-10-06 LAB
EXP DATE SOLUTION: ABNORMAL
EXP DATE SWAB: ABNORMAL
EXPIRATION DATE: ABNORMAL
LOT NUMBER POC: ABNORMAL
LOT NUMBER SOLUTION: ABNORMAL
LOT NUMBER SWAB: ABNORMAL
SARS-COV-2 RNA, POC: NEGATIVE

## 2023-10-06 PROCEDURE — 36415 COLL VENOUS BLD VENIPUNCTURE: CPT | Performed by: NURSE PRACTITIONER

## 2023-10-06 PROCEDURE — 99214 OFFICE O/P EST MOD 30 MIN: CPT | Performed by: NURSE PRACTITIONER

## 2023-10-06 PROCEDURE — 1123F ACP DISCUSS/DSCN MKR DOCD: CPT | Performed by: NURSE PRACTITIONER

## 2023-10-06 PROCEDURE — 87635 SARS-COV-2 COVID-19 AMP PRB: CPT | Performed by: NURSE PRACTITIONER

## 2023-10-06 RX ORDER — AMOXICILLIN AND CLAVULANATE POTASSIUM 875; 125 MG/1; MG/1
1 TABLET, FILM COATED ORAL 2 TIMES DAILY
Qty: 14 TABLET | Refills: 0 | Status: SHIPPED | OUTPATIENT
Start: 2023-10-06 | End: 2023-10-13

## 2023-10-06 RX ORDER — TESTOSTERONE CYPIONATE 200 MG/ML
200 INJECTION, SOLUTION INTRAMUSCULAR WEEKLY
COMMUNITY
Start: 2021-06-22

## 2023-10-06 ASSESSMENT — PATIENT HEALTH QUESTIONNAIRE - PHQ9
SUM OF ALL RESPONSES TO PHQ9 QUESTIONS 1 & 2: 2
SUM OF ALL RESPONSES TO PHQ QUESTIONS 1-9: 2
SUM OF ALL RESPONSES TO PHQ QUESTIONS 1-9: 2
1. LITTLE INTEREST OR PLEASURE IN DOING THINGS: 1
2. FEELING DOWN, DEPRESSED OR HOPELESS: 1
SUM OF ALL RESPONSES TO PHQ QUESTIONS 1-9: 2
SUM OF ALL RESPONSES TO PHQ QUESTIONS 1-9: 2

## 2023-10-07 LAB
ALBUMIN SERPL-MCNC: 3.2 G/DL (ref 3.5–5)
ALBUMIN/GLOB SERPL: 1.1 (ref 1.1–2.2)
ALP SERPL-CCNC: 112 U/L (ref 45–117)
ALT SERPL-CCNC: 29 U/L (ref 12–78)
ANION GAP SERPL CALC-SCNC: 3 MMOL/L (ref 5–15)
AST SERPL-CCNC: 44 U/L (ref 15–37)
BILIRUB SERPL-MCNC: 1.5 MG/DL (ref 0.2–1)
BUN SERPL-MCNC: 16 MG/DL (ref 6–20)
BUN/CREAT SERPL: 13 (ref 12–20)
CALCIUM SERPL-MCNC: 8.9 MG/DL (ref 8.5–10.1)
CHLORIDE SERPL-SCNC: 111 MMOL/L (ref 97–108)
CO2 SERPL-SCNC: 28 MMOL/L (ref 21–32)
CREAT SERPL-MCNC: 1.19 MG/DL (ref 0.7–1.3)
GLOBULIN SER CALC-MCNC: 3 G/DL (ref 2–4)
GLUCOSE SERPL-MCNC: 120 MG/DL (ref 65–100)
POTASSIUM SERPL-SCNC: 4.2 MMOL/L (ref 3.5–5.1)
PROT SERPL-MCNC: 6.2 G/DL (ref 6.4–8.2)
SODIUM SERPL-SCNC: 142 MMOL/L (ref 136–145)

## 2023-10-09 ENCOUNTER — NURSE ONLY (OUTPATIENT)
Age: 83
End: 2023-10-09
Payer: MEDICARE

## 2023-10-09 DIAGNOSIS — E29.1 HYPOGONADISM IN MALE: Primary | ICD-10-CM

## 2023-10-09 DIAGNOSIS — E29.1 HYPOGONADISM MALE: Primary | ICD-10-CM

## 2023-10-09 PROCEDURE — 96372 THER/PROPH/DIAG INJ SC/IM: CPT | Performed by: NURSE PRACTITIONER

## 2023-10-09 RX ORDER — TESTOSTERONE CYPIONATE 200 MG/ML
200 INJECTION, SOLUTION INTRAMUSCULAR ONCE
Status: COMPLETED | OUTPATIENT
Start: 2023-10-09 | End: 2023-10-09

## 2023-10-09 RX ORDER — TESTOSTERONE CYPIONATE 200 MG/ML
200 INJECTION, SOLUTION INTRAMUSCULAR
Qty: 2 ML | Refills: 2
Start: 2023-10-09 | End: 2024-01-07

## 2023-10-09 RX ADMIN — TESTOSTERONE CYPIONATE 200 MG: 200 INJECTION, SOLUTION INTRAMUSCULAR at 10:29

## 2023-10-09 NOTE — PATIENT INSTRUCTIONS
report side effects to FDA at 7-622-FDA-5563. What other drugs will affect testosterone injection? Tell your doctor about all your other medicines, especially:  insulin or oral diabetes medicine;  medicine to treat pain, cough, or cold symptoms;  a blood thinner --warfarin, Coumadin, Jantoven; or  steroid medicine --prednisone, dexamethasone, and others. This list is not complete. Other drugs may affect testosterone, including prescription and over-the-counter medicines, vitamins, and herbal products. Not all possible drug interactions are listed here. Where can I get more information? Your doctor or pharmacist can provide more information about testosterone injection. Remember, keep this and all other medicines out of the reach of children, never share your medicines with others, and use this medication only for the indication prescribed. Every effort has been made to ensure that the information provided by [address] [place] [address] [employer] is accurate, up-to-date, and complete, but no guarantee is made to that effect. Drug information contained herein may be time sensitive. [employer] information has been compiled for use by healthcare practitioners and consumers in the [facility] and therefore [employer] does not warrant that uses outside of the [facility] are appropriate, unless specifically indicated otherwise. [employer] drug information does not endorse drugs, diagnose patients or recommend therapy. [employer] drug information is an informational resource designed to assist licensed healthcare practitioners in caring for their patients and/or to serve consumers viewing this service as a supplement to, and not a substitute for, the expertise, skill, knowledge and judgment of healthcare practitioners. The absence of a warning for a given drug or drug combination in no way should be construed to indicate that the drug or drug combination is safe, effective or appropriate for any given patient.  [employer] does not

## 2023-10-09 NOTE — PROGRESS NOTES
After obtaining consent, and per orders of . NP, injection of testosterone given in left gluteal by Jacqueline Maldonado LPN. Patient instructed to remain in clinic for 20 minutes afterwards, and to report any adverse reaction to me immediately.

## 2023-10-11 ENCOUNTER — TELEPHONE (OUTPATIENT)
Age: 83
End: 2023-10-11

## 2023-10-11 DIAGNOSIS — J01.90 ACUTE NON-RECURRENT SINUSITIS, UNSPECIFIED LOCATION: ICD-10-CM

## 2023-10-11 NOTE — TELEPHONE ENCOUNTER
Pt was doing his pill refills next to the kitchen sink and wife spilled them down the sink.  Can they get another script for Amoxicillin-clavulanate sent to [facility]?

## 2023-10-12 RX ORDER — AMOXICILLIN AND CLAVULANATE POTASSIUM 875; 125 MG/1; MG/1
1 TABLET, FILM COATED ORAL 2 TIMES DAILY
Qty: 8 TABLET | Refills: 0 | Status: SHIPPED | OUTPATIENT
Start: 2023-10-12 | End: 2023-10-16

## 2023-10-30 ENCOUNTER — TELEPHONE (OUTPATIENT)
Age: 83
End: 2023-10-30

## 2023-10-30 DIAGNOSIS — E29.1 HYPOGONADISM MALE: ICD-10-CM

## 2023-10-30 RX ORDER — TESTOSTERONE CYPIONATE 200 MG/ML
200 INJECTION, SOLUTION INTRAMUSCULAR
Qty: 2 ML | Refills: 2 | Status: SHIPPED | OUTPATIENT
Start: 2023-10-30 | End: 2024-01-28

## 2023-10-30 NOTE — TELEPHONE ENCOUNTER
What does she mean by \"ready\"? Ready to  at pharmacy? I just sent a refill over to their pharmacy so she just needs to call the pharmacy and tell them when she wants to pick it up.

## 2023-10-30 NOTE — TELEPHONE ENCOUNTER
PT's wife called and asked if his Testosterone cypionate serum can be ready after her appt on 11/6. She said this was done before.

## 2023-11-06 ENCOUNTER — OFFICE VISIT (OUTPATIENT)
Age: 83
End: 2023-11-06
Payer: MEDICARE

## 2023-11-06 VITALS
HEIGHT: 69 IN | RESPIRATION RATE: 16 BRPM | OXYGEN SATURATION: 95 % | SYSTOLIC BLOOD PRESSURE: 129 MMHG | BODY MASS INDEX: 23.73 KG/M2 | TEMPERATURE: 97.7 F | DIASTOLIC BLOOD PRESSURE: 80 MMHG | WEIGHT: 160.2 LBS | HEART RATE: 65 BPM

## 2023-11-06 DIAGNOSIS — G89.29 CHRONIC LEFT-SIDED LOW BACK PAIN WITH LEFT-SIDED SCIATICA: Primary | ICD-10-CM

## 2023-11-06 DIAGNOSIS — E29.1 HYPOGONADISM MALE: ICD-10-CM

## 2023-11-06 DIAGNOSIS — K42.9 UMBILICAL HERNIA WITHOUT OBSTRUCTION AND WITHOUT GANGRENE: ICD-10-CM

## 2023-11-06 DIAGNOSIS — R26.89 POOR BALANCE: ICD-10-CM

## 2023-11-06 DIAGNOSIS — R14.0 ABDOMINAL BLOATING: ICD-10-CM

## 2023-11-06 DIAGNOSIS — Z79.890 ENCOUNTER FOR MONITORING TESTOSTERONE REPLACEMENT THERAPY: ICD-10-CM

## 2023-11-06 DIAGNOSIS — G30.1 ALZHEIMER'S DISEASE WITH LATE ONSET (CODE) (HCC): ICD-10-CM

## 2023-11-06 DIAGNOSIS — Z51.81 ENCOUNTER FOR MONITORING TESTOSTERONE REPLACEMENT THERAPY: ICD-10-CM

## 2023-11-06 DIAGNOSIS — M54.42 CHRONIC LEFT-SIDED LOW BACK PAIN WITH LEFT-SIDED SCIATICA: Primary | ICD-10-CM

## 2023-11-06 PROCEDURE — 99214 OFFICE O/P EST MOD 30 MIN: CPT | Performed by: NURSE PRACTITIONER

## 2023-11-06 PROCEDURE — 1123F ACP DISCUSS/DSCN MKR DOCD: CPT | Performed by: NURSE PRACTITIONER

## 2023-11-06 PROCEDURE — 96372 THER/PROPH/DIAG INJ SC/IM: CPT | Performed by: NURSE PRACTITIONER

## 2023-11-06 RX ORDER — TESTOSTERONE CYPIONATE 200 MG/ML
200 INJECTION, SOLUTION INTRAMUSCULAR ONCE
Status: COMPLETED | OUTPATIENT
Start: 2023-11-06 | End: 2023-11-06

## 2023-11-06 RX ORDER — TESTOSTERONE CYPIONATE 200 MG/ML
200 INJECTION, SOLUTION INTRAMUSCULAR
Qty: 10 ML | Refills: 0 | Status: SHIPPED | OUTPATIENT
Start: 2023-11-26 | End: 2023-11-10 | Stop reason: SDUPTHER

## 2023-11-06 RX ADMIN — TESTOSTERONE CYPIONATE 200 MG: 200 INJECTION, SOLUTION INTRAMUSCULAR at 09:19

## 2023-11-06 SDOH — ECONOMIC STABILITY: INCOME INSECURITY: HOW HARD IS IT FOR YOU TO PAY FOR THE VERY BASICS LIKE FOOD, HOUSING, MEDICAL CARE, AND HEATING?: NOT HARD AT ALL

## 2023-11-06 SDOH — ECONOMIC STABILITY: FOOD INSECURITY: WITHIN THE PAST 12 MONTHS, THE FOOD YOU BOUGHT JUST DIDN'T LAST AND YOU DIDN'T HAVE MONEY TO GET MORE.: NEVER TRUE

## 2023-11-06 SDOH — ECONOMIC STABILITY: FOOD INSECURITY: WITHIN THE PAST 12 MONTHS, YOU WORRIED THAT YOUR FOOD WOULD RUN OUT BEFORE YOU GOT MONEY TO BUY MORE.: NEVER TRUE

## 2023-11-06 ASSESSMENT — PATIENT HEALTH QUESTIONNAIRE - PHQ9
SUM OF ALL RESPONSES TO PHQ QUESTIONS 1-9: 2
1. LITTLE INTEREST OR PLEASURE IN DOING THINGS: 1
SUM OF ALL RESPONSES TO PHQ QUESTIONS 1-9: 2
2. FEELING DOWN, DEPRESSED OR HOPELESS: 1
SUM OF ALL RESPONSES TO PHQ9 QUESTIONS 1 & 2: 2

## 2023-11-07 LAB
BASOPHILS # BLD: 0 K/UL (ref 0–0.1)
BASOPHILS NFR BLD: 1 % (ref 0–1)
DIFFERENTIAL METHOD BLD: ABNORMAL
EOSINOPHIL # BLD: 0.1 K/UL (ref 0–0.4)
EOSINOPHIL NFR BLD: 3 % (ref 0–7)
ERYTHROCYTE [DISTWIDTH] IN BLOOD BY AUTOMATED COUNT: 15.9 % (ref 11.5–14.5)
HCT VFR BLD AUTO: 37.3 % (ref 36.6–50.3)
HGB BLD-MCNC: 12.3 G/DL (ref 12.1–17)
IMM GRANULOCYTES # BLD AUTO: 0 K/UL (ref 0–0.04)
IMM GRANULOCYTES NFR BLD AUTO: 1 % (ref 0–0.5)
LYMPHOCYTES # BLD: 1.2 K/UL (ref 0.8–3.5)
LYMPHOCYTES NFR BLD: 34 % (ref 12–49)
MCH RBC QN AUTO: 31.3 PG (ref 26–34)
MCHC RBC AUTO-ENTMCNC: 33 G/DL (ref 30–36.5)
MCV RBC AUTO: 94.9 FL (ref 80–99)
MONOCYTES # BLD: 0.3 K/UL (ref 0–1)
MONOCYTES NFR BLD: 7 % (ref 5–13)
NEUTS SEG # BLD: 2 K/UL (ref 1.8–8)
NEUTS SEG NFR BLD: 54 % (ref 32–75)
NRBC # BLD: 0 K/UL (ref 0–0.01)
NRBC BLD-RTO: 0 PER 100 WBC
PLATELET # BLD AUTO: 120 K/UL (ref 150–400)
PMV BLD AUTO: 11.6 FL (ref 8.9–12.9)
RBC # BLD AUTO: 3.93 M/UL (ref 4.1–5.7)
RBC MORPH BLD: ABNORMAL
WBC # BLD AUTO: 3.6 K/UL (ref 4.1–11.1)

## 2023-11-09 LAB
TESTOST FREE SERPL-MCNC: 8.4 PG/ML (ref 6.6–18.1)
TESTOST SERPL-MCNC: 1313 NG/DL (ref 264–916)

## 2023-11-10 ENCOUNTER — TELEPHONE (OUTPATIENT)
Age: 83
End: 2023-11-10

## 2023-11-10 DIAGNOSIS — E29.1 HYPOGONADISM MALE: ICD-10-CM

## 2023-11-10 DIAGNOSIS — R82.998 DARK URINE: Primary | ICD-10-CM

## 2023-11-10 RX ORDER — TESTOSTERONE CYPIONATE 200 MG/ML
100 INJECTION, SOLUTION INTRAMUSCULAR
Qty: 3 ML | Refills: 0 | Status: SHIPPED | OUTPATIENT
Start: 2023-11-26 | End: 2024-02-24

## 2023-11-10 NOTE — TELEPHONE ENCOUNTER
Patients wife called regarding his dark urine. No pain or frequency.  He's wanting to know if it could be due to his testosterone shots

## 2023-11-11 NOTE — TELEPHONE ENCOUNTER
No, testosterone would not cause that. He should come by and give us a urine sample to check for bladder infection and microscopic blood. Could be related to his liver issue. Sometimes when the urine is dark it means it is very concentrated because the patient is dehydrated so I would advise him to try to drink more water.

## 2023-11-19 ENCOUNTER — HOSPITAL ENCOUNTER (EMERGENCY)
Facility: HOSPITAL | Age: 83
Discharge: HOME | End: 2023-11-19
Attending: EMERGENCY MEDICINE
Payer: MEDICARE

## 2023-11-19 ENCOUNTER — APPOINTMENT (OUTPATIENT)
Facility: HOSPITAL | Age: 83
End: 2023-11-19
Payer: MEDICARE

## 2023-11-19 VITALS
BODY MASS INDEX: 22.9 KG/M2 | WEIGHT: 160 LBS | OXYGEN SATURATION: 95 % | SYSTOLIC BLOOD PRESSURE: 158 MMHG | HEART RATE: 60 BPM | TEMPERATURE: 97.7 F | RESPIRATION RATE: 18 BRPM | DIASTOLIC BLOOD PRESSURE: 90 MMHG | HEIGHT: 70 IN

## 2023-11-19 DIAGNOSIS — R18.8 CIRRHOSIS OF LIVER WITH ASCITES, UNSPECIFIED HEPATIC CIRRHOSIS TYPE (HCC): ICD-10-CM

## 2023-11-19 DIAGNOSIS — K40.90 LEFT INGUINAL HERNIA: ICD-10-CM

## 2023-11-19 DIAGNOSIS — K74.60 CIRRHOSIS OF LIVER WITH ASCITES, UNSPECIFIED HEPATIC CIRRHOSIS TYPE (HCC): ICD-10-CM

## 2023-11-19 DIAGNOSIS — K42.9 UMBILICAL HERNIA WITHOUT OBSTRUCTION AND WITHOUT GANGRENE: Primary | ICD-10-CM

## 2023-11-19 LAB
ALBUMIN SERPL-MCNC: 2.7 G/DL (ref 3.5–5)
ALBUMIN/GLOB SERPL: 0.7 (ref 1.1–2.2)
ALP SERPL-CCNC: 233 U/L (ref 45–117)
ALT SERPL-CCNC: 51 U/L (ref 12–78)
ANION GAP SERPL CALC-SCNC: 6 MMOL/L (ref 5–15)
APPEARANCE UR: CLEAR
AST SERPL-CCNC: 67 U/L (ref 15–37)
BACTERIA URNS QL MICRO: NEGATIVE /HPF
BASOPHILS # BLD: 0.1 K/UL (ref 0–0.1)
BASOPHILS NFR BLD: 1 % (ref 0–1)
BILIRUB SERPL-MCNC: 3.6 MG/DL (ref 0.2–1)
BILIRUB UR QL: NEGATIVE
BUN SERPL-MCNC: 15 MG/DL (ref 6–20)
BUN/CREAT SERPL: 13 (ref 12–20)
CALCIUM SERPL-MCNC: 8.7 MG/DL (ref 8.5–10.1)
CHLORIDE SERPL-SCNC: 104 MMOL/L (ref 97–108)
CO2 SERPL-SCNC: 29 MMOL/L (ref 21–32)
COLOR UR: NORMAL
CREAT SERPL-MCNC: 1.2 MG/DL (ref 0.7–1.3)
DIFFERENTIAL METHOD BLD: ABNORMAL
EOSINOPHIL # BLD: 0.1 K/UL (ref 0–0.4)
EOSINOPHIL NFR BLD: 4 % (ref 0–7)
EPITH CASTS URNS QL MICRO: NORMAL /LPF
ERYTHROCYTE [DISTWIDTH] IN BLOOD BY AUTOMATED COUNT: 16.6 % (ref 11.5–14.5)
GLOBULIN SER CALC-MCNC: 3.9 G/DL (ref 2–4)
GLUCOSE SERPL-MCNC: 137 MG/DL (ref 65–100)
GLUCOSE UR STRIP.AUTO-MCNC: NEGATIVE MG/DL
HCT VFR BLD AUTO: 37 % (ref 36.6–50.3)
HGB BLD-MCNC: 12.5 G/DL (ref 12.1–17)
HGB UR QL STRIP: NEGATIVE
IMM GRANULOCYTES # BLD AUTO: 0 K/UL (ref 0–0.04)
IMM GRANULOCYTES NFR BLD AUTO: 0 % (ref 0–0.5)
KETONES UR QL STRIP.AUTO: NEGATIVE MG/DL
LACTATE SERPL-SCNC: 1.6 MMOL/L (ref 0.4–2)
LEUKOCYTE ESTERASE UR QL STRIP.AUTO: NEGATIVE
LIPASE SERPL-CCNC: 36 U/L (ref 13–75)
LYMPHOCYTES # BLD: 1.1 K/UL (ref 0.8–3.5)
LYMPHOCYTES NFR BLD: 32 % (ref 12–49)
MCH RBC QN AUTO: 32.2 PG (ref 26–34)
MCHC RBC AUTO-ENTMCNC: 33.8 G/DL (ref 30–36.5)
MCV RBC AUTO: 95.4 FL (ref 80–99)
MONOCYTES # BLD: 0.4 K/UL (ref 0–1)
MONOCYTES NFR BLD: 10 % (ref 5–13)
NEUTS SEG # BLD: 1.9 K/UL (ref 1.8–8)
NEUTS SEG NFR BLD: 53 % (ref 32–75)
NITRITE UR QL STRIP.AUTO: NEGATIVE
NRBC # BLD: 0 K/UL (ref 0–0.01)
NRBC BLD-RTO: 0 PER 100 WBC
PH UR STRIP: 6.5 (ref 5–8)
PLATELET # BLD AUTO: 108 K/UL (ref 150–400)
PMV BLD AUTO: 10.2 FL (ref 8.9–12.9)
POTASSIUM SERPL-SCNC: 4 MMOL/L (ref 3.5–5.1)
PROT SERPL-MCNC: 6.6 G/DL (ref 6.4–8.2)
PROT UR STRIP-MCNC: NEGATIVE MG/DL
RBC # BLD AUTO: 3.88 M/UL (ref 4.1–5.7)
RBC #/AREA URNS HPF: NORMAL /HPF (ref 0–5)
SODIUM SERPL-SCNC: 139 MMOL/L (ref 136–145)
SP GR UR REFRACTOMETRY: 1.01 (ref 1–1.03)
URINE CULTURE IF INDICATED: NORMAL
UROBILINOGEN UR QL STRIP.AUTO: 1 EU/DL (ref 0.2–1)
WBC # BLD AUTO: 3.6 K/UL (ref 4.1–11.1)
WBC URNS QL MICRO: NORMAL /HPF (ref 0–4)

## 2023-11-19 PROCEDURE — 74176 CT ABD & PELVIS W/O CONTRAST: CPT

## 2023-11-19 PROCEDURE — 85025 COMPLETE CBC W/AUTO DIFF WBC: CPT

## 2023-11-19 PROCEDURE — 83690 ASSAY OF LIPASE: CPT

## 2023-11-19 PROCEDURE — 83605 ASSAY OF LACTIC ACID: CPT

## 2023-11-19 PROCEDURE — 81001 URINALYSIS AUTO W/SCOPE: CPT

## 2023-11-19 PROCEDURE — 80053 COMPREHEN METABOLIC PANEL: CPT

## 2023-11-19 PROCEDURE — 2580000003 HC RX 258: Performed by: EMERGENCY MEDICINE

## 2023-11-19 PROCEDURE — 96360 HYDRATION IV INFUSION INIT: CPT

## 2023-11-19 PROCEDURE — 99284 EMERGENCY DEPT VISIT MOD MDM: CPT

## 2023-11-19 PROCEDURE — 36415 COLL VENOUS BLD VENIPUNCTURE: CPT

## 2023-11-19 RX ORDER — SODIUM CHLORIDE 9 MG/ML
INJECTION, SOLUTION INTRAVENOUS CONTINUOUS
Status: DISCONTINUED | OUTPATIENT
Start: 2023-11-19 | End: 2023-11-19 | Stop reason: HOSPADM

## 2023-11-19 RX ADMIN — SODIUM CHLORIDE: 9 INJECTION, SOLUTION INTRAVENOUS at 13:12

## 2023-11-19 ASSESSMENT — ENCOUNTER SYMPTOMS
ABDOMINAL PAIN: 1
VOMITING: 0
NAUSEA: 0
EYE REDNESS: 0
COUGH: 0
SORE THROAT: 0
SHORTNESS OF BREATH: 0
DIARRHEA: 0

## 2023-11-19 ASSESSMENT — PAIN - FUNCTIONAL ASSESSMENT: PAIN_FUNCTIONAL_ASSESSMENT: 0-10

## 2023-11-19 ASSESSMENT — PAIN SCALES - GENERAL: PAINLEVEL_OUTOF10: 5

## 2023-11-19 ASSESSMENT — PAIN DESCRIPTION - LOCATION: LOCATION: ABDOMEN

## 2023-11-19 NOTE — ED NOTES
ED Provider at bedside to assess pt, easily reduceable hernia per ED Provider, pt tolerated exam well. Pt to have line and labs and imaging performed. Wife at bedside present, verbalized understanding of plan of care.      Jelena Morris RN  [phone]

## 2023-11-19 NOTE — ED NOTES
ED Provider at bedside, continues to discuss results with pt and pt wife. To be discharged when complete.      Emiliana Morelos RN  85/10/41 [phone]

## 2023-11-19 NOTE — ED PROVIDER NOTES
EMERGENCY DEPARTMENT HISTORY AND PHYSICAL EXAM      Date: 11/19/2023  Patient Name: Noelle Street    History of Presenting Illness     Chief Complaint   Patient presents with    Hernia       History Provided By: Patient    HPI: Noelle Street, 82 y.o. male with past medical history listed below, presents via private vehicle to the ED with cc of abdominal pain. Patient has a history of Alzheimer's dementia, primary biliary cirrhosis, CKD. He presents with pain at his umbilical hernia site. Reports that hernias been stuck out for the past 3 days. He denies any nausea vomiting. Still passing flatus. Normal bowel movements. Reports he has had intermittent bouts of pain regarding the hernia over the past 2 years. His biliary cirrhosis is followed at the [address] [place] [address] in [place]. He reports he had a hernia repair several years ago with Dr. Shanel Hoyt, at [address] [place] [address]. Then developed a recurrent hernia at the site. There are no other complaints, changes, or physical findings at this time. PCP: ANN Lemus NP    No current facility-administered medications on file prior to encounter. Current Outpatient Medications on File Prior to Encounter   Medication Sig Dispense Refill    [START ON 11/26/2023] testosterone cypionate (DEPOTESTOTERONE CYPIONATE) 200 MG/ML injection Inject 0.5 mLs into the muscle every 14 days for 90 days.  Max Daily Amount: 100 mg 3 mL 0    donepezil (ARICEPT) 10 MG tablet TAKE 1 TABLET BY MOUTH NIGHTLY 90 tablet 1    dextran 70-hypromellose (TEARS NATURALE) 0.1-0.3 % SOLN opthalmic solution 1 drop      diclofenac sodium (VOLTAREN) 1 % GEL Apply topically 4 times daily as needed      ursodiol (ACTIGALL) 300 MG capsule Take 2 capsules by mouth 2 times daily      fluticasone (FLONASE) 50 MCG/ACT nasal spray USE 1 SPRAY INTO EACH NOSTRIL TWICE A DAY      furosemide (LASIX) 20 MG tablet TAKE ONE TABLET AS NEEDED FOR SWELLING IN LEGS INDICATIONS:

## 2023-11-19 NOTE — ED TRIAGE NOTES
Pt arrived with c/o an umbillical hernia that has been bothering him for the past 3 weeks. Pt has not seen anyone for this issue. Has not taken any medication for pain but rates it at a 5/10.  States the pain radiates to his right rib cage when he bends or moves

## 2023-11-19 NOTE — ED NOTES
I have reviewed discharge instructions with the patient. The patient verbalized understanding. Discharge medications discussed with patient. No questions at this time. Ambulated without difficulty.       Gio Mack RN  11/19/23 9913

## 2023-11-20 DIAGNOSIS — K74.3 PRIMARY BILIARY CIRRHOSIS (HCC): Primary | ICD-10-CM

## 2023-11-20 DIAGNOSIS — Z79.899 ON POTASSIUM WASTING DIURETIC THERAPY: Primary | ICD-10-CM

## 2023-11-20 RX ORDER — FUROSEMIDE 20 MG/1
20 TABLET ORAL 2 TIMES DAILY
Qty: 60 TABLET | Refills: 0
Start: 2023-11-20 | End: 2023-11-20

## 2023-11-20 RX ORDER — POTASSIUM CHLORIDE 20 MEQ/1
20 TABLET, EXTENDED RELEASE ORAL DAILY
Qty: 90 TABLET | Refills: 0 | Status: SHIPPED | OUTPATIENT
Start: 2023-11-20 | End: 2023-11-20

## 2023-11-20 RX ORDER — FUROSEMIDE 40 MG/1
40 TABLET ORAL DAILY
Qty: 60 TABLET | Refills: 3 | Status: SHIPPED | OUTPATIENT
Start: 2023-11-20

## 2023-11-20 RX ORDER — SPIRONOLACTONE 100 MG/1
100 TABLET, FILM COATED ORAL DAILY
Qty: 60 TABLET | Refills: 3 | Status: SHIPPED | OUTPATIENT
Start: 2023-11-20

## 2023-11-20 NOTE — PROGRESS NOTES
Rec'd call from patient's wife. He was in the ER yesterday for SOB and abdominal pain. CT showed hernias as well as ascites. She has placed 2 messages to his liver specialist Doe Dior and waiting for call back. First available appt is not until 2-2024 which is when patient's next visit is scheduled. He has increased his Lasix to BID. I instructed his wife to have him start a potassium pill so his potassium level does not drop. Script sent. She was advised to have patient go to the ER for paracentesis if his SOB worsens.

## 2023-11-22 ENCOUNTER — TELEPHONE (OUTPATIENT)
Age: 83
End: 2023-11-22

## 2023-11-22 DIAGNOSIS — K42.9 UMBILICAL HERNIA WITHOUT OBSTRUCTION AND WITHOUT GANGRENE: Primary | ICD-10-CM

## 2023-11-22 NOTE — TELEPHONE ENCOUNTER
PT's wife called and would like to know if he could get some oxycodone/acetaminophen  5-325. This was prescribed by [facility] surgical office Dr. Vita Leon for a hernia repair [phone]. She tried to have Dr. Vita Leon sent in a few but told her to contact her primary. PT in a lot of pain. He has 1 more pill left.

## 2023-11-23 RX ORDER — HYDROCODONE BITARTRATE AND ACETAMINOPHEN 5; 325 MG/1; MG/1
1 TABLET ORAL 2 TIMES DAILY
Qty: 10 TABLET | Refills: 0 | Status: SHIPPED | OUTPATIENT
Start: 2023-11-23 | End: 2023-11-28

## 2023-11-23 RX ORDER — DOCUSATE SODIUM 100 MG/1
100 CAPSULE, LIQUID FILLED ORAL 2 TIMES DAILY
Qty: 14 CAPSULE | Refills: 0 | Status: SHIPPED | OUTPATIENT
Start: 2023-11-23 | End: 2023-11-30

## 2023-11-23 RX ORDER — NALOXONE HYDROCHLORIDE 4 MG/.1ML
1 SPRAY NASAL PRN
Qty: 1 EACH | Refills: 0 | Status: SHIPPED | OUTPATIENT
Start: 2023-11-23

## 2023-11-23 NOTE — TELEPHONE ENCOUNTER
I will give him a temporary supply so there will be no refills. Does he have an appt set up with his surgeon to evaluate the hernias and has he reviewed the recent CT scan? Also, I see that his liver doctor ordered a paracentesis. Has this been scheduled? When?

## 2023-11-28 ENCOUNTER — HOSPITAL ENCOUNTER (OUTPATIENT)
Facility: HOSPITAL | Age: 83
Discharge: HOME | End: 2023-11-28
Attending: INTERNAL MEDICINE | Admitting: INTERNAL MEDICINE
Payer: MEDICARE

## 2023-11-28 VITALS
WEIGHT: 161.4 LBS | HEART RATE: 59 BPM | DIASTOLIC BLOOD PRESSURE: 77 MMHG | OXYGEN SATURATION: 98 % | TEMPERATURE: 97.5 F | BODY MASS INDEX: 23.11 KG/M2 | HEIGHT: 70 IN | SYSTOLIC BLOOD PRESSURE: 141 MMHG | RESPIRATION RATE: 16 BRPM

## 2023-11-28 DIAGNOSIS — K74.3 PRIMARY BILIARY CIRRHOSIS (HCC): ICD-10-CM

## 2023-11-28 LAB
INR PPP: 1.2 (ref 0.9–1.1)
PROTHROMBIN TIME: 15 SEC (ref 11.9–14.7)

## 2023-11-28 PROCEDURE — 76705 ECHO EXAM OF ABDOMEN: CPT

## 2023-11-28 PROCEDURE — 85610 PROTHROMBIN TIME: CPT

## 2023-11-28 RX ORDER — LIDOCAINE HYDROCHLORIDE 10 MG/ML
10 INJECTION, SOLUTION EPIDURAL; INFILTRATION; INTRACAUDAL; PERINEURAL ONCE
Status: DISCONTINUED | OUTPATIENT
Start: 2023-11-28 | End: 2023-11-28 | Stop reason: HOSPADM

## 2023-11-28 RX ORDER — ALBUMIN (HUMAN) 12.5 G/50ML
25 SOLUTION INTRAVENOUS AS NEEDED
Status: DISCONTINUED | OUTPATIENT
Start: 2023-11-28 | End: 2023-11-28 | Stop reason: HOSPADM

## 2023-11-28 NOTE — PROGRESS NOTES
Prepped & ready for procedure. 1025 Back from [address], not enough fluid per technician. IV removed. Diet given. 1100 Discharged home via w/c in stable condition in care of wife. Denies pain.

## 2023-12-28 ENCOUNTER — OFFICE VISIT (OUTPATIENT)
Age: 83
End: 2023-12-28
Payer: MEDICARE

## 2023-12-28 VITALS
RESPIRATION RATE: 18 BRPM | SYSTOLIC BLOOD PRESSURE: 102 MMHG | TEMPERATURE: 97 F | OXYGEN SATURATION: 96 % | BODY MASS INDEX: 21.19 KG/M2 | HEIGHT: 70 IN | HEART RATE: 100 BPM | WEIGHT: 148 LBS | DIASTOLIC BLOOD PRESSURE: 71 MMHG

## 2023-12-28 DIAGNOSIS — R45.1 AGITATION: ICD-10-CM

## 2023-12-28 DIAGNOSIS — R06.02 SOB (SHORTNESS OF BREATH): ICD-10-CM

## 2023-12-28 DIAGNOSIS — I10 PRIMARY HYPERTENSION: ICD-10-CM

## 2023-12-28 DIAGNOSIS — G30.1 ALZHEIMER'S DISEASE WITH LATE ONSET (CODE) (HCC): ICD-10-CM

## 2023-12-28 DIAGNOSIS — R35.0 URINARY FREQUENCY: Primary | ICD-10-CM

## 2023-12-28 LAB
ALBUMIN SERPL-MCNC: 3.2 G/DL (ref 3.5–5)
ALBUMIN/GLOB SERPL: 0.9 (ref 1.1–2.2)
ALP SERPL-CCNC: 144 U/L (ref 45–117)
ALT SERPL-CCNC: 42 U/L (ref 12–78)
ANION GAP SERPL CALC-SCNC: 6 MMOL/L (ref 5–15)
AST SERPL-CCNC: 38 U/L (ref 15–37)
BASOPHILS # BLD: 0 K/UL (ref 0–0.1)
BASOPHILS NFR BLD: 1 % (ref 0–1)
BILIRUB SERPL-MCNC: 1.5 MG/DL (ref 0.2–1)
BILIRUBIN, URINE, POC: NEGATIVE
BLOOD URINE, POC: NEGATIVE
BUN SERPL-MCNC: 34 MG/DL (ref 6–20)
BUN/CREAT SERPL: 26 (ref 12–20)
CALCIUM SERPL-MCNC: 9 MG/DL (ref 8.5–10.1)
CHLORIDE SERPL-SCNC: 106 MMOL/L (ref 97–108)
CO2 SERPL-SCNC: 26 MMOL/L (ref 21–32)
CREAT SERPL-MCNC: 1.31 MG/DL (ref 0.7–1.3)
DIFFERENTIAL METHOD BLD: ABNORMAL
EOSINOPHIL # BLD: 0.2 K/UL (ref 0–0.4)
EOSINOPHIL NFR BLD: 5 % (ref 0–7)
ERYTHROCYTE [DISTWIDTH] IN BLOOD BY AUTOMATED COUNT: 14.8 % (ref 11.5–14.5)
GLOBULIN SER CALC-MCNC: 3.5 G/DL (ref 2–4)
GLUCOSE SERPL-MCNC: 124 MG/DL (ref 65–100)
GLUCOSE URINE, POC: NEGATIVE
HCT VFR BLD AUTO: 37.4 % (ref 36.6–50.3)
HGB BLD-MCNC: 12.9 G/DL (ref 12.1–17)
IMM GRANULOCYTES # BLD AUTO: 0 K/UL (ref 0–0.04)
IMM GRANULOCYTES NFR BLD AUTO: 0 % (ref 0–0.5)
KETONES, URINE, POC: NEGATIVE
LEUKOCYTE ESTERASE, URINE, POC: NEGATIVE
LYMPHOCYTES # BLD: 1.2 K/UL (ref 0.8–3.5)
LYMPHOCYTES NFR BLD: 31 % (ref 12–49)
MAGNESIUM SERPL-MCNC: 2.3 MG/DL (ref 1.6–2.4)
MCH RBC QN AUTO: 32.3 PG (ref 26–34)
MCHC RBC AUTO-ENTMCNC: 34.5 G/DL (ref 30–36.5)
MCV RBC AUTO: 93.5 FL (ref 80–99)
MONOCYTES # BLD: 0.4 K/UL (ref 0–1)
MONOCYTES NFR BLD: 10 % (ref 5–13)
NEUTS SEG # BLD: 2.2 K/UL (ref 1.8–8)
NEUTS SEG NFR BLD: 53 % (ref 32–75)
NITRITE, URINE, POC: NEGATIVE
NRBC # BLD: 0 K/UL (ref 0–0.01)
NRBC BLD-RTO: 0 PER 100 WBC
PH, URINE, POC: 6 (ref 4.6–8)
PLATELET # BLD AUTO: 96 K/UL (ref 150–400)
PMV BLD AUTO: 11.4 FL (ref 8.9–12.9)
POTASSIUM SERPL-SCNC: 4.8 MMOL/L (ref 3.5–5.1)
PROT SERPL-MCNC: 6.7 G/DL (ref 6.4–8.2)
PROTEIN,URINE, POC: NEGATIVE
RBC # BLD AUTO: 4 M/UL (ref 4.1–5.7)
RBC MORPH BLD: ABNORMAL
SODIUM SERPL-SCNC: 138 MMOL/L (ref 136–145)
SPECIFIC GRAVITY, URINE, POC: 1.02 (ref 1–1.03)
URINALYSIS CLARITY, POC: CLEAR
URINALYSIS COLOR, POC: YELLOW
UROBILINOGEN, POC: NORMAL
WBC # BLD AUTO: 4 K/UL (ref 4.1–11.1)

## 2023-12-28 PROCEDURE — 99214 OFFICE O/P EST MOD 30 MIN: CPT | Performed by: NURSE PRACTITIONER

## 2023-12-28 PROCEDURE — 3078F DIAST BP <80 MM HG: CPT | Performed by: NURSE PRACTITIONER

## 2023-12-28 PROCEDURE — 81001 URINALYSIS AUTO W/SCOPE: CPT | Performed by: NURSE PRACTITIONER

## 2023-12-28 PROCEDURE — 1123F ACP DISCUSS/DSCN MKR DOCD: CPT | Performed by: NURSE PRACTITIONER

## 2023-12-28 PROCEDURE — 3074F SYST BP LT 130 MM HG: CPT | Performed by: NURSE PRACTITIONER

## 2023-12-28 RX ORDER — TRIAMCINOLONE ACETONIDE 1 MG/G
OINTMENT TOPICAL
Qty: 30 G | Refills: 1 | Status: SHIPPED | OUTPATIENT
Start: 2023-12-28 | End: 2024-01-04

## 2023-12-28 NOTE — PROGRESS NOTES
Subjective:     CC: Urinary frequency, SOB, worsening memory    Frandy Rowan is a 82 y.o. male who presents today with c/o SOB, urinary frequency, and worsening memory. He is accompanied by his wife today. SOB  States 3 weeks ago he has having abdominal pain in the left groin area where his hernia is located and went to the ER. He has chronic liver disease and thought he might need a thoracentesis but he was told there was not enough fluid present to draw off. He was told to cont using lasix. Eventually his hernia stopped hurting but he began to lose his appetite and has now lost 13 pounds in the past month. He denies any chest pain. He says he does cough but that is related to his PND. Denies fever or chills or lightheadedness. Wife states his memory has worsened. He does have dementia and has been taking Aricept. Could not tolerate Namenda. Patient is wondering if the aricept is causing his symptoms. He has been restless for the past 4 nights. Waking up every 20 minutes with the urge to urinate, sometimes he can go and sometimes he can't. He has not been able to nap during the day until finally yesterday he went to bed at 4pm and slept until 730am.     Bladder Cancer  He is followed by urologist Dr Lamberto Shearer. Hx of TURB of medium bladder tumor with gemcitibine (9-2021). Path showed papillary urothelial carcinoma, non-invasive, high grade smooth muscle is present. BCG tx's ended . Neg FISH/cytology on 2-17-22. Has been following up for serial cystoscopies which cont to show moderate obstruction of the prostatic urethra. He is on Flomax. Last seen 6-2023. He was supposed to follow up in 6 months () for repeat cystoscopy and PVR. HTN  BP low but he has lost 13 pounds. He is on Nadolol. He has not been taking his lasix or spironolactone due to weight loss and poor appetite.         Patient Active Problem List   Diagnosis    Bladder cancer ([address])    B12 deficiency    Localized edema    Pure

## 2023-12-30 LAB
BACTERIA SPEC CULT: NORMAL
CC UR VC: NORMAL
SERVICE CMNT-IMP: NORMAL

## 2024-01-04 ENCOUNTER — TELEPHONE (OUTPATIENT)
Age: 84
End: 2024-01-04

## 2024-01-04 NOTE — TELEPHONE ENCOUNTER
Pts wife called to give you a update. She stated the pts liver specialist suggested he take a low dose of lasix and it has helped with the abd swelling and they will continue until the fluid is gone

## 2024-01-05 ENCOUNTER — TELEPHONE (OUTPATIENT)
Age: 84
End: 2024-01-05

## 2024-01-05 NOTE — TELEPHONE ENCOUNTER
Patient is requesting a new rx for Lasix 20 mg. He has been cutting to 40 mg tab in half but it makes a mess

## 2024-01-08 RX ORDER — FUROSEMIDE 20 MG/1
20 TABLET ORAL DAILY
Qty: 90 TABLET | Refills: 0 | Status: SHIPPED | OUTPATIENT
Start: 2024-01-08

## 2024-01-10 DIAGNOSIS — E55.9 VITAMIN D DEFICIENCY, UNSPECIFIED: ICD-10-CM

## 2024-01-10 RX ORDER — URSODIOL 300 MG/1
600 CAPSULE ORAL 2 TIMES DAILY
Qty: 360 CAPSULE | Refills: 3 | Status: SHIPPED | OUTPATIENT
Start: 2024-01-10

## 2024-01-11 ENCOUNTER — OFFICE VISIT (OUTPATIENT)
Age: 84
End: 2024-01-11

## 2024-01-11 VITALS
HEART RATE: 76 BPM | DIASTOLIC BLOOD PRESSURE: 56 MMHG | HEIGHT: 70 IN | SYSTOLIC BLOOD PRESSURE: 95 MMHG | BODY MASS INDEX: 22.19 KG/M2 | RESPIRATION RATE: 20 BRPM | WEIGHT: 155 LBS | TEMPERATURE: 98.2 F | OXYGEN SATURATION: 96 %

## 2024-01-11 DIAGNOSIS — R60.9 FLUID RETENTION: ICD-10-CM

## 2024-01-11 DIAGNOSIS — F02.80 LATE ONSET ALZHEIMER'S DISEASE WITHOUT BEHAVIORAL DISTURBANCE (HCC): ICD-10-CM

## 2024-01-11 DIAGNOSIS — R35.0 URINARY FREQUENCY: ICD-10-CM

## 2024-01-11 DIAGNOSIS — Z79.899 ON POTASSIUM WASTING DIURETIC THERAPY: ICD-10-CM

## 2024-01-11 DIAGNOSIS — Z00.00 MEDICARE ANNUAL WELLNESS VISIT, SUBSEQUENT: ICD-10-CM

## 2024-01-11 DIAGNOSIS — G30.1 LATE ONSET ALZHEIMER'S DISEASE WITHOUT BEHAVIORAL DISTURBANCE (HCC): ICD-10-CM

## 2024-01-11 DIAGNOSIS — R06.02 SOB (SHORTNESS OF BREATH): Primary | ICD-10-CM

## 2024-01-11 RX ORDER — DONEPEZIL HYDROCHLORIDE 5 MG/1
TABLET, FILM COATED ORAL
Qty: 14 TABLET | Refills: 0 | Status: SHIPPED | OUTPATIENT
Start: 2024-01-11

## 2024-01-11 RX ORDER — POTASSIUM CHLORIDE 20 MEQ/1
TABLET, EXTENDED RELEASE ORAL
Qty: 90 TABLET | Refills: 0 | Status: SHIPPED | OUTPATIENT
Start: 2024-01-11

## 2024-01-11 ASSESSMENT — PATIENT HEALTH QUESTIONNAIRE - PHQ9
SUM OF ALL RESPONSES TO PHQ QUESTIONS 1-9: 0
2. FEELING DOWN, DEPRESSED OR HOPELESS: 0

## 2024-01-11 NOTE — PROGRESS NOTES
Subjective:     CC: Urinary frequency, SOB, worsening memory    Frandy Mills is a 83 y.o. male who presents today for a 2 week follow up for SOB, urinary frequency, and worsening memory.    He is accompanied by his wife today.    SOB  About 5 weeks ago he began having abdominal pain in the left groin area where his hernia is located and went to the ER. He has chronic liver disease and thought he might need a thoracentesis but he was told there was not enough fluid present to draw off. He was told to cont using lasix. Eventually his hernia stopped hurting, his belly \"shrank\" but he began to lose his appetite and lost 13 pounds in a month. Labs indicated dehydration so he was instructed to stop all diuretics and follow up in 2 weeks. However his wife later reported 6 days ago that his liver specialist told him to restart lasix at 20mg daily due to fluid retention. His belly was swelling again and his hernia \"popped out.\"    He has gained 6 pounds since his last appt but still feels very SOB.  BP is also very low. Wife states she has still been giving him 100mg of spironolactone daily.   We will stop the Spironolactone today and cont 20mg of Lasix daily. He will take an extra Lasix 20mg as needed.   Will add potassium supplement today to prevent hypokalemia.     Urinary frequency  He has been restless at night. Waking up every 20 minutes with the urge to urinate.  At his last OV his UA was clear. He will follow up with his urologist this afternoon.     Wife states his memory has worsened. He does have dementia and had been taking Aricept. Could not tolerate Namenda. Patient is wondering if the aricept is causing his symptoms. Will wean off today to see if thi causes any improvement.     Of note he does have a pacemaker and his cardiologist is in [place] but he is not due back for follow up until June. Today I advised wife to try to get him a sooner appt to check the pacemaker. Will also order CXR and ECHO today

## 2024-01-12 ENCOUNTER — CLINICAL DOCUMENTATION (OUTPATIENT)
Age: 84
End: 2024-01-12

## 2024-01-12 ENCOUNTER — TELEPHONE (OUTPATIENT)
Age: 84
End: 2024-01-12

## 2024-01-12 DIAGNOSIS — R06.02 SOB (SHORTNESS OF BREATH): Primary | ICD-10-CM

## 2024-01-12 RX ORDER — ALBUTEROL SULFATE 90 UG/1
2 AEROSOL, METERED RESPIRATORY (INHALATION) 4 TIMES DAILY PRN
Qty: 6.7 G | Refills: 0 | Status: SHIPPED | OUTPATIENT
Start: 2024-01-12

## 2024-01-12 NOTE — PROGRESS NOTES
Pt seen by urology on 1/11/24 for bladder cancer follow up, had cystoscopy done, urethra was normal, prostate shows moderate obstruction, bladder normal.  Bladder cancer unchanged follow up in 3 months.

## 2024-01-12 NOTE — PATIENT INSTRUCTIONS
list of these orders (if applicable) as well as your Preventive Care list are included within your After Visit Summary for your review.    Other Preventive Recommendations:    A preventive eye exam performed by an eye specialist is recommended every 1-2 years to screen for glaucoma; cataracts, macular degeneration, and other eye disorders.  A preventive dental visit is recommended every 6 months.  Try to get at least 150 minutes of exercise per week or 10,000 steps per day on a pedometer .  Order or download the FREE \"Exercise & Physical Activity: Your Everyday Guide\" from [employer]. Call 1-962.230.5158 or search [employer] online.  You need 5709-1787 mg of calcium and 3999-3854 IU of vitamin D per day. It is possible to meet your calcium requirement with diet alone, but a vitamin D supplement is usually necessary to meet this goal.  When exposed to the sun, use a sunscreen that protects against both UVA and UVB radiation with an SPF of 30 or greater. Reapply every 2 to 3 hours or after sweating, drying off with a towel, or swimming.  Always wear a seat belt when traveling in a car. Always wear a helmet when riding a bicycle or motorcycle.

## 2024-01-22 ENCOUNTER — HOSPITAL ENCOUNTER (OUTPATIENT)
Facility: HOSPITAL | Age: 84
Discharge: HOME | End: 2024-01-25
Payer: MEDICARE

## 2024-01-22 DIAGNOSIS — R06.02 SOB (SHORTNESS OF BREATH): ICD-10-CM

## 2024-01-22 LAB
ECHO AO ASC DIAM: 3.7 CM
ECHO AO ROOT DIAM: 3.7 CM
ECHO AV AREA PEAK VELOCITY: 2.7 CM2
ECHO AV AREA VTI: 2.5 CM2
ECHO AV MEAN GRADIENT: 9 MMHG
ECHO AV MEAN VELOCITY: 1.4 M/S
ECHO AV PEAK GRADIENT: 17 MMHG
ECHO AV PEAK VELOCITY: 2.1 M/S
ECHO AV VELOCITY RATIO: 0.71
ECHO AV VTI: 47.3 CM
ECHO EST RA PRESSURE: 3 MMHG
ECHO LA DIAMETER: 3.6 CM
ECHO LA TO AORTIC ROOT RATIO: 0.97
ECHO LA VOL A-L A2C: 139 ML (ref 18–58)
ECHO LA VOL A-L A4C: 67 ML (ref 18–58)
ECHO LA VOL MOD A2C: 128 ML (ref 18–58)
ECHO LA VOL MOD A4C: 61 ML (ref 18–58)
ECHO LA VOLUME AREA LENGTH: 99 ML
ECHO LV E' LATERAL VELOCITY: 9 CM/S
ECHO LV E' SEPTAL VELOCITY: 7 CM/S
ECHO LV FRACTIONAL SHORTENING: 40 % (ref 28–44)
ECHO LV INTERNAL DIMENSION DIASTOLIC: 4.5 CM (ref 4.2–5.9)
ECHO LV INTERNAL DIMENSION SYSTOLIC: 2.7 CM
ECHO LV IVSD: 1 CM (ref 0.6–1)
ECHO LV MASS 2D: 153.3 G (ref 88–224)
ECHO LV POSTERIOR WALL DIASTOLIC: 1 CM (ref 0.6–1)
ECHO LV RELATIVE WALL THICKNESS RATIO: 0.44
ECHO LVOT AREA: 3.8 CM2
ECHO LVOT AV VTI INDEX: 0.68
ECHO LVOT DIAM: 2.2 CM
ECHO LVOT MEAN GRADIENT: 5 MMHG
ECHO LVOT PEAK GRADIENT: 9 MMHG
ECHO LVOT PEAK VELOCITY: 1.5 M/S
ECHO LVOT SV: 122.7 ML
ECHO LVOT VTI: 32.3 CM
ECHO MV A VELOCITY: 0.97 M/S
ECHO MV E DECELERATION TIME (DT): 210.3 MS
ECHO MV E VELOCITY: 0.75 M/S
ECHO MV E/A RATIO: 0.77
ECHO MV E/E' LATERAL: 8.33
ECHO MV E/E' RATIO (AVERAGED): 9.52
ECHO PULMONARY ARTERY SYSTOLIC PRESSURE (PASP): 16 MMHG
ECHO RA AREA 4C: 12.3 CM2
ECHO RIGHT VENTRICULAR SYSTOLIC PRESSURE (RVSP): 16 MMHG
ECHO RV TAPSE: 2.6 CM (ref 1.7–?)
ECHO TV REGURGITANT MAX VELOCITY: 1.78 M/S
ECHO TV REGURGITANT PEAK GRADIENT: 13 MMHG

## 2024-01-22 PROCEDURE — 93306 TTE W/DOPPLER COMPLETE: CPT | Performed by: INTERNAL MEDICINE

## 2024-01-22 PROCEDURE — 93306 TTE W/DOPPLER COMPLETE: CPT

## 2024-01-23 ENCOUNTER — CLINICAL DOCUMENTATION (OUTPATIENT)
Age: 84
End: 2024-01-23

## 2024-01-23 PROBLEM — I35.8: Status: ACTIVE | Noted: 2024-01-23

## 2024-01-23 NOTE — PROGRESS NOTES
Patient seen on 1-18-24 by Dr Boone at [facility] for SOB. Recent pacemaker interrogation was normal. He will cont to be followed by the device clinic and follow up in 1 month.  Waiting on ECHO results. If he has wall motion abnormality on ECHO or if symptoms worsen will order stress test.

## 2024-01-25 ENCOUNTER — OFFICE VISIT (OUTPATIENT)
Age: 84
End: 2024-01-25
Payer: MEDICARE

## 2024-01-25 VITALS
OXYGEN SATURATION: 97 % | SYSTOLIC BLOOD PRESSURE: 110 MMHG | HEIGHT: 70 IN | DIASTOLIC BLOOD PRESSURE: 69 MMHG | RESPIRATION RATE: 20 BRPM | WEIGHT: 170.8 LBS | HEART RATE: 67 BPM | BODY MASS INDEX: 24.45 KG/M2 | TEMPERATURE: 97.8 F

## 2024-01-25 DIAGNOSIS — R06.02 SOB (SHORTNESS OF BREATH): Primary | ICD-10-CM

## 2024-01-25 DIAGNOSIS — R35.1 BENIGN PROSTATIC HYPERPLASIA WITH NOCTURIA: ICD-10-CM

## 2024-01-25 DIAGNOSIS — R60.9 FLUID RETENTION: ICD-10-CM

## 2024-01-25 DIAGNOSIS — N40.1 BENIGN PROSTATIC HYPERPLASIA WITH NOCTURIA: ICD-10-CM

## 2024-01-25 DIAGNOSIS — M54.2 NECK PAIN: ICD-10-CM

## 2024-01-25 DIAGNOSIS — G30.1 ALZHEIMER'S DISEASE WITH LATE ONSET (CODE) (HCC): ICD-10-CM

## 2024-01-25 LAB
ALBUMIN SERPL-MCNC: 3 G/DL (ref 3.5–5)
ALBUMIN/GLOB SERPL: 0.9 (ref 1.1–2.2)
ALP SERPL-CCNC: 118 U/L (ref 45–117)
ALT SERPL-CCNC: 38 U/L (ref 12–78)
ANION GAP SERPL CALC-SCNC: 4 MMOL/L (ref 5–15)
AST SERPL-CCNC: 43 U/L (ref 15–37)
BILIRUB SERPL-MCNC: 1.4 MG/DL (ref 0.2–1)
BUN SERPL-MCNC: 30 MG/DL (ref 6–20)
BUN/CREAT SERPL: 25 (ref 12–20)
CALCIUM SERPL-MCNC: 9.1 MG/DL (ref 8.5–10.1)
CHLORIDE SERPL-SCNC: 109 MMOL/L (ref 97–108)
CO2 SERPL-SCNC: 26 MMOL/L (ref 21–32)
COMMENT:: NORMAL
CREAT SERPL-MCNC: 1.18 MG/DL (ref 0.7–1.3)
GLOBULIN SER CALC-MCNC: 3.4 G/DL (ref 2–4)
GLUCOSE SERPL-MCNC: 103 MG/DL (ref 65–100)
NT PRO BNP: 524 PG/ML
POTASSIUM SERPL-SCNC: 4.4 MMOL/L (ref 3.5–5.1)
PROT SERPL-MCNC: 6.4 G/DL (ref 6.4–8.2)
SODIUM SERPL-SCNC: 139 MMOL/L (ref 136–145)
SPECIMEN HOLD: NORMAL

## 2024-01-25 PROCEDURE — 1123F ACP DISCUSS/DSCN MKR DOCD: CPT | Performed by: NURSE PRACTITIONER

## 2024-01-25 PROCEDURE — 99214 OFFICE O/P EST MOD 30 MIN: CPT | Performed by: NURSE PRACTITIONER

## 2024-01-25 SDOH — ECONOMIC STABILITY: FOOD INSECURITY: WITHIN THE PAST 12 MONTHS, YOU WORRIED THAT YOUR FOOD WOULD RUN OUT BEFORE YOU GOT MONEY TO BUY MORE.: NEVER TRUE

## 2024-01-25 SDOH — ECONOMIC STABILITY: FOOD INSECURITY: WITHIN THE PAST 12 MONTHS, THE FOOD YOU BOUGHT JUST DIDN'T LAST AND YOU DIDN'T HAVE MONEY TO GET MORE.: NEVER TRUE

## 2024-01-25 SDOH — ECONOMIC STABILITY: INCOME INSECURITY: HOW HARD IS IT FOR YOU TO PAY FOR THE VERY BASICS LIKE FOOD, HOUSING, MEDICAL CARE, AND HEATING?: NOT HARD AT ALL

## 2024-01-25 ASSESSMENT — PATIENT HEALTH QUESTIONNAIRE - PHQ9
1. LITTLE INTEREST OR PLEASURE IN DOING THINGS: 0
SUM OF ALL RESPONSES TO PHQ QUESTIONS 1-9: 0
SUM OF ALL RESPONSES TO PHQ QUESTIONS 1-9: 0
2. FEELING DOWN, DEPRESSED OR HOPELESS: 0
SUM OF ALL RESPONSES TO PHQ9 QUESTIONS 1 & 2: 0
SUM OF ALL RESPONSES TO PHQ QUESTIONS 1-9: 0
SUM OF ALL RESPONSES TO PHQ QUESTIONS 1-9: 0

## 2024-01-25 NOTE — PROGRESS NOTES
Subjective:     CC: Urinary frequency, SOB, worsening memory    Frandy Mills is a 83 y.o. male who presents today for a 2 week follow up for SOB, urinary frequency, and worsening memory.    He is accompanied by his wife today.    SOB  About 7 weeks ago he began having abdominal pain in the left groin area where his hernia is located and went to the ER. He has chronic liver disease and thought he might need a thoracentesis but he was told there was not enough fluid present to draw off. He was told to cont using lasix. Eventually his hernia stopped hurting, his belly \"shrank\" but he began to lose his appetite and lost 13 pounds in a month. Labs indicated dehydration so he was instructed to stop all diuretics and follow up in 2 weeks. However his wife later reported 6 days ago that his liver specialist told him to restart lasix at 20mg daily due to fluid retention. His belly was swelling again and his hernia \"popped out.\"    He then gained 6 pounds as of 2 weeks ago but still felt very SOB.  BP was very low. Wife had still been giving him 100mg of spironolactone daily.   At the last appt she was advised to STOP the Spironolactone and cont 20mg of Lasix daily. He was instructed to take an extra Lasix 20mg as needed. We also added potassium supplement today to prevent hypokalemia.     Today BP is better at 110/69.  Weight: 170lbs up from 155#. Eating better.  Abdominal hernia is not protruding but his belly is a little swollen so she has given him an extra dose of lasix over the past 2 days without any improvement in the belly swelling. She will cont to do this as long as his BP allows until the swelling goes down and she will then reduce back to once a day.     He does have a pacemaker and saw his cardiologist on 1-18-24 (Dr Boone at [facility]).  Recent pacemaker interrogation was normal. He will cont to be followed by the device clinic and follow up in 1 month.  He was waiting on ECHO

## 2024-01-25 NOTE — PROGRESS NOTES
Chief Complaint   Patient presents with    Follow up for fluid and results     155lb  2 weeks ago        Vitals:    01/25/24 0929   BP: 110/69   Pulse: 67   Resp: 20   Temp: 97.8 °F (36.6 °C)   SpO2: 97%   \"Have you been to the ER, urgent care clinic since your last visit?  Hospitalized since your last visit?\"    NO    “Have you seen or consulted any other health care providers outside of [facility] since your last visit?”    NO

## 2024-02-22 ENCOUNTER — HOSPITAL ENCOUNTER (OUTPATIENT)
Facility: HOSPITAL | Age: 84
Setting detail: SPECIMEN
Discharge: HOME | End: 2024-02-22
Payer: MEDICARE

## 2024-02-22 ENCOUNTER — OFFICE VISIT (OUTPATIENT)
Age: 84
End: 2024-02-22
Payer: MEDICARE

## 2024-02-22 VITALS
BODY MASS INDEX: 22.19 KG/M2 | HEART RATE: 69 BPM | WEIGHT: 155 LBS | TEMPERATURE: 97.7 F | DIASTOLIC BLOOD PRESSURE: 63 MMHG | HEIGHT: 70 IN | OXYGEN SATURATION: 97 % | SYSTOLIC BLOOD PRESSURE: 115 MMHG

## 2024-02-22 DIAGNOSIS — K74.3 PRIMARY BILIARY CIRRHOSIS (HCC): ICD-10-CM

## 2024-02-22 DIAGNOSIS — K74.3 PRIMARY BILIARY CIRRHOSIS (HCC): Primary | ICD-10-CM

## 2024-02-22 LAB
25(OH)D3 SERPL-MCNC: 20.6 NG/ML (ref 30–100)
ALBUMIN SERPL-MCNC: 3.2 G/DL (ref 3.4–5)
ALBUMIN/GLOB SERPL: 1 (ref 0.8–1.7)
ALP SERPL-CCNC: 118 U/L (ref 45–117)
ALT SERPL-CCNC: 35 U/L (ref 16–61)
ANION GAP SERPL CALC-SCNC: 5 MMOL/L (ref 3–18)
AST SERPL-CCNC: 49 U/L (ref 10–38)
BASOPHILS # BLD: 0 K/UL (ref 0–0.1)
BASOPHILS NFR BLD: 1 % (ref 0–2)
BILIRUB DIRECT SERPL-MCNC: 0.5 MG/DL (ref 0–0.2)
BILIRUB SERPL-MCNC: 1.2 MG/DL (ref 0.2–1)
BUN SERPL-MCNC: 31 MG/DL (ref 7–18)
BUN/CREAT SERPL: 24 (ref 12–20)
CALCIUM SERPL-MCNC: 8.9 MG/DL (ref 8.5–10.1)
CHLORIDE SERPL-SCNC: 106 MMOL/L (ref 100–111)
CO2 SERPL-SCNC: 27 MMOL/L (ref 21–32)
CREAT SERPL-MCNC: 1.29 MG/DL (ref 0.6–1.3)
DIFFERENTIAL METHOD BLD: ABNORMAL
EOSINOPHIL # BLD: 0.2 K/UL (ref 0–0.4)
EOSINOPHIL NFR BLD: 5 % (ref 0–5)
ERYTHROCYTE [DISTWIDTH] IN BLOOD BY AUTOMATED COUNT: 13.5 % (ref 11.6–14.5)
FERRITIN SERPL-MCNC: 39 NG/ML (ref 8–388)
GLOBULIN SER CALC-MCNC: 3.3 G/DL (ref 2–4)
GLUCOSE SERPL-MCNC: 89 MG/DL (ref 74–99)
HCT VFR BLD AUTO: 34.6 % (ref 36–48)
HGB BLD-MCNC: 11.5 G/DL (ref 13–16)
IMM GRANULOCYTES # BLD AUTO: 0 K/UL (ref 0–0.04)
IMM GRANULOCYTES NFR BLD AUTO: 0 % (ref 0–0.5)
INR PPP: 1.1 (ref 0.9–1.1)
IRON SATN MFR SERPL: 31 % (ref 20–50)
IRON SERPL-MCNC: 121 UG/DL (ref 50–175)
LYMPHOCYTES # BLD: 1.4 K/UL (ref 0.9–3.6)
LYMPHOCYTES NFR BLD: 38 % (ref 21–52)
MCH RBC QN AUTO: 32 PG (ref 24–34)
MCHC RBC AUTO-ENTMCNC: 33.2 G/DL (ref 31–37)
MCV RBC AUTO: 96.4 FL (ref 78–100)
MONOCYTES # BLD: 0.3 K/UL (ref 0.05–1.2)
MONOCYTES NFR BLD: 9 % (ref 3–10)
NEUTS SEG # BLD: 1.7 K/UL (ref 1.8–8)
NEUTS SEG NFR BLD: 47 % (ref 40–73)
NRBC # BLD: 0 K/UL (ref 0–0.01)
NRBC BLD-RTO: 0 PER 100 WBC
PLATELET # BLD AUTO: 100 K/UL (ref 135–420)
PMV BLD AUTO: 10.6 FL (ref 9.2–11.8)
POTASSIUM SERPL-SCNC: 4 MMOL/L (ref 3.5–5.5)
PROT SERPL-MCNC: 6.5 G/DL (ref 6.4–8.2)
PROTHROMBIN TIME: 14.6 SEC (ref 11.9–14.7)
RBC # BLD AUTO: 3.59 M/UL (ref 4.35–5.65)
SODIUM SERPL-SCNC: 138 MMOL/L (ref 136–145)
TIBC SERPL-MCNC: 390 UG/DL (ref 250–450)
WBC # BLD AUTO: 3.6 K/UL (ref 4.6–13.2)

## 2024-02-22 PROCEDURE — 82107 ALPHA-FETOPROTEIN L3: CPT

## 2024-02-22 PROCEDURE — 1123F ACP DISCUSS/DSCN MKR DOCD: CPT | Performed by: NURSE PRACTITIONER

## 2024-02-22 PROCEDURE — 85610 PROTHROMBIN TIME: CPT

## 2024-02-22 PROCEDURE — 99214 OFFICE O/P EST MOD 30 MIN: CPT | Performed by: NURSE PRACTITIONER

## 2024-02-22 PROCEDURE — 85025 COMPLETE CBC W/AUTO DIFF WBC: CPT

## 2024-02-22 PROCEDURE — 80076 HEPATIC FUNCTION PANEL: CPT

## 2024-02-22 PROCEDURE — 83550 IRON BINDING TEST: CPT

## 2024-02-22 PROCEDURE — 80048 BASIC METABOLIC PNL TOTAL CA: CPT

## 2024-02-22 PROCEDURE — 82728 ASSAY OF FERRITIN: CPT

## 2024-02-22 PROCEDURE — 82306 VITAMIN D 25 HYDROXY: CPT

## 2024-02-22 PROCEDURE — 36415 COLL VENOUS BLD VENIPUNCTURE: CPT

## 2024-02-22 PROCEDURE — 83540 ASSAY OF IRON: CPT

## 2024-02-22 RX ORDER — SPIRONOLACTONE 100 MG/1
TABLET, FILM COATED ORAL
COMMUNITY
Start: 2024-02-17

## 2024-02-22 RX ORDER — SPIRONOLACTONE 50 MG/1
50 TABLET, FILM COATED ORAL DAILY
Qty: 90 TABLET | Refills: 3 | Status: SHIPPED | OUTPATIENT
Start: 2024-02-22

## 2024-02-22 RX ORDER — FUROSEMIDE 40 MG/1
TABLET ORAL
COMMUNITY
Start: 2024-02-17

## 2024-02-22 NOTE — PROGRESS NOTES
Critical access hospital [facility]      Levi Gan MD, FACP, FACG, FAASLD      SHAHAB White-ROSA Estrada, [facility]   Janie Ratlakesha, [facility]   Kerrieaysha Butler, [facility]  Bill Dior, [facility]   Roro Antoine, [facility]   Marylin Aston, [facility]      [facility]   [address], [address]   [place]  [address] [phone]   FAX: [phone]  [facility]   [address], [address]   [place]  [address] [phone]   FAX: [phone]       Patient Care Team:  Deanna Jackson NP as PCP - General (Nurse Practitioner)  Deanna Jackson NP as PCP - [facility] Empaneled Provider  Constantino Obrien MD (Urology)  Alcon Booen MD (Cardio Vascular Surgery)      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)    BPH with obstruction/lower urinary tract symptoms    Pseudophakia of both eyes    Vitreous floaters of both eyes    Chronic pain of right knee    Hypogonadism male    Pacemaker    Thrombocytopenia (HCC)    Lumbar pain with radiation down right leg    Right hip pain    Dermatochalasis of both upper eyelids    Venous insufficiency of both lower extremities    Heart block    Vitamin D deficiency    Late onset Alzheimer's disease without behavioral disturbance (HCC)    Chronic renal disease, stage III (HCC)    Osteopenia of multiple sites    DDD (degenerative disc disease), lumbar    Sclerosis of aortic valve cusp         Frandy Mills returns to the [facility] today for fibroscan assessment of hepatic fibrosis and for education and management of cirrhosis due to Primary Biliary Cholangitis.  The active problem list,

## 2024-02-23 LAB
AFP L3 MFR SERPL: NORMAL % (ref 0–9.9)
AFP SERPL-MCNC: 4.6 NG/ML (ref 0–6.4)

## 2024-02-26 ENCOUNTER — OFFICE VISIT (OUTPATIENT)
Age: 84
End: 2024-02-26
Payer: MEDICARE

## 2024-02-26 VITALS
SYSTOLIC BLOOD PRESSURE: 110 MMHG | TEMPERATURE: 97.4 F | HEIGHT: 70 IN | HEART RATE: 56 BPM | OXYGEN SATURATION: 98 % | RESPIRATION RATE: 18 BRPM | DIASTOLIC BLOOD PRESSURE: 65 MMHG | BODY MASS INDEX: 23.53 KG/M2 | WEIGHT: 164.38 LBS

## 2024-02-26 DIAGNOSIS — G30.1 ALZHEIMER'S DISEASE WITH LATE ONSET (CODE) (HCC): ICD-10-CM

## 2024-02-26 DIAGNOSIS — K74.60 CIRRHOSIS OF LIVER WITH ASCITES, UNSPECIFIED HEPATIC CIRRHOSIS TYPE (HCC): Primary | ICD-10-CM

## 2024-02-26 DIAGNOSIS — R18.8 CIRRHOSIS OF LIVER WITH ASCITES, UNSPECIFIED HEPATIC CIRRHOSIS TYPE (HCC): Primary | ICD-10-CM

## 2024-02-26 PROCEDURE — 1123F ACP DISCUSS/DSCN MKR DOCD: CPT | Performed by: NURSE PRACTITIONER

## 2024-02-26 PROCEDURE — 99213 OFFICE O/P EST LOW 20 MIN: CPT | Performed by: NURSE PRACTITIONER

## 2024-02-26 RX ORDER — FUROSEMIDE 20 MG/1
TABLET ORAL
Qty: 120 TABLET | Refills: 0
Start: 2024-02-26

## 2024-02-26 NOTE — PROGRESS NOTES
\"Have you been to the ER, urgent care clinic since your last visit?  Hospitalized since your last visit?\"    NO    “Have you seen or consulted any other health care providers outside of [facility] since your last visit?”    YES - When: approximately 1  weeks ago.  Where and Why: hepatology .           
Esophageal varices (HCC) March 2016    banded x 6    GERD (gastroesophageal reflux disease)     GI bleed March 2016    Hyperlipidemia     Hypogonadism male     Left bundle branch block (LBBB)     Dr. Boone, pacemaker 9/2021    Primary biliary cirrhosis (HCC)     Vitamin D deficiency          Current Outpatient Medications:     spironolactone (ALDACTONE) 100 MG tablet, , Disp: , Rfl:     furosemide (LASIX) 40 MG tablet, , Disp: , Rfl:     spironolactone (ALDACTONE) 50 MG tablet, Take 1 tablet by mouth daily, Disp: 90 tablet, Rfl: 3    albuterol sulfate HFA (VENTOLIN HFA) 108 (90 Base) MCG/ACT inhaler, Inhale 2 puffs into the lungs 4 times daily as needed for Wheezing or Shortness of Breath, Disp: 6.7 g, Rfl: 0    potassium chloride (KLOR-CON M) 20 MEQ extended release tablet, Take 1 tab daily with Furosemide., Disp: 90 tablet, Rfl: 0    ursodiol (ACTIGALL) 300 MG capsule, TAKE 2 CAPSULES BY MOUTH TWICE A DAY, Disp: 360 capsule, Rfl: 3    furosemide (LASIX) 20 MG tablet, Take 1 tablet by mouth daily, Disp: 90 tablet, Rfl: 0    dextran 70-hypromellose (TEARS NATURALE) 0.1-0.3 % SOLN opthalmic solution, 1 drop, Disp: , Rfl:     diclofenac sodium (VOLTAREN) 1 % GEL, Apply topically 4 times daily as needed, Disp: , Rfl:     fluticasone (FLONASE) 50 MCG/ACT nasal spray, USE 1 SPRAY INTO EACH NOSTRIL TWICE A DAY, Disp: , Rfl:     tamsulosin (FLOMAX) 0.4 MG capsule, Take 1 capsule by mouth 2 times daily, Disp: , Rfl:     Allergies   Allergen Reactions    Ciprofloxacin Diarrhea, Myalgia, Other (See Comments) and Hives    Sulfa Antibiotics Other (See Comments)     Joint aching and pain; nausea and vomiting  Pt states its been so long he doesn't remember the reaction.       Ciprofloxacin Hcl Other (See Comments) and Hives    Misc. Sulfonamide Containing Compounds Other (See Comments)    Other Myalgia     Think  that is was a Sulfa drug, but not sure       Past Surgical History:   Procedure Laterality Date    HEENT  2018

## 2024-03-01 ENCOUNTER — HOSPITAL ENCOUNTER (OUTPATIENT)
Facility: HOSPITAL | Age: 84
End: 2024-03-01
Payer: MEDICARE

## 2024-03-01 DIAGNOSIS — K74.3 PRIMARY BILIARY CIRRHOSIS (HCC): ICD-10-CM

## 2024-03-01 PROCEDURE — 76705 ECHO EXAM OF ABDOMEN: CPT

## 2024-03-04 ENCOUNTER — TELEPHONE (OUTPATIENT)
Age: 84
End: 2024-03-04

## 2024-03-06 RX ORDER — ERGOCALCIFEROL 1.25 MG/1
50000 CAPSULE ORAL WEEKLY
Qty: 5 CAPSULE | Refills: 0 | Status: SHIPPED | OUTPATIENT
Start: 2024-03-06

## 2024-03-25 ENCOUNTER — TELEPHONE (OUTPATIENT)
Age: 84
End: 2024-03-25

## 2024-03-28 ENCOUNTER — TELEPHONE (OUTPATIENT)
Age: 84
End: 2024-03-28

## 2024-03-28 DIAGNOSIS — G30.1 ALZHEIMER'S DISEASE WITH LATE ONSET (CODE) (HCC): Primary | ICD-10-CM

## 2024-04-01 NOTE — TELEPHONE ENCOUNTER
PT would like to go to [id number] in [place] on [address] Phone- [phone]- Fax- [phone]. As soon as they get the referral, they will put him on their cancellation list. PT's wife wants to know if there is a way this could be marked \"Urgency\" They are booked till September.

## 2024-04-01 NOTE — TELEPHONE ENCOUNTER
Unfortunately [facility] is not taking our patients and Gwyn Jaime ([place]), DAMASO ([place]), and Santi ([facility])  are booked out several months but if he could get on one their cancellation lists, just let me know where to place the referral. If it is for his dementia he might get into neuro=psych a little quicker but no estuardo

## 2024-04-02 ENCOUNTER — CLINICAL DOCUMENTATION (OUTPATIENT)
Age: 84
End: 2024-04-02

## 2024-04-03 ENCOUNTER — TELEPHONE (OUTPATIENT)
Age: 84
End: 2024-04-03

## 2024-04-03 NOTE — TELEPHONE ENCOUNTER
Received a letter from [facility]. Before scheduling our patients neurology appointment, they would like Deanna to order a CT/MRI of the head/brain. Once the imaging records are received, the patient can be scheduled.

## 2024-04-04 ENCOUNTER — HOSPITAL ENCOUNTER (OUTPATIENT)
Facility: HOSPITAL | Age: 84
Discharge: HOME | End: 2024-04-04
Payer: MEDICARE

## 2024-04-04 DIAGNOSIS — G30.1 LATE ONSET ALZHEIMER'S DISEASE WITHOUT BEHAVIORAL DISTURBANCE (HCC): Primary | ICD-10-CM

## 2024-04-04 DIAGNOSIS — F02.80 LATE ONSET ALZHEIMER'S DISEASE WITHOUT BEHAVIORAL DISTURBANCE (HCC): ICD-10-CM

## 2024-04-04 DIAGNOSIS — G30.1 LATE ONSET ALZHEIMER'S DISEASE WITHOUT BEHAVIORAL DISTURBANCE (HCC): ICD-10-CM

## 2024-04-04 DIAGNOSIS — F02.80 LATE ONSET ALZHEIMER'S DISEASE WITHOUT BEHAVIORAL DISTURBANCE (HCC): Primary | ICD-10-CM

## 2024-04-04 DIAGNOSIS — R06.02 SOB (SHORTNESS OF BREATH): ICD-10-CM

## 2024-04-04 PROCEDURE — 70450 CT HEAD/BRAIN W/O DYE: CPT

## 2024-04-04 RX ORDER — FUROSEMIDE 20 MG/1
TABLET ORAL
Qty: 90 TABLET | Refills: 1 | Status: SHIPPED | OUTPATIENT
Start: 2024-04-04

## 2024-04-09 ENCOUNTER — TELEPHONE (OUTPATIENT)
Age: 84
End: 2024-04-09

## 2024-04-09 DIAGNOSIS — R06.02 SOB (SHORTNESS OF BREATH): ICD-10-CM

## 2024-04-09 DIAGNOSIS — F02.80 LATE ONSET ALZHEIMER'S DISEASE WITHOUT BEHAVIORAL DISTURBANCE (HCC): Primary | ICD-10-CM

## 2024-04-09 DIAGNOSIS — G30.1 LATE ONSET ALZHEIMER'S DISEASE WITHOUT BEHAVIORAL DISTURBANCE (HCC): Primary | ICD-10-CM

## 2024-04-09 NOTE — TELEPHONE ENCOUNTER
Wife called about CT results. Explained to her they are normal. She does not believe he has dementia. Cannot get appt with neuro until 9-2024. Will get MRI for further eval.

## 2024-04-17 ENCOUNTER — TELEPHONE (OUTPATIENT)
Age: 84
End: 2024-04-17

## 2024-04-17 ENCOUNTER — HOSPITAL ENCOUNTER (OUTPATIENT)
Facility: HOSPITAL | Age: 84
Discharge: HOME | End: 2024-04-20

## 2024-04-17 DIAGNOSIS — G30.1 LATE ONSET ALZHEIMER'S DISEASE WITHOUT BEHAVIORAL DISTURBANCE (HCC): ICD-10-CM

## 2024-04-17 DIAGNOSIS — F02.80 LATE ONSET ALZHEIMER'S DISEASE WITHOUT BEHAVIORAL DISTURBANCE (HCC): ICD-10-CM

## 2024-04-17 NOTE — TELEPHONE ENCOUNTER
Pt was unable to get MRI of the brain. He has a pacemaker. Wife stated that the person at the hospital stated they might be able to do it at a bigger hospital. If possible and able to do, they would like to get it done in [place]. They need this done by May 6th. Neurologist appointment in [place].

## 2024-04-22 ENCOUNTER — TELEPHONE (OUTPATIENT)
Age: 84
End: 2024-04-22

## 2024-04-22 NOTE — TELEPHONE ENCOUNTER
PT's wife called and stated that [facility] cannot honor his MRI referral due to his pacemaker. Wants to know where he can go for this.

## 2024-04-22 NOTE — TELEPHONE ENCOUNTER
She will have to talk to the neurologist about that I don't understand why any hospital would do it with a pacemaker.

## 2024-04-29 ENCOUNTER — TELEPHONE (OUTPATIENT)
Age: 84
End: 2024-04-29

## 2024-04-29 NOTE — TELEPHONE ENCOUNTER
4/29/2024    Per [place], patient called and needs to resched EGD. Spoke with spouse Shayy and re-scheduled as directed.    Efs

## 2024-05-09 ENCOUNTER — TELEPHONE (OUTPATIENT)
Age: 84
End: 2024-05-09

## 2024-05-09 NOTE — TELEPHONE ENCOUNTER
Wife called and stated that the neurologist stated yesterday that patient would benefit getting B12 shots. Wife would like to get them here.

## 2024-05-10 NOTE — TELEPHONE ENCOUNTER
Can she please have him fax over the office visit note and any lab work that was done including his B12 level? Need diagnosis code for b12 shots in order for insurance to pay

## 2024-05-14 ENCOUNTER — NURSE ONLY (OUTPATIENT)
Age: 84
End: 2024-05-14
Payer: MEDICARE

## 2024-05-14 DIAGNOSIS — R06.02 SOB (SHORTNESS OF BREATH): ICD-10-CM

## 2024-05-14 DIAGNOSIS — E53.8 B12 DEFICIENCY: Primary | ICD-10-CM

## 2024-05-14 PROCEDURE — 96372 THER/PROPH/DIAG INJ SC/IM: CPT | Performed by: NURSE PRACTITIONER

## 2024-05-14 RX ORDER — CYANOCOBALAMIN 1000 UG/ML
1000 INJECTION, SOLUTION INTRAMUSCULAR; SUBCUTANEOUS ONCE
Status: DISCONTINUED | OUTPATIENT
Start: 2024-05-14 | End: 2024-05-14

## 2024-05-14 RX ORDER — CYANOCOBALAMIN 1000 UG/ML
1000 INJECTION, SOLUTION INTRAMUSCULAR; SUBCUTANEOUS ONCE
Status: COMPLETED | OUTPATIENT
Start: 2024-05-14 | End: 2024-05-14

## 2024-05-14 RX ORDER — CYANOCOBALAMIN 1000 UG/ML
1000 INJECTION, SOLUTION INTRAMUSCULAR; SUBCUTANEOUS
COMMUNITY
Start: 2024-05-08 | End: 2024-05-14 | Stop reason: SDUPTHER

## 2024-05-14 RX ADMIN — CYANOCOBALAMIN 1000 MCG: 1000 INJECTION, SOLUTION INTRAMUSCULAR; SUBCUTANEOUS at 16:49

## 2024-05-14 NOTE — PATIENT INSTRUCTIONS
cyanocobalamin (oral)  Pronunciation:  bertrand GEE oh koe BAL a min  Brand:  [employer]-12, Eligen B12, Vitamin B12  What is the most important information I should know about oral cyanocobalamin?  You should not use this medicine if you are allergic to cobalt, or if you have Abad's disease.  What is oral cyanocobalamin?  Cyanocobalamin is a form of vitamin B12. Vitamin B12 is important for growth, cell reproduction, blood formation, and protein and tissue synthesis.  Cyanocobalamin is used to treat vitamin B12 deficiency in people with pernicious anemia or other conditions such as folic acid deficiency, pregnancy, thyroid problems, stomach and intestinal disorders, bleeding, liver or kidney disease, parasite infection, or cancer.  Cyanocobalamin may also be used for purposes not listed in this medication guide.  What should I discuss with my healthcare provider before taking oral cyanocobalamin?  You should not use this medicine if you are allergic to cobalt or if you have Abad's disease. Cyanocobalamin can lead to optic nerve damage (and possibly blindness) in people with Abad's disease.  Ask a doctor or pharmacist if this medicine is safe to use if you have ever had:  low levels of calcium or potassium in your blood;  heart disease;  a stroke;  a bleeding or blood clotting disorder such as hemophilia;  diabetes;  an iron or folic acid deficiency;  kidney disease; or  any condition that makes it hard for your body to absorb nutrients from food (malabsorption).  Ask a doctor before using this medicine if you are pregnant or breastfeeding.   Do not give this medicine to a child without medical advice.  How should I take oral cyanocobalamin?  Follow all directions on your prescription label and read all medication guides or instruction sheets. Use the medicine exactly as directed.  Carefully follow instructions about whether to take your cyanocobalamin with or without food.   Your dose needs may change if you become pregnant,

## 2024-05-14 NOTE — PROGRESS NOTES
After obtaining consent, and per orders of NP, injection of B12 given in Left deltoid by Tano Fofana LPN. Patient instructed to remain in clinic for 20 minutes afterwards, and to report any adverse reaction to me immediately.

## 2024-05-14 NOTE — TELEPHONE ENCOUNTER
Spoke to neurology office.  They state patient's B12 level was 240 which is still in the normal range.  However it is in the lower range of normal.  The nurse practitioner who saw him did recommend B12 shots.  He can schedule a nurse visit and the nurses can give it to him.  1000 units once a month

## 2024-05-17 DIAGNOSIS — Z79.899 ON POTASSIUM WASTING DIURETIC THERAPY: ICD-10-CM

## 2024-05-17 RX ORDER — POTASSIUM CHLORIDE 20 MEQ/1
TABLET, EXTENDED RELEASE ORAL
Qty: 90 TABLET | Refills: 1 | Status: SHIPPED | OUTPATIENT
Start: 2024-05-17

## 2024-05-17 NOTE — TELEPHONE ENCOUNTER
Patient requesting refill on     Requested Prescriptions     Pending Prescriptions Disp Refills    potassium chloride (KLOR-CON M) 20 MEQ extended release tablet [Pharmacy Med Name: POTASSIUM CL ER 20 MEQ TABLET] 90 tablet 0     Sig: TAKE 1 TABLET BY MOUTH ONCE DAILY WITH THE FUROSEMIDE        Last OV 2/26/2024

## 2024-05-20 RX ORDER — FUROSEMIDE 40 MG/1
40 TABLET ORAL DAILY
Qty: 90 TABLET | Refills: 2 | Status: SHIPPED | OUTPATIENT
Start: 2024-05-20

## 2024-05-20 RX ORDER — SPIRONOLACTONE 100 MG/1
100 TABLET, FILM COATED ORAL DAILY
Qty: 90 TABLET | Refills: 2 | Status: SHIPPED | OUTPATIENT
Start: 2024-05-20

## 2024-06-11 ENCOUNTER — CLINICAL DOCUMENTATION (OUTPATIENT)
Age: 84
End: 2024-06-11

## 2024-06-11 NOTE — PROGRESS NOTES
Patient seen by cardiologist Dr. Boone on 6/4/2024.  He ordered a cardiac ultrasound for cardiac murmur.

## 2024-06-18 ENCOUNTER — NURSE ONLY (OUTPATIENT)
Age: 84
End: 2024-06-18
Payer: MEDICARE

## 2024-06-18 VITALS — TEMPERATURE: 97.4 F

## 2024-06-18 DIAGNOSIS — E53.8 B12 DEFICIENCY: Primary | ICD-10-CM

## 2024-06-18 PROCEDURE — 96372 THER/PROPH/DIAG INJ SC/IM: CPT | Performed by: NURSE PRACTITIONER

## 2024-06-18 RX ORDER — CYANOCOBALAMIN 1000 UG/ML
1000 INJECTION, SOLUTION INTRAMUSCULAR; SUBCUTANEOUS ONCE
Status: COMPLETED | OUTPATIENT
Start: 2024-06-18 | End: 2024-06-18

## 2024-06-18 RX ADMIN — CYANOCOBALAMIN 1000 MCG: 1000 INJECTION, SOLUTION INTRAMUSCULAR; SUBCUTANEOUS at 10:39

## 2024-06-18 NOTE — PROGRESS NOTES
Vit b12 administered in left deltoid.  Patient tolerated medication well.  AVS provided to patient with medication information.  Patient states understanding.

## 2024-07-18 ENCOUNTER — TELEPHONE (OUTPATIENT)
Age: 84
End: 2024-07-18

## 2024-07-19 ENCOUNTER — NURSE ONLY (OUTPATIENT)
Age: 84
End: 2024-07-19

## 2024-07-19 DIAGNOSIS — E53.8 B12 DEFICIENCY: Primary | ICD-10-CM

## 2024-07-19 RX ORDER — CYANOCOBALAMIN 1000 UG/ML
1000 INJECTION, SOLUTION INTRAMUSCULAR; SUBCUTANEOUS ONCE
Status: COMPLETED | OUTPATIENT
Start: 2024-07-19 | End: 2024-07-19

## 2024-07-19 RX ADMIN — CYANOCOBALAMIN 1000 MCG: 1000 INJECTION, SOLUTION INTRAMUSCULAR; SUBCUTANEOUS at 13:12

## 2024-08-12 ENCOUNTER — OFFICE VISIT (OUTPATIENT)
Age: 84
End: 2024-08-12
Payer: MEDICARE

## 2024-08-12 VITALS
WEIGHT: 161.25 LBS | BODY MASS INDEX: 23.08 KG/M2 | DIASTOLIC BLOOD PRESSURE: 58 MMHG | HEART RATE: 68 BPM | RESPIRATION RATE: 18 BRPM | OXYGEN SATURATION: 97 % | SYSTOLIC BLOOD PRESSURE: 97 MMHG | TEMPERATURE: 97.5 F | HEIGHT: 70 IN

## 2024-08-12 DIAGNOSIS — Z85.51 HISTORY OF BLADDER CANCER: ICD-10-CM

## 2024-08-12 DIAGNOSIS — K76.6 PORTAL HYPERTENSION (HCC): ICD-10-CM

## 2024-08-12 DIAGNOSIS — M19.042 PRIMARY OSTEOARTHRITIS OF BOTH HANDS: ICD-10-CM

## 2024-08-12 DIAGNOSIS — F03.B0 MODERATE DEMENTIA WITHOUT BEHAVIORAL DISTURBANCE, PSYCHOTIC DISTURBANCE, MOOD DISTURBANCE, OR ANXIETY, UNSPECIFIED DEMENTIA TYPE (HCC): ICD-10-CM

## 2024-08-12 DIAGNOSIS — R53.82 CHRONIC FATIGUE: Primary | ICD-10-CM

## 2024-08-12 DIAGNOSIS — N40.1 BENIGN PROSTATIC HYPERPLASIA WITH NOCTURIA: ICD-10-CM

## 2024-08-12 DIAGNOSIS — K74.60 CIRRHOSIS OF LIVER WITH ASCITES, UNSPECIFIED HEPATIC CIRRHOSIS TYPE (HCC): ICD-10-CM

## 2024-08-12 DIAGNOSIS — R18.8 CIRRHOSIS OF LIVER WITH ASCITES, UNSPECIFIED HEPATIC CIRRHOSIS TYPE (HCC): ICD-10-CM

## 2024-08-12 DIAGNOSIS — F32.9 REACTIVE DEPRESSION: ICD-10-CM

## 2024-08-12 DIAGNOSIS — E53.8 B12 DEFICIENCY: ICD-10-CM

## 2024-08-12 DIAGNOSIS — M19.041 PRIMARY OSTEOARTHRITIS OF BOTH HANDS: ICD-10-CM

## 2024-08-12 DIAGNOSIS — D69.6 THROMBOCYTOPENIA, UNSPECIFIED (HCC): ICD-10-CM

## 2024-08-12 DIAGNOSIS — R35.1 BENIGN PROSTATIC HYPERPLASIA WITH NOCTURIA: ICD-10-CM

## 2024-08-12 DIAGNOSIS — E55.9 VITAMIN D DEFICIENCY: ICD-10-CM

## 2024-08-12 PROCEDURE — 1123F ACP DISCUSS/DSCN MKR DOCD: CPT | Performed by: NURSE PRACTITIONER

## 2024-08-12 PROCEDURE — 96372 THER/PROPH/DIAG INJ SC/IM: CPT | Performed by: NURSE PRACTITIONER

## 2024-08-12 PROCEDURE — 99214 OFFICE O/P EST MOD 30 MIN: CPT | Performed by: NURSE PRACTITIONER

## 2024-08-12 PROCEDURE — 36415 COLL VENOUS BLD VENIPUNCTURE: CPT | Performed by: NURSE PRACTITIONER

## 2024-08-12 RX ORDER — SPIRONOLACTONE 50 MG/1
50 TABLET, FILM COATED ORAL DAILY PRN
Qty: 90 TABLET | Refills: 3
Start: 2024-08-12

## 2024-08-12 RX ORDER — FUROSEMIDE 20 MG/1
TABLET ORAL
Qty: 90 TABLET | Refills: 1
Start: 2024-08-12

## 2024-08-12 RX ORDER — CYANOCOBALAMIN 1000 UG/ML
1000 INJECTION, SOLUTION INTRAMUSCULAR; SUBCUTANEOUS ONCE
Status: COMPLETED | OUTPATIENT
Start: 2024-08-12 | End: 2024-08-12

## 2024-08-12 RX ADMIN — CYANOCOBALAMIN 1000 MCG: 1000 INJECTION, SOLUTION INTRAMUSCULAR; SUBCUTANEOUS at 15:46

## 2024-08-12 ASSESSMENT — COLUMBIA-SUICIDE SEVERITY RATING SCALE - C-SSRS
2. HAVE YOU ACTUALLY HAD ANY THOUGHTS OF KILLING YOURSELF?: YES
3. HAVE YOU BEEN THINKING ABOUT HOW YOU MIGHT KILL YOURSELF?: NO
1. WITHIN THE PAST MONTH, HAVE YOU WISHED YOU WERE DEAD OR WISHED YOU COULD GO TO SLEEP AND NOT WAKE UP?: YES
6. HAVE YOU EVER DONE ANYTHING, STARTED TO DO ANYTHING, OR PREPARED TO DO ANYTHING TO END YOUR LIFE?: NO
4. HAVE YOU HAD THESE THOUGHTS AND HAD SOME INTENTION OF ACTING ON THEM?: NO
5. HAVE YOU STARTED TO WORK OUT OR WORKED OUT THE DETAILS OF HOW TO KILL YOURSELF? DO YOU INTEND TO CARRY OUT THIS PLAN?: NO

## 2024-08-12 ASSESSMENT — PATIENT HEALTH QUESTIONNAIRE - PHQ9
6. FEELING BAD ABOUT YOURSELF - OR THAT YOU ARE A FAILURE OR HAVE LET YOURSELF OR YOUR FAMILY DOWN: SEVERAL DAYS
1. LITTLE INTEREST OR PLEASURE IN DOING THINGS: SEVERAL DAYS
SUM OF ALL RESPONSES TO PHQ QUESTIONS 1-9: 9
9. THOUGHTS THAT YOU WOULD BE BETTER OFF DEAD, OR OF HURTING YOURSELF: SEVERAL DAYS
2. FEELING DOWN, DEPRESSED OR HOPELESS: MORE THAN HALF THE DAYS
3. TROUBLE FALLING OR STAYING ASLEEP: SEVERAL DAYS
4. FEELING TIRED OR HAVING LITTLE ENERGY: SEVERAL DAYS
SUM OF ALL RESPONSES TO PHQ QUESTIONS 1-9: 10
7. TROUBLE CONCENTRATING ON THINGS, SUCH AS READING THE NEWSPAPER OR WATCHING TELEVISION: SEVERAL DAYS
5. POOR APPETITE OR OVEREATING: SEVERAL DAYS
SUM OF ALL RESPONSES TO PHQ QUESTIONS 1-9: 10
SUM OF ALL RESPONSES TO PHQ9 QUESTIONS 1 & 2: 3
SUM OF ALL RESPONSES TO PHQ QUESTIONS 1-9: 10
8. MOVING OR SPEAKING SO SLOWLY THAT OTHER PEOPLE COULD HAVE NOTICED. OR THE OPPOSITE, BEING SO FIGETY OR RESTLESS THAT YOU HAVE BEEN MOVING AROUND A LOT MORE THAN USUAL: SEVERAL DAYS

## 2024-08-12 NOTE — PROGRESS NOTES
Jennifer,in b12 administered in left.  Patient tolerated medication well.  AVS provided to patient with medication information.  Patient states understanding.

## 2024-08-12 NOTE — PROGRESS NOTES
Subjective:     CC: fatigue    Frandy Mills is a 84 y.o. male who presents today with c/o worsening chronic fatigue. He has a hx of liver cirrhosis, memory problems, pacemaker, and chronic back pain. I have not seen him in 6 months.    He is accompanied by his wife today.    Has been doing a lot of gardening. Says he is drinking plenty of water daily.  Still having to get up at night to urinate.   Appetite is good.   No swelling.  No CP or SOB.   BP low today but wife says this is his baseline.     Would like blood work today.    He is depressed. He is frustrated that he cannot do the things he used to do, both mentally and physically. Denies SI. He is good about staying active and likes to do projects at home. Likes to clean. He is not interested in counseling or medication at this time. Has good support from his wife.     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 He is followed by Bill Dior NP and Dr Gan at [facility].    He has been maintained on 1200 mg of ursodiol without issue.  Hx of varices with banding.   He was scheduled for an EGD in June but had to postpone due to a tooth infection.  Wife states he will follow up with the PA next week.      He has a low platelet count- denies any nosebleeds, bleeding gums, or blood in his stool.     He takes Lasix 20mg and Spironolactone 50mg ONCE DAILY ONLY AS NEEDED for swelling in the abdomen or legs. This is typically only required once a week. He does take a potassium supplement with the lasix. No swelling today.    Dementia  His former PCP Dr Estrada diagnosed him with dementia years ago.   Over the past year, however, his wife started accompanying him to his appts and does not believe he has dementia. He was referred to neuro for further evaluation.     Saw [facility] NP Mukesh Edwards 7-11-24. MOCA in office was 10/30. Cannot have brain MRI due to pacemaker.

## 2024-08-12 NOTE — PROGRESS NOTES
\"Have you been to the ER, urgent care clinic since your last visit?  Hospitalized since your last visit?\"    NO    “Have you seen or consulted any other health care providers outside of [facility] since your last visit?”    YES - When: approximately 1 months ago.  Where and Why: neurology.            Click Here for Release of Records Request

## 2024-08-13 LAB
25(OH)D3 SERPL-MCNC: 30.4 NG/ML (ref 30–100)
ALBUMIN SERPL-MCNC: 3 G/DL (ref 3.5–5)
ALBUMIN/GLOB SERPL: 0.8 (ref 1.1–2.2)
ALP SERPL-CCNC: 165 U/L (ref 45–117)
ALT SERPL-CCNC: 39 U/L (ref 12–78)
ANION GAP SERPL CALC-SCNC: 3 MMOL/L (ref 5–15)
AST SERPL-CCNC: 52 U/L (ref 15–37)
BASOPHILS # BLD: 0 K/UL (ref 0–0.1)
BASOPHILS NFR BLD: 1 % (ref 0–1)
BILIRUB SERPL-MCNC: 1.3 MG/DL (ref 0.2–1)
BUN SERPL-MCNC: 21 MG/DL (ref 6–20)
BUN/CREAT SERPL: 18 (ref 12–20)
CALCIUM SERPL-MCNC: 9.7 MG/DL (ref 8.5–10.1)
CHLORIDE SERPL-SCNC: 110 MMOL/L (ref 97–108)
CO2 SERPL-SCNC: 27 MMOL/L (ref 21–32)
CREAT SERPL-MCNC: 1.16 MG/DL (ref 0.7–1.3)
DIFFERENTIAL METHOD BLD: ABNORMAL
EOSINOPHIL # BLD: 0.3 K/UL (ref 0–0.4)
EOSINOPHIL NFR BLD: 9 % (ref 0–7)
ERYTHROCYTE [DISTWIDTH] IN BLOOD BY AUTOMATED COUNT: 14.3 % (ref 11.5–14.5)
GLOBULIN SER CALC-MCNC: 3.8 G/DL (ref 2–4)
GLUCOSE SERPL-MCNC: 106 MG/DL (ref 65–100)
HCT VFR BLD AUTO: 31.6 % (ref 36.6–50.3)
HGB BLD-MCNC: 10.4 G/DL (ref 12.1–17)
IMM GRANULOCYTES # BLD AUTO: 0 K/UL (ref 0–0.04)
IMM GRANULOCYTES NFR BLD AUTO: 0 % (ref 0–0.5)
LYMPHOCYTES # BLD: 1.3 K/UL (ref 0.8–3.5)
LYMPHOCYTES NFR BLD: 41 % (ref 12–49)
MCH RBC QN AUTO: 31.6 PG (ref 26–34)
MCHC RBC AUTO-ENTMCNC: 32.9 G/DL (ref 30–36.5)
MCV RBC AUTO: 96 FL (ref 80–99)
MONOCYTES # BLD: 0.3 K/UL (ref 0–1)
MONOCYTES NFR BLD: 10 % (ref 5–13)
NEUTS SEG # BLD: 1.2 K/UL (ref 1.8–8)
NEUTS SEG NFR BLD: 39 % (ref 32–75)
NRBC # BLD: 0 K/UL (ref 0–0.01)
NRBC BLD-RTO: 0 PER 100 WBC
PLATELET # BLD AUTO: 82 K/UL (ref 150–400)
PMV BLD AUTO: 11.9 FL (ref 8.9–12.9)
POTASSIUM SERPL-SCNC: 4.5 MMOL/L (ref 3.5–5.1)
PROT SERPL-MCNC: 6.8 G/DL (ref 6.4–8.2)
RBC # BLD AUTO: 3.29 M/UL (ref 4.1–5.7)
RBC MORPH BLD: ABNORMAL
SODIUM SERPL-SCNC: 140 MMOL/L (ref 136–145)
VIT B12 SERPL-MCNC: 485 PG/ML (ref 193–986)
WBC # BLD AUTO: 3.1 K/UL (ref 4.1–11.1)

## 2024-08-16 ENCOUNTER — TELEPHONE (OUTPATIENT)
Age: 84
End: 2024-08-16

## 2024-08-16 NOTE — TELEPHONE ENCOUNTER
Pt was sent oneforty message about labs on 8/13, he has not viewed them, his results are no longer in the inbasket and the pt does not know the results, please call pt about lab results.    Per Deanna: Please fax these labs to his liver specialist Dr Gan for review. Alk phos has increased. His platelet cell count has dropped even lower and now his RBC and WBC count have dropped. Wife should discuss these labs with the liver specialist at their appt next week. He may need to see a hematologist. We have one here in [place] if necessary. B12 level in normal range.

## 2024-08-16 NOTE — TELEPHONE ENCOUNTER
Spoke with the patients wife and gave results and she will address abnormal results with Liver Doctor when they go next week

## 2024-08-22 ENCOUNTER — OFFICE VISIT (OUTPATIENT)
Age: 84
End: 2024-08-22
Payer: MEDICARE

## 2024-08-22 VITALS
OXYGEN SATURATION: 97 % | TEMPERATURE: 98.1 F | HEIGHT: 70 IN | DIASTOLIC BLOOD PRESSURE: 76 MMHG | SYSTOLIC BLOOD PRESSURE: 126 MMHG | WEIGHT: 159.8 LBS | BODY MASS INDEX: 22.88 KG/M2 | HEART RATE: 62 BPM

## 2024-08-22 DIAGNOSIS — K74.3 PRIMARY BILIARY CIRRHOSIS (HCC): Primary | ICD-10-CM

## 2024-08-22 PROCEDURE — 99214 OFFICE O/P EST MOD 30 MIN: CPT | Performed by: NURSE PRACTITIONER

## 2024-08-22 PROCEDURE — 1123F ACP DISCUSS/DSCN MKR DOCD: CPT | Performed by: NURSE PRACTITIONER

## 2024-08-22 RX ORDER — SULFAMETHOXAZOLE AND TRIMETHOPRIM 800; 160 MG/1; MG/1
TABLET ORAL
COMMUNITY
Start: 2024-07-25 | End: 2024-08-22

## 2024-08-22 RX ORDER — SYRINGE, DISPOSABLE, 1 ML
SYRINGE, EMPTY DISPOSABLE MISCELLANEOUS
COMMUNITY
Start: 2024-07-11

## 2024-08-22 NOTE — PATIENT INSTRUCTIONS
Please take a daily probiotic.  Please take branch chain amino acids daily.  Please take a daily zinc supplement.

## 2024-09-04 ENCOUNTER — HOSPITAL ENCOUNTER (OUTPATIENT)
Facility: HOSPITAL | Age: 84
Discharge: HOME | End: 2024-09-07
Payer: MEDICARE

## 2024-09-04 DIAGNOSIS — K74.3 PRIMARY BILIARY CIRRHOSIS (HCC): ICD-10-CM

## 2024-09-04 PROCEDURE — 76705 ECHO EXAM OF ABDOMEN: CPT

## 2024-09-05 ENCOUNTER — PREP FOR PROCEDURE (OUTPATIENT)
Age: 84
End: 2024-09-05

## 2024-09-05 ENCOUNTER — CLINICAL DOCUMENTATION (OUTPATIENT)
Age: 84
End: 2024-09-05

## 2024-09-05 DIAGNOSIS — K74.3 PRIMARY BILIARY CIRRHOSIS (HCC): ICD-10-CM

## 2024-09-23 ENCOUNTER — ANESTHESIA EVENT (OUTPATIENT)
Facility: HOSPITAL | Age: 84
End: 2024-09-23
Payer: MEDICARE

## 2024-09-23 ENCOUNTER — HOSPITAL ENCOUNTER (OUTPATIENT)
Facility: HOSPITAL | Age: 84
Setting detail: OUTPATIENT SURGERY
Discharge: HOME | End: 2024-09-23
Attending: INTERNAL MEDICINE | Admitting: INTERNAL MEDICINE
Payer: MEDICARE

## 2024-09-23 ENCOUNTER — ANESTHESIA (OUTPATIENT)
Facility: HOSPITAL | Age: 84
End: 2024-09-23
Payer: MEDICARE

## 2024-09-23 VITALS
HEIGHT: 69 IN | RESPIRATION RATE: 18 BRPM | TEMPERATURE: 97.6 F | BODY MASS INDEX: 23.7 KG/M2 | HEART RATE: 60 BPM | DIASTOLIC BLOOD PRESSURE: 88 MMHG | SYSTOLIC BLOOD PRESSURE: 153 MMHG | OXYGEN SATURATION: 96 % | WEIGHT: 160 LBS

## 2024-09-23 PROCEDURE — 3700000000 HC ANESTHESIA ATTENDED CARE: Performed by: INTERNAL MEDICINE

## 2024-09-23 PROCEDURE — 3700000001 HC ADD 15 MINUTES (ANESTHESIA): Performed by: INTERNAL MEDICINE

## 2024-09-23 PROCEDURE — 2720000010 HC SURG SUPPLY STERILE: Performed by: INTERNAL MEDICINE

## 2024-09-23 PROCEDURE — 7100000011 HC PHASE II RECOVERY - ADDTL 15 MIN: Performed by: INTERNAL MEDICINE

## 2024-09-23 PROCEDURE — 2709999900 HC NON-CHARGEABLE SUPPLY: Performed by: INTERNAL MEDICINE

## 2024-09-23 PROCEDURE — 43244 EGD VARICES LIGATION: CPT | Performed by: INTERNAL MEDICINE

## 2024-09-23 PROCEDURE — 2500000003 HC RX 250 WO HCPCS

## 2024-09-23 PROCEDURE — 3600007512: Performed by: INTERNAL MEDICINE

## 2024-09-23 PROCEDURE — 6360000002 HC RX W HCPCS

## 2024-09-23 PROCEDURE — 2580000003 HC RX 258: Performed by: INTERNAL MEDICINE

## 2024-09-23 PROCEDURE — 7100000010 HC PHASE II RECOVERY - FIRST 15 MIN: Performed by: INTERNAL MEDICINE

## 2024-09-23 PROCEDURE — 3600007502: Performed by: INTERNAL MEDICINE

## 2024-09-23 RX ORDER — SODIUM CHLORIDE 9 MG/ML
INJECTION, SOLUTION INTRAVENOUS PRN
Status: DISCONTINUED | OUTPATIENT
Start: 2024-09-23 | End: 2024-09-23 | Stop reason: HOSPADM

## 2024-09-23 RX ORDER — SODIUM CHLORIDE 0.9 % (FLUSH) 0.9 %
5-40 SYRINGE (ML) INJECTION PRN
Status: CANCELLED | OUTPATIENT
Start: 2024-09-23

## 2024-09-23 RX ORDER — ONDANSETRON 2 MG/ML
2 INJECTION INTRAMUSCULAR; INTRAVENOUS AS NEEDED
Status: CANCELLED | OUTPATIENT
Start: 2024-09-23

## 2024-09-23 RX ORDER — SODIUM CHLORIDE 0.9 % (FLUSH) 0.9 %
5-40 SYRINGE (ML) INJECTION EVERY 12 HOURS SCHEDULED
Status: CANCELLED | OUTPATIENT
Start: 2024-09-23

## 2024-09-23 RX ORDER — LIDOCAINE HYDROCHLORIDE 20 MG/ML
INJECTION, SOLUTION EPIDURAL; INFILTRATION; INTRACAUDAL; PERINEURAL
Status: DISCONTINUED | OUTPATIENT
Start: 2024-09-23 | End: 2024-09-23 | Stop reason: SDUPTHER

## 2024-09-23 RX ORDER — PROPOFOL 10 MG/ML
INJECTION, EMULSION INTRAVENOUS
Status: DISCONTINUED | OUTPATIENT
Start: 2024-09-23 | End: 2024-09-23 | Stop reason: SDUPTHER

## 2024-09-23 RX ADMIN — PROPOFOL 30 MG: 10 INJECTION, EMULSION INTRAVENOUS at 11:02

## 2024-09-23 RX ADMIN — PROPOFOL 20 MG: 10 INJECTION, EMULSION INTRAVENOUS at 11:05

## 2024-09-23 RX ADMIN — PROPOFOL 30 MG: 10 INJECTION, EMULSION INTRAVENOUS at 11:12

## 2024-09-23 RX ADMIN — PROPOFOL 20 MG: 10 INJECTION, EMULSION INTRAVENOUS at 11:03

## 2024-09-23 RX ADMIN — LIDOCAINE HYDROCHLORIDE 80 MG: 20 INJECTION, SOLUTION EPIDURAL; INFILTRATION; INTRACAUDAL; PERINEURAL at 11:00

## 2024-09-23 RX ADMIN — SODIUM CHLORIDE: 9 INJECTION, SOLUTION INTRAVENOUS at 10:37

## 2024-09-23 RX ADMIN — PROPOFOL 10 MG: 10 INJECTION, EMULSION INTRAVENOUS at 11:09

## 2024-09-23 ASSESSMENT — PAIN - FUNCTIONAL ASSESSMENT
PAIN_FUNCTIONAL_ASSESSMENT: NONE - DENIES PAIN
PAIN_FUNCTIONAL_ASSESSMENT: 0-10

## 2024-10-07 RX ORDER — FUROSEMIDE 20 MG
TABLET ORAL
Qty: 90 TABLET | Refills: 1 | Status: SHIPPED | OUTPATIENT
Start: 2024-10-07

## 2024-10-10 ENCOUNTER — NURSE ONLY (OUTPATIENT)
Age: 84
End: 2024-10-10
Payer: MEDICARE

## 2024-10-10 DIAGNOSIS — E53.8 B12 DEFICIENCY: Primary | ICD-10-CM

## 2024-10-10 PROCEDURE — 96372 THER/PROPH/DIAG INJ SC/IM: CPT | Performed by: NURSE PRACTITIONER

## 2024-10-10 RX ORDER — CYANOCOBALAMIN 1000 UG/ML
1000 INJECTION, SOLUTION INTRAMUSCULAR; SUBCUTANEOUS ONCE
Status: COMPLETED | OUTPATIENT
Start: 2024-10-10 | End: 2024-10-10

## 2024-10-10 RX ADMIN — CYANOCOBALAMIN 1000 MCG: 1000 INJECTION, SOLUTION INTRAMUSCULAR; SUBCUTANEOUS at 15:50

## 2024-10-10 NOTE — PROGRESS NOTES
After obtaining consent, and per orders of NP, injection of B12 given in Right deltoid by Tano Fofana LPN. Patient instructed to remain in clinic for 20 minutes afterwards, and to report any adverse reaction to me immediately.

## 2024-10-15 DIAGNOSIS — E55.9 VITAMIN D DEFICIENCY, UNSPECIFIED: ICD-10-CM

## 2024-10-16 RX ORDER — URSODIOL 300 MG/1
600 CAPSULE ORAL 2 TIMES DAILY
Qty: 360 CAPSULE | Refills: 3 | Status: SHIPPED | OUTPATIENT
Start: 2024-10-16

## 2024-11-26 ENCOUNTER — HOSPITAL ENCOUNTER (OUTPATIENT)
Facility: HOSPITAL | Age: 84
Discharge: HOME | End: 2024-11-29
Payer: MEDICARE

## 2024-11-26 ENCOUNTER — OFFICE VISIT (OUTPATIENT)
Age: 84
End: 2024-11-26
Payer: MEDICARE

## 2024-11-26 VITALS
RESPIRATION RATE: 18 BRPM | BODY MASS INDEX: 25.77 KG/M2 | HEART RATE: 88 BPM | DIASTOLIC BLOOD PRESSURE: 82 MMHG | TEMPERATURE: 98.9 F | HEIGHT: 69 IN | OXYGEN SATURATION: 97 % | WEIGHT: 174 LBS | SYSTOLIC BLOOD PRESSURE: 135 MMHG

## 2024-11-26 DIAGNOSIS — R05.2 SUBACUTE COUGH: Primary | ICD-10-CM

## 2024-11-26 DIAGNOSIS — R05.2 SUBACUTE COUGH: ICD-10-CM

## 2024-11-26 DIAGNOSIS — J40 BRONCHITIS: ICD-10-CM

## 2024-11-26 PROCEDURE — 99214 OFFICE O/P EST MOD 30 MIN: CPT | Performed by: STUDENT IN AN ORGANIZED HEALTH CARE EDUCATION/TRAINING PROGRAM

## 2024-11-26 PROCEDURE — 71046 X-RAY EXAM CHEST 2 VIEWS: CPT

## 2024-11-26 PROCEDURE — 1159F MED LIST DOCD IN RCRD: CPT | Performed by: STUDENT IN AN ORGANIZED HEALTH CARE EDUCATION/TRAINING PROGRAM

## 2024-11-26 PROCEDURE — 1126F AMNT PAIN NOTED NONE PRSNT: CPT | Performed by: STUDENT IN AN ORGANIZED HEALTH CARE EDUCATION/TRAINING PROGRAM

## 2024-11-26 PROCEDURE — 1123F ACP DISCUSS/DSCN MKR DOCD: CPT | Performed by: STUDENT IN AN ORGANIZED HEALTH CARE EDUCATION/TRAINING PROGRAM

## 2024-11-26 RX ORDER — CEFPODOXIME PROXETIL 200 MG/1
200 TABLET, FILM COATED ORAL 2 TIMES DAILY
Qty: 10 TABLET | Refills: 0 | Status: SHIPPED | OUTPATIENT
Start: 2024-11-26 | End: 2024-12-01

## 2024-11-26 NOTE — PROGRESS NOTES
Subjective:     Chief Complaint   Patient presents with    Cough     X 10days    Wheezing       Frandy Mills is a 84 y.o. male who presents today for follow up     HPI    Patient presents today with his wife today who assists in providing the history.  States that one month ago developed cough and sinus congestion which is typical of him during the fall.   Over the last 1-2 weeks cough has worsened become productive for clear sputum.  Has been more frequent and now persistent throughout the daytime and overnight.  No fevers or chills   No sick contacts   No tobacco use hx   I have states that she has heard rare wheeze at the end of a coughing spell otherwise no wheezing or increased work of breathing.  Has been taking Tylenol Flonase and zyrtec - zyrtec was helpful however did not continue this.  He denies any sinus pain or pressure.  Has had reactions to multiple antibiotics in the past, does not tolerate ciprofloxacin, attributed this to worsening hepatic disease, sulfa antibiotics or Augmentin.  Has tolerated shorter courses of amoxicillin per wife.  No anaphylactic reactions.          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

## 2024-12-16 ENCOUNTER — HOSPITAL ENCOUNTER (EMERGENCY)
Facility: HOSPITAL | Age: 84
Discharge: HOME | End: 2024-12-16
Attending: EMERGENCY MEDICINE
Payer: MEDICARE

## 2024-12-16 ENCOUNTER — APPOINTMENT (OUTPATIENT)
Facility: HOSPITAL | Age: 84
End: 2024-12-16
Payer: MEDICARE

## 2024-12-16 VITALS
OXYGEN SATURATION: 95 % | DIASTOLIC BLOOD PRESSURE: 80 MMHG | RESPIRATION RATE: 14 BRPM | SYSTOLIC BLOOD PRESSURE: 129 MMHG | HEART RATE: 70 BPM | BODY MASS INDEX: 24.77 KG/M2 | TEMPERATURE: 99.1 F | HEIGHT: 70 IN | WEIGHT: 173 LBS

## 2024-12-16 DIAGNOSIS — J18.9 PNEUMONIA OF BOTH LOWER LOBES DUE TO INFECTIOUS ORGANISM: Primary | ICD-10-CM

## 2024-12-16 LAB
FLUAV RNA SPEC QL NAA+PROBE: NOT DETECTED
FLUBV RNA SPEC QL NAA+PROBE: NOT DETECTED
RSV RNA NPH QL NAA+PROBE: NOT DETECTED
S PYO DNA THROAT QL NAA+PROBE: NOT DETECTED
SARS-COV-2 RNA RESP QL NAA+PROBE: NOT DETECTED
SOURCE: NORMAL
SOURCE: NORMAL

## 2024-12-16 PROCEDURE — 6360000002 HC RX W HCPCS

## 2024-12-16 PROCEDURE — 99284 EMERGENCY DEPT VISIT MOD MDM: CPT

## 2024-12-16 PROCEDURE — 71045 X-RAY EXAM CHEST 1 VIEW: CPT

## 2024-12-16 PROCEDURE — 87636 SARSCOV2 & INF A&B AMP PRB: CPT

## 2024-12-16 PROCEDURE — 87651 STREP A DNA AMP PROBE: CPT

## 2024-12-16 PROCEDURE — 6370000000 HC RX 637 (ALT 250 FOR IP)

## 2024-12-16 PROCEDURE — 87634 RSV DNA/RNA AMP PROBE: CPT

## 2024-12-16 RX ORDER — ACETAMINOPHEN 500 MG
500 TABLET ORAL
Status: COMPLETED | OUTPATIENT
Start: 2024-12-16 | End: 2024-12-16

## 2024-12-16 RX ORDER — BENZONATATE 100 MG/1
100 CAPSULE ORAL 3 TIMES DAILY PRN
Qty: 30 CAPSULE | Refills: 0 | Status: SHIPPED | OUTPATIENT
Start: 2024-12-16 | End: 2024-12-26

## 2024-12-16 RX ORDER — LIDOCAINE HYDROCHLORIDE 20 MG/ML
15 SOLUTION OROPHARYNGEAL
Status: COMPLETED | OUTPATIENT
Start: 2024-12-16 | End: 2024-12-16

## 2024-12-16 RX ORDER — LIDOCAINE HYDROCHLORIDE 20 MG/ML
15 SOLUTION OROPHARYNGEAL PRN
Qty: 100 ML | Refills: 0 | Status: SHIPPED | OUTPATIENT
Start: 2024-12-16

## 2024-12-16 RX ORDER — DEXAMETHASONE 4 MG/1
10 TABLET ORAL
Status: COMPLETED | OUTPATIENT
Start: 2024-12-16 | End: 2024-12-16

## 2024-12-16 RX ORDER — DOXYCYCLINE HYCLATE 100 MG
100 TABLET ORAL 2 TIMES DAILY
Qty: 14 TABLET | Refills: 0 | Status: SHIPPED | OUTPATIENT
Start: 2024-12-16 | End: 2024-12-23

## 2024-12-16 RX ADMIN — ACETAMINOPHEN 500 MG: 500 TABLET ORAL at 13:15

## 2024-12-16 RX ADMIN — DEXAMETHASONE 10 MG: 4 TABLET ORAL at 13:15

## 2024-12-16 RX ADMIN — LIDOCAINE HYDROCHLORIDE 15 ML: 20 SOLUTION ORAL at 13:16

## 2024-12-16 ASSESSMENT — PAIN DESCRIPTION - LOCATION: LOCATION: THROAT

## 2024-12-16 ASSESSMENT — PAIN SCALES - GENERAL: PAINLEVEL_OUTOF10: 7

## 2024-12-16 ASSESSMENT — PAIN - FUNCTIONAL ASSESSMENT: PAIN_FUNCTIONAL_ASSESSMENT: NONE - DENIES PAIN

## 2024-12-16 NOTE — ED PROVIDER NOTES
medications      B-D 3CC LUER-PENELOPE SYR 25GX1\" 25G X 1\" 3 ML Misc  Generic drug: SYRINGE-NEEDLE (DISP) 3 ML     B-D SYRINGE SLIP TIP 3CC 3 ML Misc  Generic drug: Syringe (Disposable)            ASK your doctor about these medications      Cyanocobalamin 1000 MCG/ML Kit     dextran 70-hypromellose 0.1-0.3 % Soln opthalmic solution  Commonly known as: TEARS NATURALE     furosemide 20 MG tablet  Commonly known as: LASIX  TAKE 1 TABLET BY MOUTH EVERY DAY     potassium chloride 20 MEQ extended release tablet  Commonly known as: KLOR-CON M  TAKE 1 TABLET BY MOUTH ONCE DAILY WITH THE FUROSEMIDE     spironolactone 50 MG tablet  Commonly known as: Aldactone  Take 1 tablet by mouth daily as needed (swelling)     tamsulosin 0.4 MG capsule  Commonly known as: FLOMAX     ursodiol 300 MG capsule  Commonly known as: ACTIGALL  TAKE 2 CAPSULES BY MOUTH TWICE A DAY               Where to Get Your Medications        These medications were sent to [place], [place] - [address] [place] [address] [phone] - F [phone]  [address] [place] [id number]      Phone: [phone]   benzonatate 100 MG capsule  doxycycline hyclate 100 MG tablet  lidocaine viscous hcl 2 % Soln solution           DISCONTINUED MEDICATIONS:  Discharge Medication List as of 12/16/2024  2:15 PM        I have seen and evaluated the patient autonomously. My supervision physician was on site and available for consultation if needed.     I am the Primary Clinician of Record.   Deidra Bray PA-C (electronically signed)    (Please note that parts of this dictation were completed with voice recognition software. Quite often unanticipated grammatical, syntax, homophones, and other interpretive errors are inadvertently transcribed by the computer software. Please disregards these errors. Please excuse any errors that have escaped final proofreading.)       Deidra Bray PA-C  12/16/24 8722

## 2024-12-30 ENCOUNTER — HOSPITAL ENCOUNTER (OUTPATIENT)
Facility: HOSPITAL | Age: 84
Discharge: HOME | End: 2025-01-02
Payer: MEDICARE

## 2024-12-30 ENCOUNTER — OFFICE VISIT (OUTPATIENT)
Age: 84
End: 2024-12-30
Payer: MEDICARE

## 2024-12-30 VITALS
BODY MASS INDEX: 24.2 KG/M2 | HEART RATE: 99 BPM | OXYGEN SATURATION: 96 % | WEIGHT: 169 LBS | SYSTOLIC BLOOD PRESSURE: 100 MMHG | HEIGHT: 70 IN | TEMPERATURE: 99.2 F | RESPIRATION RATE: 18 BRPM | DIASTOLIC BLOOD PRESSURE: 66 MMHG

## 2024-12-30 DIAGNOSIS — J18.9 PNEUMONIA OF BOTH LUNGS DUE TO INFECTIOUS ORGANISM, UNSPECIFIED PART OF LUNG: ICD-10-CM

## 2024-12-30 DIAGNOSIS — J18.9 PNEUMONIA OF BOTH LUNGS DUE TO INFECTIOUS ORGANISM, UNSPECIFIED PART OF LUNG: Primary | ICD-10-CM

## 2024-12-30 PROCEDURE — 1126F AMNT PAIN NOTED NONE PRSNT: CPT | Performed by: NURSE PRACTITIONER

## 2024-12-30 PROCEDURE — 99214 OFFICE O/P EST MOD 30 MIN: CPT | Performed by: NURSE PRACTITIONER

## 2024-12-30 PROCEDURE — 1160F RVW MEDS BY RX/DR IN RCRD: CPT | Performed by: NURSE PRACTITIONER

## 2024-12-30 PROCEDURE — 1123F ACP DISCUSS/DSCN MKR DOCD: CPT | Performed by: NURSE PRACTITIONER

## 2024-12-30 PROCEDURE — 71046 X-RAY EXAM CHEST 2 VIEWS: CPT

## 2024-12-30 PROCEDURE — 1159F MED LIST DOCD IN RCRD: CPT | Performed by: NURSE PRACTITIONER

## 2024-12-30 RX ORDER — GUAIFENESIN 600 MG/1
600 TABLET, EXTENDED RELEASE ORAL 2 TIMES DAILY
Qty: 30 TABLET | Refills: 0 | Status: SHIPPED | OUTPATIENT
Start: 2024-12-30 | End: 2025-01-14

## 2024-12-30 RX ORDER — CLARITHROMYCIN 500 MG/1
500 TABLET ORAL 2 TIMES DAILY
Qty: 10 TABLET | Refills: 0 | Status: SHIPPED | OUTPATIENT
Start: 2024-12-30 | End: 2025-01-04

## 2024-12-30 NOTE — PROGRESS NOTES
Subjective:     CC: ER visit follow up (PNA)    Frandy Mills is a 84 y.o. male who presents today to follow up for an ER visit.     He is accompanied by his wife today.     He went to the [facility] ER on 12-16-24 and was dx'd with bilateral PNA.  He was given IV Decadron and d/c'd home with a 10 day course of Doxycycline. Of note, 2 weeks prior he had taken a short course of oral cefpodoxime for cough and nasal congestion (he is allergic to Augmentin, sulfa, and the flouroquinolones).     He has no history of COPD.    Today his wife states his symptoms improved for a few days but then the cough returned. He is SOB, c/o CP, has a mild low grade fever, and is coughing up a small amount of pink tinged sputum. His BP is 100/66 and he does report dizziness. He is drinking his fluids and wife reports a good appetite. Ate breakfast this morning.       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)    BPH with obstruction/lower urinary tract symptoms    Pseudophakia of both eyes    Vitreous floaters of both eyes    Chronic pain of right knee    Hypogonadism male    Pacemaker    Thrombocytopenia (HCC)    Lumbar pain with radiation down right leg    Right hip pain    Dermatochalasis of both upper eyelids    Venous insufficiency of both lower extremities    Heart block    Vitamin D deficiency    Late onset Alzheimer'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

## 2024-12-30 NOTE — PROGRESS NOTES
\"Have you been to the ER, urgent care clinic since your last visit?  Hospitalized since your last visit?\"    YES - When: approximately 5 days ago.  Where and Why: cough,congestion.    “Have you seen or consulted any other health care providers outside our system since your last visit?”    NO

## 2025-01-01 LAB
BACTERIA SPEC CULT: NORMAL
GRAM STN SPEC: NORMAL
SERVICE CMNT-IMP: NORMAL

## 2025-02-06 ENCOUNTER — CLINICAL DOCUMENTATION (OUTPATIENT)
Age: 85
End: 2025-02-06

## 2025-02-06 NOTE — PROGRESS NOTES
Patient seen by cardiologist specialist NP Tu Mendoza on 1/30/2025.  Echo findings suggest moderate aortic stenosis.  Will continue to monitor.  Bradycardia is stable.  He has a permanent pacemaker.  EKG was done in the office.  Follow-up with Dr. Boone in 6 months.

## 2025-02-12 ENCOUNTER — CLINICAL DOCUMENTATION (OUTPATIENT)
Age: 85
End: 2025-02-12

## 2025-02-12 NOTE — PROGRESS NOTES
Patient seen by urologist Dr. Obrien on 2/7/2025.  Doing well on Flomax twice daily.  Cystoscopy was done.  He will follow-up in 6 months.

## 2025-02-17 ENCOUNTER — TELEPHONE (OUTPATIENT)
Age: 85
End: 2025-02-17

## 2025-02-17 DIAGNOSIS — R18.8 CIRRHOSIS OF LIVER WITH ASCITES, UNSPECIFIED HEPATIC CIRRHOSIS TYPE (HCC): Primary | ICD-10-CM

## 2025-02-17 DIAGNOSIS — C67.9 MALIGNANT NEOPLASM OF URINARY BLADDER, UNSPECIFIED SITE (HCC): ICD-10-CM

## 2025-02-17 DIAGNOSIS — K74.60 CIRRHOSIS OF LIVER WITH ASCITES, UNSPECIFIED HEPATIC CIRRHOSIS TYPE (HCC): Primary | ICD-10-CM

## 2025-02-17 NOTE — TELEPHONE ENCOUNTER
PT's wife called and asked if Frandy could come in to have his lab work done. She said he feels \"off\". Last labs were done on 8/12/24.

## 2025-02-18 ENCOUNTER — LAB (OUTPATIENT)
Age: 85
End: 2025-02-18
Payer: MEDICARE

## 2025-02-18 DIAGNOSIS — R82.998 URINE LEUKOCYTES: ICD-10-CM

## 2025-02-18 DIAGNOSIS — K74.60 CIRRHOSIS OF LIVER WITH ASCITES, UNSPECIFIED HEPATIC CIRRHOSIS TYPE (HCC): ICD-10-CM

## 2025-02-18 DIAGNOSIS — C67.9 MALIGNANT NEOPLASM OF URINARY BLADDER, UNSPECIFIED SITE (HCC): Primary | ICD-10-CM

## 2025-02-18 DIAGNOSIS — R18.8 CIRRHOSIS OF LIVER WITH ASCITES, UNSPECIFIED HEPATIC CIRRHOSIS TYPE (HCC): ICD-10-CM

## 2025-02-18 LAB
BILIRUBIN, URINE, POC: NEGATIVE
BLOOD URINE, POC: NEGATIVE
GLUCOSE URINE, POC: NEGATIVE
KETONES, URINE, POC: NEGATIVE
LEUKOCYTE ESTERASE, URINE, POC: NORMAL
NITRITE, URINE, POC: NEGATIVE
PH, URINE, POC: 6.5 (ref 4.6–8)
PROTEIN,URINE, POC: NEGATIVE
SPECIFIC GRAVITY, URINE, POC: 1.02 (ref 1–1.03)
URINALYSIS CLARITY, POC: NORMAL
URINALYSIS COLOR, POC: YELLOW
UROBILINOGEN, POC: NORMAL MG/DL

## 2025-02-18 PROCEDURE — 81001 URINALYSIS AUTO W/SCOPE: CPT

## 2025-02-19 LAB
ALBUMIN SERPL-MCNC: 3.2 G/DL (ref 3.5–5)
ALBUMIN/GLOB SERPL: 0.9 (ref 1.1–2.2)
ALP SERPL-CCNC: 136 U/L (ref 45–117)
ALT SERPL-CCNC: 28 U/L (ref 12–78)
ANION GAP SERPL CALC-SCNC: 5 MMOL/L (ref 2–12)
AST SERPL-CCNC: 40 U/L (ref 15–37)
BASOPHILS # BLD: 0.03 K/UL (ref 0–0.1)
BASOPHILS NFR BLD: 0.7 % (ref 0–1)
BILIRUB SERPL-MCNC: 0.9 MG/DL (ref 0.2–1)
BUN SERPL-MCNC: 24 MG/DL (ref 6–20)
BUN/CREAT SERPL: 17 (ref 12–20)
CALCIUM SERPL-MCNC: 9.5 MG/DL (ref 8.5–10.1)
CHLORIDE SERPL-SCNC: 113 MMOL/L (ref 97–108)
CO2 SERPL-SCNC: 24 MMOL/L (ref 21–32)
CREAT SERPL-MCNC: 1.41 MG/DL (ref 0.7–1.3)
DIFFERENTIAL METHOD BLD: ABNORMAL
EOSINOPHIL # BLD: 0.33 K/UL (ref 0–0.4)
EOSINOPHIL NFR BLD: 7.4 % (ref 0–7)
ERYTHROCYTE [DISTWIDTH] IN BLOOD BY AUTOMATED COUNT: 14.1 % (ref 11.5–14.5)
GLOBULIN SER CALC-MCNC: 3.6 G/DL (ref 2–4)
GLUCOSE SERPL-MCNC: 75 MG/DL (ref 65–100)
HCT VFR BLD AUTO: 34.1 % (ref 36.6–50.3)
HGB BLD-MCNC: 11.1 G/DL (ref 12.1–17)
IMM GRANULOCYTES # BLD AUTO: 0.01 K/UL (ref 0–0.04)
IMM GRANULOCYTES NFR BLD AUTO: 0.2 % (ref 0–0.5)
LYMPHOCYTES # BLD: 1.64 K/UL (ref 0.8–3.5)
LYMPHOCYTES NFR BLD: 36.4 % (ref 12–49)
MCH RBC QN AUTO: 31.4 PG (ref 26–34)
MCHC RBC AUTO-ENTMCNC: 32.6 G/DL (ref 30–36.5)
MCV RBC AUTO: 96.3 FL (ref 80–99)
MONOCYTES # BLD: 0.45 K/UL (ref 0–1)
MONOCYTES NFR BLD: 10 % (ref 5–13)
NEUTS SEG # BLD: 2.04 K/UL (ref 1.8–8)
NEUTS SEG NFR BLD: 45.3 % (ref 32–75)
NRBC # BLD: 0 K/UL (ref 0–0.01)
NRBC BLD-RTO: 0 PER 100 WBC
PLATELET # BLD AUTO: 94 K/UL (ref 150–400)
PMV BLD AUTO: 11.1 FL (ref 8.9–12.9)
POTASSIUM SERPL-SCNC: 4.3 MMOL/L (ref 3.5–5.1)
PROT SERPL-MCNC: 6.8 G/DL (ref 6.4–8.2)
RBC # BLD AUTO: 3.54 M/UL (ref 4.1–5.7)
RBC MORPH BLD: ABNORMAL
SODIUM SERPL-SCNC: 142 MMOL/L (ref 136–145)
WBC # BLD AUTO: 4.5 K/UL (ref 4.1–11.1)

## 2025-02-20 DIAGNOSIS — Z79.899 ON POTASSIUM WASTING DIURETIC THERAPY: ICD-10-CM

## 2025-02-20 RX ORDER — NITROFURANTOIN 25; 75 MG/1; MG/1
100 CAPSULE ORAL 2 TIMES DAILY
Qty: 14 CAPSULE | Refills: 0 | Status: SHIPPED | OUTPATIENT
Start: 2025-02-20 | End: 2025-02-27

## 2025-02-22 LAB
BACTERIA SPEC CULT: ABNORMAL
CC UR VC: ABNORMAL
SERVICE CMNT-IMP: ABNORMAL

## 2025-03-03 ENCOUNTER — NURSE ONLY (OUTPATIENT)
Age: 85
End: 2025-03-03
Payer: MEDICARE

## 2025-03-03 DIAGNOSIS — R39.9 UTI SYMPTOMS: Primary | ICD-10-CM

## 2025-03-03 LAB
BILIRUBIN, URINE, POC: NEGATIVE
BLOOD URINE, POC: NEGATIVE
GLUCOSE URINE, POC: NEGATIVE
KETONES, URINE, POC: NEGATIVE
LEUKOCYTE ESTERASE, URINE, POC: ABNORMAL
NITRITE, URINE, POC: NEGATIVE
PH, URINE, POC: 7 (ref 4.6–8)
PROTEIN,URINE, POC: NEGATIVE
SPECIFIC GRAVITY, URINE, POC: 1.01 (ref 1–1.03)
URINALYSIS CLARITY, POC: ABNORMAL
URINALYSIS COLOR, POC: YELLOW
UROBILINOGEN, POC: ABNORMAL MG/DL

## 2025-03-03 PROCEDURE — 81001 URINALYSIS AUTO W/SCOPE: CPT

## 2025-03-06 ENCOUNTER — TELEPHONE (OUTPATIENT)
Age: 85
End: 2025-03-06

## 2025-03-06 DIAGNOSIS — N30.00 ACUTE CYSTITIS WITHOUT HEMATURIA: Primary | ICD-10-CM

## 2025-03-06 LAB
BACTERIA SPEC CULT: ABNORMAL
CC UR VC: ABNORMAL
SERVICE CMNT-IMP: ABNORMAL

## 2025-03-06 RX ORDER — AMOXICILLIN 500 MG/1
500 CAPSULE ORAL 3 TIMES DAILY
Qty: 15 CAPSULE | Refills: 0 | Status: SHIPPED | OUTPATIENT
Start: 2025-03-06 | End: 2025-03-11

## 2025-03-06 NOTE — TELEPHONE ENCOUNTER
Returned patient's wife's call.  She really does not want to take patient to the ER.  She is wondering if there is any alternative solution.  I spoke to the pharmacist at the infusion center who reviewed the urine culture and suggested he try oral amoxicillin since the infection is susceptible to ampicillin.  Prescription sent to pharmacy.  Patient's wife made aware.  She was advised to let us know if he has any problems with the antibiotic or if his infection symptoms do not improve.  By the way she believes he first got this bladder infection after his scope at the urology office 3 weeks ago.

## 2025-03-13 ENCOUNTER — TELEPHONE (OUTPATIENT)
Age: 85
End: 2025-03-13

## 2025-03-13 NOTE — TELEPHONE ENCOUNTER
PT's wife stated that tomorrow will be the last day for his antibiotic. Wants to know if it will be to soon to have another urine sample tested.

## 2025-03-17 ENCOUNTER — NURSE ONLY (OUTPATIENT)
Age: 85
End: 2025-03-17
Payer: MEDICARE

## 2025-03-17 DIAGNOSIS — R30.9 URINARY PAIN: Primary | ICD-10-CM

## 2025-03-17 LAB
BILIRUBIN, URINE, POC: NEGATIVE
BLOOD URINE, POC: NORMAL
GLUCOSE URINE, POC: NEGATIVE
KETONES, URINE, POC: NEGATIVE
LEUKOCYTE ESTERASE, URINE, POC: NORMAL
NITRITE, URINE, POC: NEGATIVE
PH, URINE, POC: 6.5 (ref 4.6–8)
PROTEIN,URINE, POC: NEGATIVE
SPECIFIC GRAVITY, URINE, POC: 1.02 (ref 1–1.03)
URINALYSIS CLARITY, POC: CLEAR
URINALYSIS COLOR, POC: YELLOW
UROBILINOGEN, POC: NORMAL

## 2025-03-17 PROCEDURE — 81003 URINALYSIS AUTO W/O SCOPE: CPT

## 2025-03-19 ENCOUNTER — RESULTS FOLLOW-UP (OUTPATIENT)
Age: 85
End: 2025-03-19

## 2025-03-19 LAB
BACTERIA SPEC CULT: ABNORMAL
CC UR VC: ABNORMAL
SERVICE CMNT-IMP: ABNORMAL

## 2025-03-20 DIAGNOSIS — N30.00 ACUTE CYSTITIS WITHOUT HEMATURIA: ICD-10-CM

## 2025-03-20 RX ORDER — AMOXICILLIN 500 MG/1
500 CAPSULE ORAL 3 TIMES DAILY
Qty: 15 CAPSULE | Refills: 0 | Status: SHIPPED | OUTPATIENT
Start: 2025-03-20 | End: 2025-03-25

## 2025-03-26 ENCOUNTER — OFFICE VISIT (OUTPATIENT)
Age: 85
End: 2025-03-26
Payer: MEDICARE

## 2025-03-26 VITALS
TEMPERATURE: 97.3 F | RESPIRATION RATE: 18 BRPM | SYSTOLIC BLOOD PRESSURE: 127 MMHG | OXYGEN SATURATION: 95 % | BODY MASS INDEX: 24.91 KG/M2 | DIASTOLIC BLOOD PRESSURE: 75 MMHG | WEIGHT: 174 LBS | HEIGHT: 70 IN | HEART RATE: 68 BPM

## 2025-03-26 DIAGNOSIS — E55.9 VITAMIN D DEFICIENCY: ICD-10-CM

## 2025-03-26 DIAGNOSIS — J41.1 BRONCHITIS, MUCOPURULENT RECURRENT (HCC): Primary | ICD-10-CM

## 2025-03-26 DIAGNOSIS — N40.1 BENIGN PROSTATIC HYPERPLASIA WITH NOCTURIA: ICD-10-CM

## 2025-03-26 DIAGNOSIS — N39.0 RECURRENT UTI: ICD-10-CM

## 2025-03-26 DIAGNOSIS — R41.3 MEMORY LOSS: ICD-10-CM

## 2025-03-26 DIAGNOSIS — Z85.51 HISTORY OF BLADDER CANCER: ICD-10-CM

## 2025-03-26 DIAGNOSIS — E53.8 B12 DEFICIENCY: ICD-10-CM

## 2025-03-26 DIAGNOSIS — R35.1 BENIGN PROSTATIC HYPERPLASIA WITH NOCTURIA: ICD-10-CM

## 2025-03-26 DIAGNOSIS — K43.9 VENTRAL HERNIA WITHOUT OBSTRUCTION OR GANGRENE: ICD-10-CM

## 2025-03-26 DIAGNOSIS — Z95.0 PACEMAKER: ICD-10-CM

## 2025-03-26 DIAGNOSIS — K74.3 HEPATIC CIRRHOSIS DUE TO PRIMARY BILIARY CHOLANGITIS (HCC): ICD-10-CM

## 2025-03-26 PROCEDURE — 1160F RVW MEDS BY RX/DR IN RCRD: CPT | Performed by: NURSE PRACTITIONER

## 2025-03-26 PROCEDURE — 99214 OFFICE O/P EST MOD 30 MIN: CPT | Performed by: NURSE PRACTITIONER

## 2025-03-26 PROCEDURE — 1123F ACP DISCUSS/DSCN MKR DOCD: CPT | Performed by: NURSE PRACTITIONER

## 2025-03-26 PROCEDURE — 1126F AMNT PAIN NOTED NONE PRSNT: CPT | Performed by: NURSE PRACTITIONER

## 2025-03-26 PROCEDURE — 36415 COLL VENOUS BLD VENIPUNCTURE: CPT | Performed by: NURSE PRACTITIONER

## 2025-03-26 PROCEDURE — 1159F MED LIST DOCD IN RCRD: CPT | Performed by: NURSE PRACTITIONER

## 2025-03-26 RX ORDER — MONTELUKAST SODIUM 10 MG/1
10 TABLET ORAL DAILY
Qty: 90 TABLET | Refills: 1 | Status: SHIPPED | OUTPATIENT
Start: 2025-03-26

## 2025-03-26 SDOH — ECONOMIC STABILITY: FOOD INSECURITY: WITHIN THE PAST 12 MONTHS, THE FOOD YOU BOUGHT JUST DIDN'T LAST AND YOU DIDN'T HAVE MONEY TO GET MORE.: NEVER TRUE

## 2025-03-26 SDOH — ECONOMIC STABILITY: FOOD INSECURITY: WITHIN THE PAST 12 MONTHS, YOU WORRIED THAT YOUR FOOD WOULD RUN OUT BEFORE YOU GOT MONEY TO BUY MORE.: NEVER TRUE

## 2025-03-26 ASSESSMENT — PATIENT HEALTH QUESTIONNAIRE - PHQ9
7. TROUBLE CONCENTRATING ON THINGS, SUCH AS READING THE NEWSPAPER OR WATCHING TELEVISION: NOT AT ALL
3. TROUBLE FALLING OR STAYING ASLEEP: NOT AT ALL
5. POOR APPETITE OR OVEREATING: NOT AT ALL
SUM OF ALL RESPONSES TO PHQ QUESTIONS 1-9: 0
10. IF YOU CHECKED OFF ANY PROBLEMS, HOW DIFFICULT HAVE THESE PROBLEMS MADE IT FOR YOU TO DO YOUR WORK, TAKE CARE OF THINGS AT HOME, OR GET ALONG WITH OTHER PEOPLE: NOT DIFFICULT AT ALL
SUM OF ALL RESPONSES TO PHQ QUESTIONS 1-9: 0
6. FEELING BAD ABOUT YOURSELF - OR THAT YOU ARE A FAILURE OR HAVE LET YOURSELF OR YOUR FAMILY DOWN: NOT AT ALL
9. THOUGHTS THAT YOU WOULD BE BETTER OFF DEAD, OR OF HURTING YOURSELF: NOT AT ALL
1. LITTLE INTEREST OR PLEASURE IN DOING THINGS: NOT AT ALL
8. MOVING OR SPEAKING SO SLOWLY THAT OTHER PEOPLE COULD HAVE NOTICED. OR THE OPPOSITE, BEING SO FIGETY OR RESTLESS THAT YOU HAVE BEEN MOVING AROUND A LOT MORE THAN USUAL: NOT AT ALL
SUM OF ALL RESPONSES TO PHQ QUESTIONS 1-9: 0
2. FEELING DOWN, DEPRESSED OR HOPELESS: NOT AT ALL
SUM OF ALL RESPONSES TO PHQ QUESTIONS 1-9: 0
4. FEELING TIRED OR HAVING LITTLE ENERGY: NOT AT ALL

## 2025-03-26 NOTE — PROGRESS NOTES
Labs drawn in left arm per Deanna's orders.  Patient tolerated well.  
\"Have you been to the ER, urgent care clinic since your last visit?  Hospitalized since your last visit?\"    NO    “Have you seen or consulted any other health care providers outside our system since your last visit?”    NO           
Thrombocytopenia    Lumbar pain with radiation down right leg    Right hip pain    Dermatochalasis of both upper eyelids    Venous insufficiency of both lower extremities    Heart block    Vitamin D deficiency    Late onset Alzheimer'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 21 and junctional rhythm) with outpt laser bladder stone surgery 8/2021    Allergic     Anemia     Arthritis     BPH (benign prostatic hyperplasia)     Esophageal varices (HCC) March 2016    banded x 6    GERD (gastroesophageal reflux disease)     GI bleed March 2016    Hypogonadism male     Left bundle branch block (LBBB)     Dr. Boone, pacemaker 9/2021    Primary biliary cirrhosis (HCC)     Vitamin D deficiency          Current Outpatient Medications:     ursodiol (ACTIGALL) 300 MG capsule, TAKE 2 CAPSULES BY MOUTH TWICE A DAY, Disp: 360 capsule, Rfl: 3    furosemide (LASIX) 20 MG tablet, TAKE 1 TABLET BY MOUTH EVERY DAY, Disp: 90 tablet, Rfl: 1    B-D SYRINGE SLIP TIP 3CC 3 ML MISC, USE TO INJECT VITAMIN B12 AS DIRECTED., Disp: , Rfl:     spironolactone (ALDACTONE) 50 MG tablet, Take 1 tablet by mouth daily as needed (swelling), Disp: 90 tablet, Rfl: 3    Cyanocobalamin 1000 MCG/ML KIT, Inject 1,000 mcg into the muscle every 30 days, Disp: , Rfl:     B-D 3CC LUER-PENELOPE SYR 25GX1\" 25G X 1\" 3 ML MISC, USE TO INJECT VITAMIN B12 AS DIRECTED., Disp: , Rfl:     potassium chloride (KLOR-CON M) 20 MEQ extended release tablet, TAKE 1 TABLET BY MOUTH ONCE DAILY WITH THE FUROSEMIDE, Disp: 90 tablet, Rfl: 1    dextran 70-hypromellose (TEARS NATURALE) 0.1-0.3 % SOLN opthalmic

## 2025-03-27 ENCOUNTER — RESULTS FOLLOW-UP (OUTPATIENT)
Age: 85
End: 2025-03-27

## 2025-03-27 LAB
25(OH)D3 SERPL-MCNC: 35.9 NG/ML (ref 30–100)
ANION GAP SERPL CALC-SCNC: 8 MMOL/L (ref 2–12)
BUN SERPL-MCNC: 27 MG/DL (ref 6–20)
BUN/CREAT SERPL: 21 (ref 12–20)
CALCIUM SERPL-MCNC: 8.9 MG/DL (ref 8.5–10.1)
CHLORIDE SERPL-SCNC: 112 MMOL/L (ref 97–108)
CO2 SERPL-SCNC: 22 MMOL/L (ref 21–32)
CREAT SERPL-MCNC: 1.29 MG/DL (ref 0.7–1.3)
GLUCOSE SERPL-MCNC: 104 MG/DL (ref 65–100)
POTASSIUM SERPL-SCNC: 4.3 MMOL/L (ref 3.5–5.1)
SODIUM SERPL-SCNC: 142 MMOL/L (ref 136–145)
VIT B12 SERPL-MCNC: 286 PG/ML (ref 193–986)

## 2025-04-07 ENCOUNTER — LAB (OUTPATIENT)
Age: 85
End: 2025-04-07
Payer: MEDICARE

## 2025-04-07 ENCOUNTER — RESULTS FOLLOW-UP (OUTPATIENT)
Age: 85
End: 2025-04-07

## 2025-04-07 DIAGNOSIS — N39.0 RECURRENT UTI: Primary | ICD-10-CM

## 2025-04-07 LAB
BILIRUBIN, URINE, POC: NEGATIVE
BLOOD URINE, POC: NEGATIVE
GLUCOSE URINE, POC: NEGATIVE
KETONES, URINE, POC: NEGATIVE
LEUKOCYTE ESTERASE, URINE, POC: NEGATIVE
NITRITE, URINE, POC: NEGATIVE
PH, URINE, POC: 7 (ref 4.6–8)
PROTEIN,URINE, POC: NEGATIVE
SPECIFIC GRAVITY, URINE, POC: 1.01 (ref 1–1.03)
URINALYSIS CLARITY, POC: CLEAR
URINALYSIS COLOR, POC: YELLOW
UROBILINOGEN, POC: NORMAL MG/DL

## 2025-04-07 PROCEDURE — 81001 URINALYSIS AUTO W/SCOPE: CPT

## 2025-04-07 RX ORDER — FUROSEMIDE 20 MG/1
20 TABLET ORAL DAILY
Qty: 90 TABLET | Refills: 1 | Status: SHIPPED | OUTPATIENT
Start: 2025-04-07

## 2025-04-10 ENCOUNTER — HOSPITAL ENCOUNTER (OUTPATIENT)
Facility: HOSPITAL | Age: 85
Setting detail: SPECIMEN
Discharge: HOME | End: 2025-04-13
Payer: MEDICARE

## 2025-04-10 ENCOUNTER — OFFICE VISIT (OUTPATIENT)
Age: 85
End: 2025-04-10
Payer: MEDICARE

## 2025-04-10 VITALS
HEART RATE: 61 BPM | HEIGHT: 70 IN | OXYGEN SATURATION: 99 % | BODY MASS INDEX: 24.2 KG/M2 | WEIGHT: 169 LBS | SYSTOLIC BLOOD PRESSURE: 137 MMHG | DIASTOLIC BLOOD PRESSURE: 75 MMHG | TEMPERATURE: 98.4 F

## 2025-04-10 DIAGNOSIS — E55.9 VITAMIN D DEFICIENCY, UNSPECIFIED: ICD-10-CM

## 2025-04-10 DIAGNOSIS — K74.3 PRIMARY BILIARY CIRRHOSIS: ICD-10-CM

## 2025-04-10 DIAGNOSIS — K74.3 PRIMARY BILIARY CIRRHOSIS: Primary | ICD-10-CM

## 2025-04-10 LAB
ALBUMIN SERPL-MCNC: 2.8 G/DL (ref 3.4–5)
ALBUMIN/GLOB SERPL: 0.7 (ref 0.8–1.7)
ALP SERPL-CCNC: 205 U/L (ref 45–117)
ALT SERPL-CCNC: 45 U/L (ref 16–61)
ANION GAP SERPL CALC-SCNC: 4 MMOL/L (ref 3–18)
AST SERPL-CCNC: 58 U/L (ref 10–38)
BASOPHILS # BLD: 0.04 K/UL (ref 0–0.1)
BASOPHILS NFR BLD: 1.1 % (ref 0–2)
BILIRUB DIRECT SERPL-MCNC: 1 MG/DL (ref 0–0.2)
BILIRUB SERPL-MCNC: 1.6 MG/DL (ref 0.2–1)
BUN SERPL-MCNC: 18 MG/DL (ref 7–18)
BUN/CREAT SERPL: 17 (ref 12–20)
CALCIUM SERPL-MCNC: 8.9 MG/DL (ref 8.5–10.1)
CHLORIDE SERPL-SCNC: 110 MMOL/L (ref 100–111)
CO2 SERPL-SCNC: 27 MMOL/L (ref 21–32)
CREAT SERPL-MCNC: 1.08 MG/DL (ref 0.6–1.3)
DIFFERENTIAL METHOD BLD: ABNORMAL
EOSINOPHIL # BLD: 0.33 K/UL (ref 0–0.4)
EOSINOPHIL NFR BLD: 9.4 % (ref 0–5)
ERYTHROCYTE [DISTWIDTH] IN BLOOD BY AUTOMATED COUNT: 14.6 % (ref 11.6–14.5)
GLOBULIN SER CALC-MCNC: 3.8 G/DL (ref 2–4)
GLUCOSE SERPL-MCNC: 87 MG/DL (ref 74–99)
HCT VFR BLD AUTO: 31.4 % (ref 36–48)
HGB BLD-MCNC: 10.2 G/DL (ref 13–16)
IMM GRANULOCYTES # BLD AUTO: 0.01 K/UL (ref 0–0.04)
IMM GRANULOCYTES NFR BLD AUTO: 0.3 % (ref 0–0.5)
INR PPP: 1.1 (ref 0.9–1.1)
LYMPHOCYTES # BLD: 1.43 K/UL (ref 0.9–3.3)
LYMPHOCYTES NFR BLD: 40.6 % (ref 21–52)
MCH RBC QN AUTO: 31.1 PG (ref 24–34)
MCHC RBC AUTO-ENTMCNC: 32.5 G/DL (ref 31–37)
MCV RBC AUTO: 95.7 FL (ref 78–100)
MONOCYTES # BLD: 0.35 K/UL (ref 0.05–1.2)
MONOCYTES NFR BLD: 9.9 % (ref 3–10)
NEUTS SEG # BLD: 1.36 K/UL (ref 1.8–8)
NEUTS SEG NFR BLD: 38.7 % (ref 40–73)
NRBC # BLD: 0 K/UL (ref 0–0.01)
NRBC BLD-RTO: 0 PER 100 WBC
PLATELET # BLD AUTO: 106 K/UL (ref 135–420)
PMV BLD AUTO: 11.1 FL (ref 9.2–11.8)
POTASSIUM SERPL-SCNC: 4.3 MMOL/L (ref 3.5–5.5)
PROT SERPL-MCNC: 6.6 G/DL (ref 6.4–8.2)
PROTHROMBIN TIME: 14.3 SEC (ref 11.9–14.9)
RBC # BLD AUTO: 3.28 M/UL (ref 4.35–5.65)
SODIUM SERPL-SCNC: 141 MMOL/L (ref 136–145)
WBC # BLD AUTO: 3.5 K/UL (ref 4.6–13.2)

## 2025-04-10 PROCEDURE — 82107 ALPHA-FETOPROTEIN L3: CPT

## 2025-04-10 PROCEDURE — 1123F ACP DISCUSS/DSCN MKR DOCD: CPT | Performed by: NURSE PRACTITIONER

## 2025-04-10 PROCEDURE — 99214 OFFICE O/P EST MOD 30 MIN: CPT | Performed by: NURSE PRACTITIONER

## 2025-04-10 PROCEDURE — 36415 COLL VENOUS BLD VENIPUNCTURE: CPT

## 2025-04-10 PROCEDURE — 80076 HEPATIC FUNCTION PANEL: CPT

## 2025-04-10 PROCEDURE — 85025 COMPLETE CBC W/AUTO DIFF WBC: CPT

## 2025-04-10 PROCEDURE — 85610 PROTHROMBIN TIME: CPT

## 2025-04-10 PROCEDURE — 1126F AMNT PAIN NOTED NONE PRSNT: CPT | Performed by: NURSE PRACTITIONER

## 2025-04-10 PROCEDURE — 80048 BASIC METABOLIC PNL TOTAL CA: CPT

## 2025-04-10 RX ORDER — URSODIOL 300 MG/1
600 CAPSULE ORAL 2 TIMES DAILY
Qty: 360 CAPSULE | Refills: 3 | Status: SHIPPED | OUTPATIENT
Start: 2025-04-10

## 2025-04-10 RX ORDER — CEPHALEXIN 500 MG/1
CAPSULE ORAL
COMMUNITY
Start: 2025-02-06 | End: 2025-04-10

## 2025-04-10 NOTE — PROGRESS NOTES
[facility] LIVER [facility]     Levi Gan MD, FACP, MACG, FAASLD   MD Tiffanie Betancourt PA-C April S Ashworth, [facility]   Janie Mccurdy, [facility]-   Kerrie Butler, FNP-C  Bill Dior, FNP-C   Roro Antoine, [facility]   [address], [address]   [place]  [address] [phone]   FAX: [phone]  [facility]   [address], [address]   [place]  [address] [phone]   FAX: [phone]         Patient Care Team:  Deanna Jackson NP as PCP - General (Nurse Practitioner)  Deanna Jackson NP as PCP - [facility] Empaneled Provider  Constantino Obrien MD (Urology)  Alcon Boone MD (Cardio Vascular Surgery)      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)    BPH with obstruction/lower urinary tract symptoms    Pseudophakia of both eyes    Vitreous floaters of both eyes    Chronic pain of right knee    Hypogonadism male    Pacemaker    Thrombocytopenia    Lumbar pain with radiation down right leg    Right hip pain    Dermatochalasis of both upper eyelids    Venous insufficiency of both lower extremities    Heart block    Vitamin D deficiency    Late onset Alzheimer's disease without behavioral disturbance (HCC)    Chronic renal disease, stage III (HCC)    Osteopenia of multiple sites    DDD (degenerative disc disease), lumbar    Sclerosis of aortic valve cusp    Portal hypertension (HCC)    Reactive depression    Primary osteoarthritis of both hands    Primary biliary cirrhosis    Ventral hernia without obstruction or gangrene         Frandy A Mills returns to the Liver

## 2025-04-14 ENCOUNTER — HOSPITAL ENCOUNTER (OUTPATIENT)
Facility: HOSPITAL | Age: 85
Discharge: HOME | End: 2025-04-17
Payer: MEDICARE

## 2025-04-14 DIAGNOSIS — K74.3 PRIMARY BILIARY CIRRHOSIS: ICD-10-CM

## 2025-04-14 LAB
AFP L3 MFR SERPL: NORMAL % (ref 0–9.9)
AFP SERPL-MCNC: 2.7 NG/ML (ref 0–6.4)

## 2025-04-14 PROCEDURE — 76705 ECHO EXAM OF ABDOMEN: CPT

## 2025-04-21 ENCOUNTER — RESULTS FOLLOW-UP (OUTPATIENT)
Age: 85
End: 2025-04-21

## 2025-04-22 ENCOUNTER — TELEPHONE (OUTPATIENT)
Age: 85
End: 2025-04-22

## 2025-04-22 ENCOUNTER — OFFICE VISIT (OUTPATIENT)
Age: 85
End: 2025-04-22
Payer: MEDICARE

## 2025-04-22 VITALS
HEART RATE: 78 BPM | WEIGHT: 169 LBS | SYSTOLIC BLOOD PRESSURE: 158 MMHG | DIASTOLIC BLOOD PRESSURE: 81 MMHG | BODY MASS INDEX: 24.25 KG/M2 | OXYGEN SATURATION: 95 % | TEMPERATURE: 97.7 F

## 2025-04-22 DIAGNOSIS — N39.0 RECURRENT UTI: ICD-10-CM

## 2025-04-22 DIAGNOSIS — F03.B0 MODERATE DEMENTIA WITHOUT BEHAVIORAL DISTURBANCE, PSYCHOTIC DISTURBANCE, MOOD DISTURBANCE, OR ANXIETY, UNSPECIFIED DEMENTIA TYPE (HCC): Primary | ICD-10-CM

## 2025-04-22 DIAGNOSIS — J41.1 BRONCHITIS, MUCOPURULENT RECURRENT (HCC): ICD-10-CM

## 2025-04-22 LAB
BILIRUBIN, URINE, POC: NEGATIVE
BLOOD URINE, POC: NORMAL
GLUCOSE URINE, POC: NEGATIVE
KETONES, URINE, POC: NEGATIVE
LEUKOCYTE ESTERASE, URINE, POC: NORMAL
NITRITE, URINE, POC: NEGATIVE
PH, URINE, POC: 6 (ref 4.6–8)
PROTEIN,URINE, POC: NEGATIVE
SPECIFIC GRAVITY, URINE, POC: 1.02 (ref 1–1.03)
URINALYSIS CLARITY, POC: NORMAL
URINALYSIS COLOR, POC: YELLOW
UROBILINOGEN, POC: NORMAL MG/DL

## 2025-04-22 PROCEDURE — 99215 OFFICE O/P EST HI 40 MIN: CPT | Performed by: NURSE PRACTITIONER

## 2025-04-22 PROCEDURE — 81001 URINALYSIS AUTO W/SCOPE: CPT | Performed by: NURSE PRACTITIONER

## 2025-04-22 PROCEDURE — 1123F ACP DISCUSS/DSCN MKR DOCD: CPT | Performed by: NURSE PRACTITIONER

## 2025-04-22 PROCEDURE — 1125F AMNT PAIN NOTED PAIN PRSNT: CPT | Performed by: NURSE PRACTITIONER

## 2025-04-22 RX ORDER — SERTRALINE HYDROCHLORIDE 25 MG/1
25 TABLET, FILM COATED ORAL DAILY
Qty: 30 TABLET | Refills: 0 | Status: SHIPPED | OUTPATIENT
Start: 2025-04-22 | End: 2025-04-22

## 2025-04-22 RX ORDER — OLANZAPINE 2.5 MG/1
2.5 TABLET, FILM COATED ORAL DAILY PRN
Qty: 30 TABLET | Refills: 0 | Status: SHIPPED | OUTPATIENT
Start: 2025-04-22

## 2025-04-22 SDOH — ECONOMIC STABILITY: FOOD INSECURITY: WITHIN THE PAST 12 MONTHS, YOU WORRIED THAT YOUR FOOD WOULD RUN OUT BEFORE YOU GOT MONEY TO BUY MORE.: NEVER TRUE

## 2025-04-22 SDOH — ECONOMIC STABILITY: FOOD INSECURITY: WITHIN THE PAST 12 MONTHS, THE FOOD YOU BOUGHT JUST DIDN'T LAST AND YOU DIDN'T HAVE MONEY TO GET MORE.: NEVER TRUE

## 2025-04-22 ASSESSMENT — COLUMBIA-SUICIDE SEVERITY RATING SCALE - C-SSRS
6. HAVE YOU EVER DONE ANYTHING, STARTED TO DO ANYTHING, OR PREPARED TO DO ANYTHING TO END YOUR LIFE?: NO
1. WITHIN THE PAST MONTH, HAVE YOU WISHED YOU WERE DEAD OR WISHED YOU COULD GO TO SLEEP AND NOT WAKE UP?: YES
5. HAVE YOU STARTED TO WORK OUT OR WORKED OUT THE DETAILS OF HOW TO KILL YOURSELF? DO YOU INTEND TO CARRY OUT THIS PLAN?: NO
2. HAVE YOU ACTUALLY HAD ANY THOUGHTS OF KILLING YOURSELF?: YES
4. HAVE YOU HAD THESE THOUGHTS AND HAD SOME INTENTION OF ACTING ON THEM?: YES
3. HAVE YOU BEEN THINKING ABOUT HOW YOU MIGHT KILL YOURSELF?: YES

## 2025-04-22 ASSESSMENT — PATIENT HEALTH QUESTIONNAIRE - PHQ9
SUM OF ALL RESPONSES TO PHQ QUESTIONS 1-9: 17
6. FEELING BAD ABOUT YOURSELF - OR THAT YOU ARE A FAILURE OR HAVE LET YOURSELF OR YOUR FAMILY DOWN: NEARLY EVERY DAY
9. THOUGHTS THAT YOU WOULD BE BETTER OFF DEAD, OR OF HURTING YOURSELF: NEARLY EVERY DAY
SUM OF ALL RESPONSES TO PHQ QUESTIONS 1-9: 20
5. POOR APPETITE OR OVEREATING: NOT AT ALL
SUM OF ALL RESPONSES TO PHQ QUESTIONS 1-9: 20
7. TROUBLE CONCENTRATING ON THINGS, SUCH AS READING THE NEWSPAPER OR WATCHING TELEVISION: NEARLY EVERY DAY
SUM OF ALL RESPONSES TO PHQ QUESTIONS 1-9: 20
4. FEELING TIRED OR HAVING LITTLE ENERGY: MORE THAN HALF THE DAYS
1. LITTLE INTEREST OR PLEASURE IN DOING THINGS: NEARLY EVERY DAY
3. TROUBLE FALLING OR STAYING ASLEEP: NEARLY EVERY DAY
2. FEELING DOWN, DEPRESSED OR HOPELESS: NEARLY EVERY DAY
10. IF YOU CHECKED OFF ANY PROBLEMS, HOW DIFFICULT HAVE THESE PROBLEMS MADE IT FOR YOU TO DO YOUR WORK, TAKE CARE OF THINGS AT HOME, OR GET ALONG WITH OTHER PEOPLE: EXTREMELY DIFFICULT
8. MOVING OR SPEAKING SO SLOWLY THAT OTHER PEOPLE COULD HAVE NOTICED. OR THE OPPOSITE, BEING SO FIGETY OR RESTLESS THAT YOU HAVE BEEN MOVING AROUND A LOT MORE THAN USUAL: NOT AT ALL

## 2025-04-22 NOTE — PROGRESS NOTES
Chief Complaint   Patient presents with    Paranoid    Urinary Frequency       Vitals:    04/22/25 0909   BP: (!) 158/81   Pulse: 78   Temp: 97.7 °F (36.5 °C)   SpO2: 95%     \"Have you been to the ER, urgent care clinic since your last visit?  Hospitalized since your last visit?\"    NO    “Have you seen or consulted any other health care providers outside our system since your last visit?”    NO

## 2025-04-22 NOTE — PROGRESS NOTES
Subjective:     CC: depression, paranoia, delusions    Frandy Mills is a 85 y.o. male with dementia who presents today to follow up for major depression.     Dementia  His former PCP Dr Estrada diagnosed him with dementia years ago.   Over the past year, however, his wife started accompanying him to his appts and does not believe he has dementia. He was referred to neuro for further evaluation.      Saw [facility] NP Mukesh Edwards 7-11-24. MOCA in office was 10/30.   Cannot have brain MRI due to pacemaker.   Note states she suspects underlying dementia. \"Tau was positive with borderline beta amyloid which could suggest developing Alzheimer's versus different neurodegenerative process.     Of note, he could not tolerate Aricept or Namenda.    He has been depressed for a few years now. His depression mostly stems from his lack of independence. He is not able to do the things he used to love to do. He has not been interested in taking any medication for it. He does not tolerate meds well.     He is accompanied by his wife today who is his main caregiver. She wrote a letter prior to this appt explaining patient's behavior at home. (See scanned document) It states that over the past 8 months there has been NEW onset paranoid behavior and delusional thinking that has worsened over the past 3 to 4 weeks. He has been accusing her of having an affair. They have been  for 60 years and she adamantly denies this.  She mostly stays at home with him and will often bring him with her to do errands.  She has a \"helper\" who will come over on occasion to sit with him while she goes to choir practice.  Patient has accused her of having an affair with another member of their [employer] and he has not wanted to go to [employer] anymore.    He feels hurt and betrayed by this and reported SI today.  He cannot articulate a plan.  Wife does not believe he is capable of this because she keeps such a close eye on him at home.  She

## 2025-04-22 NOTE — TELEPHONE ENCOUNTER
Spoke to urology Dr Obrien's office and made them aware of his recurrent UTI since his last cystoscopy. Asked them to please reach out to patient to schedule an appt soon. They agreed.

## 2025-04-23 ENCOUNTER — TELEPHONE (OUTPATIENT)
Age: 85
End: 2025-04-23

## 2025-04-23 NOTE — TELEPHONE ENCOUNTER
Please call patient's wife and ask her to stop the Singulair.  This was prescribed about a month ago for his congestion.  After giving more thought to his symptoms yesterday, I realized that Singulair can sometimes cause suicidal thoughts.  I would like him to stop the medication.     Regarding the Zoloft that was prescribed yesterday, I would wait a week or two before starting it to see if his mood improves after stopping the Singulair.  He seems to be very sensitive to medications.  I would still like for him to follow-up with neurology and urology and I called both offices yesterday and they said they would reach out to her to schedule an appointment soon.

## 2025-04-23 NOTE — TELEPHONE ENCOUNTER
PC ZAYRA Mrs. Mills, informed of Deanna's advice message, OK of understanding and thanks.  Both offices has already reached out to her regarding the appointments.

## 2025-04-24 ENCOUNTER — RESULTS FOLLOW-UP (OUTPATIENT)
Age: 85
End: 2025-04-24

## 2025-04-24 ENCOUNTER — CLINICAL DOCUMENTATION (OUTPATIENT)
Age: 85
End: 2025-04-24

## 2025-04-24 NOTE — TELEPHONE ENCOUNTER
Spoke with pt and gave ultrasound/lab results as written. Results were also faxed to the pt PCP @ [phone]---- Message from ANN Small CNP sent at 4/21/2025  2:32 PM EDT -----  Please call with ultrasound results.  Compatible with cirrhosis.  No suspicious liver masses.  Thank you.

## 2025-04-25 LAB
BACTERIA SPEC CULT: ABNORMAL
CC UR VC: ABNORMAL
SERVICE CMNT-IMP: ABNORMAL

## 2025-04-28 ENCOUNTER — CLINICAL DOCUMENTATION (OUTPATIENT)
Age: 85
End: 2025-04-28

## 2025-04-28 NOTE — PROGRESS NOTES
Patient seen by urology Dr. Obrien on 4/24/2025.  Plan for repeat cystoscopy on 8/21/2025 for history of bladder cancer.  He was advised to continue Flomax twice daily.  He was started on dutasteride.  He was given a prescription for Macrobid for recent UTI.  Repeat UA was done and culture was sent.  This is likely due to his incomplete bladder emptying.  Dutasteride should help with this.

## 2025-05-05 NOTE — PROGRESS NOTES
Subjective:     Chief Complaint   Patient presents with    Congestion     Head and chest         Frandy Mills is a 85 y.o. male who presents today for follow up     HPI    Dementia  His former PCP Dr Estrada diagnosed him with dementia years ago.   Over the past year, however, his wife started accompanying him to his appts and does not believe he has dementia. He was referred to neuro for further evaluation.      Saw [facility] NP Mukesh Edwards 7-11-24. MOCA in office was 10/30.   Cannot have brain MRI due to pacemaker.   Note states she suspects underlying dementia. \"Tau was positive with borderline beta amyloid which could suggest developing Alzheimer's versus different neurodegenerative process.      Of note, he could not tolerate Aricept or Namenda.    Was seen by neuro most recently 4/28, MRI not obtainable due to pacemaker placement.  He was started on low-dose of Zyprexa and Zoloft at last PCP visit however he did not start this. He saw neurology and wife states was left up to them if wanted to start this. His wife and family do not feel that this has been necessary.   Have been using behavioral techniques with redirection   Sleep has been good   Since last visit has not had any agitation or period behaviors since last PCP visit   Wife thinks that some of these symptoms was due to singular side effects   She feels comfortable continuing to provide his care at home.  Not interested in additional medications at this point      Patient seen by urology Dr. Obrien on 4/24/2025.  Plan for repeat cystoscopy on 8/21/2025 for history of bladder cancer.  He was advised to continue Flomax twice daily.  He was started on dutasteride.     He has had recent UTI,   Ecoli - pan sensitive on 4/22.   HE has been treated with multiple abx - urology prescribed Macrobid for 7x then switch to once daily for suppression.   Unfortunately they were unclear on instructions with this - has continued BID for the last 1.5 weeks

## 2025-05-08 ENCOUNTER — OFFICE VISIT (OUTPATIENT)
Age: 85
End: 2025-05-08
Payer: MEDICARE

## 2025-05-08 VITALS
RESPIRATION RATE: 16 BRPM | HEART RATE: 69 BPM | SYSTOLIC BLOOD PRESSURE: 134 MMHG | DIASTOLIC BLOOD PRESSURE: 69 MMHG | HEIGHT: 70 IN | WEIGHT: 168.8 LBS | TEMPERATURE: 97.9 F | BODY MASS INDEX: 24.17 KG/M2 | OXYGEN SATURATION: 94 %

## 2025-05-08 DIAGNOSIS — G30.1 LATE ONSET ALZHEIMER'S DISEASE WITHOUT BEHAVIORAL DISTURBANCE (HCC): Primary | ICD-10-CM

## 2025-05-08 DIAGNOSIS — Z85.51 HISTORY OF BLADDER CANCER: ICD-10-CM

## 2025-05-08 DIAGNOSIS — C67.9 MALIGNANT NEOPLASM OF URINARY BLADDER, UNSPECIFIED SITE (HCC): ICD-10-CM

## 2025-05-08 DIAGNOSIS — N39.0 RECURRENT UTI: ICD-10-CM

## 2025-05-08 DIAGNOSIS — F02.80 LATE ONSET ALZHEIMER'S DISEASE WITHOUT BEHAVIORAL DISTURBANCE (HCC): Primary | ICD-10-CM

## 2025-05-08 PROCEDURE — 99214 OFFICE O/P EST MOD 30 MIN: CPT | Performed by: STUDENT IN AN ORGANIZED HEALTH CARE EDUCATION/TRAINING PROGRAM

## 2025-05-08 PROCEDURE — 1123F ACP DISCUSS/DSCN MKR DOCD: CPT | Performed by: STUDENT IN AN ORGANIZED HEALTH CARE EDUCATION/TRAINING PROGRAM

## 2025-05-08 ASSESSMENT — PATIENT HEALTH QUESTIONNAIRE - PHQ9: DEPRESSION UNABLE TO ASSESS: FUNCTIONAL CAPACITY MOTIVATION LIMITS ACCURACY

## 2025-05-08 NOTE — PROGRESS NOTES
Chief Complaint   Patient presents with    Congestion     Head and chest         Vitals:    05/08/25 1011   BP: 134/69   Pulse: 69   Resp: 16   Temp: 97.9 °F (36.6 °C)   SpO2: 94%     Have you been to the ER, urgent care clinic since your last visit?  Hospitalized since your last visit?   NO    Have you seen or consulted any other health care providers outside our system since your last visit?   NO

## 2025-05-15 RX ORDER — OLANZAPINE 2.5 MG/1
2.5 TABLET, FILM COATED ORAL DAILY PRN
Qty: 90 TABLET | Refills: 1 | Status: SHIPPED | OUTPATIENT
Start: 2025-05-15

## 2025-06-06 ENCOUNTER — TELEPHONE (OUTPATIENT)
Age: 85
End: 2025-06-06

## 2025-06-06 DIAGNOSIS — N39.0 RECURRENT UTI: Primary | ICD-10-CM

## 2025-06-06 NOTE — TELEPHONE ENCOUNTER
PT just got off antibiotics for a UTI and PT's wife wants to know if he can drop off a urine sample Monday morning to make sure his urine is clear.

## 2025-06-10 ENCOUNTER — APPOINTMENT (OUTPATIENT)
Facility: HOSPITAL | Age: 85
End: 2025-06-10
Payer: MEDICARE

## 2025-06-10 ENCOUNTER — HOSPITAL ENCOUNTER (EMERGENCY)
Facility: HOSPITAL | Age: 85
Discharge: HOME | End: 2025-06-10
Attending: EMERGENCY MEDICINE
Payer: MEDICARE

## 2025-06-10 VITALS
RESPIRATION RATE: 16 BRPM | BODY MASS INDEX: 24.34 KG/M2 | OXYGEN SATURATION: 93 % | DIASTOLIC BLOOD PRESSURE: 66 MMHG | WEIGHT: 170 LBS | HEART RATE: 76 BPM | HEIGHT: 70 IN | SYSTOLIC BLOOD PRESSURE: 111 MMHG | TEMPERATURE: 98.1 F

## 2025-06-10 DIAGNOSIS — D17.23 LIPOMA OF RIGHT LOWER EXTREMITY: Primary | ICD-10-CM

## 2025-06-10 LAB — ECHO BSA: 1.95 M2

## 2025-06-10 PROCEDURE — 93971 EXTREMITY STUDY: CPT

## 2025-06-10 PROCEDURE — 99284 EMERGENCY DEPT VISIT MOD MDM: CPT

## 2025-06-10 ASSESSMENT — LIFESTYLE VARIABLES
HOW MANY STANDARD DRINKS CONTAINING ALCOHOL DO YOU HAVE ON A TYPICAL DAY: PATIENT DOES NOT DRINK
HOW OFTEN DO YOU HAVE A DRINK CONTAINING ALCOHOL: NEVER

## 2025-06-10 ASSESSMENT — PAIN DESCRIPTION - ORIENTATION: ORIENTATION: RIGHT

## 2025-06-10 ASSESSMENT — PAIN - FUNCTIONAL ASSESSMENT: PAIN_FUNCTIONAL_ASSESSMENT: 0-10

## 2025-06-10 ASSESSMENT — PAIN SCALES - GENERAL
PAINLEVEL_OUTOF10: 0
PAINLEVEL_OUTOF10: 3

## 2025-06-10 ASSESSMENT — PAIN DESCRIPTION - LOCATION: LOCATION: LEG

## 2025-06-10 ASSESSMENT — PAIN DESCRIPTION - DESCRIPTORS: DESCRIPTORS: TENDER

## 2025-06-10 NOTE — ED NOTES
I have reviewed discharge instructions with the patient and spouse. The patient and spouse verbalized understanding. No questions at this time. Ambulated without difficulty.

## 2025-06-10 NOTE — ED PROVIDER NOTES
[facility] EMERGENCY DEPARTMENT  EMERGENCY DEPARTMENT ENCOUNTER       Pt Name: Frandy Mills  MRN: [id number]  Birthdate 1940  Date of evaluation: 6/10/2025  Provider: Kassie Navarro MD   PCP: Deanna Jackson, ANN - NP  Note Started: 6:04 PM EDT 6/10/25     CHIEF COMPLAINT       Chief Complaint   Patient presents with    Leg Swelling        HISTORY OF PRESENT ILLNESS: 1 or more elements      History From: patient's wife, History limited by: none     Frandy Mills is a 85 y.o. male presents to ED complaining of swelling of right calf.  Painful and swollen nodule noted on right calf today.  No treatments for symptoms prior to arrival.  No injuries noted.       Please See MDM for Additional Details of the HPI/PMH  Nursing Notes were all reviewed and agreed with or any disagreements were addressed in the HPI.     REVIEW OF SYSTEMS        Positives and Pertinent negatives as per HPI.    PAST HISTORY     Past Medical History:  Past Medical History:   Diagnosis Date    Adverse effect of anesthesia     bradycardia (HR 21 and junctional rhythm) with outpt laser bladder stone surgery 8/2021    Allergic     Anemia     Arthritis     BPH (benign prostatic hyperplasia)     Esophageal varices (HCC) March 2016    banded x 6    GERD (gastroesophageal reflux disease)     GI bleed March 2016    Hypogonadism male     Left bundle branch block (LBBB)     Dr. Boone, pacemaker 9/2021    Primary biliary cirrhosis (HCC)     Vitamin D deficiency        Past Surgical History:  Past Surgical History:   Procedure Laterality Date    HEENT  2018    cataract removal    HEENT  2009    deviated septum repair    ORTHOPEDIC SURGERY Right 01/2017    Arthroscopy knee    PACEMAKER      PACEMAKER PLACEMENT  09/03/2021    SECONDARY TO L BBB AND JUNCTIONAL RHYTHM DURING SURGERY.      REFRACTIVE SURGERY Left     SEPTOPLASTY                           Biopsy    UPPER GASTROINTESTINAL ENDOSCOPY      UPPER GASTROINTESTINAL ENDOSCOPY

## 2025-06-10 NOTE — ED TRIAGE NOTES
Pt arrived with complaint of leg swelling.  Per pts family pt has a history of fluid buildup for 3 days and was placed on Lasix which he took yesterday and the swelling went down, pt reports he has a knot on the back of his right calf and it is painful when touched and sometimes when he walks.  Pt is awake and alert  pt ambulated with a slow gait to room 10.  Pt and family educated on ER flow

## 2025-06-16 ENCOUNTER — CLINICAL SUPPORT (OUTPATIENT)
Age: 85
End: 2025-06-16
Payer: MEDICARE

## 2025-06-16 DIAGNOSIS — R35.0 URINARY FREQUENCY: Primary | ICD-10-CM

## 2025-06-16 LAB
BILIRUBIN, URINE, POC: NEGATIVE
BLOOD URINE, POC: NORMAL
GLUCOSE URINE, POC: NEGATIVE
KETONES, URINE, POC: NEGATIVE
LEUKOCYTE ESTERASE, URINE, POC: NORMAL
NITRITE, URINE, POC: NEGATIVE
PH, URINE, POC: 7 (ref 4.6–8)
PROTEIN,URINE, POC: NEGATIVE
SPECIFIC GRAVITY, URINE, POC: 1.01 (ref 1–1.03)
URINALYSIS CLARITY, POC: CLEAR
URINALYSIS COLOR, POC: YELLOW
UROBILINOGEN, POC: NORMAL

## 2025-06-16 PROCEDURE — 81003 URINALYSIS AUTO W/O SCOPE: CPT

## 2025-06-17 ENCOUNTER — SCHEDULED TELEPHONE ENCOUNTER (OUTPATIENT)
Age: 85
End: 2025-06-17

## 2025-06-17 ENCOUNTER — TELEPHONE (OUTPATIENT)
Age: 85
End: 2025-06-17

## 2025-06-17 DIAGNOSIS — N39.0 RECURRENT UTI: Primary | ICD-10-CM

## 2025-06-17 DIAGNOSIS — R35.1 BENIGN PROSTATIC HYPERPLASIA WITH NOCTURIA: ICD-10-CM

## 2025-06-17 DIAGNOSIS — N40.1 BENIGN PROSTATIC HYPERPLASIA WITH NOCTURIA: ICD-10-CM

## 2025-06-17 NOTE — TELEPHONE ENCOUNTER
Called patient's wife to let her know that the urine sample that we rec'd yesterday was not sent off for culture.  It was discarded.  We need a new sample to send off to do a culture.  She says she will come by tomorrow to  another urine specimen cup and have him give us another sample later this week.

## 2025-06-17 NOTE — PROGRESS NOTES
Frandy Mills is a 85 y.o. male evaluated via audio-only technology on 6/17/2025 to follow up for recurrent UTI. Patient has dementia so I spoke to his wife today who is his main caregiver.    Assessment & Plan:     BPH with recurrent UTI   He just finished a 3-week course of Macrobid prescribed to him by his urologist   He is asymptomatic  UA was brought in yesterday-it was positive for small leuks and trace blood only.  Unfortunately the urine was not sent off for C/S so we need a new sample.   Patient's wife will have patient give us another sample this week to send off for C/S and we will call her with the results.       Subjective:     Patient has BPH with recurrent UTI ever since his last cystoscopy was done in February.  His past several urine cultures have been positive for E. Faecalis (last one done was in April).  Since then his urologist Dr. Tima Bass prescribed him a 3-week course of Macrobid which patient finished 2 weeks ago.  He is asymptomatic and wife would like his urine checked to make sure the infection has cleared.  She had patient come by the office to give us a urine sample yesterday.    Prior to Admission medications    Medication Sig Start Date End Date Taking? Authorizing Provider   sertraline (ZOLOFT) 50 MG tablet TAKE 1 TABLET BY MOUTH EVERY DAY 5/15/25   Deanna Jackson APRN - NP   OLANZapine (ZYPREXA) 2.5 MG tablet TAKE 1 TABLET BY MOUTH DAILY AS NEEDED (AGITATION) 5/15/25   Deanna Jackson APRN - NP   ursodiol (ACTIGALL) 300 MG capsule Take 2 capsules by mouth 2 times daily 4/10/25   Bill Dior APRN - CNP   furosemide (LASIX) 20 MG tablet TAKE 1 TABLET BY MOUTH EVERY DAY 4/7/25   Deanna Jackson APRN - NP   B-D SYRINGE SLIP TIP 3CC 3 ML MISC USE TO INJECT VITAMIN B12 AS DIRECTED. 7/11/24   Provider, MD Malia   spironolactone (ALDACTONE) 50 MG tablet Take 1 tablet by mouth daily as needed (swelling) 8/12/24   Deanna Jackson APRN - NP   Cyanocobalamin 1000

## 2025-06-20 ENCOUNTER — LAB (OUTPATIENT)
Age: 85
End: 2025-06-20

## 2025-06-20 DIAGNOSIS — N39.0 RECURRENT UTI: ICD-10-CM

## 2025-06-22 LAB
BACTERIA SPEC CULT: ABNORMAL
CC UR VC: ABNORMAL
SERVICE CMNT-IMP: ABNORMAL

## 2025-06-23 ENCOUNTER — RESULTS FOLLOW-UP (OUTPATIENT)
Age: 85
End: 2025-06-23

## 2025-06-23 LAB
BACTERIA SPEC CULT: ABNORMAL
CC UR VC: ABNORMAL
SERVICE CMNT-IMP: ABNORMAL

## 2025-07-29 ENCOUNTER — TELEPHONE (OUTPATIENT)
Age: 85
End: 2025-07-29

## 2025-07-29 DIAGNOSIS — E55.9 VITAMIN D DEFICIENCY, UNSPECIFIED: ICD-10-CM

## 2025-07-29 RX ORDER — URSODIOL 300 MG/1
600 CAPSULE ORAL 2 TIMES DAILY
Qty: 360 CAPSULE | Refills: 3 | Status: SHIPPED | OUTPATIENT
Start: 2025-07-29 | End: 2025-07-29

## 2025-07-29 RX ORDER — URSODIOL 300 MG/1
600 CAPSULE ORAL 2 TIMES DAILY
Qty: 360 CAPSULE | Refills: 3 | Status: SHIPPED | OUTPATIENT
Start: 2025-07-29

## 2025-07-29 NOTE — TELEPHONE ENCOUNTER
Patient's wife called requesting rx for the following medication. Next office visit with MLS is in October.     ursodiol (ACTIGALL) 300 MG capsule

## 2025-08-18 ENCOUNTER — OFFICE VISIT (OUTPATIENT)
Age: 85
End: 2025-08-18
Payer: MEDICARE

## 2025-08-18 VITALS
HEART RATE: 70 BPM | HEIGHT: 70 IN | DIASTOLIC BLOOD PRESSURE: 60 MMHG | OXYGEN SATURATION: 99 % | TEMPERATURE: 98.1 F | SYSTOLIC BLOOD PRESSURE: 110 MMHG | WEIGHT: 164.2 LBS | RESPIRATION RATE: 18 BRPM | BODY MASS INDEX: 23.51 KG/M2

## 2025-08-18 DIAGNOSIS — F03.B0 MODERATE DEMENTIA WITHOUT BEHAVIORAL DISTURBANCE, PSYCHOTIC DISTURBANCE, MOOD DISTURBANCE, OR ANXIETY, UNSPECIFIED DEMENTIA TYPE (HCC): Primary | ICD-10-CM

## 2025-08-18 DIAGNOSIS — N40.1 BENIGN PROSTATIC HYPERPLASIA WITH NOCTURIA: ICD-10-CM

## 2025-08-18 DIAGNOSIS — J30.89 NON-SEASONAL ALLERGIC RHINITIS, UNSPECIFIED TRIGGER: ICD-10-CM

## 2025-08-18 DIAGNOSIS — R35.1 BENIGN PROSTATIC HYPERPLASIA WITH NOCTURIA: ICD-10-CM

## 2025-08-18 DIAGNOSIS — N39.0 RECURRENT UTI: ICD-10-CM

## 2025-08-18 DIAGNOSIS — F33.0 MILD EPISODE OF RECURRENT MAJOR DEPRESSIVE DISORDER: ICD-10-CM

## 2025-08-18 DIAGNOSIS — K74.3 HEPATIC CIRRHOSIS DUE TO PRIMARY BILIARY CHOLANGITIS (HCC): ICD-10-CM

## 2025-08-18 PROCEDURE — 1160F RVW MEDS BY RX/DR IN RCRD: CPT | Performed by: NURSE PRACTITIONER

## 2025-08-18 PROCEDURE — 1123F ACP DISCUSS/DSCN MKR DOCD: CPT | Performed by: NURSE PRACTITIONER

## 2025-08-18 PROCEDURE — 1159F MED LIST DOCD IN RCRD: CPT | Performed by: NURSE PRACTITIONER

## 2025-08-18 PROCEDURE — 1126F AMNT PAIN NOTED NONE PRSNT: CPT | Performed by: NURSE PRACTITIONER

## 2025-08-18 PROCEDURE — 99214 OFFICE O/P EST MOD 30 MIN: CPT | Performed by: NURSE PRACTITIONER

## 2025-08-18 RX ORDER — NITROFURANTOIN 25; 75 MG/1; MG/1
100 CAPSULE ORAL EVERY 12 HOURS
COMMUNITY

## 2025-08-18 RX ORDER — FLUTICASONE PROPIONATE 50 MCG
2 SPRAY, SUSPENSION (ML) NASAL DAILY
COMMUNITY
Start: 2025-08-18

## 2025-08-18 RX ORDER — DUTASTERIDE 0.5 MG/1
0.5 CAPSULE, LIQUID FILLED ORAL DAILY
COMMUNITY

## 2025-08-18 ASSESSMENT — PATIENT HEALTH QUESTIONNAIRE - PHQ9
1. LITTLE INTEREST OR PLEASURE IN DOING THINGS: SEVERAL DAYS
SUM OF ALL RESPONSES TO PHQ QUESTIONS 1-9: 2
SUM OF ALL RESPONSES TO PHQ QUESTIONS 1-9: 2
2. FEELING DOWN, DEPRESSED OR HOPELESS: SEVERAL DAYS
SUM OF ALL RESPONSES TO PHQ QUESTIONS 1-9: 2
SUM OF ALL RESPONSES TO PHQ QUESTIONS 1-9: 2

## 2025-08-27 ENCOUNTER — CLINICAL DOCUMENTATION (OUTPATIENT)
Age: 85
End: 2025-08-27

## (undated) DEVICE — SUT SLK 0 30IN SH BLK --

## (undated) DEVICE — SET GRAV CK VLV NEEDLESS ST 3 GANGED 4WAY STPCOCK HI FLO 10

## (undated) DEVICE — BAG BELONG PT PERS CLEAR HANDL

## (undated) DEVICE — SUTURE VCRL 2-0 L27IN ABSRB UD PS-2 L19MM 1/2 CIR J428H

## (undated) DEVICE — NDL FLTR TIP 5 MIC 18GX1.5IN --

## (undated) DEVICE — MOUTHPIECE ENDOSCP 20X27MM --

## (undated) DEVICE — 3M™ TEGADERM™ TRANSPARENT FILM DRESSING FRAME STYLE, 1626W, 4 IN X 4-3/4 IN (10 CM X 12 CM), 50/CT 4CT/CASE: Brand: 3M™ TEGADERM™

## (undated) DEVICE — CANN NASAL O2 CAPNOGRAPHY AD -- FILTERLINE

## (undated) DEVICE — GLOVE ORANGE PI 7 1/2   MSG9075

## (undated) DEVICE — SUTURE COAT VCRL SZ 4-0 L18IN ABSRB UD L19MM PS-2 1/2 CIR J496G

## (undated) DEVICE — SYR 3ML LL TIP 1/10ML GRAD --

## (undated) DEVICE — CATH IV AUTOGRD BC BLU 22GA 25 -- INSYTE

## (undated) DEVICE — CATH IV AUTOGRD BC PNK 20GA 25 -- INSYTE

## (undated) DEVICE — MULTIPLE BAND LIGATOR: Brand: SPEEDBAND SUPERVIEW SUPER 7

## (undated) DEVICE — FORCEPS BX CAP 240CM L RAD JAW 4

## (undated) DEVICE — DRAPE PRB US TRNSDCR 6X96IN --

## (undated) DEVICE — 4-PORT MANIFOLD: Brand: NEPTUNE 2

## (undated) DEVICE — BASIC SINGLE BASIN BTC-LF: Brand: MEDLINE INDUSTRIES, INC.

## (undated) DEVICE — TOTAL TRAY, 16FR 10ML SIL FOLEY, URN: Brand: MEDLINE

## (undated) DEVICE — SET ADMIN 16ML TBNG L100IN 2 Y INJ SITE IV PIGGY BK DISP

## (undated) DEVICE — BITEBLOCK ENDOSCP 60FR MAXI WHT POLYETH STURDY W/ VELC WVN

## (undated) DEVICE — KIT COLON W/ 1.1OZ LUB AND 2 END

## (undated) DEVICE — TRAP SPEC COLL POLYP POLYSTYR --

## (undated) DEVICE — SINGLE PORT MANIFOLD: Brand: NEPTUNE 2

## (undated) DEVICE — SPONGE GZ W4XL4IN COT 12 PLY TYP VII WVN C FLD DSGN

## (undated) DEVICE — DEVICE SECUREMENT 1/32IN POLYETH FOAM F ANCHR URIN CATH

## (undated) DEVICE — MEDI-TRACE CADENCE ADULT, DEFIBRILLATION ELECTRODE -RTS  (10 PR/PK) - PHILIPS: Brand: MEDI-TRACE CADENCE

## (undated) DEVICE — SOLUTION IRRIG 3000ML 0.9% SOD CHL USP UROMATIC PLAS CONT

## (undated) DEVICE — MOUTHPIECE ENDOSCP L CTRL OPN AND SIDE PORTS DISP

## (undated) DEVICE — CATHETER,URETHRAL,REDRUBBER,STRL,16FR: Brand: MEDLINE

## (undated) DEVICE — LIGATOR ENDOSCP DIA8.6-11.5MM MULT DISP SPDBND LIGATOR SUP

## (undated) DEVICE — SOLIDIFIER MEDC 1200ML -- CONVERT TO 356117

## (undated) DEVICE — REM POLYHESIVE ADULT PATIENT RETURN ELECTRODE: Brand: VALLEYLAB

## (undated) DEVICE — Device

## (undated) DEVICE — MEDI-VAC NON-CONDUCTIVE SUCTION TUBING: Brand: CARDINAL HEALTH

## (undated) DEVICE — BASIN EMESIS 500CC ROSE 250/CS 60/PLT: Brand: MEDEGEN MEDICAL PRODUCTS, LLC

## (undated) DEVICE — 3M™ CUROS™ DISINFECTING CAP FOR NEEDLELESS CONNECTORS 270/CARTON 20 CARTONS/CASE CFF1-270: Brand: CUROS™

## (undated) DEVICE — KENDALL RADIOLUCENT FOAM MONITORING ELECTRODE -RECTANGULAR SHAPE: Brand: KENDALL

## (undated) DEVICE — SYR 5ML 1/5 GRAD LL NSAF LF --

## (undated) DEVICE — ADULT SPO2 SENSOR: Brand: NELLCOR

## (undated) DEVICE — KENDALL RADIOLUCENT FOAM MONITORING ELECTRODE RECTANGULAR SHAPE: Brand: KENDALL

## (undated) DEVICE — SNARE POLYP SM W13MMXL240CM SHTH DIA2.4MM OVL FLX DISP

## (undated) DEVICE — SOLUTION IRRIG 1000ML 0.9% SOD CHL USP POUR PLAS BTL

## (undated) DEVICE — CYSTO MRMC: Brand: MEDLINE INDUSTRIES, INC.

## (undated) DEVICE — 1200 GUARD II KIT W/5MM TUBE W/O VAC TUBE: Brand: GUARDIAN

## (undated) DEVICE — NDL PRT INJ NSAF BLNT 18GX1.5 --

## (undated) DEVICE — INTRO SHTH HEMO 9FR 18G 13CM -- PRELUDE SNAP PEEL-AWAY

## (undated) DEVICE — CUTTING ELECTRODE BIPO 24FR 12/30°  FOR RESECTOSCOPES, TELESCOPE Ø 4MM, FOR SHEATHS, INTERMITTENT/CONTINUOUS IRRIGATION, 24/26, FR, LOOP: ROUND, WIRE Ø 0.3MM, FORK COLOR BLUE, STEM COLOR BLUE, PACK=3 PCS, FOR SHARK AND S-LINE RESECTOSCOPES, STERILE, FOR SINGLE USE: Brand: SHARK/S-LINE

## (undated) DEVICE — INTRO PEELWY HEMVLV 7F 13CM -- SHRT PRELUDE SNAP

## (undated) DEVICE — TUBING SUCT L12FT DIA0.25IN CLR W/ MAXI-GRIP AND M/M CONN

## (undated) DEVICE — ERBE NESSY®PLATE 170 SPLIT; 168CM²; CABLE 3M: Brand: ERBE

## (undated) DEVICE — ENDO CARRY-ON PROCEDURE KIT INCLUDES ENZYMATIC SPONGE, GAUZE, BIOHAZARD LABEL, TRAY, LUBRICANT, DIRTY SCOPE LABEL, WATER LABEL, TRAY, DRAWSTRING PAD, AND DEFENDO 4-PIECE KIT.: Brand: ENDO CARRY-ON PROCEDURE KIT

## (undated) DEVICE — SOLUTION IRRIG 1000ML STRL H2O USP PLAS POUR BTL

## (undated) DEVICE — BASIN EMSIS 16OZ GRAPHITE PLAS KID SHP MOLD GRAD FOR ORAL

## (undated) DEVICE — KIT ACCS INTRO 4FR L10CM NDL 21GA L7CM GWIRE L40CM

## (undated) DEVICE — TRAY,IRRIGATION,PISTON SYRINGE,60ML,STRL: Brand: MEDLINE

## (undated) DEVICE — SPEEDBAND SUPERVIEW SUPER 7

## (undated) DEVICE — ELECTRODE,RADIOTRANSLUCENT,FOAM,3PK: Brand: MEDLINE

## (undated) DEVICE — ASSURANCE CLIP

## (undated) DEVICE — SUTURE VCRL SZ 2-0 L36IN ABSRB UD L40MM CT 1/2 CIR J957H

## (undated) DEVICE — MAJ-1414 SINGLE USE ADPATER BIOPSY VALV: Brand: SINGLE USE ADAPTOR BIOPSY VALVE

## (undated) DEVICE — GARMENT,MEDLINE,DVT,INT,CALF,MED, GEN2: Brand: MEDLINE

## (undated) DEVICE — CANNULA CUSH AD W/ 14FT TBG

## (undated) DEVICE — 3M™ IOBAN™ 2 ANTIMICROBIAL INCISE DRAPE 6650EZ: Brand: IOBAN™ 2

## (undated) DEVICE — SIMPLICITY FLUFF UNDERPAD 23X36, MODERATE: Brand: SIMPLICITY

## (undated) DEVICE — STRIP,CLOSURE,WOUND,MEDI-STRIP,1/2X4: Brand: MEDLINE

## (undated) DEVICE — TUBING, SUCTION, 1/4" X 10', STRAIGHT: Brand: MEDLINE

## (undated) DEVICE — BAG,DRAINAGE,4L,A/R TOWER,LL,SLIDE TAP: Brand: MEDLINE